# Patient Record
Sex: MALE | Race: WHITE | NOT HISPANIC OR LATINO | Employment: FULL TIME | URBAN - METROPOLITAN AREA
[De-identification: names, ages, dates, MRNs, and addresses within clinical notes are randomized per-mention and may not be internally consistent; named-entity substitution may affect disease eponyms.]

---

## 2017-01-24 ENCOUNTER — COMMUNICATION - HEALTHEAST (OUTPATIENT)
Dept: FAMILY MEDICINE | Facility: CLINIC | Age: 62
End: 2017-01-24

## 2017-01-24 DIAGNOSIS — E11.9 DIABETES MELLITUS, TYPE 2 (H): ICD-10-CM

## 2017-02-22 ENCOUNTER — COMMUNICATION - HEALTHEAST (OUTPATIENT)
Dept: FAMILY MEDICINE | Facility: CLINIC | Age: 62
End: 2017-02-22

## 2017-02-22 DIAGNOSIS — E11.9 TYPE 2 DIABETES MELLITUS (H): ICD-10-CM

## 2017-03-03 ENCOUNTER — OFFICE VISIT - HEALTHEAST (OUTPATIENT)
Dept: NURSING | Facility: CLINIC | Age: 62
End: 2017-03-03

## 2017-03-03 DIAGNOSIS — E66.9 OBESITY, UNSPECIFIED: ICD-10-CM

## 2017-03-03 DIAGNOSIS — E11.9 TYPE II OR UNSPECIFIED TYPE DIABETES MELLITUS WITHOUT MENTION OF COMPLICATION, NOT STATED AS UNCONTROLLED: ICD-10-CM

## 2017-03-03 DIAGNOSIS — E11.9 CONTROLLED TYPE 2 DIABETES MELLITUS WITHOUT COMPLICATION, WITH LONG-TERM CURRENT USE OF INSULIN (H): ICD-10-CM

## 2017-03-03 DIAGNOSIS — Z79.4 CONTROLLED TYPE 2 DIABETES MELLITUS WITHOUT COMPLICATION, WITH LONG-TERM CURRENT USE OF INSULIN (H): ICD-10-CM

## 2017-03-03 DIAGNOSIS — G40.309 GENERALIZED CONVULSIVE EPILEPSY (H): ICD-10-CM

## 2017-03-03 DIAGNOSIS — F32.9 MAJOR DEPRESSION, CHRONIC: ICD-10-CM

## 2017-03-03 LAB
CHOLEST SERPL-MCNC: 155 MG/DL
FASTING STATUS PATIENT QL REPORTED: NO
HBA1C MFR BLD: 8.5 % (ref 3.5–6)
HDLC SERPL-MCNC: 30 MG/DL
LDLC SERPL CALC-MCNC: 99 MG/DL
TRIGL SERPL-MCNC: 130 MG/DL

## 2017-03-05 ENCOUNTER — COMMUNICATION - HEALTHEAST (OUTPATIENT)
Dept: FAMILY MEDICINE | Facility: CLINIC | Age: 62
End: 2017-03-05

## 2017-03-06 ENCOUNTER — AMBULATORY - HEALTHEAST (OUTPATIENT)
Dept: PODIATRY | Facility: CLINIC | Age: 62
End: 2017-03-06

## 2017-03-06 DIAGNOSIS — L60.2 ONYCHAUXIS: ICD-10-CM

## 2017-03-06 DIAGNOSIS — M79.673 PAIN OF FOOT, UNSPECIFIED LATERALITY: ICD-10-CM

## 2017-03-06 DIAGNOSIS — E11.49 TYPE 2 DIABETES MELLITUS WITH NEUROLOGICAL MANIFESTATIONS (H): ICD-10-CM

## 2017-03-06 ASSESSMENT — MIFFLIN-ST. JEOR: SCORE: 2186.06

## 2017-03-19 ENCOUNTER — COMMUNICATION - HEALTHEAST (OUTPATIENT)
Dept: FAMILY MEDICINE | Facility: CLINIC | Age: 62
End: 2017-03-19

## 2017-03-19 DIAGNOSIS — E78.5 HYPERLIPIDEMIA: ICD-10-CM

## 2017-03-21 ENCOUNTER — COMMUNICATION - HEALTHEAST (OUTPATIENT)
Dept: FAMILY MEDICINE | Facility: CLINIC | Age: 62
End: 2017-03-21

## 2017-04-14 ENCOUNTER — OFFICE VISIT - HEALTHEAST (OUTPATIENT)
Dept: NURSING | Facility: CLINIC | Age: 62
End: 2017-04-14

## 2017-04-14 DIAGNOSIS — Z79.4 TYPE 2 DIABETES MELLITUS WITHOUT COMPLICATION, WITH LONG-TERM CURRENT USE OF INSULIN (H): ICD-10-CM

## 2017-04-14 DIAGNOSIS — G40.309 GENERALIZED CONVULSIVE EPILEPSY (H): ICD-10-CM

## 2017-04-14 DIAGNOSIS — E11.9 TYPE 2 DIABETES MELLITUS WITHOUT COMPLICATION, WITH LONG-TERM CURRENT USE OF INSULIN (H): ICD-10-CM

## 2017-05-02 ENCOUNTER — OFFICE VISIT - HEALTHEAST (OUTPATIENT)
Dept: EDUCATION SERVICES | Facility: CLINIC | Age: 62
End: 2017-05-02

## 2017-05-02 DIAGNOSIS — E11.9 CONTROLLED TYPE 2 DIABETES MELLITUS WITHOUT COMPLICATION, WITH LONG-TERM CURRENT USE OF INSULIN (H): ICD-10-CM

## 2017-05-02 DIAGNOSIS — E11.9 DIABETES (H): ICD-10-CM

## 2017-05-02 DIAGNOSIS — Z79.4 CONTROLLED TYPE 2 DIABETES MELLITUS WITHOUT COMPLICATION, WITH LONG-TERM CURRENT USE OF INSULIN (H): ICD-10-CM

## 2017-05-08 ENCOUNTER — COMMUNICATION - HEALTHEAST (OUTPATIENT)
Dept: FAMILY MEDICINE | Facility: CLINIC | Age: 62
End: 2017-05-08

## 2017-05-08 DIAGNOSIS — E11.9 DIABETES MELLITUS (H): ICD-10-CM

## 2017-05-14 ENCOUNTER — RECORDS - HEALTHEAST (OUTPATIENT)
Dept: ADMINISTRATIVE | Facility: OTHER | Age: 62
End: 2017-05-14

## 2017-06-14 ENCOUNTER — COMMUNICATION - HEALTHEAST (OUTPATIENT)
Dept: FAMILY MEDICINE | Facility: CLINIC | Age: 62
End: 2017-06-14

## 2017-06-14 DIAGNOSIS — E11.9 TYPE 2 DIABETES MELLITUS (H): ICD-10-CM

## 2017-06-23 ENCOUNTER — COMMUNICATION - HEALTHEAST (OUTPATIENT)
Dept: NURSING | Facility: CLINIC | Age: 62
End: 2017-06-23

## 2017-06-23 DIAGNOSIS — R56.9 SEIZURE (H): ICD-10-CM

## 2017-07-05 ENCOUNTER — COMMUNICATION - HEALTHEAST (OUTPATIENT)
Dept: FAMILY MEDICINE | Facility: CLINIC | Age: 62
End: 2017-07-05

## 2017-07-05 ENCOUNTER — AMBULATORY - HEALTHEAST (OUTPATIENT)
Dept: FAMILY MEDICINE | Facility: CLINIC | Age: 62
End: 2017-07-05

## 2017-07-05 DIAGNOSIS — E78.00 HYPERCHOLESTEREMIA: ICD-10-CM

## 2017-07-05 DIAGNOSIS — G40.309 GENERALIZED CONVULSIVE EPILEPSY (H): ICD-10-CM

## 2017-07-05 DIAGNOSIS — Z12.5 PROSTATE CANCER SCREENING: ICD-10-CM

## 2017-07-05 DIAGNOSIS — Z79.4 CONTROLLED TYPE 2 DIABETES MELLITUS WITHOUT COMPLICATION, WITH LONG-TERM CURRENT USE OF INSULIN (H): ICD-10-CM

## 2017-07-05 DIAGNOSIS — E11.9 CONTROLLED TYPE 2 DIABETES MELLITUS WITHOUT COMPLICATION, WITH LONG-TERM CURRENT USE OF INSULIN (H): ICD-10-CM

## 2017-07-13 ENCOUNTER — AMBULATORY - HEALTHEAST (OUTPATIENT)
Dept: LAB | Facility: CLINIC | Age: 62
End: 2017-07-13

## 2017-07-13 ENCOUNTER — COMMUNICATION - HEALTHEAST (OUTPATIENT)
Dept: FAMILY MEDICINE | Facility: CLINIC | Age: 62
End: 2017-07-13

## 2017-07-13 DIAGNOSIS — Z12.5 PROSTATE CANCER SCREENING: ICD-10-CM

## 2017-07-13 DIAGNOSIS — Z79.4 CONTROLLED TYPE 2 DIABETES MELLITUS WITHOUT COMPLICATION, WITH LONG-TERM CURRENT USE OF INSULIN (H): ICD-10-CM

## 2017-07-13 DIAGNOSIS — E78.00 HYPERCHOLESTEREMIA: ICD-10-CM

## 2017-07-13 DIAGNOSIS — G40.309 GENERALIZED CONVULSIVE EPILEPSY (H): ICD-10-CM

## 2017-07-13 DIAGNOSIS — E11.9 CONTROLLED TYPE 2 DIABETES MELLITUS WITHOUT COMPLICATION, WITH LONG-TERM CURRENT USE OF INSULIN (H): ICD-10-CM

## 2017-07-13 LAB
ALT SERPL W P-5'-P-CCNC: 28 U/L (ref 0–45)
HBA1C MFR BLD: 8 % (ref 3.5–6)
PSA SERPL-MCNC: 0.2 NG/ML (ref 0–4.5)

## 2017-08-28 ENCOUNTER — COMMUNICATION - HEALTHEAST (OUTPATIENT)
Dept: NURSING | Facility: CLINIC | Age: 62
End: 2017-08-28

## 2017-08-28 DIAGNOSIS — R56.9 SEIZURE (H): ICD-10-CM

## 2017-08-28 DIAGNOSIS — E11.9 DIABETES MELLITUS (H): ICD-10-CM

## 2017-10-03 ENCOUNTER — COMMUNICATION - HEALTHEAST (OUTPATIENT)
Dept: FAMILY MEDICINE | Facility: CLINIC | Age: 62
End: 2017-10-03

## 2017-10-03 DIAGNOSIS — E11.9 DIABETES (H): ICD-10-CM

## 2017-12-14 ENCOUNTER — RECORDS - HEALTHEAST (OUTPATIENT)
Dept: ADMINISTRATIVE | Facility: OTHER | Age: 62
End: 2017-12-14

## 2017-12-15 ENCOUNTER — COMMUNICATION - HEALTHEAST (OUTPATIENT)
Dept: FAMILY MEDICINE | Facility: CLINIC | Age: 62
End: 2017-12-15

## 2017-12-19 ENCOUNTER — COMMUNICATION - HEALTHEAST (OUTPATIENT)
Dept: NURSING | Facility: CLINIC | Age: 62
End: 2017-12-19

## 2017-12-19 DIAGNOSIS — R56.9 SEIZURE (H): ICD-10-CM

## 2017-12-31 ENCOUNTER — COMMUNICATION - HEALTHEAST (OUTPATIENT)
Dept: FAMILY MEDICINE | Facility: CLINIC | Age: 62
End: 2017-12-31

## 2017-12-31 DIAGNOSIS — F32.9 MAJOR DEPRESSION, CHRONIC: ICD-10-CM

## 2018-01-03 ENCOUNTER — RECORDS - HEALTHEAST (OUTPATIENT)
Dept: ADMINISTRATIVE | Facility: OTHER | Age: 63
End: 2018-01-03

## 2018-01-03 LAB
LAB AP CHARGES (HE HISTORICAL CONVERSION): NORMAL
PATH REPORT.COMMENTS IMP SPEC: NORMAL
PATH REPORT.FINAL DX SPEC: NORMAL
PATH REPORT.GROSS SPEC: NORMAL
PATH REPORT.MICROSCOPIC SPEC OTHER STN: NORMAL
PATH REPORT.RELEVANT HX SPEC: NORMAL
RESULT FLAG (HE HISTORICAL CONVERSION): NORMAL

## 2018-01-04 ENCOUNTER — OFFICE VISIT - HEALTHEAST (OUTPATIENT)
Dept: FAMILY MEDICINE | Facility: CLINIC | Age: 63
End: 2018-01-04

## 2018-01-04 DIAGNOSIS — E11.9 DIABETES (H): ICD-10-CM

## 2018-01-04 DIAGNOSIS — G62.9 PERIPHERAL NEUROPATHY: ICD-10-CM

## 2018-01-04 DIAGNOSIS — F32.9 MAJOR DEPRESSION, CHRONIC: ICD-10-CM

## 2018-01-04 DIAGNOSIS — E11.9 CONTROLLED TYPE 2 DIABETES MELLITUS WITHOUT COMPLICATION, WITH LONG-TERM CURRENT USE OF INSULIN (H): ICD-10-CM

## 2018-01-04 DIAGNOSIS — E78.00 HYPERCHOLESTEREMIA: ICD-10-CM

## 2018-01-04 DIAGNOSIS — Z23 NEED FOR VACCINATION: ICD-10-CM

## 2018-01-04 DIAGNOSIS — Z00.00 HEALTH CARE MAINTENANCE: ICD-10-CM

## 2018-01-04 DIAGNOSIS — Z79.4 CONTROLLED TYPE 2 DIABETES MELLITUS WITHOUT COMPLICATION, WITH LONG-TERM CURRENT USE OF INSULIN (H): ICD-10-CM

## 2018-01-04 DIAGNOSIS — Z12.11 COLON CANCER SCREENING: ICD-10-CM

## 2018-01-04 DIAGNOSIS — E66.9 OBESITY: ICD-10-CM

## 2018-01-04 LAB
ALBUMIN SERPL-MCNC: 3.7 G/DL (ref 3.5–5)
ALP SERPL-CCNC: 129 U/L (ref 45–120)
ALT SERPL W P-5'-P-CCNC: 31 U/L (ref 0–45)
ANION GAP SERPL CALCULATED.3IONS-SCNC: 11 MMOL/L (ref 5–18)
AST SERPL W P-5'-P-CCNC: 15 U/L (ref 0–40)
BILIRUB SERPL-MCNC: 0.3 MG/DL (ref 0–1)
BUN SERPL-MCNC: 23 MG/DL (ref 8–22)
CALCIUM SERPL-MCNC: 9 MG/DL (ref 8.5–10.5)
CHLORIDE BLD-SCNC: 108 MMOL/L (ref 98–107)
CHOLEST SERPL-MCNC: 166 MG/DL
CO2 SERPL-SCNC: 22 MMOL/L (ref 22–31)
CREAT SERPL-MCNC: 0.82 MG/DL (ref 0.7–1.3)
FASTING STATUS PATIENT QL REPORTED: NO
GFR SERPL CREATININE-BSD FRML MDRD: >60 ML/MIN/1.73M2
GLUCOSE BLD-MCNC: 267 MG/DL (ref 70–125)
HBA1C MFR BLD: 7.7 % (ref 3.5–6)
HDLC SERPL-MCNC: 42 MG/DL
LDLC SERPL CALC-MCNC: 96 MG/DL
POTASSIUM BLD-SCNC: 4.4 MMOL/L (ref 3.5–5)
PROT SERPL-MCNC: 6.9 G/DL (ref 6–8)
SODIUM SERPL-SCNC: 141 MMOL/L (ref 136–145)
TRIGL SERPL-MCNC: 142 MG/DL

## 2018-01-05 ENCOUNTER — COMMUNICATION - HEALTHEAST (OUTPATIENT)
Dept: FAMILY MEDICINE | Facility: CLINIC | Age: 63
End: 2018-01-05

## 2018-01-05 ENCOUNTER — COMMUNICATION - HEALTHEAST (OUTPATIENT)
Dept: SURGERY | Facility: CLINIC | Age: 63
End: 2018-01-05

## 2018-01-08 ENCOUNTER — AMBULATORY - HEALTHEAST (OUTPATIENT)
Dept: PODIATRY | Facility: CLINIC | Age: 63
End: 2018-01-08

## 2018-01-08 DIAGNOSIS — L60.2 ONYCHAUXIS: ICD-10-CM

## 2018-01-08 DIAGNOSIS — E11.49 TYPE 2 DIABETES MELLITUS WITH NEUROLOGICAL MANIFESTATIONS (H): ICD-10-CM

## 2018-01-08 DIAGNOSIS — M79.673 PAIN OF FOOT, UNSPECIFIED LATERALITY: ICD-10-CM

## 2018-01-08 ASSESSMENT — MIFFLIN-ST. JEOR: SCORE: 2226.89

## 2018-02-15 ENCOUNTER — OFFICE VISIT - HEALTHEAST (OUTPATIENT)
Dept: SURGERY | Facility: CLINIC | Age: 63
End: 2018-02-15

## 2018-02-15 DIAGNOSIS — F32.A DEPRESSION: ICD-10-CM

## 2018-02-15 DIAGNOSIS — E66.01 OBESITY, CLASS III, BMI 40-49.9 (MORBID OBESITY) (H): ICD-10-CM

## 2018-02-15 DIAGNOSIS — R03.0 ELEVATED BLOOD PRESSURE READING: ICD-10-CM

## 2018-02-15 DIAGNOSIS — E11.8 TYPE II DIABETES MELLITUS WITH COMPLICATION (H): ICD-10-CM

## 2018-02-15 DIAGNOSIS — M19.079 ANKLE ARTHRITIS: ICD-10-CM

## 2018-02-15 DIAGNOSIS — G47.33 OSA ON CPAP: ICD-10-CM

## 2018-02-15 ASSESSMENT — MIFFLIN-ST. JEOR: SCORE: 2204.28

## 2018-02-26 ENCOUNTER — COMMUNICATION - HEALTHEAST (OUTPATIENT)
Dept: FAMILY MEDICINE | Facility: CLINIC | Age: 63
End: 2018-02-26

## 2018-02-26 DIAGNOSIS — E11.9 TYPE 2 DIABETES MELLITUS (H): ICD-10-CM

## 2018-03-08 ENCOUNTER — COMMUNICATION - HEALTHEAST (OUTPATIENT)
Dept: FAMILY MEDICINE | Facility: CLINIC | Age: 63
End: 2018-03-08

## 2018-03-08 DIAGNOSIS — Z79.4 TYPE 2 DIABETES MELLITUS WITHOUT COMPLICATION, WITH LONG-TERM CURRENT USE OF INSULIN (H): ICD-10-CM

## 2018-03-08 DIAGNOSIS — E11.9 TYPE 2 DIABETES MELLITUS WITHOUT COMPLICATION, WITH LONG-TERM CURRENT USE OF INSULIN (H): ICD-10-CM

## 2018-03-15 ENCOUNTER — COMMUNICATION - HEALTHEAST (OUTPATIENT)
Dept: SURGERY | Facility: CLINIC | Age: 63
End: 2018-03-15

## 2018-03-15 ENCOUNTER — OFFICE VISIT - HEALTHEAST (OUTPATIENT)
Dept: SURGERY | Facility: CLINIC | Age: 63
End: 2018-03-15

## 2018-03-15 DIAGNOSIS — Z71.3 DIETARY COUNSELING: ICD-10-CM

## 2018-03-15 DIAGNOSIS — E66.01 OBESITY, MORBID, BMI 40.0-49.9 (H): ICD-10-CM

## 2018-03-15 ASSESSMENT — MIFFLIN-ST. JEOR: SCORE: 2195.2

## 2018-03-26 ENCOUNTER — OFFICE VISIT - HEALTHEAST (OUTPATIENT)
Dept: SURGERY | Facility: CLINIC | Age: 63
End: 2018-03-26

## 2018-03-26 ENCOUNTER — AMBULATORY - HEALTHEAST (OUTPATIENT)
Dept: PODIATRY | Facility: CLINIC | Age: 63
End: 2018-03-26

## 2018-03-26 DIAGNOSIS — M79.673 PAIN OF FOOT, UNSPECIFIED LATERALITY: ICD-10-CM

## 2018-03-26 DIAGNOSIS — E11.49 TYPE 2 DIABETES MELLITUS WITH NEUROLOGICAL MANIFESTATIONS (H): ICD-10-CM

## 2018-03-26 DIAGNOSIS — L60.2 ONYCHAUXIS: ICD-10-CM

## 2018-03-26 DIAGNOSIS — E66.01 MORBID OBESITY (H): ICD-10-CM

## 2018-04-04 ENCOUNTER — AMBULATORY - HEALTHEAST (OUTPATIENT)
Dept: SURGERY | Facility: CLINIC | Age: 63
End: 2018-04-04

## 2018-04-16 ENCOUNTER — OFFICE VISIT - HEALTHEAST (OUTPATIENT)
Dept: SURGERY | Facility: CLINIC | Age: 63
End: 2018-04-16

## 2018-04-16 DIAGNOSIS — E66.01 MORBID OBESITY (H): ICD-10-CM

## 2018-04-17 ENCOUNTER — OFFICE VISIT - HEALTHEAST (OUTPATIENT)
Dept: SURGERY | Facility: CLINIC | Age: 63
End: 2018-04-17

## 2018-04-17 DIAGNOSIS — Z71.3 DIETARY COUNSELING: ICD-10-CM

## 2018-04-17 DIAGNOSIS — E11.9 CONTROLLED TYPE 2 DIABETES MELLITUS WITHOUT COMPLICATION, WITH LONG-TERM CURRENT USE OF INSULIN (H): ICD-10-CM

## 2018-04-17 DIAGNOSIS — Z79.4 CONTROLLED TYPE 2 DIABETES MELLITUS WITHOUT COMPLICATION, WITH LONG-TERM CURRENT USE OF INSULIN (H): ICD-10-CM

## 2018-04-17 DIAGNOSIS — E66.01 OBESITY, MORBID, BMI 40.0-49.9 (H): ICD-10-CM

## 2018-04-17 ASSESSMENT — MIFFLIN-ST. JEOR: SCORE: 2190.67

## 2018-04-26 ENCOUNTER — COMMUNICATION - HEALTHEAST (OUTPATIENT)
Dept: FAMILY MEDICINE | Facility: CLINIC | Age: 63
End: 2018-04-26

## 2018-04-26 DIAGNOSIS — F32.9 MAJOR DEPRESSION, CHRONIC: ICD-10-CM

## 2018-05-02 ENCOUNTER — COMMUNICATION - HEALTHEAST (OUTPATIENT)
Dept: FAMILY MEDICINE | Facility: CLINIC | Age: 63
End: 2018-05-02

## 2018-05-02 DIAGNOSIS — R56.9 SEIZURE (H): ICD-10-CM

## 2018-05-22 ENCOUNTER — COMMUNICATION - HEALTHEAST (OUTPATIENT)
Dept: FAMILY MEDICINE | Facility: CLINIC | Age: 63
End: 2018-05-22

## 2018-05-24 ENCOUNTER — COMMUNICATION - HEALTHEAST (OUTPATIENT)
Dept: FAMILY MEDICINE | Facility: CLINIC | Age: 63
End: 2018-05-24

## 2018-05-24 DIAGNOSIS — E11.9 TYPE 2 DIABETES MELLITUS WITHOUT COMPLICATION, WITH LONG-TERM CURRENT USE OF INSULIN (H): ICD-10-CM

## 2018-05-24 DIAGNOSIS — Z79.4 TYPE 2 DIABETES MELLITUS WITHOUT COMPLICATION, WITH LONG-TERM CURRENT USE OF INSULIN (H): ICD-10-CM

## 2018-06-26 ENCOUNTER — COMMUNICATION - HEALTHEAST (OUTPATIENT)
Dept: FAMILY MEDICINE | Facility: CLINIC | Age: 63
End: 2018-06-26

## 2018-06-26 DIAGNOSIS — E78.5 HYPERLIPIDEMIA: ICD-10-CM

## 2018-07-03 ENCOUNTER — OFFICE VISIT - HEALTHEAST (OUTPATIENT)
Dept: FAMILY MEDICINE | Facility: CLINIC | Age: 63
End: 2018-07-03

## 2018-07-03 DIAGNOSIS — E66.9 OBESITY: ICD-10-CM

## 2018-07-03 DIAGNOSIS — G40.309 GENERALIZED CONVULSIVE EPILEPSY (H): ICD-10-CM

## 2018-07-03 DIAGNOSIS — Z79.4 CONTROLLED TYPE 2 DIABETES MELLITUS WITHOUT COMPLICATION, WITH LONG-TERM CURRENT USE OF INSULIN (H): ICD-10-CM

## 2018-07-03 DIAGNOSIS — E11.9 CONTROLLED TYPE 2 DIABETES MELLITUS WITHOUT COMPLICATION, WITH LONG-TERM CURRENT USE OF INSULIN (H): ICD-10-CM

## 2018-07-03 DIAGNOSIS — E78.00 HYPERCHOLESTEREMIA: ICD-10-CM

## 2018-07-03 DIAGNOSIS — Z12.11 COLON CANCER SCREENING: ICD-10-CM

## 2018-07-03 DIAGNOSIS — F32.9 MAJOR DEPRESSION, CHRONIC: ICD-10-CM

## 2018-07-03 DIAGNOSIS — Z12.5 PROSTATE CANCER SCREENING: ICD-10-CM

## 2018-07-03 DIAGNOSIS — E11.9 TYPE 2 DIABETES MELLITUS (H): ICD-10-CM

## 2018-07-03 LAB
CREAT UR-MCNC: 360.6 MG/DL
HBA1C MFR BLD: 11.7 % (ref 3.5–6)
MICROALBUMIN UR-MCNC: 6.77 MG/DL (ref 0–1.99)
MICROALBUMIN/CREAT UR: 18.8 MG/G
PHENYTOIN SERPL-MCNC: 9.4 UG/ML (ref 10–20)

## 2018-07-05 ENCOUNTER — COMMUNICATION - HEALTHEAST (OUTPATIENT)
Dept: FAMILY MEDICINE | Facility: CLINIC | Age: 63
End: 2018-07-05

## 2018-07-09 ENCOUNTER — RECORDS - HEALTHEAST (OUTPATIENT)
Dept: ADMINISTRATIVE | Facility: OTHER | Age: 63
End: 2018-07-09

## 2018-07-09 ENCOUNTER — COMMUNICATION - HEALTHEAST (OUTPATIENT)
Dept: FAMILY MEDICINE | Facility: CLINIC | Age: 63
End: 2018-07-09

## 2018-07-10 ENCOUNTER — AMBULATORY - HEALTHEAST (OUTPATIENT)
Dept: NURSING | Facility: CLINIC | Age: 63
End: 2018-07-10

## 2018-07-10 DIAGNOSIS — I10 HYPERTENSION: ICD-10-CM

## 2018-07-30 ENCOUNTER — COMMUNICATION - HEALTHEAST (OUTPATIENT)
Dept: FAMILY MEDICINE | Facility: CLINIC | Age: 63
End: 2018-07-30

## 2018-07-30 DIAGNOSIS — R56.9 SEIZURE (H): ICD-10-CM

## 2018-08-09 ENCOUNTER — OFFICE VISIT - HEALTHEAST (OUTPATIENT)
Dept: FAMILY MEDICINE | Facility: CLINIC | Age: 63
End: 2018-08-09

## 2018-08-09 DIAGNOSIS — R05.9 COUGH: ICD-10-CM

## 2018-08-09 DIAGNOSIS — L08.9 INFECTED SEBACEOUS CYST: ICD-10-CM

## 2018-08-09 DIAGNOSIS — L72.3 INFECTED SEBACEOUS CYST: ICD-10-CM

## 2018-08-20 ENCOUNTER — COMMUNICATION - HEALTHEAST (OUTPATIENT)
Dept: FAMILY MEDICINE | Facility: CLINIC | Age: 63
End: 2018-08-20

## 2018-08-20 DIAGNOSIS — E11.9 TYPE 2 DIABETES MELLITUS (H): ICD-10-CM

## 2018-08-28 ENCOUNTER — COMMUNICATION - HEALTHEAST (OUTPATIENT)
Dept: TELEHEALTH | Facility: CLINIC | Age: 63
End: 2018-08-28

## 2018-09-27 ENCOUNTER — COMMUNICATION - HEALTHEAST (OUTPATIENT)
Dept: FAMILY MEDICINE | Facility: CLINIC | Age: 63
End: 2018-09-27

## 2018-09-27 ENCOUNTER — OFFICE VISIT - HEALTHEAST (OUTPATIENT)
Dept: FAMILY MEDICINE | Facility: CLINIC | Age: 63
End: 2018-09-27

## 2018-09-27 DIAGNOSIS — E11.9 DIABETES MELLITUS, TYPE 2 (H): ICD-10-CM

## 2018-09-27 DIAGNOSIS — J02.9 SORE THROAT: ICD-10-CM

## 2018-09-27 DIAGNOSIS — Z79.4 TYPE 2 DIABETES MELLITUS WITHOUT COMPLICATION, WITH LONG-TERM CURRENT USE OF INSULIN (H): ICD-10-CM

## 2018-09-27 DIAGNOSIS — E11.9 TYPE 2 DIABETES MELLITUS WITHOUT COMPLICATION, WITH LONG-TERM CURRENT USE OF INSULIN (H): ICD-10-CM

## 2018-09-27 DIAGNOSIS — J02.0 STREP THROAT: ICD-10-CM

## 2018-09-27 DIAGNOSIS — Z00.00 HEALTH CARE MAINTENANCE: ICD-10-CM

## 2018-09-27 DIAGNOSIS — Z12.5 PROSTATE CANCER SCREENING: ICD-10-CM

## 2018-09-27 LAB
DEPRECATED S PYO AG THROAT QL EIA: ABNORMAL
HBA1C MFR BLD: 8.1 % (ref 3.5–6)
PSA SERPL-MCNC: 0.2 NG/ML (ref 0–4.5)

## 2018-10-01 ENCOUNTER — OFFICE VISIT - HEALTHEAST (OUTPATIENT)
Dept: FAMILY MEDICINE | Facility: CLINIC | Age: 63
End: 2018-10-01

## 2018-10-01 ENCOUNTER — RECORDS - HEALTHEAST (OUTPATIENT)
Dept: GENERAL RADIOLOGY | Facility: CLINIC | Age: 63
End: 2018-10-01

## 2018-10-01 ENCOUNTER — COMMUNICATION - HEALTHEAST (OUTPATIENT)
Dept: SCHEDULING | Facility: CLINIC | Age: 63
End: 2018-10-01

## 2018-10-01 DIAGNOSIS — R06.00 DYSPNEA: ICD-10-CM

## 2018-10-01 DIAGNOSIS — R05.9 COUGH: ICD-10-CM

## 2018-10-01 DIAGNOSIS — G47.33 OSA ON CPAP: ICD-10-CM

## 2018-10-01 DIAGNOSIS — R06.00 DYSPNEA, UNSPECIFIED: ICD-10-CM

## 2018-10-01 LAB
BASOPHILS # BLD AUTO: 0 THOU/UL (ref 0–0.2)
BASOPHILS NFR BLD AUTO: 0 % (ref 0–2)
BNP SERPL-MCNC: 23 PG/ML (ref 0–55)
EOSINOPHIL # BLD AUTO: 0 THOU/UL (ref 0–0.4)
EOSINOPHIL NFR BLD AUTO: 0 % (ref 0–6)
ERYTHROCYTE [DISTWIDTH] IN BLOOD BY AUTOMATED COUNT: 12.6 % (ref 11–14.5)
HCT VFR BLD AUTO: 40.3 % (ref 40–54)
HGB BLD-MCNC: 13.4 G/DL (ref 14–18)
LYMPHOCYTES # BLD AUTO: 2 THOU/UL (ref 0.8–4.4)
LYMPHOCYTES NFR BLD AUTO: 15 % (ref 20–40)
MCH RBC QN AUTO: 29 PG (ref 27–34)
MCHC RBC AUTO-ENTMCNC: 33.3 G/DL (ref 32–36)
MCV RBC AUTO: 87 FL (ref 80–100)
MONOCYTES # BLD AUTO: 1 THOU/UL (ref 0–0.9)
MONOCYTES NFR BLD AUTO: 7 % (ref 2–10)
NEUTROPHILS # BLD AUTO: 10.5 THOU/UL (ref 2–7.7)
NEUTROPHILS NFR BLD AUTO: 78 % (ref 50–70)
PLATELET # BLD AUTO: 274 THOU/UL (ref 140–440)
PMV BLD AUTO: 10.5 FL (ref 8.5–12.5)
RBC # BLD AUTO: 4.62 MILL/UL (ref 4.4–6.2)
WBC: 13.6 THOU/UL (ref 4–11)

## 2018-10-02 ENCOUNTER — COMMUNICATION - HEALTHEAST (OUTPATIENT)
Dept: FAMILY MEDICINE | Facility: CLINIC | Age: 63
End: 2018-10-02

## 2018-11-05 ENCOUNTER — COMMUNICATION - HEALTHEAST (OUTPATIENT)
Dept: FAMILY MEDICINE | Facility: CLINIC | Age: 63
End: 2018-11-05

## 2018-11-05 DIAGNOSIS — E11.9 DIABETES (H): ICD-10-CM

## 2018-11-05 DIAGNOSIS — R56.9 SEIZURE (H): ICD-10-CM

## 2019-01-08 ENCOUNTER — COMMUNICATION - HEALTHEAST (OUTPATIENT)
Dept: FAMILY MEDICINE | Facility: CLINIC | Age: 64
End: 2019-01-08

## 2019-01-11 ENCOUNTER — RECORDS - HEALTHEAST (OUTPATIENT)
Dept: ADMINISTRATIVE | Facility: OTHER | Age: 64
End: 2019-01-11

## 2019-02-07 ENCOUNTER — COMMUNICATION - HEALTHEAST (OUTPATIENT)
Dept: FAMILY MEDICINE | Facility: CLINIC | Age: 64
End: 2019-02-07

## 2019-02-07 DIAGNOSIS — R56.9 SEIZURE (H): ICD-10-CM

## 2019-02-11 ENCOUNTER — COMMUNICATION - HEALTHEAST (OUTPATIENT)
Dept: FAMILY MEDICINE | Facility: CLINIC | Age: 64
End: 2019-02-11

## 2019-02-11 DIAGNOSIS — E11.9 DIABETES (H): ICD-10-CM

## 2019-02-12 ENCOUNTER — COMMUNICATION - HEALTHEAST (OUTPATIENT)
Dept: FAMILY MEDICINE | Facility: CLINIC | Age: 64
End: 2019-02-12

## 2019-02-12 ENCOUNTER — AMBULATORY - HEALTHEAST (OUTPATIENT)
Dept: FAMILY MEDICINE | Facility: CLINIC | Age: 64
End: 2019-02-12

## 2019-02-14 ENCOUNTER — COMMUNICATION - HEALTHEAST (OUTPATIENT)
Dept: FAMILY MEDICINE | Facility: CLINIC | Age: 64
End: 2019-02-14

## 2019-02-14 ENCOUNTER — OFFICE VISIT - HEALTHEAST (OUTPATIENT)
Dept: FAMILY MEDICINE | Facility: CLINIC | Age: 64
End: 2019-02-14

## 2019-02-14 DIAGNOSIS — E11.9 DIABETES MELLITUS, TYPE 2 (H): ICD-10-CM

## 2019-02-14 DIAGNOSIS — E78.00 HYPERCHOLESTEREMIA: ICD-10-CM

## 2019-02-14 DIAGNOSIS — G63 POLYNEUROPATHY ASSOCIATED WITH UNDERLYING DISEASE (H): ICD-10-CM

## 2019-02-14 DIAGNOSIS — F32.9 MAJOR DEPRESSION, CHRONIC: ICD-10-CM

## 2019-02-14 LAB
ALBUMIN SERPL-MCNC: 3.9 G/DL (ref 3.5–5)
ALP SERPL-CCNC: 157 U/L (ref 45–120)
ALT SERPL W P-5'-P-CCNC: 20 U/L (ref 0–45)
ANION GAP SERPL CALCULATED.3IONS-SCNC: 14 MMOL/L (ref 5–18)
AST SERPL W P-5'-P-CCNC: 12 U/L (ref 0–40)
BILIRUB SERPL-MCNC: 0.7 MG/DL (ref 0–1)
BUN SERPL-MCNC: 21 MG/DL (ref 8–22)
CALCIUM SERPL-MCNC: 9.4 MG/DL (ref 8.5–10.5)
CHLORIDE BLD-SCNC: 103 MMOL/L (ref 98–107)
CHOLEST SERPL-MCNC: 201 MG/DL
CO2 SERPL-SCNC: 22 MMOL/L (ref 22–31)
CREAT SERPL-MCNC: 0.9 MG/DL (ref 0.7–1.3)
FASTING STATUS PATIENT QL REPORTED: ABNORMAL
GFR SERPL CREATININE-BSD FRML MDRD: >60 ML/MIN/1.73M2
GLUCOSE BLD-MCNC: 304 MG/DL (ref 70–125)
HBA1C MFR BLD: 10.9 % (ref 3.5–6)
HDLC SERPL-MCNC: 42 MG/DL
LDLC SERPL CALC-MCNC: 131 MG/DL
POTASSIUM BLD-SCNC: 4.5 MMOL/L (ref 3.5–5)
PROT SERPL-MCNC: 6.8 G/DL (ref 6–8)
SODIUM SERPL-SCNC: 139 MMOL/L (ref 136–145)
TRIGL SERPL-MCNC: 138 MG/DL

## 2019-02-16 ENCOUNTER — COMMUNICATION - HEALTHEAST (OUTPATIENT)
Dept: FAMILY MEDICINE | Facility: CLINIC | Age: 64
End: 2019-02-16

## 2019-03-22 ENCOUNTER — RECORDS - HEALTHEAST (OUTPATIENT)
Dept: HEALTH INFORMATION MANAGEMENT | Facility: CLINIC | Age: 64
End: 2019-03-22

## 2019-04-05 ENCOUNTER — COMMUNICATION - HEALTHEAST (OUTPATIENT)
Dept: FAMILY MEDICINE | Facility: CLINIC | Age: 64
End: 2019-04-05

## 2019-04-05 DIAGNOSIS — E11.9 TYPE 2 DIABETES MELLITUS (H): ICD-10-CM

## 2019-04-09 ENCOUNTER — COMMUNICATION - HEALTHEAST (OUTPATIENT)
Dept: FAMILY MEDICINE | Facility: CLINIC | Age: 64
End: 2019-04-09

## 2019-04-09 DIAGNOSIS — F32.9 MAJOR DEPRESSION, CHRONIC: ICD-10-CM

## 2019-04-23 ENCOUNTER — COMMUNICATION - HEALTHEAST (OUTPATIENT)
Dept: FAMILY MEDICINE | Facility: CLINIC | Age: 64
End: 2019-04-23

## 2019-04-26 ENCOUNTER — COMMUNICATION - HEALTHEAST (OUTPATIENT)
Dept: FAMILY MEDICINE | Facility: CLINIC | Age: 64
End: 2019-04-26

## 2019-05-01 ENCOUNTER — COMMUNICATION - HEALTHEAST (OUTPATIENT)
Dept: FAMILY MEDICINE | Facility: CLINIC | Age: 64
End: 2019-05-01

## 2019-05-01 DIAGNOSIS — R56.9 SEIZURE (H): ICD-10-CM

## 2019-05-23 ENCOUNTER — COMMUNICATION - HEALTHEAST (OUTPATIENT)
Dept: FAMILY MEDICINE | Facility: CLINIC | Age: 64
End: 2019-05-23

## 2019-05-23 ENCOUNTER — OFFICE VISIT - HEALTHEAST (OUTPATIENT)
Dept: FAMILY MEDICINE | Facility: CLINIC | Age: 64
End: 2019-05-23

## 2019-05-23 DIAGNOSIS — E78.5 HYPERLIPIDEMIA: ICD-10-CM

## 2019-05-23 DIAGNOSIS — Z79.4 TYPE 2 DIABETES MELLITUS WITHOUT COMPLICATION, WITH LONG-TERM CURRENT USE OF INSULIN (H): ICD-10-CM

## 2019-05-23 DIAGNOSIS — E11.9 TYPE 2 DIABETES MELLITUS WITHOUT COMPLICATION, WITH LONG-TERM CURRENT USE OF INSULIN (H): ICD-10-CM

## 2019-05-23 DIAGNOSIS — G40.309 GENERALIZED CONVULSIVE EPILEPSY (H): ICD-10-CM

## 2019-05-23 LAB
ALBUMIN SERPL-MCNC: 3.9 G/DL (ref 3.5–5)
ALP SERPL-CCNC: 121 U/L (ref 45–120)
ALT SERPL W P-5'-P-CCNC: 19 U/L (ref 0–45)
ANION GAP SERPL CALCULATED.3IONS-SCNC: 10 MMOL/L (ref 5–18)
AST SERPL W P-5'-P-CCNC: 13 U/L (ref 0–40)
BILIRUB SERPL-MCNC: 0.3 MG/DL (ref 0–1)
BUN SERPL-MCNC: 21 MG/DL (ref 8–22)
CALCIUM SERPL-MCNC: 9.4 MG/DL (ref 8.5–10.5)
CHLORIDE BLD-SCNC: 106 MMOL/L (ref 98–107)
CHOLEST SERPL-MCNC: 167 MG/DL
CO2 SERPL-SCNC: 24 MMOL/L (ref 22–31)
CREAT SERPL-MCNC: 0.77 MG/DL (ref 0.7–1.3)
FASTING STATUS PATIENT QL REPORTED: YES
GFR SERPL CREATININE-BSD FRML MDRD: >60 ML/MIN/1.73M2
GLUCOSE BLD-MCNC: 167 MG/DL (ref 70–125)
HBA1C MFR BLD: 8 % (ref 3.5–6)
HDLC SERPL-MCNC: 43 MG/DL
LDLC SERPL CALC-MCNC: 105 MG/DL
PHENYTOIN SERPL-MCNC: 9.6 UG/ML (ref 10–20)
POTASSIUM BLD-SCNC: 4.4 MMOL/L (ref 3.5–5)
PROT SERPL-MCNC: 6.7 G/DL (ref 6–8)
SODIUM SERPL-SCNC: 140 MMOL/L (ref 136–145)
TRIGL SERPL-MCNC: 97 MG/DL

## 2019-07-30 ENCOUNTER — COMMUNICATION - HEALTHEAST (OUTPATIENT)
Dept: FAMILY MEDICINE | Facility: CLINIC | Age: 64
End: 2019-07-30

## 2019-07-30 DIAGNOSIS — R56.9 SEIZURE (H): ICD-10-CM

## 2019-08-05 ENCOUNTER — COMMUNICATION - HEALTHEAST (OUTPATIENT)
Dept: FAMILY MEDICINE | Facility: CLINIC | Age: 64
End: 2019-08-05

## 2019-08-05 DIAGNOSIS — Z79.4 TYPE 2 DIABETES MELLITUS WITHOUT COMPLICATION, WITH LONG-TERM CURRENT USE OF INSULIN (H): ICD-10-CM

## 2019-08-05 DIAGNOSIS — E11.9 TYPE 2 DIABETES MELLITUS WITHOUT COMPLICATION, WITH LONG-TERM CURRENT USE OF INSULIN (H): ICD-10-CM

## 2019-08-12 ENCOUNTER — COMMUNICATION - HEALTHEAST (OUTPATIENT)
Dept: FAMILY MEDICINE | Facility: CLINIC | Age: 64
End: 2019-08-12

## 2019-10-28 ENCOUNTER — COMMUNICATION - HEALTHEAST (OUTPATIENT)
Dept: FAMILY MEDICINE | Facility: CLINIC | Age: 64
End: 2019-10-28

## 2019-10-28 DIAGNOSIS — E11.9 TYPE 2 DIABETES MELLITUS WITHOUT COMPLICATION, WITH LONG-TERM CURRENT USE OF INSULIN (H): ICD-10-CM

## 2019-10-28 DIAGNOSIS — R56.9 SEIZURE (H): ICD-10-CM

## 2019-10-28 DIAGNOSIS — Z79.4 TYPE 2 DIABETES MELLITUS WITHOUT COMPLICATION, WITH LONG-TERM CURRENT USE OF INSULIN (H): ICD-10-CM

## 2019-10-29 ENCOUNTER — COMMUNICATION - HEALTHEAST (OUTPATIENT)
Dept: FAMILY MEDICINE | Facility: CLINIC | Age: 64
End: 2019-10-29

## 2019-10-29 DIAGNOSIS — E11.9 TYPE 2 DIABETES MELLITUS WITHOUT COMPLICATION, WITH LONG-TERM CURRENT USE OF INSULIN (H): ICD-10-CM

## 2019-10-29 DIAGNOSIS — Z79.4 TYPE 2 DIABETES MELLITUS WITHOUT COMPLICATION, WITH LONG-TERM CURRENT USE OF INSULIN (H): ICD-10-CM

## 2019-10-29 DIAGNOSIS — R56.9 SEIZURE (H): ICD-10-CM

## 2019-11-21 ENCOUNTER — COMMUNICATION - HEALTHEAST (OUTPATIENT)
Dept: FAMILY MEDICINE | Facility: CLINIC | Age: 64
End: 2019-11-21

## 2019-11-21 DIAGNOSIS — Z79.4 TYPE 2 DIABETES MELLITUS WITHOUT COMPLICATION, WITH LONG-TERM CURRENT USE OF INSULIN (H): ICD-10-CM

## 2019-11-21 DIAGNOSIS — E11.9 TYPE 2 DIABETES MELLITUS WITHOUT COMPLICATION, WITH LONG-TERM CURRENT USE OF INSULIN (H): ICD-10-CM

## 2019-12-12 ENCOUNTER — OFFICE VISIT - HEALTHEAST (OUTPATIENT)
Dept: FAMILY MEDICINE | Facility: CLINIC | Age: 64
End: 2019-12-12

## 2019-12-12 DIAGNOSIS — Z79.4 TYPE 2 DIABETES MELLITUS WITHOUT COMPLICATION, WITH LONG-TERM CURRENT USE OF INSULIN (H): ICD-10-CM

## 2019-12-12 DIAGNOSIS — E11.9 TYPE 2 DIABETES MELLITUS WITHOUT COMPLICATION, WITH LONG-TERM CURRENT USE OF INSULIN (H): ICD-10-CM

## 2019-12-12 DIAGNOSIS — G63 POLYNEUROPATHY ASSOCIATED WITH UNDERLYING DISEASE (H): ICD-10-CM

## 2019-12-12 DIAGNOSIS — B37.2 YEAST DERMATITIS: ICD-10-CM

## 2019-12-12 DIAGNOSIS — E11.9 TYPE 2 DIABETES MELLITUS WITHOUT COMPLICATION, WITHOUT LONG-TERM CURRENT USE OF INSULIN (H): ICD-10-CM

## 2019-12-12 DIAGNOSIS — E78.00 HYPERCHOLESTEREMIA: ICD-10-CM

## 2019-12-12 DIAGNOSIS — Z12.5 SCREENING PSA (PROSTATE SPECIFIC ANTIGEN): ICD-10-CM

## 2019-12-12 DIAGNOSIS — G47.33 OSA ON CPAP: ICD-10-CM

## 2019-12-12 DIAGNOSIS — F32.9 MAJOR DEPRESSION, CHRONIC: ICD-10-CM

## 2019-12-12 LAB
CREAT UR-MCNC: 267.4 MG/DL
HBA1C MFR BLD: 9.7 % (ref 3.5–6)
MICROALBUMIN UR-MCNC: 21.79 MG/DL (ref 0–1.99)
MICROALBUMIN/CREAT UR: 81.5 MG/G
PSA SERPL-MCNC: 0.2 NG/ML (ref 0–4.5)

## 2019-12-12 ASSESSMENT — MIFFLIN-ST. JEOR: SCORE: 2138.21

## 2019-12-12 ASSESSMENT — PATIENT HEALTH QUESTIONNAIRE - PHQ9: SUM OF ALL RESPONSES TO PHQ QUESTIONS 1-9: 19

## 2019-12-13 ENCOUNTER — COMMUNICATION - HEALTHEAST (OUTPATIENT)
Dept: FAMILY MEDICINE | Facility: CLINIC | Age: 64
End: 2019-12-13

## 2020-01-02 ENCOUNTER — COMMUNICATION - HEALTHEAST (OUTPATIENT)
Dept: FAMILY MEDICINE | Facility: CLINIC | Age: 65
End: 2020-01-02

## 2020-01-02 DIAGNOSIS — E11.9 DIABETES (H): ICD-10-CM

## 2020-01-10 ENCOUNTER — RECORDS - HEALTHEAST (OUTPATIENT)
Dept: ADMINISTRATIVE | Facility: OTHER | Age: 65
End: 2020-01-10

## 2020-01-13 ENCOUNTER — COMMUNICATION - HEALTHEAST (OUTPATIENT)
Dept: FAMILY MEDICINE | Facility: CLINIC | Age: 65
End: 2020-01-13

## 2020-01-27 ENCOUNTER — COMMUNICATION - HEALTHEAST (OUTPATIENT)
Dept: FAMILY MEDICINE | Facility: CLINIC | Age: 65
End: 2020-01-27

## 2020-01-27 DIAGNOSIS — R56.9 SEIZURE (H): ICD-10-CM

## 2020-01-28 ENCOUNTER — OFFICE VISIT - HEALTHEAST (OUTPATIENT)
Dept: FAMILY MEDICINE | Facility: CLINIC | Age: 65
End: 2020-01-28

## 2020-01-28 DIAGNOSIS — E11.9 DIABETES MELLITUS (H): ICD-10-CM

## 2020-01-28 DIAGNOSIS — G47.33 OSA ON CPAP: ICD-10-CM

## 2020-01-28 DIAGNOSIS — F32.9 MAJOR DEPRESSION, CHRONIC: ICD-10-CM

## 2020-01-28 DIAGNOSIS — G63 POLYNEUROPATHY ASSOCIATED WITH UNDERLYING DISEASE (H): ICD-10-CM

## 2020-01-28 RX ORDER — GLUCOSAMINE HCL/CHONDROITIN SU 500-400 MG
3 CAPSULE ORAL PRN
Status: SHIPPED
Start: 2020-01-28 | End: 2021-12-22

## 2020-01-28 ASSESSMENT — PATIENT HEALTH QUESTIONNAIRE - PHQ9: SUM OF ALL RESPONSES TO PHQ QUESTIONS 1-9: 1

## 2020-02-21 ENCOUNTER — COMMUNICATION - HEALTHEAST (OUTPATIENT)
Dept: FAMILY MEDICINE | Facility: CLINIC | Age: 65
End: 2020-02-21

## 2020-02-21 DIAGNOSIS — F32.9 MAJOR DEPRESSION, CHRONIC: ICD-10-CM

## 2020-02-24 ENCOUNTER — COMMUNICATION - HEALTHEAST (OUTPATIENT)
Dept: FAMILY MEDICINE | Facility: CLINIC | Age: 65
End: 2020-02-24

## 2020-02-24 DIAGNOSIS — E78.5 HYPERLIPIDEMIA: ICD-10-CM

## 2020-04-22 ENCOUNTER — COMMUNICATION - HEALTHEAST (OUTPATIENT)
Dept: FAMILY MEDICINE | Facility: CLINIC | Age: 65
End: 2020-04-22

## 2020-04-22 DIAGNOSIS — R56.9 SEIZURE (H): ICD-10-CM

## 2020-04-24 ENCOUNTER — OFFICE VISIT - HEALTHEAST (OUTPATIENT)
Dept: FAMILY MEDICINE | Facility: CLINIC | Age: 65
End: 2020-04-24

## 2020-04-24 ENCOUNTER — COMMUNICATION - HEALTHEAST (OUTPATIENT)
Dept: FAMILY MEDICINE | Facility: CLINIC | Age: 65
End: 2020-04-24

## 2020-04-24 DIAGNOSIS — E78.00 HYPERCHOLESTEREMIA: ICD-10-CM

## 2020-04-24 DIAGNOSIS — G40.309 GENERALIZED CONVULSIVE EPILEPSY (H): ICD-10-CM

## 2020-04-24 DIAGNOSIS — E11.9 TYPE 2 DIABETES MELLITUS WITHOUT COMPLICATION, WITHOUT LONG-TERM CURRENT USE OF INSULIN (H): ICD-10-CM

## 2020-04-24 DIAGNOSIS — Z00.00 HEALTH CARE MAINTENANCE: ICD-10-CM

## 2020-05-10 ENCOUNTER — COMMUNICATION - HEALTHEAST (OUTPATIENT)
Dept: FAMILY MEDICINE | Facility: CLINIC | Age: 65
End: 2020-05-10

## 2020-05-10 DIAGNOSIS — E11.9 TYPE 2 DIABETES MELLITUS (H): ICD-10-CM

## 2020-06-04 ENCOUNTER — COMMUNICATION - HEALTHEAST (OUTPATIENT)
Dept: FAMILY MEDICINE | Facility: CLINIC | Age: 65
End: 2020-06-04

## 2020-06-04 DIAGNOSIS — E11.9 TYPE 2 DIABETES MELLITUS WITHOUT COMPLICATION, WITH LONG-TERM CURRENT USE OF INSULIN (H): ICD-10-CM

## 2020-06-04 DIAGNOSIS — Z79.4 TYPE 2 DIABETES MELLITUS WITHOUT COMPLICATION, WITH LONG-TERM CURRENT USE OF INSULIN (H): ICD-10-CM

## 2020-06-10 ENCOUNTER — COMMUNICATION - HEALTHEAST (OUTPATIENT)
Dept: FAMILY MEDICINE | Facility: CLINIC | Age: 65
End: 2020-06-10

## 2020-06-10 DIAGNOSIS — Z23 NEED FOR TD VACCINE: ICD-10-CM

## 2020-06-10 DIAGNOSIS — Z23 NEED FOR SHINGLES VACCINE: ICD-10-CM

## 2020-06-11 ENCOUNTER — RECORDS - HEALTHEAST (OUTPATIENT)
Dept: ADMINISTRATIVE | Facility: OTHER | Age: 65
End: 2020-06-11

## 2020-06-11 LAB — RETINOPATHY: NEGATIVE

## 2020-06-15 ENCOUNTER — RECORDS - HEALTHEAST (OUTPATIENT)
Dept: HEALTH INFORMATION MANAGEMENT | Facility: CLINIC | Age: 65
End: 2020-06-15

## 2020-06-17 ENCOUNTER — AMBULATORY - HEALTHEAST (OUTPATIENT)
Dept: NURSING | Facility: CLINIC | Age: 65
End: 2020-06-17

## 2020-06-17 ENCOUNTER — COMMUNICATION - HEALTHEAST (OUTPATIENT)
Dept: SCHEDULING | Facility: CLINIC | Age: 65
End: 2020-06-17

## 2020-06-17 ENCOUNTER — AMBULATORY - HEALTHEAST (OUTPATIENT)
Dept: LAB | Facility: CLINIC | Age: 65
End: 2020-06-17

## 2020-06-17 DIAGNOSIS — G40.309 GENERALIZED CONVULSIVE EPILEPSY (H): ICD-10-CM

## 2020-06-17 DIAGNOSIS — E11.9 TYPE 2 DIABETES MELLITUS WITHOUT COMPLICATION, WITHOUT LONG-TERM CURRENT USE OF INSULIN (H): ICD-10-CM

## 2020-06-17 DIAGNOSIS — Z23 NEED FOR TD VACCINE: ICD-10-CM

## 2020-06-17 DIAGNOSIS — E78.00 HYPERCHOLESTEREMIA: ICD-10-CM

## 2020-06-17 LAB
ALBUMIN SERPL-MCNC: 3.8 G/DL (ref 3.5–5)
ALP SERPL-CCNC: 140 U/L (ref 45–120)
ALT SERPL W P-5'-P-CCNC: 22 U/L (ref 0–45)
ANION GAP SERPL CALCULATED.3IONS-SCNC: 13 MMOL/L (ref 5–18)
AST SERPL W P-5'-P-CCNC: 13 U/L (ref 0–40)
BASOPHILS # BLD AUTO: 0 THOU/UL (ref 0–0.2)
BASOPHILS NFR BLD AUTO: 1 % (ref 0–2)
BILIRUB SERPL-MCNC: 0.3 MG/DL (ref 0–1)
BUN SERPL-MCNC: 16 MG/DL (ref 8–22)
CALCIUM SERPL-MCNC: 8.8 MG/DL (ref 8.5–10.5)
CHLORIDE BLD-SCNC: 104 MMOL/L (ref 98–107)
CHOLEST SERPL-MCNC: 189 MG/DL
CO2 SERPL-SCNC: 22 MMOL/L (ref 22–31)
CREAT SERPL-MCNC: 1.11 MG/DL (ref 0.7–1.3)
EOSINOPHIL # BLD AUTO: 0.1 THOU/UL (ref 0–0.4)
EOSINOPHIL NFR BLD AUTO: 1 % (ref 0–6)
ERYTHROCYTE [DISTWIDTH] IN BLOOD BY AUTOMATED COUNT: 12.8 % (ref 11–14.5)
FASTING STATUS PATIENT QL REPORTED: NO
GFR SERPL CREATININE-BSD FRML MDRD: >60 ML/MIN/1.73M2
GLUCOSE BLD-MCNC: 454 MG/DL (ref 70–125)
HBA1C MFR BLD: 11.2 % (ref 3.5–6)
HCT VFR BLD AUTO: 43.3 % (ref 40–54)
HDLC SERPL-MCNC: 45 MG/DL
HGB BLD-MCNC: 14.9 G/DL (ref 14–18)
LDLC SERPL CALC-MCNC: 97 MG/DL
LYMPHOCYTES # BLD AUTO: 2.1 THOU/UL (ref 0.8–4.4)
LYMPHOCYTES NFR BLD AUTO: 24 % (ref 20–40)
MCH RBC QN AUTO: 30.3 PG (ref 27–34)
MCHC RBC AUTO-ENTMCNC: 34.5 G/DL (ref 32–36)
MCV RBC AUTO: 88 FL (ref 80–100)
MONOCYTES # BLD AUTO: 0.4 THOU/UL (ref 0–0.9)
MONOCYTES NFR BLD AUTO: 4 % (ref 2–10)
NEUTROPHILS # BLD AUTO: 6.3 THOU/UL (ref 2–7.7)
NEUTROPHILS NFR BLD AUTO: 71 % (ref 50–70)
PHENYTOIN SERPL-MCNC: 7.5 UG/ML (ref 10–20)
PLATELET # BLD AUTO: 225 THOU/UL (ref 140–440)
PMV BLD AUTO: 8.1 FL (ref 7–10)
POTASSIUM BLD-SCNC: 4.9 MMOL/L (ref 3.5–5)
PROT SERPL-MCNC: 6.7 G/DL (ref 6–8)
RBC # BLD AUTO: 4.93 MILL/UL (ref 4.4–6.2)
SODIUM SERPL-SCNC: 139 MMOL/L (ref 136–145)
TRIGL SERPL-MCNC: 233 MG/DL
WBC: 8.9 THOU/UL (ref 4–11)

## 2020-06-18 ENCOUNTER — COMMUNICATION - HEALTHEAST (OUTPATIENT)
Dept: FAMILY MEDICINE | Facility: CLINIC | Age: 65
End: 2020-06-18

## 2020-06-25 ENCOUNTER — COMMUNICATION - HEALTHEAST (OUTPATIENT)
Dept: NURSING | Facility: CLINIC | Age: 65
End: 2020-06-25

## 2020-07-03 ENCOUNTER — COMMUNICATION - HEALTHEAST (OUTPATIENT)
Dept: FAMILY MEDICINE | Facility: CLINIC | Age: 65
End: 2020-07-03

## 2020-07-03 DIAGNOSIS — E11.9 TYPE 2 DIABETES MELLITUS WITHOUT COMPLICATION, WITH LONG-TERM CURRENT USE OF INSULIN (H): ICD-10-CM

## 2020-07-03 DIAGNOSIS — Z79.4 TYPE 2 DIABETES MELLITUS WITHOUT COMPLICATION, WITH LONG-TERM CURRENT USE OF INSULIN (H): ICD-10-CM

## 2020-07-05 ENCOUNTER — COMMUNICATION - HEALTHEAST (OUTPATIENT)
Dept: FAMILY MEDICINE | Facility: CLINIC | Age: 65
End: 2020-07-05

## 2020-07-19 ENCOUNTER — COMMUNICATION - HEALTHEAST (OUTPATIENT)
Dept: FAMILY MEDICINE | Facility: CLINIC | Age: 65
End: 2020-07-19

## 2020-07-19 DIAGNOSIS — R56.9 SEIZURE (H): ICD-10-CM

## 2020-08-07 ENCOUNTER — COMMUNICATION - HEALTHEAST (OUTPATIENT)
Dept: FAMILY MEDICINE | Facility: CLINIC | Age: 65
End: 2020-08-07

## 2020-08-07 DIAGNOSIS — E11.9 TYPE 2 DIABETES MELLITUS (H): ICD-10-CM

## 2020-08-14 ENCOUNTER — COMMUNICATION - HEALTHEAST (OUTPATIENT)
Dept: FAMILY MEDICINE | Facility: CLINIC | Age: 65
End: 2020-08-14

## 2020-08-14 DIAGNOSIS — E11.9 TYPE 2 DIABETES MELLITUS (H): ICD-10-CM

## 2020-08-18 ENCOUNTER — COMMUNICATION - HEALTHEAST (OUTPATIENT)
Dept: FAMILY MEDICINE | Facility: CLINIC | Age: 65
End: 2020-08-18

## 2020-08-18 DIAGNOSIS — F32.9 MAJOR DEPRESSION, CHRONIC: ICD-10-CM

## 2020-08-26 ENCOUNTER — OFFICE VISIT - HEALTHEAST (OUTPATIENT)
Dept: FAMILY MEDICINE | Facility: CLINIC | Age: 65
End: 2020-08-26

## 2020-08-26 DIAGNOSIS — E11.9 TYPE 2 DIABETES MELLITUS WITHOUT COMPLICATION, WITHOUT LONG-TERM CURRENT USE OF INSULIN (H): ICD-10-CM

## 2020-08-26 DIAGNOSIS — Z23 NEED FOR SHINGLES VACCINE: ICD-10-CM

## 2020-08-26 DIAGNOSIS — G47.33 OSA ON CPAP: ICD-10-CM

## 2020-08-26 DIAGNOSIS — F32.9 MAJOR DEPRESSION, CHRONIC: ICD-10-CM

## 2020-08-26 DIAGNOSIS — E11.9 DIABETES (H): ICD-10-CM

## 2020-08-26 DIAGNOSIS — E78.00 HYPERCHOLESTEREMIA: ICD-10-CM

## 2020-08-26 DIAGNOSIS — B35.1 ONYCHOMYCOSIS: ICD-10-CM

## 2020-08-26 ASSESSMENT — PATIENT HEALTH QUESTIONNAIRE - PHQ9: SUM OF ALL RESPONSES TO PHQ QUESTIONS 1-9: 10

## 2020-08-29 ENCOUNTER — COMMUNICATION - HEALTHEAST (OUTPATIENT)
Dept: FAMILY MEDICINE | Facility: CLINIC | Age: 65
End: 2020-08-29

## 2020-08-29 DIAGNOSIS — E11.9 DIABETES (H): ICD-10-CM

## 2020-09-18 ENCOUNTER — COMMUNICATION - HEALTHEAST (OUTPATIENT)
Dept: FAMILY MEDICINE | Facility: CLINIC | Age: 65
End: 2020-09-18

## 2020-10-26 ENCOUNTER — AMBULATORY - HEALTHEAST (OUTPATIENT)
Dept: NURSING | Facility: CLINIC | Age: 65
End: 2020-10-26

## 2020-10-26 DIAGNOSIS — Z23 NEED FOR SHINGLES VACCINE: ICD-10-CM

## 2020-11-13 ENCOUNTER — COMMUNICATION - HEALTHEAST (OUTPATIENT)
Dept: FAMILY MEDICINE | Facility: CLINIC | Age: 65
End: 2020-11-13

## 2020-11-15 ENCOUNTER — COMMUNICATION - HEALTHEAST (OUTPATIENT)
Dept: FAMILY MEDICINE | Facility: CLINIC | Age: 65
End: 2020-11-15

## 2020-11-15 DIAGNOSIS — E78.5 HYPERLIPIDEMIA: ICD-10-CM

## 2020-11-17 ENCOUNTER — COMMUNICATION - HEALTHEAST (OUTPATIENT)
Dept: FAMILY MEDICINE | Facility: CLINIC | Age: 65
End: 2020-11-17

## 2020-11-17 DIAGNOSIS — E11.9 TYPE 2 DIABETES MELLITUS (H): ICD-10-CM

## 2020-11-19 ENCOUNTER — OFFICE VISIT - HEALTHEAST (OUTPATIENT)
Dept: FAMILY MEDICINE | Facility: CLINIC | Age: 65
End: 2020-11-19

## 2020-11-19 DIAGNOSIS — E11.9 TYPE 2 DIABETES MELLITUS WITHOUT COMPLICATION, WITHOUT LONG-TERM CURRENT USE OF INSULIN (H): ICD-10-CM

## 2020-11-19 DIAGNOSIS — Z12.5 SCREENING PSA (PROSTATE SPECIFIC ANTIGEN): ICD-10-CM

## 2020-11-19 DIAGNOSIS — Z23 NEED FOR INFLUENZA VACCINATION: ICD-10-CM

## 2020-11-19 DIAGNOSIS — E11.9 DIABETES (H): ICD-10-CM

## 2020-11-19 DIAGNOSIS — F32.9 MAJOR DEPRESSION, CHRONIC: ICD-10-CM

## 2020-11-19 DIAGNOSIS — E11.9 TYPE 2 DIABETES MELLITUS (H): ICD-10-CM

## 2020-11-19 DIAGNOSIS — F10.21 ALCOHOL DEPENDENCE IN REMISSION (H): ICD-10-CM

## 2020-11-19 DIAGNOSIS — E66.01 MORBID OBESITY (H): ICD-10-CM

## 2020-11-19 DIAGNOSIS — E78.5 HYPERLIPIDEMIA: ICD-10-CM

## 2020-11-19 LAB
CREAT UR-MCNC: 88 MG/DL
HBA1C MFR BLD: 12.1 %
MICROALBUMIN UR-MCNC: 2.48 MG/DL (ref 0–1.99)
MICROALBUMIN/CREAT UR: 28.2 MG/G
PSA SERPL-MCNC: 0.5 NG/ML (ref 0–4.5)

## 2020-11-19 RX ORDER — METFORMIN HCL 500 MG
TABLET, EXTENDED RELEASE 24 HR ORAL
Qty: 360 TABLET | Refills: 3 | Status: SHIPPED | OUTPATIENT
Start: 2020-11-19 | End: 2021-10-14

## 2020-11-19 RX ORDER — VENLAFAXINE HYDROCHLORIDE 150 MG/1
300 CAPSULE, EXTENDED RELEASE ORAL DAILY
Qty: 180 CAPSULE | Refills: 3 | Status: SHIPPED | OUTPATIENT
Start: 2020-11-19 | End: 2021-12-17

## 2020-11-19 RX ORDER — ATORVASTATIN CALCIUM 20 MG/1
20 TABLET, FILM COATED ORAL AT BEDTIME
Qty: 90 TABLET | Refills: 3 | Status: SHIPPED | OUTPATIENT
Start: 2020-11-19 | End: 2022-01-05

## 2020-11-19 ASSESSMENT — MIFFLIN-ST. JEOR: SCORE: 2141.61

## 2020-11-19 ASSESSMENT — PATIENT HEALTH QUESTIONNAIRE - PHQ9: SUM OF ALL RESPONSES TO PHQ QUESTIONS 1-9: 19

## 2020-11-20 ENCOUNTER — COMMUNICATION - HEALTHEAST (OUTPATIENT)
Dept: FAMILY MEDICINE | Facility: CLINIC | Age: 65
End: 2020-11-20

## 2020-11-29 ENCOUNTER — COMMUNICATION - HEALTHEAST (OUTPATIENT)
Dept: FAMILY MEDICINE | Facility: CLINIC | Age: 65
End: 2020-11-29

## 2020-11-29 DIAGNOSIS — E11.9 TYPE 2 DIABETES MELLITUS WITHOUT COMPLICATION, WITH LONG-TERM CURRENT USE OF INSULIN (H): ICD-10-CM

## 2020-11-29 DIAGNOSIS — Z79.4 TYPE 2 DIABETES MELLITUS WITHOUT COMPLICATION, WITH LONG-TERM CURRENT USE OF INSULIN (H): ICD-10-CM

## 2020-12-16 ENCOUNTER — RECORDS - HEALTHEAST (OUTPATIENT)
Dept: ADMINISTRATIVE | Facility: OTHER | Age: 65
End: 2020-12-16

## 2020-12-30 ENCOUNTER — COMMUNICATION - HEALTHEAST (OUTPATIENT)
Dept: FAMILY MEDICINE | Facility: CLINIC | Age: 65
End: 2020-12-30

## 2020-12-30 DIAGNOSIS — E11.9 TYPE 2 DIABETES MELLITUS WITHOUT COMPLICATION, WITH LONG-TERM CURRENT USE OF INSULIN (H): ICD-10-CM

## 2020-12-30 DIAGNOSIS — Z79.4 TYPE 2 DIABETES MELLITUS WITHOUT COMPLICATION, WITH LONG-TERM CURRENT USE OF INSULIN (H): ICD-10-CM

## 2021-01-04 ENCOUNTER — COMMUNICATION - HEALTHEAST (OUTPATIENT)
Dept: FAMILY MEDICINE | Facility: CLINIC | Age: 66
End: 2021-01-04

## 2021-01-11 ENCOUNTER — COMMUNICATION - HEALTHEAST (OUTPATIENT)
Dept: FAMILY MEDICINE | Facility: CLINIC | Age: 66
End: 2021-01-11

## 2021-01-11 DIAGNOSIS — E11.9 TYPE 2 DIABETES MELLITUS WITHOUT COMPLICATION, WITH LONG-TERM CURRENT USE OF INSULIN (H): ICD-10-CM

## 2021-01-11 DIAGNOSIS — Z79.4 TYPE 2 DIABETES MELLITUS WITHOUT COMPLICATION, WITH LONG-TERM CURRENT USE OF INSULIN (H): ICD-10-CM

## 2021-02-16 ENCOUNTER — COMMUNICATION - HEALTHEAST (OUTPATIENT)
Dept: FAMILY MEDICINE | Facility: CLINIC | Age: 66
End: 2021-02-16

## 2021-02-16 DIAGNOSIS — E11.9 TYPE 2 DIABETES MELLITUS (H): ICD-10-CM

## 2021-02-16 RX ORDER — LISINOPRIL 5 MG/1
5 TABLET ORAL DAILY
Qty: 90 TABLET | Refills: 2 | Status: SHIPPED | OUTPATIENT
Start: 2021-02-16 | End: 2021-12-08

## 2021-02-22 ENCOUNTER — OFFICE VISIT - HEALTHEAST (OUTPATIENT)
Dept: FAMILY MEDICINE | Facility: CLINIC | Age: 66
End: 2021-02-22

## 2021-02-22 DIAGNOSIS — E11.9 TYPE 2 DIABETES MELLITUS WITHOUT COMPLICATION, WITH LONG-TERM CURRENT USE OF INSULIN (H): ICD-10-CM

## 2021-02-22 DIAGNOSIS — Z79.4 TYPE 2 DIABETES MELLITUS WITHOUT COMPLICATION, WITH LONG-TERM CURRENT USE OF INSULIN (H): ICD-10-CM

## 2021-02-22 LAB
CREAT UR-MCNC: 130.5 MG/DL
HBA1C MFR BLD: 9.1 %
MICROALBUMIN UR-MCNC: 3.11 MG/DL (ref 0–1.99)
MICROALBUMIN/CREAT UR: 23.8 MG/G

## 2021-02-23 ENCOUNTER — COMMUNICATION - HEALTHEAST (OUTPATIENT)
Dept: FAMILY MEDICINE | Facility: CLINIC | Age: 66
End: 2021-02-23

## 2021-05-05 ENCOUNTER — COMMUNICATION - HEALTHEAST (OUTPATIENT)
Dept: FAMILY MEDICINE | Facility: CLINIC | Age: 66
End: 2021-05-05

## 2021-05-05 DIAGNOSIS — E11.9 TYPE 2 DIABETES MELLITUS WITHOUT COMPLICATION, WITH LONG-TERM CURRENT USE OF INSULIN (H): ICD-10-CM

## 2021-05-05 DIAGNOSIS — Z79.4 TYPE 2 DIABETES MELLITUS WITHOUT COMPLICATION, WITH LONG-TERM CURRENT USE OF INSULIN (H): ICD-10-CM

## 2021-05-06 RX ORDER — INSULIN GLARGINE 100 [IU]/ML
INJECTION, SOLUTION SUBCUTANEOUS
Qty: 54 ML | Refills: 1 | Status: SHIPPED | OUTPATIENT
Start: 2021-05-06 | End: 2022-02-24

## 2021-05-13 ENCOUNTER — RECORDS - HEALTHEAST (OUTPATIENT)
Dept: FAMILY MEDICINE | Facility: CLINIC | Age: 66
End: 2021-05-13

## 2021-05-13 DIAGNOSIS — R56.9 SEIZURE (H): ICD-10-CM

## 2021-05-13 RX ORDER — PHENYTOIN SODIUM 100 MG/1
CAPSULE, EXTENDED RELEASE ORAL
Qty: 450 CAPSULE | Refills: 0 | Status: SHIPPED | OUTPATIENT
Start: 2021-05-13 | End: 2021-08-03

## 2021-05-26 ASSESSMENT — PATIENT HEALTH QUESTIONNAIRE - PHQ9
SUM OF ALL RESPONSES TO PHQ QUESTIONS 1-9: 10
SUM OF ALL RESPONSES TO PHQ QUESTIONS 1-9: 19

## 2021-05-27 ASSESSMENT — PATIENT HEALTH QUESTIONNAIRE - PHQ9
SUM OF ALL RESPONSES TO PHQ QUESTIONS 1-9: 1
SUM OF ALL RESPONSES TO PHQ QUESTIONS 1-9: 19

## 2021-05-27 NOTE — TELEPHONE ENCOUNTER
Refill Approved    Rx renewed per Medication Renewal Policy. Medication was last renewed on 8/20/18 .    Sangita Quarles, [facility] Triage/Med Refill 4/6/2019     Requested Prescriptions   Pending Prescriptions Disp Refills     lisinopril (PRINIVIL,ZESTRIL) 5 MG tablet [Pharmacy Med Name: LISINOPRIL 5MG TABS] 90 tablet 1     Sig: TAKE ONE TABLET BY MOUTH EVERY DAY    [employer] Refill Protocol Passed - 4/5/2019 11:41 AM       Passed - PCP or prescribing provider visit in past 12 months      Last office visit with prescriber/PCP: 2/14/2019 Marquise Russ MD OR same dept: 2/14/2019 Marquise Russ MD OR same specialty: 2/14/2019 Marquise Russ MD  Last physical: Visit date not found Last MTM visit: Visit date not found   Next visit within 3 mo: Visit date not found  Next physical within 3 mo: Visit date not found  Prescriber OR PCP: Marquise Russ MD  Last diagnosis associated with med order: 1. Type 2 diabetes mellitus (H)  - lisinopril (PRINIVIL,ZESTRIL) 5 MG tablet [Pharmacy Med Name: LISINOPRIL 5MG TABS]; TAKE ONE TABLET BY MOUTH EVERY DAY  Dispense: 90 tablet; Refill: 1    If protocol passes may refill for 12 months if within 3 months of last provider visit (or a total of 15 months).            Passed - Serum Potassium in past 12 months    Lab Results   Component Value Date    Potassium 4.5 02/14/2019            Passed - Blood pressure filed in past 12 months    BP Readings from Last 1 Encounters:   02/14/19 138/82            Passed - Serum Creatinine in past 12 months    Creatinine   Date Value Ref Range Status   02/14/2019 0.90 0.70 - 1.30 mg/dL Final

## 2021-05-27 NOTE — TELEPHONE ENCOUNTER
Refill Approved    Rx renewed per Medication Renewal Policy. Medication was last renewed on 4/26/18.    Kalie Myers, [facility] Triage/Med Refill 4/10/2019     Requested Prescriptions   Pending Prescriptions Disp Refills     venlafaxine 225 mg TR24 90 each 0     Sig: Take 225 mg by mouth daily.       Venlafaxine/Desvenlafaxine Refill Protocol Passed - 4/9/2019  3:12 PM        Passed - LFT or AST or ALT in last year     Albumin   Date Value Ref Range Status   02/14/2019 3.9 3.5 - 5.0 g/dL Final     Bilirubin, Total   Date Value Ref Range Status   02/14/2019 0.7 0.0 - 1.0 mg/dL Final     Bilirubin, Direct   Date Value Ref Range Status   10/08/2010 0.1 <0.6 mg/dL Final     Alkaline Phosphatase   Date Value Ref Range Status   02/14/2019 157 (H) 45 - 120 U/L Final     AST   Date Value Ref Range Status   02/14/2019 12 0 - 40 U/L Final     ALT   Date Value Ref Range Status   02/14/2019 20 0 - 45 U/L Final     Protein, Total   Date Value Ref Range Status   02/14/2019 6.8 6.0 - 8.0 g/dL Final                Passed - Fasting lipid cascade in last year     Cholesterol   Date Value Ref Range Status   02/14/2019 201 (H) <=199 mg/dL Final     Triglycerides   Date Value Ref Range Status   02/14/2019 138 <=149 mg/dL Final     HDL Cholesterol   Date Value Ref Range Status   02/14/2019 42 >=40 mg/dL Final     LDL Calculated   Date Value Ref Range Status   02/14/2019 131 (H) <=129 mg/dL Final     Patient Fasting > 8hrs?   Date Value Ref Range Status   02/14/2019 Unknown  Final             Passed - PCP or prescribing provider visit in last year     Last office visit with prescriber/PCP: 2/14/2019 Marquise Russ MD OR same dept: 2/14/2019 Marquise Russ MD OR same specialty: 2/14/2019 Marquise Russ MD  Last physical: Visit date not found Last MTM visit: Visit date not found   Next visit within 3 mo: Visit date not found  Next physical within 3 mo: Visit date not found  Prescriber OR PCP: Marquise Hanson  MD [place]  Last diagnosis associated with med order: 1. Major depression, chronic  - venlafaxine 225 mg TR24; Take 225 mg by mouth daily.  Dispense: 90 each; Refill: 0    If protocol passes may refill for 12 months if within 3 months of last provider visit (or a total of 15 months).             Passed - Blood Pressure in last year     BP Readings from Last 1 Encounters:   02/14/19 138/82

## 2021-05-28 ENCOUNTER — RECORDS - HEALTHEAST (OUTPATIENT)
Dept: ADMINISTRATIVE | Facility: CLINIC | Age: 66
End: 2021-05-28

## 2021-05-28 NOTE — TELEPHONE ENCOUNTER
RN cannot approve Refill Request    RN can NOT refill this medication overdue for office visits and/or labs.    Daron Chambers, [facility] Triage/Med Refill 5/1/2019    Requested Prescriptions   Pending Prescriptions Disp Refills     phenytoin (DILANTIN) 100 MG ER capsule [Pharmacy Med Name: PHENYTOIN-EX 100MG CAP] 450 capsule 0     Sig: TAKE 5 CAPSULES BY MOUTH AT BEDTIME       Phenytoin Refill Protocol Failed - 5/1/2019  8:50 AM        Failed - Free phenytoin level in last 12 months     Phenytoin, Free   Date Value Ref Range Status   03/03/2017 <0.5 (L) 1.0 - 2.0 ug/mL Final             Failed - Folate level in last 12 months     No results found for: FOLATE          Passed - LFT or AST or ALT in last 12 months     Albumin   Date Value Ref Range Status   02/14/2019 3.9 3.5 - 5.0 g/dL Final     Bilirubin, Total   Date Value Ref Range Status   02/14/2019 0.7 0.0 - 1.0 mg/dL Final     Bilirubin, Direct   Date Value Ref Range Status   10/08/2010 0.1 <0.6 mg/dL Final     Alkaline Phosphatase   Date Value Ref Range Status   02/14/2019 157 (H) 45 - 120 U/L Final     AST   Date Value Ref Range Status   02/14/2019 12 0 - 40 U/L Final     ALT   Date Value Ref Range Status   02/14/2019 20 0 - 45 U/L Final     Protein, Total   Date Value Ref Range Status   02/14/2019 6.8 6.0 - 8.0 g/dL Final                Passed - CBC w/o diff (Hemogram 2) in last year      WBC   Date Value Ref Range Status   10/01/2018 13.6 (H) 4.0 - 11.0 thou/uL Final     RBC   Date Value Ref Range Status   10/01/2018 4.62 4.40 - 6.20 mill/uL Final     Hemoglobin   Date Value Ref Range Status   10/01/2018 13.4 (L) 14.0 - 18.0 g/dL Final     Hematocrit   Date Value Ref Range Status   10/01/2018 40.3 40.0 - 54.0 % Final     MCV   Date Value Ref Range Status   10/01/2018 87 80 - 100 fL Final     MCH   Date Value Ref Range Status   10/01/2018 29.0 27.0 - 34.0 pg Final     MCHC   Date Value Ref Range Status   10/01/2018 33.3 32.0 - 36.0 g/dL Final      RDW   Date Value Ref Range Status   10/01/2018 12.6 11.0 - 14.5 % Final     Platelets   Date Value Ref Range Status   10/01/2018 274 140 - 440 thou/uL Final     MPV   Date Value Ref Range Status   10/01/2018 10.5 8.5 - 12.5 fL Final                Passed - PCP or prescribing provider visit in past 12 months       Last office visit with prescriber/PCP: Visit date not found OR same dept: 2/14/2019 Marquise Russ MD OR same specialty: 2/14/2019 Marquise Russ MD  Last physical: Visit date not found Last MTM visit: Visit date not found   Next visit within 3 mo: Visit date not found  Next physical within 3 mo: Visit date not found  Prescriber OR PCP: Becki Zamora MD  Last diagnosis associated with med order: 1. Seizure (H)  - phenytoin (DILANTIN) 100 MG ER capsule [Pharmacy Med Name: PHENYTOIN-EX 100MG CAP]; TAKE 5 CAPSULES BY MOUTH AT BEDTIME  Dispense: 450 capsule; Refill: 0    If protocol passes may refill for 12 months if within 3 months of last provider visit (or a total of 15 months).

## 2021-05-29 NOTE — PATIENT INSTRUCTIONS - HE
Resume aspirin 81 mg daily    Tighten up diet and recheck A1C in 3 months.    Diabetic ed refresher course offered (in my result letter).

## 2021-05-29 NOTE — PROGRESS NOTES
HPI:  Diabetic recheck.  Still looking for work.  Last week a lot of bad days but typically doing ok.  Took a walk around [employer] last month.  Eye check a couple months ago.   Taking meds consistently.  Depression doing better.  Getting out more.    Blood sugar numbers running the highest of 210 a couple weeks ago.     MEDS: REVIEWED  ALLERGIES: REVIEWED  PMH: REVIEWED  PSH: REVIEWED    SMOKING: none      EXAM:  /85   Pulse 75   Temp 97.7  F (36.5  C) (Oral)   Resp 24   Wt (!) 312 lb 3.2 oz (141.6 kg)   BMI 45.72 kg/m     Wt Readings from Last 3 Encounters:   05/23/19 (!) 312 lb 3.2 oz (141.6 kg)   02/14/19 (!) 306 lb (138.8 kg)   10/01/18 (!) 309 lb 12.8 oz (140.5 kg)      GEN: ALERT, ORIENTED TIMES THREE, NAD  LUNGS: CTA  COR: RRR WITHOUT MURMUR  FEET: exam done 2-14-19    LABS:    Lab Results   Component Value Date    HGBA1C 8.0 (H) 05/23/2019     Lab Results   Component Value Date    CHOL 167 05/23/2019    CHOL 201 (H) 02/14/2019    CHOL 166 01/04/2018     Lab Results   Component Value Date    HDL 43 05/23/2019    HDL 42 02/14/2019    HDL 42 01/04/2018     Lab Results   Component Value Date    LDLCALC 105 05/23/2019    LDLCALC 131 (H) 02/14/2019    LDLCALC 96 01/04/2018     Lab Results   Component Value Date    TRIG 97 05/23/2019    TRIG 138 02/14/2019    TRIG 142 01/04/2018     Lab Results   Component Value Date    MICROALBUR 6.77 (H) 07/03/2018       No components found for: CHOLHDL    Chemistry        Component Value Date/Time     05/23/2019 1052    K 4.4 05/23/2019 1052     05/23/2019 1052    CO2 24 05/23/2019 1052    BUN 21 05/23/2019 1052    CREATININE 0.77 05/23/2019 1052     (H) 05/23/2019 1052        Component Value Date/Time    CALCIUM 9.4 05/23/2019 1052    ALKPHOS 121 (H) 05/23/2019 1052    AST 13 05/23/2019 1052    ALT 19 05/23/2019 1052    BILITOT 0.3 05/23/2019 1052            IMP:    1. Generalized Convulsive Seizure                          The diagnosis was  confirmed.  The condition is stable/controlled on DILANTIN and no side effects  have been noted.  The appropriate follow up labs  ( DILANTIN LEVEL AND LIVER FXNS )have been ordered  (OR ARE UP TO DATE) and medication refills ordered if requested.     Phenytoin (Dilantin )   2. Hyperlipidemia                          The diagnosis was confirmed.  The condition is stable/controlled on ATORVASTATIN and no side effects  have been noted.  The appropriate follow up labs  ( FLP, ALT )have been ordered  (OR ARE UP TO DATE) and medication refills ordered if requested.   atorvastatin (LIPITOR) 20 MG tablet    Lipid Profile    Comprehensive Metabolic Panel   3. Type 2 diabetes mellitus without complication, with long-term current use of insulin (H)  insulin aspart U-100 (NOVOLOG FLEXPEN U-100 INSULIN) 100 unit/mL (3 mL) injection pen    Glycosylated Hemoglobin A1c    Comprehensive Metabolic Panel    Microalbumin, Random Urine                          The diagnosis was confirmed.  The condition is stable/controlled on METFORMIN, INSULINand no side effects  have been noted.  The appropriate follow up labs  ( AIC )have been ordered  (OR ARE UP TO DATE) and medication refills ordered if requested.    4.  HTN, controlled  PLAN:   Patient Instructions   Resume aspirin 81 mg daily    Tighten up diet and recheck A1C in 3 months.    Diabetic ed refresher course offered (in my result letter).      DIABETES TIPS  Weight control and exercise are important.  Try to check your blood glucose every day.  Fasting blood sugar should be in the range of .  In case blood sugar is too low you should have quick access to a source of sugar such as orange juice or a candy bar.  If low blood sugars are occurring frequently let us know as a  medication adjustment  Is neessary.

## 2021-05-30 VITALS — HEIGHT: 71 IN | BODY MASS INDEX: 42.56 KG/M2 | WEIGHT: 304 LBS

## 2021-05-30 NOTE — TELEPHONE ENCOUNTER
RN cannot approve Refill Request    RN can NOT refill this medication overdue for office visits and/or labs.    Daron Chambers, [facility] Triage/Med Refill 7/30/2019    Requested Prescriptions   Pending Prescriptions Disp Refills     phenytoin (DILANTIN) 100 MG ER capsule [Pharmacy Med Name: PHENYTOIN-EX 100MG CAP] 450 capsule 0     Sig: TAKE 5 CAPSULES BY MOUTH AT BEDTIME       Phenytoin Refill Protocol Failed - 7/30/2019 10:11 AM        Failed - Free phenytoin level in last 12 months     Phenytoin, Free   Date Value Ref Range Status   03/03/2017 <0.5 (L) 1.0 - 2.0 ug/mL Final             Failed - Folate level in last 12 months     No results found for: FOLATE          Passed - LFT or AST or ALT in last 12 months     Albumin   Date Value Ref Range Status   05/23/2019 3.9 3.5 - 5.0 g/dL Final     Bilirubin, Total   Date Value Ref Range Status   05/23/2019 0.3 0.0 - 1.0 mg/dL Final     Bilirubin, Direct   Date Value Ref Range Status   10/08/2010 0.1 <0.6 mg/dL Final     Alkaline Phosphatase   Date Value Ref Range Status   05/23/2019 121 (H) 45 - 120 U/L Final     AST   Date Value Ref Range Status   05/23/2019 13 0 - 40 U/L Final     ALT   Date Value Ref Range Status   05/23/2019 19 0 - 45 U/L Final     Protein, Total   Date Value Ref Range Status   05/23/2019 6.7 6.0 - 8.0 g/dL Final                Passed - CBC w/o diff (Hemogram 2) in last year      WBC   Date Value Ref Range Status   10/01/2018 13.6 (H) 4.0 - 11.0 thou/uL Final     RBC   Date Value Ref Range Status   10/01/2018 4.62 4.40 - 6.20 mill/uL Final     Hemoglobin   Date Value Ref Range Status   10/01/2018 13.4 (L) 14.0 - 18.0 g/dL Final     Hematocrit   Date Value Ref Range Status   10/01/2018 40.3 40.0 - 54.0 % Final     MCV   Date Value Ref Range Status   10/01/2018 87 80 - 100 fL Final     MCH   Date Value Ref Range Status   10/01/2018 29.0 27.0 - 34.0 pg Final     MCHC   Date Value Ref Range Status   10/01/2018 33.3 32.0 - 36.0 g/dL Final      RDW   Date Value Ref Range Status   10/01/2018 12.6 11.0 - 14.5 % Final     Platelets   Date Value Ref Range Status   10/01/2018 274 140 - 440 thou/uL Final     MPV   Date Value Ref Range Status   10/01/2018 10.5 8.5 - 12.5 fL Final                Passed - PCP or prescribing provider visit in past 12 months       Last office visit with prescriber/PCP: 5/23/2019 Marquise Russ MD OR same dept: 5/23/2019 Marquise Russ MD OR same specialty: 5/23/2019 Marquise Russ MD  Last physical: Visit date not found Last MTM visit: Visit date not found   Next visit within 3 mo: Visit date not found  Next physical within 3 mo: Visit date not found  Prescriber OR PCP: Marquise Russ MD  Last diagnosis associated with med order: 1. Seizure (H)  - phenytoin (DILANTIN) 100 MG ER capsule [Pharmacy Med Name: PHENYTOIN-EX 100MG CAP]; TAKE 5 CAPSULES BY MOUTH AT BEDTIME  Dispense: 450 capsule; Refill: 0    If protocol passes may refill for 12 months if within 3 months of last provider visit (or a total of 15 months).

## 2021-05-31 VITALS — HEIGHT: 71 IN | BODY MASS INDEX: 43.82 KG/M2 | WEIGHT: 313 LBS

## 2021-05-31 VITALS — BODY MASS INDEX: 43.65 KG/M2 | WEIGHT: 313 LBS

## 2021-05-31 NOTE — TELEPHONE ENCOUNTER
RN cannot approve Refill Request    RN can NOT refill this medication PCP messaged that patient is overdue for Labs. Last office visit: 5/23/2019 Marquise Russ MD Last Physical: Visit date not found Last MTM visit: Visit date not found Last visit same specialty: 5/23/2019 Marquise Russ MD.  Next visit within 3 mo: Visit date not found  Next physical within 3 mo: Visit date not found      Carolin CRUZ [place], [facility] Triage/Med Refill 8/5/2019    Requested Prescriptions   Pending Prescriptions Disp Refills     BASAGLAR KWIKPEN U-100 INSULIN 100 unit/mL (3 mL) pen [Pharmacy Med Name: BASAGLAR KWIKPEN 100UNIT/ML SOPN] 15 mL 11     Sig: INJECT 60 UNITS SUBCUTANEOUSLY DAILY AT BEDTIME       Insulin/GLP-1 Refill Protocol Failed - 8/5/2019 10:29 AM        Failed - Microalbumin in last year     Microalbumin, Random Urine   Date Value Ref Range Status   07/03/2018 6.77 (H) 0.00 - 1.99 mg/dL Final                  Passed - Visit with PCP or prescribing provider visit in last 6 months     Last office visit with prescriber/PCP: 5/23/2019 OR same dept: 5/23/2019 Marquise Russ MD OR same specialty: 5/23/2019 Marquise Russ MD Last physical: Visit date not found Last MTM visit: Visit date not found     Next appt within 3 mo: Visit date not found  Next physical within 3 mo: Visit date not found  Prescriber OR PCP: Marquise Russ MD  Last diagnosis associated with med order: 1. Type 2 diabetes mellitus without complication, with long-term current use of insulin (H)  - BASAGLAR KWIKPEN U-100 INSULIN 100 unit/mL (3 mL) pen [Pharmacy Med Name: BASAGLAR KWIKPEN 100UNIT/ML SOPN]; INJECT 60 UNITS SUBCUTANEOUSLY DAILY AT BEDTIME  Dispense: 15 mL; Refill: 11    If protocol passes may refill for 6 months if within 3 months of last provider visit (or a total of 9 months).              Passed - A1C in last 6 months     Hemoglobin A1c   Date Value Ref Range Status   05/23/2019 8.0 (H) 3.5 - 6.0  % Final               Passed - Blood pressure in last year     BP Readings from Last 1 Encounters:   05/23/19 138/85             Passed - Creatinine done in last year     Creatinine   Date Value Ref Range Status   05/23/2019 0.77 0.70 - 1.30 mg/dL Final

## 2021-06-01 VITALS — BODY MASS INDEX: 46.21 KG/M2 | HEIGHT: 69 IN | WEIGHT: 312 LBS

## 2021-06-01 VITALS — WEIGHT: 315 LBS | BODY MASS INDEX: 46.28 KG/M2

## 2021-06-01 VITALS — HEIGHT: 69 IN | WEIGHT: 314 LBS | BODY MASS INDEX: 46.51 KG/M2

## 2021-06-01 VITALS — WEIGHT: 311.3 LBS | BODY MASS INDEX: 45.59 KG/M2

## 2021-06-01 VITALS — WEIGHT: 311 LBS | HEIGHT: 69 IN | BODY MASS INDEX: 46.06 KG/M2

## 2021-06-02 VITALS — BODY MASS INDEX: 44.81 KG/M2 | WEIGHT: 306 LBS

## 2021-06-02 VITALS — BODY MASS INDEX: 45.37 KG/M2 | WEIGHT: 309.8 LBS

## 2021-06-02 VITALS — WEIGHT: 309.8 LBS | BODY MASS INDEX: 45.37 KG/M2

## 2021-06-02 NOTE — TELEPHONE ENCOUNTER
Incoming call from Myra at [facility].  Pt is completely out of medications below.  Please advise.    Requested Prescriptions     Pending Prescriptions Disp Refills     insulin glargine (BASAGLAR KWIKPEN) 100 unit/mL (3 mL) pen [Pharmacy Med Name: BASAGLAR KWIKPEN 100UNIT/ML SOPN] 15 mL 2     Sig: INJECT 60 UNITS SUBCUTANEOUSLY DAILY AT BEDTIME     phenytoin (DILANTIN) 100 MG ER capsule [Pharmacy Med Name: PHENYTOIN-EX 100MG CAP] 450 capsule 0     Sig: TAKE 5 CAPSULES BY MOUTH AT BEDTIME

## 2021-06-02 NOTE — TELEPHONE ENCOUNTER
RN cannot approve Refill Request    RN can NOT refill these medications Protocol failed and NO refill given.       Helga Matson, [facility] Triage/Med Refill 10/28/2019    Requested Prescriptions   Pending Prescriptions Disp Refills     insulin glargine (BASAGLAR KWIKPEN) 100 unit/mL (3 mL) pen [Pharmacy Med Name: BASAGLAR KWIKPEN 100UNIT/ML SOPN] 15 mL 2     Sig: INJECT 60 UNITS SUBCUTANEOUSLY DAILY AT BEDTIME       Insulin/GLP-1 Refill Protocol Failed - 10/28/2019 12:44 PM        Failed - Microalbumin in last year     Microalbumin, Random Urine   Date Value Ref Range Status   07/03/2018 6.77 (H) 0.00 - 1.99 mg/dL Final                  Passed - Visit with PCP or prescribing provider visit in last 6 months     Last office visit with prescriber/PCP: 5/23/2019 OR same dept: 5/23/2019 Marquise Russ MD OR same specialty: 5/23/2019 Marquise Russ MD Last physical: Visit date not found Last MTM visit: Visit date not found     Next appt within 3 mo: Visit date not found  Next physical within 3 mo: Visit date not found  Prescriber OR PCP: Marquise Russ MD  Last diagnosis associated with med order: 1. Type 2 diabetes mellitus without complication, with long-term current use of insulin (H)  - insulin glargine (BASAGLAR KWIKPEN) 100 unit/mL (3 mL) pen [Pharmacy Med Name: BASAGLAR KWIKPEN 100UNIT/ML SOPN]; INJECT 60 UNITS SUBCUTANEOUSLY DAILY AT BEDTIME  Dispense: 15 mL; Refill: 2    2. Seizure (H)  - phenytoin (DILANTIN) 100 MG ER capsule [Pharmacy Med Name: PHENYTOIN-EX 100MG CAP]; TAKE 5 CAPSULES BY MOUTH AT BEDTIME  Dispense: 450 capsule; Refill: 0    If protocol passes may refill for 6 months if within 3 months of last provider visit (or a total of 9 months).              Passed - A1C in last 6 months     Hemoglobin A1c   Date Value Ref Range Status   05/23/2019 8.0 (H) 3.5 - 6.0 % Final               Passed - Blood pressure in last year     BP Readings from Last 1 Encounters:   05/23/19  138/85             Passed - Creatinine done in last year     Creatinine   Date Value Ref Range Status   05/23/2019 0.77 0.70 - 1.30 mg/dL Final             phenytoin (DILANTIN) 100 MG ER capsule [Pharmacy Med Name: PHENYTOIN-EX 100MG CAP] 450 capsule 0     Sig: TAKE 5 CAPSULES BY MOUTH AT BEDTIME       Phenytoin Refill Protocol Failed - 10/28/2019 12:44 PM        Failed - CBC w/o diff (Hemogram 2) in last year      WBC   Date Value Ref Range Status   10/01/2018 13.6 (H) 4.0 - 11.0 thou/uL Final     RBC   Date Value Ref Range Status   10/01/2018 4.62 4.40 - 6.20 mill/uL Final     Hemoglobin   Date Value Ref Range Status   10/01/2018 13.4 (L) 14.0 - 18.0 g/dL Final     Hematocrit   Date Value Ref Range Status   10/01/2018 40.3 40.0 - 54.0 % Final     MCV   Date Value Ref Range Status   10/01/2018 87 80 - 100 fL Final     MCH   Date Value Ref Range Status   10/01/2018 29.0 27.0 - 34.0 pg Final     MCHC   Date Value Ref Range Status   10/01/2018 33.3 32.0 - 36.0 g/dL Final     RDW   Date Value Ref Range Status   10/01/2018 12.6 11.0 - 14.5 % Final     Platelets   Date Value Ref Range Status   10/01/2018 274 140 - 440 thou/uL Final     MPV   Date Value Ref Range Status   10/01/2018 10.5 8.5 - 12.5 fL Final                Failed - Free phenytoin level in last 12 months     Phenytoin, Free   Date Value Ref Range Status   03/03/2017 <0.5 (L) 1.0 - 2.0 ug/mL Final             Failed - Folate level in last 12 months     No results found for: FOLATE          Passed - LFT or AST or ALT in last 12 months     Albumin   Date Value Ref Range Status   05/23/2019 3.9 3.5 - 5.0 g/dL Final     Bilirubin, Total   Date Value Ref Range Status   05/23/2019 0.3 0.0 - 1.0 mg/dL Final     Bilirubin, Direct   Date Value Ref Range Status   10/08/2010 0.1 <0.6 mg/dL Final     Alkaline Phosphatase   Date Value Ref Range Status   05/23/2019 121 (H) 45 - 120 U/L Final     AST   Date Value Ref Range Status   05/23/2019 13 0 - 40 U/L Final     ALT    Date Value Ref Range Status   05/23/2019 19 0 - 45 U/L Final     Protein, Total   Date Value Ref Range Status   05/23/2019 6.7 6.0 - 8.0 g/dL Final                Passed - PCP or prescribing provider visit in past 12 months       Last office visit with prescriber/PCP: 5/23/2019 Marquise Russ MD OR same dept: 5/23/2019 Marquise Russ MD OR same specialty: 5/23/2019 Marquise Russ MD  Last physical: Visit date not found Last MTM visit: Visit date not found   Next visit within 3 mo: Visit date not found  Next physical within 3 mo: Visit date not found  Prescriber OR PCP: Marquise Russ MD  Last diagnosis associated with med order: 1. Type 2 diabetes mellitus without complication, with long-term current use of insulin (H)  - insulin glargine (BASAGLAR KWIKPEN) 100 unit/mL (3 mL) pen [Pharmacy Med Name: BASAGLAR KWIKPEN 100UNIT/ML SOPN]; INJECT 60 UNITS SUBCUTANEOUSLY DAILY AT BEDTIME  Dispense: 15 mL; Refill: 2    2. Seizure (H)  - phenytoin (DILANTIN) 100 MG ER capsule [Pharmacy Med Name: PHENYTOIN-EX 100MG CAP]; TAKE 5 CAPSULES BY MOUTH AT BEDTIME  Dispense: 450 capsule; Refill: 0    If protocol passes may refill for 12 months if within 3 months of last provider visit (or a total of 15 months).

## 2021-06-02 NOTE — TELEPHONE ENCOUNTER
First Attempt: Spoke to pt and let him know that Dr. Dahl approved 1 month supply of refills, but pt will need to be seen by Dr. Russ for a diabetic check for further refills. Pt stated he's been on this medication since the 80's and swore, then hung up the phone.

## 2021-06-03 VITALS — BODY MASS INDEX: 45.72 KG/M2 | WEIGHT: 312.2 LBS

## 2021-06-03 NOTE — TELEPHONE ENCOUNTER
Patient notified that the refill request is being addressed by the refill nurses.      Verified that he got the atorvastatin and metformin handled also.

## 2021-06-03 NOTE — TELEPHONE ENCOUNTER
Medication Question or Clarification  Who is calling: Patient  What medication are you calling about? (include dose and sig) Dede Langford  Who prescribed the medication?:Dr Everett  What is your question/concern?: Patient has a an appt scheduled for 12/12/19. He only has a few days left of Basaglar.  Pharmacy: [facility]  Okay to leave a detailed message?: Yes  Site CMT - Please call the pharmacy to obtain any additional needed information.  [facility] also request atorvastin and is out of metformin.  The order should have refills.  The patient is going to call his pharmacy.

## 2021-06-03 NOTE — TELEPHONE ENCOUNTER
RN cannot approve Refill Request    RN can NOT refill this medication Protocol failed and NO refill given.       Kalie Myers, [facility] Triage/Med Refill 11/22/2019    Requested Prescriptions   Pending Prescriptions Disp Refills     insulin glargine (BASAGLAR KWIKPEN) 100 unit/mL (3 mL) pen [Pharmacy Med Name: BASAGLAR KWIKPEN 100UNIT/ML SOPN] 15 mL 6     Sig: INJECT 60 UNITS SUBCUTANEOUSLY DAILY AT BEDTIME       Insulin/GLP-1 Refill Protocol Failed - 11/21/2019 10:22 AM        Failed - Visit with PCP or prescribing provider visit in last 6 months     Last office visit with prescriber/PCP: Visit date not found OR same dept: 5/23/2019 Marquise Russ MD OR same specialty: 5/23/2019 Marquise Russ MD Last physical: Visit date not found Last MTM visit: Visit date not found     Next appt within 3 mo: Visit date not found  Next physical within 3 mo: Visit date not found  Prescriber OR PCP: Charlie Dahl MD  Last diagnosis associated with med order: 1. Type 2 diabetes mellitus without complication, with long-term current use of insulin (H)  - insulin glargine (BASAGLAR KWIKPEN) 100 unit/mL (3 mL) pen [Pharmacy Med Name: BASAGLAR KWIKPEN 100UNIT/ML SOPN]; INJECT 60 UNITS SUBCUTANEOUSLY DAILY AT BEDTIME  Dispense: 15 mL; Refill: 0    If protocol passes may refill for 6 months if within 3 months of last provider visit (or a total of 9 months).              Failed - A1C in last 6 months     Hemoglobin A1c   Date Value Ref Range Status   05/23/2019 8.0 (H) 3.5 - 6.0 % Final               Failed - Microalbumin in last year     Microalbumin, Random Urine   Date Value Ref Range Status   07/03/2018 6.77 (H) 0.00 - 1.99 mg/dL Final                  Passed - Blood pressure in last year     BP Readings from Last 1 Encounters:   05/23/19 138/85             Passed - Creatinine done in last year     Creatinine   Date Value Ref Range Status   05/23/2019 0.77 0.70 - 1.30 mg/dL Final

## 2021-06-04 VITALS
RESPIRATION RATE: 22 BRPM | TEMPERATURE: 96.9 F | HEART RATE: 82 BPM | WEIGHT: 296.2 LBS | OXYGEN SATURATION: 97 % | BODY MASS INDEX: 41.9 KG/M2 | DIASTOLIC BLOOD PRESSURE: 80 MMHG | SYSTOLIC BLOOD PRESSURE: 129 MMHG

## 2021-06-04 VITALS
WEIGHT: 302.25 LBS | RESPIRATION RATE: 16 BRPM | HEART RATE: 89 BPM | BODY MASS INDEX: 42.76 KG/M2 | SYSTOLIC BLOOD PRESSURE: 134 MMHG | DIASTOLIC BLOOD PRESSURE: 81 MMHG

## 2021-06-04 VITALS
HEIGHT: 71 IN | RESPIRATION RATE: 24 BRPM | WEIGHT: 295.2 LBS | TEMPERATURE: 99.6 F | SYSTOLIC BLOOD PRESSURE: 130 MMHG | OXYGEN SATURATION: 96 % | BODY MASS INDEX: 41.33 KG/M2 | HEART RATE: 95 BPM | DIASTOLIC BLOOD PRESSURE: 75 MMHG

## 2021-06-04 NOTE — PATIENT INSTRUCTIONS - HE
Trial increase of venlafaxine to 300 mg daily (two 150 mg capsules) and eval after 6 weeks.    Combination of remeron or trazodone with venlafaxine can result in the seratonin syndrome.    Schedule a follow up with your psychologist in the near future.    If the med change isn't helpful then we may need to consider enlisting the help of a psychiatrist.     Trial of lotrimin cream twice daily to left groin area.   Keep area clean and dry.    Schedule for nail care    Set up your follow up eye exam with  [facility] in Jan 2020    Tighten up the diabetic diet  Check blood sugars twice day for a couple weeks.  Let me know if these numbers are > 180.  Otherwise the plan will be an A1C in 3 months.

## 2021-06-04 NOTE — TELEPHONE ENCOUNTER
patient states his pharmacy faxed refill request several times to clinic the first one was requested on 12/23/19 . Patient is out of medication ,he is really up set . Please expedite as soon as possible . Patient is scheduled appointment tomorrow with provider.  Refill Request  Did you contact pharmacy: No  Medication name:   Requested Prescriptions     Pending Prescriptions Disp Refills     metFORMIN (GLUCOPHAGE-XR) 500 MG 24 hr tablet 360 tablet 3     Sig: TAKE 4 TABLETS BY MOUTH WITH EVENING MEAL   Who prescribed the medication: Marquise Russ MD  Pharmacy Name and Location: [facility]   Is patient out of medication: Yes patient states his pharmacy faxed refill request several times to clinic the first one was requested on 12/23/19 .  Patient notified refills processed in 72 hours:  yes  Okay to leave a detailed message: no

## 2021-06-04 NOTE — PROGRESS NOTES
DIAGNOSIS:  1. Type 2 diabetes mellitus without complication, without long-term current use of insulin (H) , POORLY CONTROLLED.  RESUME DIABETIC DIET. Glycosylated Hemoglobin A1c    Microalbumin, Random Urine   2. Screening PSA (prostate specific antigen)  PSA (Prostatic-Specific Antigen), Annual Screen   3. ROSSI on CPAP     4. Major depression, chronic, POORLY CONTROLLED venlafaxine (EFFEXOR-XR) 150 MG 24 hr capsule   5. Hypercholesteremia, CONTROLLED ON SATATIN     6. Yeast dermatitis, LEFT GROIN,  START LOTRIMIN CREAM two times a day OTC.  APPLY TO CLEAN AND DRY SKIN    7. Polyneuropathy associated with underlying disease (H)     8. Type 2 diabetes mellitus without complication, with long-term current use of insulin (H)  insulin aspart U-100 (NOVOLOG FLEXPEN U-100 INSULIN) 100 unit/mL (3 mL) injection pen       PLAN:     Trial increase of venlafaxine to 300 mg daily (two 150 mg capsules) and eval after 6 weeks.    Combination of remeron or trazodone with venlafaxine can result in the seratonin syndrome.    Schedule a follow up with your psychologist in the near future.    If the med change isn't helpful then we may need to consider enlisting the help of a psychiatrist.     Trial of lotrimin cream twice daily to left groin area.   Keep area clean and dry.    Schedule for nail care    Set up your follow up eye exam with [facility] in Jan 2020    Tighten up the diabetic diet  Check blood sugars twice day for a couple weeks.  Let me know if these numbers are > 180.  Otherwise the plan will be an A1C in 3 months.      I spent 45 MIN with patient face-to-face, of which 50% or greater was spent in counseling and coordination of care in regards to patient's: DEPRESSION AND MED CHANGE, DIABETES, YEAST INFECTION LEFT GROIN.   Please see plan above                   HPI:  [id number]  Onset of a cough 3 days ago.  Non-productive.  No shaking chills or fever.  No shortness of breath.     Has lost some weight. Not really  "getting exercise.  Not really adhering to the diabetic diet.    Sleeping a lot.  CPAP machine broke in the spring and he hasn't been able to get it repaired.  Machine is 9-10 years old    Cyst in the left groin area.  It popped last weekend it drained.  Felt tight for awhile and then better afterwards.  There was a lot of drainage.    Main reason for visit is depression.  Does dishes and laundry.  Not even looking for a job, \"not even on the radar\".  Dreads when people come over.   Still takes venlafaxine 225 mg daily in the evening.   Job loss in Feb 2019 was the start of things getting worse.  Tried celexa 10-11 years ago but it wasn't very effective.  Hasn't seen his psychologist for about a month.  Had been seeing the psychologist about every 2 weeks.    Awakes about every 2 hours at night.     PHQ-9:  19    No muscle aches on the atorvastatin  No dry cough on the lisinopril.    Last eye exam Jan 2020      Current Outpatient Medications on File Prior to Visit   Medication Sig Dispense Refill     aspirin 81 MG EC tablet Take 81 mg by mouth daily.       atorvastatin (LIPITOR) 20 MG tablet Take 1 tablet (20 mg total) by mouth at bedtime. 90 tablet 2     blood glucose test (ACCU-CHEK DEVON PLUS TEST STRP) strips Test 4 x/day as directed. Dispense brand per patient's insurance at pharmacy discretion. 360 strip 4     blood glucose test strips Dispense brand per patient's insurance at pharmacy discretion.  One Touch Ultra -test 4 times daily 200 strip 3     blood-glucose meter Misc Use 1 Device As Directed 4 (four) times a day. 1 each 0     insulin glargine (BASAGLAR KWIKPEN) 100 unit/mL (3 mL) pen INJECT 60 UNITS SUBCUTANEOUSLY DAILY AT BEDTIME 15 mL 6     lancets Misc Use As Directed. Use as directed.       lisinopril (PRINIVIL,ZESTRIL) 5 MG tablet Take 1 tablet (5 mg total) by mouth daily. 90 tablet 3     metFORMIN (GLUCOPHAGE-XR) 500 MG 24 hr tablet TAKE 4 TABLETS BY MOUTH WITH EVENING MEAL 360 tablet 3     " "NEEDLES, INSULIN DISPOSABLE (BD ULTRA-FINE YAHAIRA PEN NEEDLES MISC) Use As Directed daily. Use daily with Levemir and Novolog pen as directed.       phenytoin (DILANTIN) 100 MG ER capsule TAKE 5 CAPSULES BY MOUTH AT BEDTIME 450 capsule 0     [DISCONTINUED] insulin aspart U-100 (NOVOLOG FLEXPEN U-100 INSULIN) 100 unit/mL (3 mL) injection pen Inject 14-24 units with each meal per sliding scale. 5 Pre-filled Pen Syringe 6     [DISCONTINUED] venlafaxine 225 mg TR24 Take 225 mg by mouth daily. 90 each 2     albuterol (PROAIR HFA;PROVENTIL HFA;VENTOLIN HFA) 90 mcg/actuation inhaler Inhale 1-2 puffs po q 4-6h prn cough, wheeze, sob 1 Inhaler 0     CALCIUM CARBONATE (CALCIUM 500 ORAL) Take 1 tablet by mouth daily.       cholecalciferol, vitamin D3, 1,000 unit tablet Take 1,000 Units by mouth daily.       MULTIVITAMIN ORAL Take 1 tablet by mouth daily.       pen needle, diabetic (BD ULTRA-FINE YAHAIRA PEN NEEDLE) 32 gauge x 5/32\" Ndle USE DAILY WITH NOVOLOG AND LEVEMIR PENS AS DIRECTED 200 each 1     No current facility-administered medications on file prior to visit.        Pmh: reviewed  Psh: reviewed  Allergy:  reviewed      EXAM:    /75 (Patient Site: Left Arm, Patient Position: Sitting, Cuff Size: Adult Large)   Pulse 95   Temp 99.6  F (37.6  C) (Oral)   Resp 24   Ht 5' 10.5\" (1.791 m)   Wt (!) 295 lb 3.2 oz (133.9 kg)   SpO2 96%   BMI 41.76 kg/m    GEN:   ALERT, NAD, ORIENTED TIMES THREE  HEENT: TMS NL, PERRL, THR CLEAR  NECK: SUPPLE WITHOUT ADENOPATHY OR THYROMEGALY  LUNGS: CTA  COR: RRR WITHOUT MURMUR  FEET:  MF TESTING:  No sensation to the monofilament.              SKIN EXAM:  NL              VASCULAR:   R DP  PULSE: +                                      L DP  PULSE: +                                      R PT  PULSE: +                                      L PT  PULSE: +          EXT: WITHOUT EDEMA/SWELLING    Lab Results   Component Value Date    HGBA1C 9.7 (H) 12/12/2019     Lab Results   Component " Value Date    MICROALBUR 6.77 (H) 07/03/2018     Lab Results   Component Value Date    CHOL 167 05/23/2019    CHOL 201 (H) 02/14/2019    CHOL 166 01/04/2018     Lab Results   Component Value Date    HDL 43 05/23/2019    HDL 42 02/14/2019    HDL 42 01/04/2018     Lab Results   Component Value Date    LDLCALC 105 05/23/2019    LDLCALC 131 (H) 02/14/2019    LDLCALC 96 01/04/2018     Lab Results   Component Value Date    TRIG 97 05/23/2019    TRIG 138 02/14/2019    TRIG 142 01/04/2018     Lab Results   Component Value Date    PSA 0.2 09/27/2018    PSA 0.2 07/13/2017    PSA 0.2 03/15/2010       No components found for: CHOLHDL              Recent Results (from the past 168 hour(s))   Glycosylated Hemoglobin A1c    Collection Time: 12/12/19  1:41 PM   Result Value Ref Range    Hemoglobin A1c 9.7 (H) 3.5 - 6.0 %

## 2021-06-05 VITALS
BODY MASS INDEX: 42.62 KG/M2 | SYSTOLIC BLOOD PRESSURE: 134 MMHG | RESPIRATION RATE: 20 BRPM | TEMPERATURE: 98 F | HEART RATE: 82 BPM | DIASTOLIC BLOOD PRESSURE: 73 MMHG | WEIGHT: 305.6 LBS

## 2021-06-05 VITALS
TEMPERATURE: 97.7 F | RESPIRATION RATE: 20 BRPM | DIASTOLIC BLOOD PRESSURE: 81 MMHG | HEIGHT: 71 IN | BODY MASS INDEX: 41.19 KG/M2 | HEART RATE: 103 BPM | SYSTOLIC BLOOD PRESSURE: 137 MMHG | WEIGHT: 294.2 LBS

## 2021-06-05 NOTE — TELEPHONE ENCOUNTER
Sister Kaelyn calling. Requesting a prior authorization be done as quickly as possible because patient is out of medication.    Prior Authorization Request  Who s requesting:  Kaelyn marc  Pharmacy Name and Location: [facility] [place]  Medication Name: venlafaxine (EFFEXOR-XR) 150 MG 24 hr capsule  Insurance Plan: [employer]  Insurance Member ID Number:  [id number]  CoverMyMeds Key: N/A  Informed patient that prior authorizations can take up to 10 business days for response:   Yes  Okay to leave a detailed message: Yes

## 2021-06-05 NOTE — TELEPHONE ENCOUNTER
RN cannot approve Refill Request    RN can NOT refill this medication PCP messaged that patient is overdue for Labs. Last office visit: 12/12/2019 Marquise Russ MD Last Physical: Visit date not found Last MTM visit: Visit date not found Last visit same specialty: 12/12/2019 Marquise Russ MD.  Next visit within 3 mo: Visit date not found  Next physical within 3 mo: Visit date not found      Ileana Clay, [facility] Triage/Med Refill 1/27/2020    Requested Prescriptions   Pending Prescriptions Disp Refills     phenytoin (DILANTIN) 100 MG ER capsule 450 capsule 0       Phenytoin Refill Protocol Failed - 1/27/2020 10:09 AM        Failed - CBC w/o diff (Hemogram 2) in last year      WBC   Date Value Ref Range Status   10/01/2018 13.6 (H) 4.0 - 11.0 thou/uL Final     RBC   Date Value Ref Range Status   10/01/2018 4.62 4.40 - 6.20 mill/uL Final     Hemoglobin   Date Value Ref Range Status   10/01/2018 13.4 (L) 14.0 - 18.0 g/dL Final     Hematocrit   Date Value Ref Range Status   10/01/2018 40.3 40.0 - 54.0 % Final     MCV   Date Value Ref Range Status   10/01/2018 87 80 - 100 fL Final     MCH   Date Value Ref Range Status   10/01/2018 29.0 27.0 - 34.0 pg Final     MCHC   Date Value Ref Range Status   10/01/2018 33.3 32.0 - 36.0 g/dL Final     RDW   Date Value Ref Range Status   10/01/2018 12.6 11.0 - 14.5 % Final     Platelets   Date Value Ref Range Status   10/01/2018 274 140 - 440 thou/uL Final     MPV   Date Value Ref Range Status   10/01/2018 10.5 8.5 - 12.5 fL Final                Failed - Free phenytoin level in last 12 months     Phenytoin, Free   Date Value Ref Range Status   03/03/2017 <0.5 (L) 1.0 - 2.0 ug/mL Final             Failed - Folate level in last 12 months     No results found for: FOLATE          Passed - LFT or AST or ALT in last 12 months     Albumin   Date Value Ref Range Status   05/23/2019 3.9 3.5 - 5.0 g/dL Final     Bilirubin, Total   Date Value Ref Range Status    05/23/2019 0.3 0.0 - 1.0 mg/dL Final     Bilirubin, Direct   Date Value Ref Range Status   10/08/2010 0.1 <0.6 mg/dL Final     Alkaline Phosphatase   Date Value Ref Range Status   05/23/2019 121 (H) 45 - 120 U/L Final     AST   Date Value Ref Range Status   05/23/2019 13 0 - 40 U/L Final     ALT   Date Value Ref Range Status   05/23/2019 19 0 - 45 U/L Final     Protein, Total   Date Value Ref Range Status   05/23/2019 6.7 6.0 - 8.0 g/dL Final                Passed - PCP or prescribing provider visit in past 12 months       Last office visit with prescriber/PCP: 12/12/2019 Marquise Russ MD OR same dept: 12/12/2019 Marquise Russ MD OR same specialty: 12/12/2019 Marquise Russ MD  Last physical: Visit date not found Last MTM visit: Visit date not found   Next visit within 3 mo: Visit date not found  Next physical within 3 mo: Visit date not found  Prescriber OR PCP: Marquise Russ MD  Last diagnosis associated with med order: 1. Seizure (H)  - phenytoin (DILANTIN) 100 MG ER capsule  Dispense: 450 capsule; Refill: 0    If protocol passes may refill for 12 months if within 3 months of last provider visit (or a total of 15 months).

## 2021-06-05 NOTE — TELEPHONE ENCOUNTER
Central PA team  [phone]  Pool: HE PA MED ([id number])          PA has been initiated.       PA form completed and faxed insurance via Cover My Meds     Key:  [id number] - PA Case ID: [id number]     Medication:  Venlafaxine HCl ER 150MG er capsules    Insurance:  [facility]        Response will be received via fax and may take up to 5-10 business days depending on plan

## 2021-06-05 NOTE — PROGRESS NOTES
"DIAGNOSIS:  1. Major depression, chronic, Improved    2. Diabetes mellitus (H), not ideally controlled blood-glucose meter Misc    blood glucose test strips   3. ROSSI on CPAP. Not using CPAP for financial reasons    4. Polyneuropathy associated with underlying disease (H). bilat feet        PLAN:     Next A1C 3-12 20 or after.    The following high BMI interventions were performed this visit: encouragement to exercise    Follow up with the sleep clinic when he wishes.    Continue present diabetic and depression meds at current doses.              HPI:  Feeling better after a recent trip to [facility].  On 12-12-19 we increased the venlafaxine to 300 mg daily.  Needs to set up a psychology appt  Groin area is much better.   Now the blood sugars running  Has stopped drinking coke.  Most of the blood sugars running < 150 and only rarely > 200.      PHQ-9:  1        Current Outpatient Medications on File Prior to Visit   Medication Sig Dispense Refill     aspirin 81 MG EC tablet Take 81 mg by mouth daily.       atorvastatin (LIPITOR) 20 MG tablet Take 1 tablet (20 mg total) by mouth at bedtime. 90 tablet 2     insulin aspart U-100 (NOVOLOG FLEXPEN U-100 INSULIN) 100 unit/mL (3 mL) injection pen Inject 18-26 units with each meal per sliding scale. 5 Pre-filled Pen Syringe 6     insulin glargine (BASAGLAR KWIKPEN) 100 unit/mL (3 mL) pen INJECT 60 UNITS SUBCUTANEOUSLY DAILY AT BEDTIME 15 mL 6     lancets Misc Use As Directed. Use as directed.       lisinopril (PRINIVIL,ZESTRIL) 5 MG tablet Take 1 tablet (5 mg total) by mouth daily. 90 tablet 3     metFORMIN (GLUCOPHAGE-XR) 500 MG 24 hr tablet TAKE 4 TABLETS BY MOUTH WITH EVENING MEAL 360 tablet 1     pen needle, diabetic (BD ULTRA-FINE YAHAIRA PEN NEEDLE) 32 gauge x 5/32\" Ndle USE DAILY WITH NOVOLOG AND LEVEMIR PENS AS DIRECTED 200 each 1     phenytoin (DILANTIN) 100 MG ER capsule TAKE 5 CAPSULES BY MOUTH AT BEDTIME 450 capsule 0     venlafaxine (EFFEXOR-XR) 150 MG 24 hr " capsule Take 2 capsules (300 mg total) by mouth daily. 60 capsule 1     [DISCONTINUED] blood glucose test strips Dispense brand per patient's insurance at pharmacy discretion.  One Touch Ultra -test 4 times daily (Patient taking differently: Use 3 each As Directed. Dispense brand per patient's insurance at pharmacy discretion.  One Touch Ultra -test 3 times daily) 200 strip 3     [DISCONTINUED] blood-glucose meter Misc Use 1 Device As Directed 4 (four) times a day. (Patient taking differently: Use 1 Device As Directed 3 (three) times a day. ) 1 each 0     [DISCONTINUED] albuterol (PROAIR HFA;PROVENTIL HFA;VENTOLIN HFA) 90 mcg/actuation inhaler Inhale 1-2 puffs po q 4-6h prn cough, wheeze, sob 1 Inhaler 0     [DISCONTINUED] blood glucose test (ACCU-CHEK DEVON PLUS TEST STRP) strips Test 4 x/day as directed. Dispense brand per patient's insurance at pharmacy discretion. 360 strip 4     [DISCONTINUED] CALCIUM CARBONATE (CALCIUM 500 ORAL) Take 1 tablet by mouth daily.       [DISCONTINUED] cholecalciferol, vitamin D3, 1,000 unit tablet Take 1,000 Units by mouth daily.       [DISCONTINUED] MULTIVITAMIN ORAL Take 1 tablet by mouth daily.       [DISCONTINUED] NEEDLES, INSULIN DISPOSABLE (BD ULTRA-FINE YAHAIRA PEN NEEDLES MISC) Use As Directed daily. Use daily with Levemir and Novolog pen as directed.       No current facility-administered medications on file prior to visit.        Pmh: reviewed  Psh: reviewed  Allergy:  reviewed      EXAM:    /80   Pulse 82   Temp 96.9  F (36.1  C) (Oral)   Resp 22   Wt (!) 296 lb 3.2 oz (134.4 kg)   SpO2 97%   BMI 41.90 kg/m    GEN:   ALERT, NAD, ORIENTED TIMES THREE  NECK: SUPPLE WITHOUT ADENOPATHY OR THYROMEGALY  LUNGS: CTA  COR: RRR WITHOUT MURMUR  SKIN: NO RASH , ULCERS OR LESIONS NOTED  EXT: WITHOUT EDEMA/SWELLING  FEET:  MF TESTING: poor sensation              SKIN EXAM:  NL              VASCULAR:   R DP  PULSE: +                                      L DP  PULSE: +                                       R PT  PULSE: +                                      L PT  PULSE: +      No results found for this or any previous visit (from the past 168 hour(s)).  Lab Results   Component Value Date    HGBA1C 9.7 (H) 12/12/2019

## 2021-06-05 NOTE — PATIENT INSTRUCTIONS - HE
Next A1C 3-12 20 or after.    The following high BMI interventions were performed this visit: encouragement to exercise

## 2021-06-05 NOTE — TELEPHONE ENCOUNTER
Refill Request  Did you contact pharmacy: Yes  Medication name:   Requested Prescriptions     Pending Prescriptions Disp Refills     phenytoin (DILANTIN) 100 MG ER capsule 450 capsule 0     Who prescribed the medication: HEMANTH URRUTIA  Requested Pharmacy: [facility] #[id number]  Is patient out of medication: Yes  Patient notified refills processed in 3 business days:  no  Okay to leave a detailed message: no    FYI: Patient stated that he is out of his medication. Patient is questioning if he can get prescription sent enough to bridge him through his appointment on 1/28/2020.

## 2021-06-07 NOTE — TELEPHONE ENCOUNTER
Who is calling:  Patient  Reason for Call:  Wants to apologize for missing phone call this morning - Patient overslept and didn't hear the phone as it was in the living room. Patient rescheduled for 05/04.  Date of last appointment with primary care: NA   Okay to leave a detailed message: Yes

## 2021-06-07 NOTE — TELEPHONE ENCOUNTER
RN cannot approve Refill Request    RN can NOT refill this medication PCP messaged that patient is overdue for Labs. Last office visit: 1/28/2020 Marquise Russ MD Last Physical: Visit date not found Last MTM visit: Visit date not found Last visit same specialty: 1/28/2020 Marquise Russ MD.  Next visit within 3 mo: Visit date not found  Next physical within 3 mo: Visit date not found      Ileana Clay, [facility] Triage/Med Refill 4/22/2020    Requested Prescriptions   Pending Prescriptions Disp Refills     phenytoin (DILANTIN) 100 MG ER capsule [Pharmacy Med Name: PHENYTOIN SODIUM 100MG EXT CAPSULES] 450 capsule 0     Sig: TAKE FIVE CAPSULES BY MOUTH EVERY NIGHT AT BEDTIME       Phenytoin Refill Protocol Failed - 4/22/2020  8:50 AM        Failed - CBC w/o diff (Hemogram 2) in last year      WBC   Date Value Ref Range Status   10/01/2018 13.6 (H) 4.0 - 11.0 thou/uL Final     RBC   Date Value Ref Range Status   10/01/2018 4.62 4.40 - 6.20 mill/uL Final     Hemoglobin   Date Value Ref Range Status   10/01/2018 13.4 (L) 14.0 - 18.0 g/dL Final     Hematocrit   Date Value Ref Range Status   10/01/2018 40.3 40.0 - 54.0 % Final     MCV   Date Value Ref Range Status   10/01/2018 87 80 - 100 fL Final     MCH   Date Value Ref Range Status   10/01/2018 29.0 27.0 - 34.0 pg Final     MCHC   Date Value Ref Range Status   10/01/2018 33.3 32.0 - 36.0 g/dL Final     RDW   Date Value Ref Range Status   10/01/2018 12.6 11.0 - 14.5 % Final     Platelets   Date Value Ref Range Status   10/01/2018 274 140 - 440 thou/uL Final     MPV   Date Value Ref Range Status   10/01/2018 10.5 8.5 - 12.5 fL Final                Failed - Free phenytoin level in last 12 months     Phenytoin, Free   Date Value Ref Range Status   03/03/2017 <0.5 (L) 1.0 - 2.0 ug/mL Final             Failed - Folate level in last 12 months     No results found for: FOLATE          Passed - LFT or AST or ALT in last 12 months     Albumin    Date Value Ref Range Status   05/23/2019 3.9 3.5 - 5.0 g/dL Final     Bilirubin, Total   Date Value Ref Range Status   05/23/2019 0.3 0.0 - 1.0 mg/dL Final     Bilirubin, Direct   Date Value Ref Range Status   10/08/2010 0.1 <0.6 mg/dL Final     Alkaline Phosphatase   Date Value Ref Range Status   05/23/2019 121 (H) 45 - 120 U/L Final     AST   Date Value Ref Range Status   05/23/2019 13 0 - 40 U/L Final     ALT   Date Value Ref Range Status   05/23/2019 19 0 - 45 U/L Final     Protein, Total   Date Value Ref Range Status   05/23/2019 6.7 6.0 - 8.0 g/dL Final                Passed - PCP or prescribing provider visit in past 12 months       Last office visit with prescriber/PCP: 1/28/2020 Marquise Russ MD OR same dept: 1/28/2020 Marquise Russ MD OR same specialty: 1/28/2020 Marquise Russ MD  Last physical: Visit date not found Last MTM visit: Visit date not found   Next visit within 3 mo: Visit date not found  Next physical within 3 mo: Visit date not found  Prescriber OR PCP: Marquise Rsus MD  Last diagnosis associated with med order: 1. Seizure (H)  - phenytoin (DILANTIN) 100 MG ER capsule [Pharmacy Med Name: PHENYTOIN SODIUM 100MG EXT CAPSULES]; TAKE FIVE CAPSULES BY MOUTH EVERY NIGHT AT BEDTIME  Dispense: 450 capsule; Refill: 0    If protocol passes may refill for 12 months if within 3 months of last provider visit (or a total of 15 months).

## 2021-06-07 NOTE — PATIENT INSTRUCTIONS - HE
Follow up for fasting labs    Continue same meds.    Colorectal screening discussed.    Update Td  when you come in for labs.    Call insurance to see if the Shingrix vaccine is covered.

## 2021-06-08 NOTE — TELEPHONE ENCOUNTER
Left message to schedule appt for this morning.  See message from Dr. Russ below.  Please help patient schedule.

## 2021-06-08 NOTE — TELEPHONE ENCOUNTER
At 1941 writer received a phone call from Sarmad  at [facility] regarding critical glucose lab, 454 mg/dL. writer called pt and asked to recheck his blood sugar and was 339 mg/dL. Pt said that the last time he had his insulin novolog was 1330 at lunch, and since then had pop and other snacks. Pt said that he will eat dinner at 2100, because he doesn't feel hungry right now. Writer paged on call and spoke with Dr Charlie Adames, and informed about the blood sugar check and the glucose lab. Doctor suggested writer to send a message to dr Russ regarding adjustment about his medications.Moe called back patient, and told him what the on call provider suggested. Pt was also told to cut back the pop intake sice it rises the blood sugar, pt said lately he drinks a lot of pop.    Graham Alejandro RN  COVID 19 Nurse Triage Plan/Patient Instructions    Please be aware that novel coronavirus (COVID-19) may be circulating in the community. If you develop symptoms such as fever, cough, or SOB or if you have concerns about the presence of another infection including coronavirus (COVID-19), please contact your health care provider or visit [email or web address].     Disposition/Instructions    Patient to stay at home and follow home care protocol based instructions.    Thank you for taking steps to prevent the spread of this virus.  o Limit your contact with others.  o Wear a simple mask to cover your cough.  o Wash your hands well and often.    Resources    [facility]: About COVID-19: [email or web address]    CDC: What to Do If You're Sick: www.cdc.gov/coronavirus/2019-ncov/about/steps-when-sick.html    CDC: Ending Home Isolation: www.cdc.gov/coronavirus/2019-ncov/hcp/disposition-in-home-patients.html     CDC: Caring for Someone: www.cdc.gov/coronavirus/2019-ncov/if-you-are-sick/care-for-someone.html     [facility]: Interim Guidance for Hospital Discharge to Home:  www.[email or web address].[email or web address]/diseases/coronavirus/hcp/hospdischarge.pdf    [facility] clinical trials (COVID-19 research studies): [email or web address]     Below are the COVID-19 hotlines at the [facility] of Health ([facility]). Interpreters are available.   o For health questions: Call 186-590-8326 or 1-110.936.9815 (7 a.m. to 7 p.m.)  o For questions about schools and childcare: Call 850-039-5824 or 1-230.127.3690 (7 a.m. to 7 p.m.)

## 2021-06-08 NOTE — TELEPHONE ENCOUNTER
Left message to call back for: Appt needed  Information to relay to patient: LMTCB, Please assist patient with scheduling a fasting lab only appt.

## 2021-06-08 NOTE — TELEPHONE ENCOUNTER
PLEASE SET UP A VIDEO APPT FOR THE PT WITH ME ON 6-18-20 TO DISCUSS HIS DIABETES AND RECENT ELEVATED BLOOD SUGARS.

## 2021-06-08 NOTE — TELEPHONE ENCOUNTER
Patient scheduled a nurse only visit on 06/12/20 at 2:45PM for his tetanus shot and Shingrix vaccine. Please place the order for the Shingrix vaccine if appropriate.    Thank you!

## 2021-06-08 NOTE — TELEPHONE ENCOUNTER
RN cannot approve Refill Request    RN can NOT refill this medication Protocol failed and NO refill given. .  Kalie Myers, [facility] Connection Triage/Med Refill 6/9/2020    Requested Prescriptions   Pending Prescriptions Disp Refills     insulin glargine (BASAGLAR KWIKPEN) 100 unit/mL (3 mL) pen [Pharmacy Med Name: BASAGLAR 100 U/ML KWIKPEN INJ 3ML] 15 mL 6     Sig: INJECT 60 UNITS SUBCUTANEOUSLY DAILY AT BEDTIME       Insulin/GLP-1 Refill Protocol Failed - 6/4/2020  5:05 PM        Failed - Creatinine done in last year     Creatinine   Date Value Ref Range Status   05/23/2019 0.77 0.70 - 1.30 mg/dL Final             Passed - Visit with PCP or prescribing provider visit in last 6 months     Last office visit with prescriber/PCP: 1/28/2020 OR same dept: 1/28/2020 Marquise Russ MD OR same specialty: 1/28/2020 Marquise Russ MD Last physical: Visit date not found Last MTM visit: Visit date not found     Next appt within 3 mo: Visit date not found  Next physical within 3 mo: Visit date not found  Prescriber OR PCP: Marquise Russ MD  Last diagnosis associated with med order: 1. Type 2 diabetes mellitus without complication, with long-term current use of insulin (H)  - insulin glargine (BASAGLAR KWIKPEN) 100 unit/mL (3 mL) pen [Pharmacy Med Name: BASAGLAR 100 U/ML KWIKPEN INJ 3ML]; INJECT 60 UNITS SUBCUTANEOUSLY DAILY AT BEDTIME  Dispense: 15 mL; Refill: 6    If protocol passes may refill for 6 months if within 3 months of last provider visit (or a total of 9 months).              Passed - A1C in last 6 months     Hemoglobin A1c   Date Value Ref Range Status   12/12/2019 9.7 (H) 3.5 - 6.0 % Final               Passed - Microalbumin in last year     Microalbumin, Random Urine   Date Value Ref Range Status   12/12/2019 21.79 (H) 0.00 - 1.99 mg/dL Final                  Passed - Blood pressure in last year     BP Readings from Last 1 Encounters:   01/28/20 129/80

## 2021-06-08 NOTE — TELEPHONE ENCOUNTER
Refill Approved    Rx renewed per Medication Renewal Policy. Medication was last renewed on 4/6/19.    Kalie Myers, [facility] Triage/Med Refill 5/12/2020     Requested Prescriptions   Pending Prescriptions Disp Refills     lisinopriL (PRINIVIL,ZESTRIL) 5 MG tablet [Pharmacy Med Name: LISINOPRIL 5MG TABLETS] 90 tablet 3     Sig: TAKE 1 TABLET BY MOUTH DAILY       Ace Inhibitors Refill Protocol Passed - 5/10/2020  2:27 PM        Passed - PCP or prescribing provider visit in past 12 months       Last office visit with prescriber/PCP: 1/28/2020 Marquise Russ MD OR same dept: 1/28/2020 Marquise Russ MD OR same specialty: 1/28/2020 Marquise Russ MD  Last physical: Visit date not found Last MTM visit: Visit date not found   Next visit within 3 mo: Visit date not found  Next physical within 3 mo: Visit date not found  Prescriber OR PCP: Marquise Russ MD  Last diagnosis associated with med order: 1. Type 2 diabetes mellitus (H)  - lisinopriL (PRINIVIL,ZESTRIL) 5 MG tablet [Pharmacy Med Name: LISINOPRIL 5MG TABLETS]; TAKE 1 TABLET BY MOUTH DAILY  Dispense: 90 tablet; Refill: 3    If protocol passes may refill for 12 months if within 3 months of last provider visit (or a total of 15 months).             Passed - Serum Potassium in past 12 months     Lab Results   Component Value Date    Potassium 4.4 05/23/2019             Passed - Blood pressure filed in past 12 months     BP Readings from Last 1 Encounters:   01/28/20 129/80             Passed - Serum Creatinine in past 12 months     Creatinine   Date Value Ref Range Status   05/23/2019 0.77 0.70 - 1.30 mg/dL Final

## 2021-06-09 NOTE — PROGRESS NOTES
ASSESSMENT: Onychauxis, diabetes with neurologic manifestations, foot pain.    PLAN: Toenails were debrided manually and mechanically x10. Return to clinic in nine weeks.         SUBJECTIVE: Toenails are long, thick and painful. Last nail debridement in the clinic was October 20, 2015.  He denies any changes in the numbness in his feet.    OBJECTIVE:  Vascular: DP pulses are diminished. PT pulses are diminished. Pedal hair is absent. Feet are cool to the touch.  Neuro: Sensation in the feet is diminished per monofilament.  Derm:  Toenails are elongated, thickened and dystrophic with discoloration and subungual debris. Skin is thin and shiny but intact.   Musculoskeletal: Hallux valgus.

## 2021-06-09 NOTE — TELEPHONE ENCOUNTER
RN cannot approve Refill Request    RN can NOT refill this medication Protocol failed and NO refill given. Last office visit: 1/28/2020 Marquise Russ MD Last Physical: Visit date not found Last MTM visit: Visit date not found Last visit same specialty: 1/28/2020 Marquise Russ MD.  Next visit within 3 mo: Visit date not found  Next physical within 3 mo: Visit date not found      Marquise Russ, [facility] Connection Triage/Med Refill 7/20/2020    Requested Prescriptions   Signed Prescriptions Disp Refills    phenytoin (DILANTIN) 100 MG ER capsule 450 capsule 3     Sig: TAKE 5 CAPSULES BY MOUTH EVERY NIGHT AT BEDTIME       Phenytoin Refill Protocol Failed - 7/20/2020 12:07 PM        Failed - Free phenytoin level in last 12 months     Phenytoin, Free   Date Value Ref Range Status   03/03/2017 <0.5 (L) 1.0 - 2.0 ug/mL Final             Failed - Folate level in last 12 months     No results found for: FOLATE          Passed - LFT or AST or ALT in last 12 months     Albumin   Date Value Ref Range Status   06/17/2020 3.8 3.5 - 5.0 g/dL Final     Bilirubin, Total   Date Value Ref Range Status   06/17/2020 0.3 0.0 - 1.0 mg/dL Final     Bilirubin, Direct   Date Value Ref Range Status   10/08/2010 0.1 <0.6 mg/dL Final     Alkaline Phosphatase   Date Value Ref Range Status   06/17/2020 140 (H) 45 - 120 U/L Final     AST   Date Value Ref Range Status   06/17/2020 13 0 - 40 U/L Final     ALT   Date Value Ref Range Status   06/17/2020 22 0 - 45 U/L Final     Protein, Total   Date Value Ref Range Status   06/17/2020 6.7 6.0 - 8.0 g/dL Final                Passed - CBC w/o diff (Hemogram 2) in last year      WBC   Date Value Ref Range Status   06/17/2020 8.9 4.0 - 11.0 thou/uL Final     RBC   Date Value Ref Range Status   06/17/2020 4.93 4.40 - 6.20 mill/uL Final     Hemoglobin   Date Value Ref Range Status   06/17/2020 14.9 14.0 - 18.0 g/dL Final     Hematocrit   Date Value Ref Range Status   06/17/2020 43.3  40.0 - 54.0 % Final     MCV   Date Value Ref Range Status   06/17/2020 88 80 - 100 fL Final     MCH   Date Value Ref Range Status   06/17/2020 30.3 27.0 - 34.0 pg Final     MCHC   Date Value Ref Range Status   06/17/2020 34.5 32.0 - 36.0 g/dL Final     RDW   Date Value Ref Range Status   06/17/2020 12.8 11.0 - 14.5 % Final     Platelets   Date Value Ref Range Status   06/17/2020 225 140 - 440 thou/uL Final     MPV   Date Value Ref Range Status   06/17/2020 8.1 7.0 - 10.0 fL Final                Passed - PCP or prescribing provider visit in past 12 months       Last office visit with prescriber/PCP: 1/28/2020 Marquise Russ MD OR same dept: 1/28/2020 Marquise Russ MD OR same specialty: 1/28/2020 Marquise Russ MD  Last physical: Visit date not found Last MTM visit: Visit date not found   Next visit within 3 mo: Visit date not found  Next physical within 3 mo: Visit date not found  Prescriber OR PCP: Marquise Russ MD  Last diagnosis associated with med order: 1. Seizure (H)  - phenytoin (DILANTIN) 100 MG ER capsule; TAKE 5 CAPSULES BY MOUTH EVERY NIGHT AT BEDTIME  Dispense: 450 capsule; Refill: 3    If protocol passes may refill for 12 months if within 3 months of last provider visit (or a total of 15 months).

## 2021-06-09 NOTE — TELEPHONE ENCOUNTER
Requested Prescriptions     Pending Prescriptions Disp Refills     insulin aspart U-100 (NOVOLOG FLEXPEN U-100 INSULIN) 100 unit/mL (3 mL) injection pen       Sig: Inject 18-26 units with each meal per sliding scale.

## 2021-06-09 NOTE — PROGRESS NOTES
MTM Encounter    Assessment/Plan  Diabetes: Controlled.  Very well controlled with sliding scale and up to date on diabetes maintenance items.  Discussed possibility of a GLP-1 and decided not to due to keeping things simple.  We also discussed the possibility of increasing his statin which would reduce his 10 year risk from about 11% to 8%, and agreed not to do this either for simplicity's sake.  He is up to date on all diabetic maintenance items and is on appropriate aspirin and statin therapy.  Lisinopril was recently started, presumably to help prevent microalbuminuira, so we will get a BMP today.  Previous orders for lipid cascade and A1c also present.  EDIT: A1c came back elevated at 8.5%.  Isac to start checking blood sugar 2-3 times per day and follow up with MTM in one month.  - BMP  - A1c and lipid cascade    Epilepsy: Despite having subtherapuetic levels chronically, Isac has not had a seizure in several decades.  His history suggests that it is appropriate for him to continue on antiepileptics.  We discussed today that if his PTN levels are still low today, that we could increase his dose from 460mg QHS to 500mg QHS which would reduce his pill count by one and eliminate one prescription each month.  It would also cause his Dilantin level to trend closer to normal levels.  I will touch base with his neurologist once his Dilantin levels come back to discuss our plan of a modest increase before getting back to Isac.  EDIT: Dilantin levels came back undetectable.  Per my discussion with Dr. Pastrana, he would be OK with stopping, but recommends continuing.  Isac would also like to continue despite undetectable levels.  We will increase to 500mg to reduce pill count, but no need to increase further as this amount has been therapeutic.  - Free dilantin level    Depression: Although Isac feels that his depression is well controlled, he describes a 1-2 month bout of crying spells and feeling hopeless.  He is  currently stressed because his father is dying and he is trying to help care for his brother with parkinsons, but feels that overall he is doing well.  We agreed to continue current therapy, but will monitor for subsequent episodes of crying spells/hopelessness without cause.  - Continue current therapy     Supplements: We discussed the pros and cons of his current supplements.  Literature shows little benefit of multivitamin use in those without specific deficiencies.  Calcium supplementation does not meaningfully improve bone density or prevent fractures in those without osteoporosis.  PTN can lower calcium and vitamin D levels in some cases, so we will continue to monitor his serum calcium in the future.  We also discussed the limited benefit of vitamin C in general immune support.  Since Isac's preference is to take as few medications as possible, we will stop these today.  We agreed that he would continue vitamin D as the potential upside outweighs the risks of polypharmacy.  - stop multivitamin  - stop calcium  - stop vitamin C    Follow Up  As needed.    Subjective/Objective  Isac Li is a 62 y.o. male here for a medication therapy management (MTM) appointment; his chief concern today is initial medication review.    Diabetes: Isac is taking Levemir 50 units at bedtime and Novolog sliding scale 12-20 units in addition to metformin XR 2000mg QD.  Seeing diabetes education with Taniya.  Last A1c: 6.5% on 10/27/16  Glucose readings: Isac checks his glucose 1-3 time(s) per day.  AM fasting range: 130-160  Last microalbumin/creatinine: wnl June '16  Last diabetic eye exam: Dec '16 at [facility]  Last diabetic foot exam: Sept '16; some numbness  On aspirin: Yes  On statin: moderate intensity  Current complications: none   Lifestyle interventions:encouragement to exercise, weight loss from baseline weight and lifestyle education regarding diet     Epilepsy:  Hasnt had a seizure since 1980.  Isac fell as a boy  "in the 60s and had a seizure.  Several years later he started having grand mal and petite mal seizures and was started on medication.  He was somewhat well controlled until he went do college and was drinking more.  He had an EEG four years ago with some base activity which suggested he should continue on medication.  Last saw neurology in 2016 (Doc Pastrana at [facility]).  Currently taking 460mg Dilantin ER QHS.  Last Dilantin level was 6.3 Sept '16.    Depression: Taking venlafaxine 225mg daily.  He has been on venlafaxine for about 4-5 years.  His dose was last increased about two years ago.  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  Last \"hopeless spell\" was about six months ago and lasted 1-2.    ----------------    Isac's medication list was reviewed with them, discussing reason for use, directions for use, and potential side effects of each medication as needed. Indication, safety, efficacy, and convenience was assessed for all medications addressed above.  No environmental factors were noted currently affecting patient.  This care plan was communicated via EMR with his primary care provider, Marquise Russ MD, who is the authorizing prescriber for this visit.  Direct supervision was available by either the patient's PCP or other available provider.  Time and complexity billing metrics are included in the docflowsheet linked to this visit.  Time spent: 70 minutes      Brody Gomez, PharmD  [facility] Pharmacist  [facility]  [phone]    "

## 2021-06-09 NOTE — TELEPHONE ENCOUNTER
Disregard last message from PA team.  [facility] was called for further inquiry on what they were trying to process.  They were trying to process the generic Insulin Lispro instead of brand Novolog Flexpen.     They now have a paid claim for Novolog Flexpen brand, no further action needed.

## 2021-06-09 NOTE — TELEPHONE ENCOUNTER
Central PA team  [phone]  Pool: HE PA MED ([id number])          PA has been initiated.       PA form completed and faxed insurance via Cover My Meds     Key:  [id number]     Medication:  Novolog Flexpen    Insurance:  [employer]         Response will be received via fax and may take up to 5-10 business days depending on plan

## 2021-06-09 NOTE — TELEPHONE ENCOUNTER
Patient Returning Call  Reason for call:  Returning call to have appointment today, still a possibility? Less than 30mins from appt time   Information relayed to patient:  Per message below   Patient has additional questions:  Yes  If YES, what are your questions/concerns:  Today?  Okay to leave a detailed message?: Yes

## 2021-06-09 NOTE — TELEPHONE ENCOUNTER
Prior Authorization Request  Who s requesting:  Pharmacy  Pharmacy Name and Location: [id number]  Medication Name: insulin aspart U-100 (NOVOLOG FLEXPEN U-100 INSULIN) 100 unit/mL (3 mL) injection pen   Insurance Plan: [employer]   Insurance Member ID Number:  [id number]  CoverMyMeds Key: N/A  Informed patient that prior authorizations can take up to 10 business days for response:   NA  Okay to leave a detailed message: No

## 2021-06-10 ENCOUNTER — OFFICE VISIT - HEALTHEAST (OUTPATIENT)
Dept: FAMILY MEDICINE | Facility: CLINIC | Age: 66
End: 2021-06-10

## 2021-06-10 DIAGNOSIS — F32.9 MAJOR DEPRESSION, CHRONIC: ICD-10-CM

## 2021-06-10 DIAGNOSIS — E78.00 HYPERCHOLESTEREMIA: ICD-10-CM

## 2021-06-10 DIAGNOSIS — E66.01 MORBID OBESITY (H): ICD-10-CM

## 2021-06-10 DIAGNOSIS — E11.9 TYPE 2 DIABETES MELLITUS WITHOUT COMPLICATION, WITHOUT LONG-TERM CURRENT USE OF INSULIN (H): ICD-10-CM

## 2021-06-10 LAB
ALBUMIN SERPL-MCNC: 3.5 G/DL (ref 3.5–5)
ALP SERPL-CCNC: 157 U/L (ref 45–120)
ALT SERPL W P-5'-P-CCNC: 22 U/L (ref 0–45)
ANION GAP SERPL CALCULATED.3IONS-SCNC: 16 MMOL/L (ref 5–18)
AST SERPL W P-5'-P-CCNC: 19 U/L (ref 0–40)
BILIRUB SERPL-MCNC: 0.2 MG/DL (ref 0–1)
BUN SERPL-MCNC: 19 MG/DL (ref 8–22)
CALCIUM SERPL-MCNC: 8.8 MG/DL (ref 8.5–10.5)
CHLORIDE BLD-SCNC: 104 MMOL/L (ref 98–107)
CO2 SERPL-SCNC: 19 MMOL/L (ref 22–31)
CREAT SERPL-MCNC: 1.09 MG/DL (ref 0.7–1.3)
GFR SERPL CREATININE-BSD FRML MDRD: >60 ML/MIN/1.73M2
GLUCOSE BLD-MCNC: 397 MG/DL (ref 70–125)
HBA1C MFR BLD: 10.5 %
POTASSIUM BLD-SCNC: 4.6 MMOL/L (ref 3.5–5)
PROT SERPL-MCNC: 6.3 G/DL (ref 6–8)
SODIUM SERPL-SCNC: 139 MMOL/L (ref 136–145)

## 2021-06-10 ASSESSMENT — PATIENT HEALTH QUESTIONNAIRE - PHQ9: SUM OF ALL RESPONSES TO PHQ QUESTIONS 1-9: 9

## 2021-06-10 ASSESSMENT — MIFFLIN-ST. JEOR: SCORE: 2140.71
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
## 2021-06-10 NOTE — PROGRESS NOTES
DIAGNOSIS:  1. Need for shingles vaccine  Varicella Zoster, Recombinant Vaccine IM   2. Diabetes (H) , poorly contolled metFORMIN (GLUCOPHAGE-XR) 500 MG 24 hr tablet   3. Type 2 diabetes mellitus without complication, without long-term current use of insulin (H)  Glycosylated Hemoglobin A1c   4. Hypercholesteremia , on statin    5. Major depression, chronic, stable, doing better lately    6. ROSSI on CPAP, CPAP broken Ambulatory referral to Sleep Medicine   7. Onychomycosis, nails needing care Ambulatory referral to Podiatry   8.      Peripheral neuropathy    PLAN:     Check with insurance on Cologuard coverage    Shingrix #1 and then repeat in 2 months    Next A1C 9-17-20.  If A1Cs not improving then will consult with Nighat Matson, Morgan D for more intense monitoring of blood sugars and augmentation of med regimen.    Referral to the Sleep Cllinic    Referral to the Podiatry Clinic    Take your Novolog before meals, watch diet,  Get out for those walks.          HPI:  NOT CHECKING BLOOD SUGARS.  Eats one large meal in the evening.  Beginning to get out for walks.  No seizures since 1980.  Eye exam 6--11-20      Current Outpatient Medications on File Prior to Visit   Medication Sig Dispense Refill     aspirin 81 MG EC tablet Take 81 mg by mouth daily.       atorvastatin (LIPITOR) 20 MG tablet TAKE ONE TABLET BY MOUTH AT BEDTIME 90 tablet 2     blood glucose test strips Use 3 each As Directed as needed. Dispense brand per patient's insurance at pharmacy discretion.  One Touch Ultra -test 3 times daily       blood-glucose meter Misc Use 1 Device As Directed 3 (three) times a day.       insulin aspart U-100 (NOVOLOG FLEXPEN U-100 INSULIN) 100 unit/mL (3 mL) injection pen Inject 18-26 units with each meal per sliding scale. 15 mL 1     insulin glargine (BASAGLAR KWIKPEN) 100 unit/mL (3 mL) pen INJECT 60 UNITS SUBCUTANEOUSLY DAILY AT BEDTIME 15 mL 6     lancets Misc Use As Directed. Use as directed.       lisinopriL  "(PRINIVIL,ZESTRIL) 5 MG tablet TAKE 1 TABLET BY MOUTH DAILY 90 tablet 0     pen needle, diabetic (BD ULTRA-FINE YAHAIRA PEN NEEDLE) 32 gauge x 5/32\" Ndle USE DAILY WITH NOVOLOG AND LEVEMIR PENS AS DIRECTED 200 each 1     phenytoin (DILANTIN) 100 MG ER capsule TAKE 5 CAPSULES BY MOUTH EVERY NIGHT AT BEDTIME 450 capsule 3     venlafaxine (EFFEXOR-XR) 150 MG 24 hr capsule TAKE 2 CAPSULES BY MOUTH DAILY 180 capsule 2     [DISCONTINUED] metFORMIN (GLUCOPHAGE-XR) 500 MG 24 hr tablet TAKE 4 TABLETS BY MOUTH WITH EVENING MEAL 360 tablet 1     No current facility-administered medications on file prior to visit.        Pmh: reviewed  Psh: reviewed  Allergy:  reviewed      EXAM:    /81   Pulse 89   Resp 16   Wt (!) 302 lb 4 oz (137.1 kg)   BMI 42.76 kg/m    GEN:   ALERT, NAD, ORIENTED TIMES THREE  NECK: SUPPLE WITHOUT ADENOPATHY OR THYROMEGALY  LUNGS: CTA  COR: RRR WITHOUT MURMUR  SKIN: NO RASH , ULCERS OR LESIONS NOTED  EXT: WITHOUT EDEMA/SWELLING  FEET:  MF TESTING: monofilament sensation absent              SKIN EXAM:  NL              VASCULAR:   R DP  PULSE: -                                      L DP  PULSE: -                                      R PT  PULSE: -                                      L PT  PULSE: -        No results found for this or any previous visit (from the past 168 hour(s)).       Lab Results   Component Value Date    HGBA1C 11.2 (H) 06/17/2020     Lab Results   Component Value Date    MICROALBUR 21.79 (H) 12/12/2019     Lab Results   Component Value Date    CHOL 189 06/17/2020    CHOL 167 05/23/2019    CHOL 201 (H) 02/14/2019     Lab Results   Component Value Date    HDL 45 06/17/2020    HDL 43 05/23/2019    HDL 42 02/14/2019     Lab Results   Component Value Date    LDLCALC 97 06/17/2020    LDLCALC 105 05/23/2019    LDLCALC 131 (H) 02/14/2019     Lab Results   Component Value Date    TRIG 233 (H) 06/17/2020    TRIG 97 05/23/2019    TRIG 138 02/14/2019   .  Results for orders placed or " performed in visit on 06/17/20   Comprehensive Metabolic Panel   Result Value Ref Range    Sodium 139 136 - 145 mmol/L    Potassium 4.9 3.5 - 5.0 mmol/L    Chloride 104 98 - 107 mmol/L    CO2 22 22 - 31 mmol/L    Anion Gap, Calculation 13 5 - 18 mmol/L    Glucose 454 (HH) 70 - 125 mg/dL    BUN 16 8 - 22 mg/dL    Creatinine 1.11 0.70 - 1.30 mg/dL    GFR MDRD Af Amer >60 >60 mL/min/1.73m2    GFR MDRD Non Af Amer >60 >60 mL/min/1.73m2    Bilirubin, Total 0.3 0.0 - 1.0 mg/dL    Calcium 8.8 8.5 - 10.5 mg/dL    Protein, Total 6.7 6.0 - 8.0 g/dL    Albumin 3.8 3.5 - 5.0 g/dL    Alkaline Phosphatase 140 (H) 45 - 120 U/L    AST 13 0 - 40 U/L    ALT 22 0 - 45 U/L     Lab Results   Component Value Date    PHENYTOIN 7.5 (L) 06/17/2020       No components found for: CHOLHDL

## 2021-06-10 NOTE — TELEPHONE ENCOUNTER
Refill Approved    Rx renewed per Medication Renewal Policy. Medication was last renewed on 5/12/20.    Darlyn Zuluaga, [facility] Triage/Med Refill 8/7/2020     Requested Prescriptions   Pending Prescriptions Disp Refills     lisinopriL (PRINIVIL,ZESTRIL) 5 MG tablet [Pharmacy Med Name: LISINOPRIL 5MG TABLETS] 90 tablet 0     Sig: TAKE 1 TABLET BY MOUTH DAILY       Ace Inhibitors Refill Protocol Passed - 8/7/2020  5:05 PM        Passed - PCP or prescribing provider visit in past 12 months       Last office visit with prescriber/PCP: 1/28/2020 Marquise Russ MD OR same dept: 1/28/2020 Marquise Russ MD OR same specialty: 1/28/2020 Marquise Russ MD  Last physical: Visit date not found Last MTM visit: Visit date not found   Next visit within 3 mo: Visit date not found  Next physical within 3 mo: Visit date not found  Prescriber OR PCP: Marquise Russ MD  Last diagnosis associated with med order: 1. Type 2 diabetes mellitus (H)  - lisinopriL (PRINIVIL,ZESTRIL) 5 MG tablet [Pharmacy Med Name: LISINOPRIL 5MG TABLETS]; TAKE 1 TABLET BY MOUTH DAILY  Dispense: 90 tablet; Refill: 0    If protocol passes may refill for 12 months if within 3 months of last provider visit (or a total of 15 months).             Passed - Serum Potassium in past 12 months     Lab Results   Component Value Date    Potassium 4.9 06/17/2020             Passed - Blood pressure filed in past 12 months     BP Readings from Last 1 Encounters:   01/28/20 129/80             Passed - Serum Creatinine in past 12 months     Creatinine   Date Value Ref Range Status   06/17/2020 1.11 0.70 - 1.30 mg/dL Final                            
no

## 2021-06-10 NOTE — TELEPHONE ENCOUNTER
Refill Approved    Rx renewed per Medication Renewal Policy. Medication was last renewed on 8/7/20.    Last OV: 4/24/20 virtual    Shira Sepulveda [facility] Triage/Med Refill 8/16/2020     Requested Prescriptions   Signed Prescriptions Disp Refills    lisinopriL (PRINIVIL,ZESTRIL) 5 MG tablet 90 tablet 0     Sig: TAKE 1 TABLET BY MOUTH DAILY       Ace Inhibitors Refill Protocol Passed - 8/14/2020  4:09 PM        Passed - PCP or prescribing provider visit in past 12 months       Last office visit with prescriber/PCP: 1/28/2020 Marquise Russ MD OR same dept: 1/28/2020 Marquise Russ MD OR same specialty: 1/28/2020 Marquise Russ MD  Last physical: Visit date not found Last MTM visit: Visit date not found   Next visit within 3 mo: Visit date not found  Next physical within 3 mo: Visit date not found  Prescriber OR PCP: Marquise Russ MD  Last diagnosis associated with med order: 1. Type 2 diabetes mellitus (H)  - lisinopriL (PRINIVIL,ZESTRIL) 5 MG tablet; TAKE 1 TABLET BY MOUTH DAILY  Dispense: 90 tablet; Refill: 0    If protocol passes may refill for 12 months if within 3 months of last provider visit (or a total of 15 months).             Passed - Serum Potassium in past 12 months     Lab Results   Component Value Date    Potassium 4.9 06/17/2020             Passed - Blood pressure filed in past 12 months     BP Readings from Last 1 Encounters:   01/28/20 129/80             Passed - Serum Creatinine in past 12 months     Creatinine   Date Value Ref Range Status   06/17/2020 1.11 0.70 - 1.30 mg/dL Final

## 2021-06-10 NOTE — TELEPHONE ENCOUNTER
Refill Approved    Rx renewed per Medication Renewal Policy. Medication was last renewed on 2/21/20.    Kalie Myers, [facility] Triage/Med Refill 8/19/2020     Requested Prescriptions   Pending Prescriptions Disp Refills     venlafaxine (EFFEXOR-XR) 150 MG 24 hr capsule [Pharmacy Med Name: VENLAFAXINE 150MG ER CAPSULES] 60 capsule 5     Sig: TAKE 2 CAPSULES BY MOUTH DAILY       Venlafaxine/Desvenlafaxine Refill Protocol Passed - 8/18/2020 10:07 AM        Passed - LFT or AST or ALT in last year     Albumin   Date Value Ref Range Status   06/17/2020 3.8 3.5 - 5.0 g/dL Final     Bilirubin, Total   Date Value Ref Range Status   06/17/2020 0.3 0.0 - 1.0 mg/dL Final     Bilirubin, Direct   Date Value Ref Range Status   10/08/2010 0.1 <0.6 mg/dL Final     Alkaline Phosphatase   Date Value Ref Range Status   06/17/2020 140 (H) 45 - 120 U/L Final     AST   Date Value Ref Range Status   06/17/2020 13 0 - 40 U/L Final     ALT   Date Value Ref Range Status   06/17/2020 22 0 - 45 U/L Final     Protein, Total   Date Value Ref Range Status   06/17/2020 6.7 6.0 - 8.0 g/dL Final                Passed - Fasting lipid cascade in last year     Cholesterol   Date Value Ref Range Status   06/17/2020 189 <=199 mg/dL Final     Triglycerides   Date Value Ref Range Status   06/17/2020 233 (H) <=149 mg/dL Final     HDL Cholesterol   Date Value Ref Range Status   06/17/2020 45 >=40 mg/dL Final     LDL Calculated   Date Value Ref Range Status   06/17/2020 97 <=129 mg/dL Final     Patient Fasting > 8hrs?   Date Value Ref Range Status   06/17/2020 No  Final             Passed - PCP or prescribing provider visit in last year     Last office visit with prescriber/PCP: 1/28/2020 Marquise Russ MD OR same dept: 1/28/2020 Marquise Russ MD OR same specialty: 1/28/2020 Marquise Russ MD  Last physical: Visit date not found Last MTM visit: Visit date not found   Next visit within 3 mo: Visit date not found  Next  physical within 3 mo: Visit date not found  Prescriber OR PCP: Marquise Russ MD  Last diagnosis associated with med order: 1. Major depression, chronic  - venlafaxine (EFFEXOR-XR) 150 MG 24 hr capsule [Pharmacy Med Name: VENLAFAXINE 150MG ER CAPSULES]; TAKE 2 CAPSULES BY MOUTH DAILY  Dispense: 60 capsule; Refill: 5    If protocol passes may refill for 12 months if within 3 months of last provider visit (or a total of 15 months).             Passed - Blood Pressure in last year     BP Readings from Last 1 Encounters:   01/28/20 129/80

## 2021-06-10 NOTE — PATIENT INSTRUCTIONS - HE
Check with insurance on Cologuard coverage    Shingrix #1 and then repeat in 2 months    Next A1C 9-17-20    Referral to the Sleep Cllinic    Referral to the Podiatry Clinic    Take your Novolog before meals, watch diet,  Get out for those walks.

## 2021-06-10 NOTE — PROGRESS NOTES
MTM Encounter    Assessment/Plan  Diabetes: Uncontrolled to goal of 7%.  We will switch Isac from Levemir to Basaglar as this will likely provide more even glucose lowering and is approximately 15% cheaper than Levemir.  We will increase his basal unit by a moderate amount today and bump up the base on his mealtime insulin also.  The should improve the frequent hyperglycemia Isac is experiencing.  His sliding scale should help prevent episodes of hypoglycemia from this modestly aggressive increase today.  Isac is going to follow-up with diabetes education in the next month, and with me in June.  -Stop Levemir   -Start Basaglar 55 units  -Increase NovoLog to 14-24 units per meal    Epilepsy: Per Isac's wishes we will continue with Dilantin.  He will continues to take 5 100 mg capsules daily.    We reviewed all of his lab results today.  Exercise was encouraged to improve glucose readings and raise HDL.    Follow Up  Two months    Subjective/Objective  Isac Li is a 62 y.o. male here for a medication therapy management (MTM) appointment; his chief concern today is diabetes follow-up.    Diabetes: Isac is taking Levemir 50 units and NovoLog sliding scale 12-20 units per meal.  Isac admits that with the stress he has had going on in his life recently he has not been very consistently taking his insulin.  He noticed that his blood sugars continued to climb and are often in the 300s now.  Last A1c: 8.5% on 3/3/17  Glucose readings: Isac checks his glucose 0-2 time(s) per day.  AM fasting range: 200-350  Last microalbumin/creatinine: wnl June '16  Last diabetic eye exam: Dec '16 at [facility]  Last diabetic foot exam: Sept '16; some numbness  On aspirin: Yes  On statin: moderate intensity  Current complications: none   Lifestyle interventions:encouragement to exercise, weight loss from baseline weight and lifestyle education regarding diet     Epilepsy: I learned from Isac today that Dr. Pastrana has only seen him once  and that his previous neurologist has left. I do not feel a compelling reason to continue his Dilantin, which is chronically undetectable, however, Isac would like to continue with Dilantin even though his drug levels are nondetectable because he does not want to have a seizure again.  ----------------    Isac's medication list was reviewed with them, discussing reason for use, directions for use, and potential side effects of each medication as needed. Indication, safety, efficacy, and convenience was assessed for all medications addressed above.  No environmental factors were noted currently affecting patient.  This care plan was communicated via EMR with his primary care provider, Marquise Russ MD, who is the authorizing prescriber for this visit.  Direct supervision was available by either the patient's PCP or other available provider.  Time and complexity billing metrics are included in the docflowsheet linked to this visit.  Time spent: 25 minutes      Brody Gomez PharmD  [facility] Pharmacist  [facility]  [phone]

## 2021-06-11 ENCOUNTER — COMMUNICATION - HEALTHEAST (OUTPATIENT)
Dept: FAMILY MEDICINE | Facility: CLINIC | Age: 66
End: 2021-06-11

## 2021-06-13 NOTE — PATIENT INSTRUCTIONS - HE
Flu vaccine    Fasting labs    Recommend diabetic shoes    The following high BMI interventions were performed this visit: encouragement to exercise and dietary management education, guidance, and counseling     Work on getting weight below 290 lbs with a goal of 280 lbs.    Make sure you take a dose of Novolog before every meal.    Drink zero josé beverages.  wiork on reducing intake of potatoes, bread, pasta and rice    Next A1C in 3 months.  Follow up in 3 months.    Check with insurance on COLOGUARD coverage.   If it is a covered benefit then I will order it.

## 2021-06-13 NOTE — TELEPHONE ENCOUNTER
Refill Approved    Rx renewed per Medication Renewal Policy. Medication was last renewed on 6/9/20.    Kalie Myers, [facility] Triage/Med Refill 11/30/2020     Requested Prescriptions   Pending Prescriptions Disp Refills     insulin glargine (BASAGLAR KWIKPEN) 100 unit/mL (3 mL) pen [Pharmacy Med Name: BASAGLAR 100 U/ML KWIKPEN INJ 3ML] 15 mL 6     Sig: ADMINISTER 60 UNITS UNDER THE SKIN DAILY AT BEDTIME       Insulin/GLP-1 Refill Protocol Passed - 11/29/2020 11:05 AM        Passed - Visit with PCP or prescribing provider visit in last 6 months     Last office visit with prescriber/PCP: 11/19/2020 OR same dept: 11/19/2020 Marquise Russ MD OR same specialty: 11/19/2020 Marquise Russ MD Last physical: Visit date not found Last MTM visit: Visit date not found     Next appt within 3 mo: Visit date not found  Next physical within 3 mo: Visit date not found  Prescriber OR PCP: Marquise Russ MD  Last diagnosis associated with med order: 1. Type 2 diabetes mellitus without complication, with long-term current use of insulin (H)  - insulin glargine (BASAGLAR KWIKPEN) 100 unit/mL (3 mL) pen [Pharmacy Med Name: BASAGLAR 100 U/ML KWIKPEN INJ 3ML]; ADMINISTER 60 UNITS UNDER THE SKIN DAILY AT BEDTIME  Dispense: 15 mL; Refill: 6    If protocol passes may refill for 6 months if within 3 months of last provider visit (or a total of 9 months).              Passed - A1C in last 6 months     Hemoglobin A1c   Date Value Ref Range Status   11/19/2020 12.1 (H) <=5.6 % Final     Comment:     Normal <5.7% Prediabete 5.7-6.4% Diabletes 6.5% or higher - adopted from ADA consensus guidelines               Passed - Microalbumin in last year     Microalbumin, Random Urine   Date Value Ref Range Status   11/19/2020 2.48 (H) 0.00 - 1.99 mg/dL Final                  Passed - Blood pressure in last year     BP Readings from Last 1 Encounters:   11/19/20 137/81             Passed - Creatinine done in last year      Creatinine   Date Value Ref Range Status   06/17/2020 1.11 0.70 - 1.30 mg/dL Final

## 2021-06-13 NOTE — TELEPHONE ENCOUNTER
Refill Approved    Rx renewed per Medication Renewal Policy. Medication was last renewed on 02/24/2020.  Last office visit was 08/26/2020 with PCP.    Trish Hull, [facility] Triage/Med Refill 11/16/2020     Requested Prescriptions   Pending Prescriptions Disp Refills     atorvastatin (LIPITOR) 20 MG tablet [Pharmacy Med Name: ATORVASTATIN 20MG TABLETS] 90 tablet 2     Sig: TAKE 1 TABLET BY MOUTH AT BEDTIME       Statins Refill Protocol (Hmg CoA Reductase Inhibitors) Passed - 11/15/2020  3:12 AM        Passed - PCP or prescribing provider visit in past 12 months      Last office visit with prescriber/PCP: 8/26/2020 Marquise Russ MD OR same dept: 8/26/2020 Marquise Russ MD OR same specialty: 8/26/2020 Marquise Russ MD  Last physical: Visit date not found Last MTM visit: Visit date not found   Next visit within 3 mo: Visit date not found  Next physical within 3 mo: Visit date not found  Prescriber OR PCP: Marquise Russ MD  Last diagnosis associated with med order: 1. Hyperlipidemia  - atorvastatin (LIPITOR) 20 MG tablet [Pharmacy Med Name: ATORVASTATIN 20MG TABLETS]; TAKE 1 TABLET BY MOUTH AT BEDTIME  Dispense: 90 tablet; Refill: 2    If protocol passes may refill for 12 months if within 3 months of last provider visit (or a total of 15 months).

## 2021-06-13 NOTE — TELEPHONE ENCOUNTER
Requested Prescriptions     Pending Prescriptions Disp Refills     lisinopriL (PRINIVIL,ZESTRIL) 5 MG tablet 90 tablet 3     Sig: Take 1 tablet (5 mg total) by mouth daily.

## 2021-06-13 NOTE — TELEPHONE ENCOUNTER
Refill Approved    Rx renewed per Medication Renewal Policy. Medication was last renewed on 8/16/20.    Kalie Myers, [facility] Triage/Med Refill 11/19/2020     Requested Prescriptions   Pending Prescriptions Disp Refills     lisinopriL (PRINIVIL,ZESTRIL) 5 MG tablet 90 tablet 3     Sig: Take 1 tablet (5 mg total) by mouth daily.       Ace Inhibitors Refill Protocol Passed - 11/17/2020  2:46 PM        Passed - PCP or prescribing provider visit in past 12 months       Last office visit with prescriber/PCP: 8/26/2020 Marquise Russ MD OR same dept: 8/26/2020 Marquise Russ MD OR same specialty: 8/26/2020 Marquise Russ MD  Last physical: Visit date not found Last MTM visit: Visit date not found   Next visit within 3 mo: Visit date not found  Next physical within 3 mo: Visit date not found  Prescriber OR PCP: Marquise Russ MD  Last diagnosis associated with med order: 1. Type 2 diabetes mellitus (H)  - lisinopriL (PRINIVIL,ZESTRIL) 5 MG tablet; Take 1 tablet (5 mg total) by mouth daily.  Dispense: 90 tablet; Refill: 3    If protocol passes may refill for 12 months if within 3 months of last provider visit (or a total of 15 months).             Passed - Serum Potassium in past 12 months     Lab Results   Component Value Date    Potassium 4.9 06/17/2020             Passed - Blood pressure filed in past 12 months     BP Readings from Last 1 Encounters:   08/26/20 134/81             Passed - Serum Creatinine in past 12 months     Creatinine   Date Value Ref Range Status   06/17/2020 1.11 0.70 - 1.30 mg/dL Final

## 2021-06-13 NOTE — PROGRESS NOTES
HPI:  1130  Just got a job today.   Taking novolog with one meal out of three meals.    No seizure since Jan 1980  Eye exam 6-11-20    MEDS: REVIEWED  ALLERGIES: REVIEWED  PMH: REVIEWED  PSH: REVIEWED    PHQ-9:  19    SMOKING:non-smoker      EXAM:  /81 (Patient Site: Left Arm, Patient Position: Sitting, Cuff Size: Adult Large)   Pulse (!) 103   Temp 97.7  F (36.5  C) (Oral)   Resp 20   Wt (!) 294 lb 3.2 oz (133.4 kg)   BMI 41.62 kg/m       Wt Readings from Last 3 Encounters:   11/19/20 (!) 294 lb 3.2 oz (133.4 kg)   08/26/20 (!) 302 lb 4 oz (137.1 kg)   01/28/20 (!) 296 lb 3.2 oz (134.4 kg)      GEN: ALERT, ORIENTED TIMES THREE, NAD  LUNGS: CTA  COR: RRR WITHOUT MURMUR  FEET:  MF TESTING:  Absent sensation              SKIN EXAM:  NL              VASCULAR:   R DP  PULSE: +                                      L DP  PULSE: +                                      R PT  PULSE: +                                      L PT  PULSE: +    LABS:    Lab Results   Component Value Date    HGBA1C 12.1 (H) 11/19/2020     Lab Results   Component Value Date    CHOL 189 06/17/2020    CHOL 167 05/23/2019    CHOL 201 (H) 02/14/2019     Lab Results   Component Value Date    HDL 45 06/17/2020    HDL 43 05/23/2019    HDL 42 02/14/2019     Lab Results   Component Value Date    LDLCALC 97 06/17/2020    LDLCALC 105 05/23/2019    LDLCALC 131 (H) 02/14/2019     Lab Results   Component Value Date    TRIG 233 (H) 06/17/2020    TRIG 97 05/23/2019    TRIG 138 02/14/2019     No components found for: CHOLHDL    Chemistry        Component Value Date/Time     06/17/2020 1540    K 4.9 06/17/2020 1540     06/17/2020 1540    CO2 22 06/17/2020 1540    BUN 16 06/17/2020 1540    CREATININE 1.11 06/17/2020 1540     (HH) 06/17/2020 1540        Component Value Date/Time    CALCIUM 8.8 06/17/2020 1540    ALKPHOS 140 (H) 06/17/2020 1540    AST 13 06/17/2020 1540    ALT 22 06/17/2020 1540    BILITOT 0.3 06/17/2020 1540            IMP:  Pt is having headaches & chest is tight & stomach    1. Need for influenza vaccination  Influenza, Recombinant, Inj, Quadrivalent, PF, 18+YRS   2. Major depression, chronic  venlafaxine (EFFEXOR-XR) 150 MG 24 hr capsule   3. Type 2 diabetes mellitus (H), poorly controlled  lisinopriL (PRINIVIL,ZESTRIL) 5 MG tablet    metFORMIN (GLUCOPHAGE-XR) 500 MG 24 hr tablet    Microalbumin, Random Urine    CANCELED: Glycosylated Hemoglobin A1c   4. Diabetes (H)     5. Hyperlipidemia , stable on statin atorvastatin (LIPITOR) 20 MG tablet   6. Type 2 diabetes mellitus without complication, without long-term current use of insulin (H)  Glycosylated Hemoglobin A1c   7. Screening PSA (prostate specific antigen)  PSA, Annual Screen (Prostatic-Specific Antigen)   8. Alcohol dependence in remission (H)     9. Morbid obesity (H)         PLAN:      Flu vaccine    Fasting labs    Recommend diabetic shoes    The following high BMI interventions were performed this visit: encouragement to exercise and dietary management education, guidance, and counseling     Work on getting weight below 290 lbs with a goal of 280 lbs.    Make sure you take a dose of Novolog before every meal.    Drink zero josé beverages.  wiork on reducing intake of potatoes, bread, pasta and rice    Next A1C in 3 months.    Check with insurance on COLOGUARD coverage.   If it is a covered benefit then I will order it.       I spent 45 min  with patient face-to-face, of which 50% or greater was spent in counseling and coordination of care in regards to patient's:  Diabetes, weight, health main updates, vaccine updates. .   Please see plan above       DIABETES TIPS  Weight control and exercise are important.  Try to check your blood glucose every day.  Fasting blood sugar should be in the range of .  In case blood sugar is too low you should have quick access to a source of sugar such as orange juice or a candy bar.  If low blood sugars are occurring frequently let us know as a  medication adjustment  Is neessary.

## 2021-06-13 NOTE — TELEPHONE ENCOUNTER
Upcoming Appointment Question  When is the appointment: 11/17/20  What is your appointment for?: office visit - diabetic check  Who is your appointment scheduled with?: PCP  What is your question/concern?: Patient was returning calls from clinic about needing to reschedule this appointment due to the provider changing his hours.  Patient states he is looking for the latest appointment in the day with his PCP for this appointment.    Patient would like the appointment on 11/19/20 at 11:30am with PCP.    Central scheduling is not able to schedule in blocked slot.    Okay to leave a detailed message?: Yes

## 2021-06-14 NOTE — TELEPHONE ENCOUNTER
Please resend insulin aspart U-100 (NOVOLOG FLEXPEN U-100 INSULIN) 100 unit/mL (3 mL) injection pen. Last Rx printed.  LMTCB. Patient to call back or respond to WOW[employer] message with updated BG readings.

## 2021-06-14 NOTE — TELEPHONE ENCOUNTER
Central PA team  [phone]  Pool: HE PA MED ([id number])          PA has been initiated.       PA form completed and faxed insurance via Cover My Meds     Key:  [id number]     Medication:  NOVOLOG FLEXPEN    Insurance:  [employer]        Response will be received via fax and may take up to 5-10 business days depending on plan

## 2021-06-14 NOTE — TELEPHONE ENCOUNTER
Last Rx was not sent as KEVIN. Please resend to pharmacy.    Requested Prescriptions     Pending Prescriptions Disp Refills     NOVOLOG FLEXPEN U-100 INSULIN 100 unit/mL (3 mL) injection pen 15 mL 3     Sig: Inject 18-26 units with each meal per sliding scale 11.9 Type 2 without complications     Signed Prescriptions Disp Refills     insulin aspart U-100 (NOVOLOG FLEXPEN U-100 INSULIN) 100 unit/mL (3 mL) injection pen 15 mL 3     Sig: Inject 18-26 units with each meal per sliding scale.     Authorizing Provider: RAJAN CASTRO

## 2021-06-14 NOTE — TELEPHONE ENCOUNTER
Has some nova log left but he needs a refill but he stated that when he goes to  the prescription it is stating that it is $300 out of pocket. He needs a prior auth sent in so he can pick them up at a reasonable price.

## 2021-06-14 NOTE — TELEPHONE ENCOUNTER
RN cannot approve Refill Request    RN can NOT refill this medication diagnosis code needed. Last office visit: 11/19/2020 Marquise Russ MD Last Physical: Visit date not found Last MTM visit: Visit date not found Last visit same specialty: 11/19/2020 Marquise Russ MD.  Next visit within 3 mo: Visit date not found  Next physical within 3 mo: Visit date not found      Seema Orozco, [facility] Triage/Med Refill 1/2/2021

## 2021-06-15 NOTE — TELEPHONE ENCOUNTER
Refill Approved    Rx renewed per Medication Renewal Policy. Medication was last renewed on 11/19/20, last OV 11/19/20.    Seema Orozco, [facility] Triage/Med Refill 2/16/2021     Requested Prescriptions   Pending Prescriptions Disp Refills     lisinopriL (PRINIVIL,ZESTRIL) 5 MG tablet 90 tablet 3     Sig: Take 1 tablet (5 mg total) by mouth daily.       Ace Inhibitors Refill Protocol Passed - 2/16/2021 11:25 AM        Passed - PCP or prescribing provider visit in past 12 months       Last office visit with prescriber/PCP: 11/19/2020 Marquise Russ MD OR same dept: 11/19/2020 Marquise Russ MD OR same specialty: 11/19/2020 Marquise Russ MD  Last physical: Visit date not found Last MTM visit: Visit date not found   Next visit within 3 mo: Visit date not found  Next physical within 3 mo: Visit date not found  Prescriber OR PCP: Marquise Russ MD  Last diagnosis associated with med order: 1. Type 2 diabetes mellitus (H)  - lisinopriL (PRINIVIL,ZESTRIL) 5 MG tablet; Take 1 tablet (5 mg total) by mouth daily.  Dispense: 90 tablet; Refill: 3    If protocol passes may refill for 12 months if within 3 months of last provider visit (or a total of 15 months).             Passed - Serum Potassium in past 12 months     Lab Results   Component Value Date    Potassium 4.9 06/17/2020             Passed - Blood pressure filed in past 12 months     BP Readings from Last 1 Encounters:   11/19/20 137/81             Passed - Serum Creatinine in past 12 months     Creatinine   Date Value Ref Range Status   06/17/2020 1.11 0.70 - 1.30 mg/dL Final

## 2021-06-15 NOTE — PROGRESS NOTES
ASSESSMENT: Onychauxis, diabetes with neurologic manifestations, foot pain.    PLAN: Toenails were debrided manually and mechanically x10. Return to clinic in nine weeks.         SUBJECTIVE: Toenails are long, thick and painful. Last nail debridement in the clinic was March 6, 2017.  He denies any changes in the numbness in his feet.    OBJECTIVE:  Vascular: DP pulses are diminished. PT pulses are diminished. Pedal hair is absent. Feet are cool to the touch.  Neuro: Sensation in the feet is diminished per monofilament.  Derm:  Toenails are elongated, thickened and dystrophic with discoloration and subungual debris. Skin is thin and shiny but intact.   Musculoskeletal: Hallux valgus.

## 2021-06-15 NOTE — PROGRESS NOTES
HPI:  Diabetic check    Took a fall 12-23-17.  No head injury.  Some left sided neck pain but no arm pain.  Hasn't been walking or swimming.  Gets home at night and then doesn't do much.  States weight has been going up and he wants to work on weight loss.      PHQ-9:  5  MARTHA-7:  2    MEDS: REVIEWED  ALLERGIES: REVIEWED  PMH: REVIEWED  PSH: REVIEWED    SMOKING:  no      EXAM:  /72  Pulse 88  Temp 98.2  F (36.8  C) (Oral)   Resp 16  Wt (!) 313 lb (142 kg)  BMI 43.65 kg/m2  GEN: ALERT, ORIENTED TIMES THREE, NAD  MS:  NO PARACERVICAL TX  LUNGS: CTA  COR: RRR WITHOUT MURMUR  SKIN:   BACK WITHOUT CONCERNING LESIONS  FEET:  MF TESTING: N ABSENT SENSATION              SKIN EXAM:  NL              VASCULAR:   R DP  PULSE: +                                      L DP  PULSE: +                                      R PT  PULSE: -                                      L PT  PULSE: -    LABS:    Lab Results   Component Value Date    HGBA1C 7.7 (H) 01/04/2018     Lab Results   Component Value Date    CHOL 155 03/03/2017    CHOL 152 10/27/2016    CHOL 168 06/27/2016     Lab Results   Component Value Date    HDL 30 (L) 03/03/2017    HDL 37 (L) 10/27/2016    HDL 45 06/27/2016     Lab Results   Component Value Date    LDLCALC 99 03/03/2017    LDLCALC 98 10/27/2016    LDLCALC 105 06/27/2016     Lab Results   Component Value Date    TRIG 130 03/03/2017    TRIG 83 10/27/2016    TRIG 91 06/27/2016     No components found for: CHOLHDL    Chemistry        Component Value Date/Time     03/03/2017 1131    K 4.0 03/03/2017 1131     (H) 03/03/2017 1131    CO2 24 03/03/2017 1131    BUN 21 03/03/2017 1131    CREATININE 0.73 03/03/2017 1131     (H) 03/03/2017 1131        Component Value Date/Time    CALCIUM 9.0 03/03/2017 1131    ALKPHOS 122 (H) 06/27/2016 0821    AST 14 06/27/2016 0821    ALT 28 07/13/2017 0738    BILITOT 0.4 06/27/2016 0821        Lab Results   Component Value Date    PSA 0.2 07/13/2017    PSA 0.2  "03/15/2010     Lab Results   Component Value Date    PHENYTOIN 10.1 07/13/2017     Lab Results   Component Value Date    HGBA1C 7.7 (H) 01/04/2018           IMP:    1. Diabetes  pen needle, diabetic (BD ULTRA-FINE YAHAIRA PEN NEEDLES) 32 gauge x 5/32\" Ndle   2. Need for vaccination  Influenza, Seasonal,Quad Inj, 36+ MOS   3. Controlled type 2 diabetes mellitus without complication, with long-term current use of insulin  Glycosylated Hemoglobin A1c   4. Hypercholesteremia  Lipid Profile    Comprehensive Metabolic Panel   5. Major depression, chronic     6. Obesity  Ambulatory referral to Bariatric Care: Surgical and Non-Surgical   7. Peripheral neuropathy     8. Health care maintenance  Influenza, Seasonal,Quad Inj, 36+ MOS    Pneumococcal polysaccharide vaccine 23-valent 1 yo or older, subq/IM   9. Colon cancer screening  Ambulatory referral for Colonoscopy       PLAN:   Patient Instructions   NON- FASTING LABS  (SOME SWEETENER AND 1/2 & 1/2 IN COFFEE)    SCHEDULE EYE EXAM    FLU VACCINE AND PNEUMOVAX    REFERRAL TO THE BARIATRIC CLINIC.    COLONOSCOPY DUE    The following high BMI interventions were performed this visit: encouragement to exercise and dietary management education, guidance, and counseling        DIABETES TIPS  Weight control and exercise are important.  Try to check your blood glucose every day.  Fasting blood sugar should be in the range of .  In case blood sugar is too low you should have quick access to a source of sugar such as orange juice or a candy bar.  If low blood sugars are occurring frequently let us know as a  medication adjustment  Is neessary.    "

## 2021-06-15 NOTE — PATIENT INSTRUCTIONS - HE
You must use the novolog before every meal.    Swimming for exercise at [employer].      Colorectal cancer screening discussed and declined.    Cut out any sweetened drinks other than  for artificial sweeteners.    Follow up in 3 months.

## 2021-06-15 NOTE — PROGRESS NOTES
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  Not using CPAP as it is broken.   Eats twice daily. Uses the novolog with at least one of these meals.      MEDS: REVIEWED  ALLERGIES: REVIEWED  PMH: REVIEWED  PSH: REVIEWED    SMOKING:  Non-smoker.       EXAM:  /73 (Patient Site: Left Arm, Patient Position: Sitting, Cuff Size: Adult Large)   Pulse 82   Temp 98  F (36.7  C) (Oral)   Resp 20   Wt (!) 305 lb 9.6 oz (138.6 kg)   BMI 42.62 kg/m    GEN: ALERT, ORIENTED TIMES THREE, NAD  LUNGS: CTA  COR: RRR WITHOUT MURMUR  EXT:   MILD EDEMA OF THE DISTAL LE BILAT  FEET:  MF TESTING: NL              SKIN EXAM:  NL              VASCULAR:   R DP  PULSE: +                                      L DP  PULSE: +                                      R PT  PULSE: -                                      L PT  PULSE: -    LABS:    Lab Results   Component Value Date    HGBA1C 9.1 (H) 02/22/2021     Lab Results   Component Value Date    CHOL 189 06/17/2020    CHOL 167 05/23/2019    CHOL 201 (H) 02/14/2019     Lab Results   Component Value Date    HDL 45 06/17/2020    HDL 43 05/23/2019    HDL 42 02/14/2019     Lab Results   Component Value Date    LDLCALC 97 06/17/2020    LDLCALC 105 05/23/2019    LDLCALC 131 (H) 02/14/2019     Lab Results   Component Value Date    TRIG 233 (H) 06/17/2020    TRIG 97 05/23/2019    TRIG 138 02/14/2019     No components found for: CHOLHDL    Chemistry        Component Value Date/Time     06/17/2020 1540    K 4.9 06/17/2020 1540     06/17/2020 1540    CO2 22 06/17/2020 1540    BUN 16 06/17/2020 1540    CREATININE 1.11 06/17/2020 1540     (HH) 06/17/2020 1540        Component Value Date/Time    CALCIUM 8.8 06/17/2020 1540    ALKPHOS 140 (H) 06/17/2020 1540    AST 13 06/17/2020 1540    ALT 22 06/17/2020 1540    BILITOT 0.3 06/17/2020 1540        Lab Results   Component Value Date    PSA 0.5 11/19/2020    PSA 0.2 12/12/2019    PSA 0.2 09/27/2018           IMP:    1. Type 2 diabetes  mellitus without complication, with long-term current use of insulin (H)  Glycosylated Hemoglobin A1c    Microalbumin, Random Urine       PLAN:      You must use the novolog before every meal.    Swimming for exercise at [employer].      Colorectal cancer screening discussed and declined.    Cut out any sweetened drinks other than  for artificial sweeteners.    Follow up in 3 months.       DIABETES TIPS  Weight control and exercise are important.  Try to check your blood glucose every day.  Fasting blood sugar should be in the range of .  In case blood sugar is too low you should have quick access to a source of sugar such as orange juice or a candy bar.  If low blood sugars are occurring frequently let us know as a  medication adjustment  Is neessary.

## 2021-06-16 PROBLEM — E66.01 MORBID OBESITY (H): Status: ACTIVE | Noted: 2020-11-19

## 2021-06-16 PROBLEM — G47.33 OSA ON CPAP: Status: ACTIVE | Noted: 2018-10-03

## 2021-06-16 PROBLEM — E11.9 DIABETES MELLITUS, TYPE 2 (H): Status: ACTIVE | Noted: 2018-09-27

## 2021-06-16 NOTE — PROGRESS NOTES
Initial Structured Weight Loss Supervised Diet Evaluation     Assessment:  Pt. is a 63 y.o. male is being seen today for initial RD nutritional evaluation. Pt. has been unsuccessful with non-surgical weight loss methods and is interested in bariatric surgery. Today we reviewed current eating habits and level of physical activity, and instructed on the changes that are required for successful bariatric outcomes.    Workflow review: Pt still needs to attend initial seminar, has attended support group, has not completed labs and will start psych on 3/26  Weight goal: At or below initial weight    Pt's Initial Weight: 314 lbs  Weight: 312 lb (141.5 kg)  Weight loss from initial: 2  % Weight loss: 0.64 %  BMI: Body mass index is 45.69 kg/(m^2).    Estimated RMR ([place]-St Jeor equation): 2215 calories    -pt would like tools to help him lose weight but not 100% sure he will have surgery  +Pt shared with me his history of alcohol and sex addiction; his history being interested in men and a relationship he had with a women who he fell in love with and lost; pt dealing with depression and anxiety - pt is working with a therpist  +recently started to manage his diabetes (test and take meds)  Food allergies, intolerances, and [employer] customs: none    Diabetes  Testing Blood Sugars: 200-300s  Last A1C, (per pt. report): 7.7%  DM Meds currently taking: in chart    Vitamins   Educated on post-op vitamin regimen: Multi Vit + iron 2x/day, calcium citrate 500-600 mg 2x/day, 7411-0758 mcg of Sublingual B-12 daily, and 5000 IU Vitamin D3 daily.    Biggest struggle with weight loss: boredom eating, poor choices and large portion    Who does the grocery shopping for your household? Self  Who prepares your meals at home? Self  - lives alone    Diet Recall/Time: wakes at 7/730am  Breakfast: coffee and OJ occasionally OR fruit OR mcDonalds OR yogurt (most days skipping)   Am Snack: snacks all day on fruit, fiber one bar OR muffin -  previously eating candy bars   Lunch: sandwich OR salad w/ protein OR mcdonalds/arbys    Pm snack: occasional but mostly iced tea, soda and water  Dinner: lunds dinner   HS Snack: snacks all evening nuts, cookies     Typical Snacks: above     Meals per week away from home: 7-8X/week     Fried Foods: 3-4 times per week    Recommended limiting eating out to no more than 2x/week.  Patient and I reviewed the importance of eating three consistent meals per day; as well as meal timing to be spaced 4-5 hours apart.  Snack choices: 100-150 calories (1-2x/day if physically hungry), incorporating a fruit/vegetable w/ protein source.    Portion Sizes problematic? Yes per patient/diet recall  Encouraged slowing meal times down, 20-30 minutes, chewing to applesauce consistency.   To aid in proper portion control and slow meal time down discussed consuming meals off smaller plates, use toddler/children utensils and set utensils down after each bite.    Protein, vegetables/fruits, carbohydrates:   Reviewed lean protein sources today. Recommended consuming 15-20gm protein at 3 meals daily    Beverages (Type/Oz. per day)  Water: 32-60oz?   Coffee: 20oz w/ cream and slenda; occasional 2-3  Tea: sweetened iced tea 40oz daily   Milk: 3-4X/week   Regular soda: 1 can/day  Diet soda: none  Juice: 12oz every other day   Clemente-Aid/lemonade/etc: none  Alcohol: none    Discussed the importance of adequate hydration after surgery and the goal of 64+ oz. of fluid daily.   The patient understands the importance of avoiding all carbonated, caffeinated and sweetened drinks; instead choosing 64 oz. plain water.    Fluid-meal separation:  The patient and I reviewed the anatomy of the bypass and why  fluids from a meal is so important.    Exercise  ADL  -likes to swim but hasn't over the last     Pt. s understands that 30-60 minutes of daily activity is an important part of bariatric surgery success.   Encouraged pt. to incorporate upper  body strength training exercise, even if its lifting soup cans while watching TV at night, doing pushups/sit-ups, and abdominal work.    PES statement:   1. (NI-1.3)Excessive energy intake related to Food and nutrition related knowledge deficit concerning excessive energy/oral intake as evidenced by Intake of high caloric density foods/beverages (juice, soda) at meals and/or snacks; large portions; frequent grazing; Estimated intake that exceeds estimated daily energy intake; Frequent excessive fast food or restaurant intake; and BMI 45.69    2. (NC-3.3.5) Obese, class III, BMI ?40 related to physical inactivity as evidenced by Infrequent, low-duration and or low intensity physical activity; and Large amounts of sedentary activities, no structured exercise regimen.     Intervention  Discussion:    1. Educated pt on the [place] [facility] Bariatric Success handout.  2. Reviewed lean protein sources today. Recommended consuming 15-20gm protein at 3 meals daily    Instructions/Goals:     1. Include 15-20gm lean protein at each meal.  2. Increase vegetable/fruit intake, by having a vegetable or fruit with each meal daily. Recommended pt to increase vegetable/fruit intake to 4-5 servings daily.  3. Increase fluid intake to 64oz daily: choose plain or calorie/carbonation-free beverages.  4. Incorporate daily structured activity, 30-60 minutes most days of the week  5. Practice plate method: 1/2 plate lean/low fat protein source, vegetable/fruit, <25% of plate complex carbohydrates.  6. Read food labels more consistently: keeping total fat grams <10, total sugar grams <10, fiber >3gm per serving.  7. Separate fluids 30 minutes before/after meal times.  8. Practice eating off of smaller plates/bowls, chewing to applesauce consistency, taking 20-30 minutes to eat in a calm/relaxed environment without distractions of tv/email/cell phone.    Handouts Provided:  Pt. [facility] Patient Handbook  Protein supplement  list    Monitor/Evaluation:  Pt. s target weight: no gain from initial visit, pt. verbalized understanding.     Has realistic expectations for weight loss: Yes  Verbalizes understanding of dietary changes post procedure: Yes  Verbalizes understanding of supplement needs: No  Verbalizes willingness to participate in physical activity: Yes  Motivation for change: average  RD s prediction for client success and compliance on a scale of 1 (low) to 10 (high):  5    Plan for next visit:   Bariatric plate. Give food journal homework.    Time In: 9:00a  Time Out: 10:00a     ABN signed: Yes
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
## 2021-06-16 NOTE — PROGRESS NOTES
ASSESSMENT: Onychauxis, diabetes with neurologic manifestations, foot pain.    PLAN: Toenails were debrided manually x10. Return to clinic in nine weeks as needed.         SUBJECTIVE: Toenails are long, thick and painful. Last nail debridement in the clinic was January 8, 2018.  He denies any changes in the numbness in his feet.    OBJECTIVE:  Vascular: DP pulses are diminished. PT pulses are diminished. Pedal hair is absent. Feet are cool to the touch.  Neuro: Sensation in the feet is diminished per monofilament.  Derm:  Toenails are elongated, thickened and dystrophic with discoloration and subungual debris. Skin is thin and shiny but intact.   Musculoskeletal: Hallux valgus.

## 2021-06-16 NOTE — TELEPHONE ENCOUNTER
Telephone Encounter by Jayla Nicole at 1/15/2020  8:39 AM     Author: Jayla Nicole Service: -- Author Type: --    Filed: 1/15/2020  8:39 AM Encounter Date: 1/13/2020 Status: Signed    : Jayla Nicole APPROVED:    Approval start date: 01/01/2020   Approval end date:  01/30/2023    Pharmacy has been notified of approval and will contact patient when medication is ready for pickup.

## 2021-06-17 NOTE — TELEPHONE ENCOUNTER
Telephone Encounter by Jenna Prado at 1/12/2021  2:51 PM     Author: Jenna Prado Service: -- Author Type: --    Filed: 1/12/2021  2:53 PM Encounter Date: 1/11/2021 Status: Signed    : Jenna Prado       PRIOR AUTHORIZATION DENIED    Denial Rational: Insurance plan covers Novolog brand.  Formulary does not have any brand products on a lower tier to be eligible for lowering your copayment tier per medicare law.  Novolog will stay on Tier 3

## 2021-06-17 NOTE — PROGRESS NOTES
Attended support group. Workflow updated.    Tor Cárdenas [facility]  P: [phone]  F: [phone]

## 2021-06-17 NOTE — TELEPHONE ENCOUNTER
Requested Prescriptions     Pending Prescriptions Disp Refills     insulin glargine (BASAGLAR KWIKPEN) 100 unit/mL (3 mL) pen 45 mL 1     Sig: ADMINISTER 60 UNITS UNDER THE SKIN DAILY AT BEDTIME

## 2021-06-17 NOTE — TELEPHONE ENCOUNTER
Refill Approved    Rx renewed per Medication Renewal Policy. Medication was last renewed on 11/30/20.    Marquise Jose, [facility] Triage/Med Refill 5/6/2021     Requested Prescriptions   Pending Prescriptions Disp Refills     insulin glargine (BASAGLAR KWIKPEN) 100 unit/mL (3 mL) pen 45 mL 1     Sig: ADMINISTER 60 UNITS UNDER THE SKIN DAILY AT BEDTIME       Insulin/GLP-1 Refill Protocol Passed - 5/5/2021  9:33 AM        Passed - Visit with PCP or prescribing provider visit in last 6 months     Last office visit with prescriber/PCP: 2/22/2021 OR same dept: 2/22/2021 Marquise Russ MD OR same specialty: 2/22/2021 Marquise Russ MD Last physical: Visit date not found Last MTM visit: Visit date not found     Next appt within 3 mo: Visit date not found  Next physical within 3 mo: Visit date not found  Prescriber OR PCP: Marquise Russ MD  Last diagnosis associated with med order: 1. Type 2 diabetes mellitus without complication, with long-term current use of insulin (H)  - insulin glargine (BASAGLAR KWIKPEN) 100 unit/mL (3 mL) pen; ADMINISTER 60 UNITS UNDER THE SKIN DAILY AT BEDTIME  Dispense: 45 mL; Refill: 1    If protocol passes may refill for 6 months if within 3 months of last provider visit (or a total of 9 months).              Passed - A1C in last 6 months     Hemoglobin A1c   Date Value Ref Range Status   02/22/2021 9.1 (H) <=5.6 % Final               Passed - Microalbumin in last year     Microalbumin, Random Urine   Date Value Ref Range Status   02/22/2021 3.11 (H) 0.00 - 1.99 mg/dL Final                  Passed - Blood pressure in last year     BP Readings from Last 1 Encounters:   02/22/21 134/73             Passed - Creatinine done in last year     Creatinine   Date Value Ref Range Status   06/17/2020 1.11 0.70 - 1.30 mg/dL Final

## 2021-06-17 NOTE — PROGRESS NOTES
+Pt presented today to discuss he does not want to move forward with surgery currently. Pt stated he is not in a place where he is even ready to make changes to his current habits. Pt was not cleared by psych and stated he is planning to work with his own therapist for awhile. Pt stated he will reach out in the future when he thinks he is more ready to focus on his health. Let patient know nutrition services are always here to help him with his diabetes management or any nutrition concern that may arise. Encouraged pt even small changes he can make with his health are improvements. Pt declined further follow-up at this point.  Time In: 8:55a  Time Out: 9:05a

## 2021-06-17 NOTE — TELEPHONE ENCOUNTER
Telephone Encounter by Jenna Prado at 7/6/2020  1:15 PM     Author: Jenna Prado Service: -- Author Type: --    Filed: 7/6/2020  1:21 PM Encounter Date: 7/5/2020 Status: Addendum    : Jenna Prado    Related Notes: Original Note by Jenna Prado filed at 7/6/2020  1:18 PM       PRIOR AUTHORIZATION DENIED    Denial Rational: must try/fail Fiasp.          Appeal Information: This medication was denied. If physician would like to appeal because patient has contraindication or allergy to covered medication please write letter of medical necessity and route back to PA team to initiate.  If no further action is needed please close encounter thank you.
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
## 2021-06-17 NOTE — TELEPHONE ENCOUNTER
Telephone Encounter by Jenna Prado at 1/12/2021  9:33 AM     Author: Jenna Prado Service: -- Author Type: --    Filed: 1/12/2021  2:51 PM Encounter Date: 1/11/2021 Status: Addendum    : Jenna Prado    Related Notes: Original Note by Jenna Prado filed at 1/12/2021  9:34 AM       Rep from [employer] called back regarding PA.     Novolog is on formulary, no PA needed.  Requested tier exception over the phone.  Rep initiated Tier exception request and will fax determination.     Central PA team  [phone]  Pool:  PA MED ([id number])          Tier exception PA has been initiated.

## 2021-06-17 NOTE — TELEPHONE ENCOUNTER
RN cannot approve Refill Request    RN can NOT refill this medication PCP messaged that patient is overdue for Labs. Last office visit: 2/22/2021 Marquise Russ MD Last Physical: Visit date not found Last MTM visit: Visit date not found Last visit same specialty: 2/22/2021 Marquise Russ MD.  Next visit within 3 mo: Visit date not found  Next physical within 3 mo: Visit date not found      Seema Orozco, [facility] Triage/Med Refill 5/13/2021    Requested Prescriptions   Pending Prescriptions Disp Refills     phenytoin (DILANTIN) 100 MG ER capsule [Pharmacy Med Name: PHENYTOIN SODIUM 100MG EXT CAPSULES] 450 capsule 3     Sig: TAKE 5 CAPSULES BY MOUTH EVERY NIGHT AT BEDTIME       Phenytoin Refill Protocol Failed - 5/13/2021  5:27 AM        Failed - Free phenytoin level in last 12 months     Phenytoin, Free   Date Value Ref Range Status   03/03/2017 <0.5 (L) 1.0 - 2.0 ug/mL Final             Failed - Folate level in last 12 months     No results found for: FOLATE          Passed - LFT or AST or ALT in last 12 months     Albumin   Date Value Ref Range Status   06/17/2020 3.8 3.5 - 5.0 g/dL Final     Bilirubin, Total   Date Value Ref Range Status   06/17/2020 0.3 0.0 - 1.0 mg/dL Final     Bilirubin, Direct   Date Value Ref Range Status   10/08/2010 0.1 <0.6 mg/dL Final     Alkaline Phosphatase   Date Value Ref Range Status   06/17/2020 140 (H) 45 - 120 U/L Final     AST   Date Value Ref Range Status   06/17/2020 13 0 - 40 U/L Final     ALT   Date Value Ref Range Status   06/17/2020 22 0 - 45 U/L Final     Protein, Total   Date Value Ref Range Status   06/17/2020 6.7 6.0 - 8.0 g/dL Final                Passed - CBC w/o diff (Hemogram 2) in last year      WBC   Date Value Ref Range Status   06/17/2020 8.9 4.0 - 11.0 thou/uL Final     RBC   Date Value Ref Range Status   06/17/2020 4.93 4.40 - 6.20 mill/uL Final     Hemoglobin   Date Value Ref Range Status   06/17/2020 14.9 14.0 - 18.0 g/dL Final      Hematocrit   Date Value Ref Range Status   06/17/2020 43.3 40.0 - 54.0 % Final     MCV   Date Value Ref Range Status   06/17/2020 88 80 - 100 fL Final     MCH   Date Value Ref Range Status   06/17/2020 30.3 27.0 - 34.0 pg Final     MCHC   Date Value Ref Range Status   06/17/2020 34.5 32.0 - 36.0 g/dL Final     RDW   Date Value Ref Range Status   06/17/2020 12.8 11.0 - 14.5 % Final     Platelets   Date Value Ref Range Status   06/17/2020 225 140 - 440 thou/uL Final     MPV   Date Value Ref Range Status   06/17/2020 8.1 7.0 - 10.0 fL Final                Passed - PCP or prescribing provider visit in past 12 months       Last office visit with prescriber/PCP: 2/22/2021 Marquise Russ MD OR same dept: 2/22/2021 Marquise Russ MD OR same specialty: 2/22/2021 Marquise Russ MD  Last physical: Visit date not found Last MTM visit: Visit date not found   Next visit within 3 mo: Visit date not found  Next physical within 3 mo: Visit date not found  Prescriber OR PCP: Marquise Russ MD  Last diagnosis associated with med order: 1. Seizure (H)  - phenytoin (DILANTIN) 100 MG ER capsule [Pharmacy Med Name: PHENYTOIN SODIUM 100MG EXT CAPSULES]; TAKE 5 CAPSULES BY MOUTH EVERY NIGHT AT BEDTIME  Dispense: 450 capsule; Refill: 3    If protocol passes may refill for 12 months if within 3 months of last provider visit (or a total of 15 months).

## 2021-06-18 NOTE — LETTER
Letter by Marquise Russ MD at      Author: Marquise Russ MD Service: -- Author Type: --    Filed:  Encounter Date: 2/16/2019 Status: (Other)       Isac CRUZ Guillermo  [address] [place] [id number]             February 16, 2019         Dear Mr. Li,    Below are the results from your recent visit:    Resulted Orders   Glycosylated Hemoglobin A1c   Result Value Ref Range    Hemoglobin A1c 10.9 (H) 3.5 - 6.0 %   Lipid Profile   Result Value Ref Range    Triglycerides 138 <=149 mg/dL    Cholesterol 201 (H) <=199 mg/dL    LDL Calculated 131 (H) <=129 mg/dL    HDL Cholesterol 42 >=40 mg/dL    Patient Fasting > 8hrs? Unknown    Comprehensive Metabolic Panel   Result Value Ref Range    Sodium 139 136 - 145 mmol/L    Potassium 4.5 3.5 - 5.0 mmol/L    Chloride 103 98 - 107 mmol/L    CO2 22 22 - 31 mmol/L    Anion Gap, Calculation 14 5 - 18 mmol/L    Glucose 304 (H) 70 - 125 mg/dL    BUN 21 8 - 22 mg/dL    Creatinine 0.90 0.70 - 1.30 mg/dL    GFR MDRD Af Amer >60 >60 mL/min/1.73m2    GFR MDRD Non Af Amer >60 >60 mL/min/1.73m2    Bilirubin, Total 0.7 0.0 - 1.0 mg/dL    Calcium 9.4 8.5 - 10.5 mg/dL    Protein, Total 6.8 6.0 - 8.0 g/dL    Albumin 3.9 3.5 - 5.0 g/dL    Alkaline Phosphatase 157 (H) 45 - 120 U/L    AST 12 0 - 40 U/L    ALT 20 0 - 45 U/L    Narrative    Fasting Glucose reference range is 70-99 mg/dL per  American Diabetes Association (ADA) guidelines.       Emanuel Chau:  As we discussed the A1C is elevated.  Resume diabetic diet, get a daily walk, work on weight loss and take your meds without missing.  Then we will recheck the A1C in 3 months (make an appt with me for then).  The potassium, liver and kidney functions were NORMAL.  Keep in touch and let me know of any hep we can provide.    The LDL was also elevated as it was much lower in the past when you were taking your statin regularly.  So get back on your daily statin as well.    Please call with questions or contact us using  [employer].    Sincerely,        Electronically signed by Marquise Russ MD

## 2021-06-19 NOTE — PROGRESS NOTES
"ASSESSMENT & PLAN:  1. Cough     2. Infected sebaceous cyst         Patient Instructions   Use Albuterol inhaler with spacer. Take 2 puffs up to 4 times a day as needed for cough.     Seeing Dr Russ on Thursday, August 16 th at 9:30 am. Sooner is worsening symptoms.     Dr. Russ can assess if surgery consult is needed.     Start Keflex 500 mg 3 times a day for 10 days.     Hot pack infected cyst 1-2 times a day for 1 week.      Orders Placed This Encounter   Procedures     Nursing communication     Give spacer/aerochamber     There are no discontinued medications.    No Follow-up on file.    CHIEF COMPLAINT:  Chief Complaint   Patient presents with     Cough     cough x 3-4 days      Mass     lump in groin x \"few years\", discharge x 1 day        HISTORY OF PRESENT ILLNESS:  Isac is a 63 y.o. male presenting to the clinic today for cough and groin mass.    Cough: On Monday, he felt cold onset with a sore throat that has since resolved. Dry and nonproductive cough has remained and is a little bit better since Monday. He denies albuterol use.    Groin Mass: He has a cyst in his groin for years that drained 2 years ago. Mass is draining today and he is inquiring about getting it checked. Mass is bothersome and he does not know if it is caused by his weight.     REVIEW OF SYSTEMS:   He denies ear pain and sinus pressure. He uses a CPAP machine. All other systems are negative.    PFSH:    TOBACCO USE:  History   Smoking Status     Former Smoker     Quit date: 2/15/1990   Smokeless Tobacco     Never Used       VITALS:  Vitals:    08/09/18 1048   BP: 138/73   Patient Site: Left Arm   Patient Position: Sitting   Cuff Size: Adult Large   Pulse: 76   Resp: 16   Temp: 97.8  F (36.6  C)   TempSrc: Oral   SpO2: 96%   Weight: (!) 316 lb (143.3 kg)     Wt Readings from Last 3 Encounters:   08/09/18 (!) 316 lb (143.3 kg)   07/03/18 (!) 311 lb 4.8 oz (141.2 kg)   04/17/18 (!) 311 lb (141.1 kg)     Body mass index is 46.28 " kg/(m^2).    PHYSICAL EXAM:  /73 (Patient Site: Left Arm, Patient Position: Sitting, Cuff Size: Adult Large)  Pulse 76  Temp 97.8  F (36.6  C) (Oral)   Resp 16  Wt (!) 316 lb (143.3 kg)  SpO2 96%  BMI 46.28 kg/m2  General appearance: alert, appears stated age and cooperative  Nose: Nares normal. Septum midline. Mucosa normal. No drainage or sinus tenderness.  Throat: lips, mucosa, and tongue normal; teeth and gums normal  Lungs: clear to auscultation bilaterally course breath sound anteriorlly no rhonic wheezes or crackles.  Heart: regular rate and rhythm, S1, S2 normal, no murmur, click, rub or gallop  Male genitalia: 2 cm fullness in left pubic area that shows weeping and redness and tender to palpation  Neurologic: Grossly normal    QUALITY MEASURES:      ADDITIONAL HISTORY SUMMARIZED (2): None.  DECISION TO OBTAIN EXTRA INFORMATION (1): None.   RADIOLOGY TESTS (1): None.  LABS (1): None.  MEDICINE TESTS (1): None.  INDEPENDENT REVIEW (2 each): None.     The visit lasted a total of 20 minutes face to face with the patient. Over 50% of the time was spent counseling and educating the patient about cough and groin mass.     I, Dorota Potts, am scribing for and in the presence of, Dr. Sara Monahan.    I, Dr. Sara Monahan MD, personally performed the services described in this documentation, as scribed by Dorota Potts in my presence, and it is both accurate and complete.    MEDICATIONS:  Current Outpatient Prescriptions   Medication Sig Dispense Refill     aspirin 81 MG EC tablet Take 81 mg by mouth daily.       atorvastatin (LIPITOR) 20 MG tablet Take 1 tablet (20 mg total) by mouth at bedtime. 90 tablet 2     blood glucose test (ACCU-CHEK DEVON PLUS TEST STRP) strips Test 4 x/day as directed. Dispense brand per patient's insurance at pharmacy discretion. 360 strip 4     blood glucose test strips Use 1 each As Directed as needed. Dispense brand per patient's insurance at pharmacy  "discretion.  One Touch Ultra       blood-glucose meter Misc Use 1 Device As Directed 4 (four) times a day. 1 each 0     CALCIUM CARBONATE (CALCIUM 500 ORAL) Take 1 tablet by mouth daily.       cholecalciferol, vitamin D3, 1,000 unit tablet Take 1,000 Units by mouth daily.       insulin aspart U-100 (NOVOLOG FLEXPEN U-100 INSULIN) 100 unit/mL injection pen Inject 14-24 units with each meal per sliding scale. 5 Pre-filled Pen Syringe 6     insulin glargine (BASAGLAR KWIKPEN U-100 INSULIN) 100 unit/mL (3 mL) pen Inject 55 Units under the skin at bedtime. 5 adj dose pen 11     lancets Misc Use As Directed. Use as directed.       lisinopril (PRINIVIL,ZESTRIL) 2.5 MG tablet Take 2 tablets (5 mg total) by mouth daily. 90 tablet 3     metFORMIN (GLUCOPHAGE-XR) 500 MG 24 hr tablet TAKE 4 TABLETS BY MOUTH WITH EVENING MEAL 360 tablet 3     MULTIVITAMIN ORAL Take 1 tablet by mouth daily.       NEEDLES, INSULIN DISPOSABLE (BD ULTRA-FINE YAHAIRA PEN NEEDLES MISC) Use As Directed daily. Use daily with Levemir and Novolog pen as directed.       pen needle, diabetic (BD ULTRA-FINE YAHAIRA PEN NEEDLES) 32 gauge x 5/32\" Ndle USE DAILY WITH LEMEVIR AND NOVOLOG PENS AS DIRECTED 200 each 1     phenytoin (DILANTIN) 100 MG ER capsule TAKE 5 CAPSULES BY MOUTH AT BEDTIME 150 capsule 2     venlafaxine 225 mg TR24 Take 225 mg by mouth daily. 90 each 2     albuterol (PROAIR HFA;PROVENTIL HFA;VENTOLIN HFA) 90 mcg/actuation inhaler Inhale 2 puffs 4 (four) times a day as needed for wheezing. Or cough 1 each 0     cephalexin (KEFLEX) 500 MG capsule Take 1 capsule (500 mg total) by mouth 3 (three) times a day for 10 days. 30 capsule 0     No current facility-administered medications for this visit.        Total data points: 0    "

## 2021-06-19 NOTE — PROGRESS NOTES
I met with Isac Li at the request of Dr. Russ to recheck his blood pressure.  Blood pressure medications on the MAR were reviewed with patient.    Patient has taken all medications as per usual regimen: Yes  Patient reports tolerating them without any issues or concerns: No     Patient's lisinopril was increased from 2.5 mg to 5 mg daily a week ago.  Isac came in for a blood pressure check.      Vitals:    07/10/18 0742 07/10/18 0758   BP: 140/82 140/70   Patient Site: Right Arm    Patient Position: Sitting    Cuff Size: Adult Large    Pulse: 78        After 5 minutes, the patient's blood pressure remained greater than or equal to 140/90.    Is the patient currently having any chest pain?  No  Does the patient currently have a headache?   No  Does the patient currently have any vision changes? No  Does the patient currently have any nausea? No  Does the patient currently have any abdominal pain? No    The previous encounter was reviewed.  The patient was discharged and the note will be sent to the provider for final review.  Advised to continue what he is doing.  We will call him with any recommended changes.  If he does not hear from us, he should come back in a week for another bp check.

## 2021-06-19 NOTE — LETTER
Letter by Marquise Russ MD at      Author: Marquise Russ MD Service: -- Author Type: --    Filed:  Encounter Date: 4/26/2019 Status: (Other)         Isac Li  [address] [place] [id number]      April 26, 2019      Dear Mr. Li,    In reviewing your chart it looks like you are going to be due for a diabetic check appointment with Dr Russ in mid May. If you would like help getting this appointment scheduled please let us know! Otherwise you can schedule through here or by calling [phone].       Sincerely,    [facility] Support Staff

## 2021-06-19 NOTE — PROGRESS NOTES
DIAGNOSIS:  1. Controlled type 2 diabetes mellitus without complication, with long-term current use of insulin  Glycosylated Hemoglobin A1c    Microalbumin, Random Urine   2. Generalized Convulsive Seizure  Phenytoin (Dilantin )   3. Hypercholesteremia     4. Major depression, chronic     5. Obesity     6. Prostate cancer screening  PSA, Annual Screen (Prostatic-Specific Antigen)   7. Colon cancer screening     8. Type 2 diabetes mellitus (H)  lisinopril (PRINIVIL,ZESTRIL) 2.5 MG tablet       PLAN:  Patient Instructions   Please get colonoscopy done this year  ([facility])    The following high BMI interventions were performed this visit: encouragement to exercise and dietary management education, guidance, and counseling     Increase lisinopril to 5 mg daily  Nurse BP check in a week  Follow up BP check in a month.             The following high BMI interventions were performed this visit: encouragement to exercise and dietary management education, guidance, and counseling        HPI: in for a medcheck today.  Does miss some novalog.  Not getting in a lot of walking.  Eye exam within the past year.        Current Outpatient Prescriptions on File Prior to Visit   Medication Sig Dispense Refill     aspirin 81 MG EC tablet Take 81 mg by mouth daily.       atorvastatin (LIPITOR) 20 MG tablet Take 1 tablet (20 mg total) by mouth at bedtime. 90 tablet 2     blood glucose test (ACCU-CHEK DEVON PLUS TEST STRP) strips Test 4 x/day as directed. Dispense brand per patient's insurance at pharmacy discretion. 360 strip 4     blood glucose test strips Use 1 each As Directed as needed. Dispense brand per patient's insurance at pharmacy discretion.  One Touch Ultra       blood-glucose meter Misc Use 1 Device As Directed 4 (four) times a day. 1 each 0     CALCIUM CARBONATE (CALCIUM 500 ORAL) Take 1 tablet by mouth daily.       cholecalciferol, vitamin D3, 1,000 unit tablet Take 1,000 Units by mouth daily.       insulin aspart U-100  "(NOVOLOG FLEXPEN U-100 INSULIN) 100 unit/mL injection pen Inject 14-24 units with each meal per sliding scale. 5 Pre-filled Pen Syringe 6     insulin glargine (BASAGLAR KWIKPEN U-100 INSULIN) 100 unit/mL (3 mL) pen Inject 55 Units under the skin at bedtime. 5 adj dose pen 11     lancets Misc Use As Directed. Use as directed.       metFORMIN (GLUCOPHAGE-XR) 500 MG 24 hr tablet TAKE 4 TABLETS BY MOUTH WITH EVENING MEAL 360 tablet 3     MULTIVITAMIN ORAL Take 1 tablet by mouth daily.       NEEDLES, INSULIN DISPOSABLE (BD ULTRA-FINE YAHAIRA PEN NEEDLES MISC) Use As Directed daily. Use daily with Levemir and Novolog pen as directed.       pen needle, diabetic (BD ULTRA-FINE YAHAIRA PEN NEEDLES) 32 gauge x 5/32\" Ndle USE DAILY WITH LEMEVIR AND NOVOLOG PENS AS DIRECTED 200 each 1     phenytoin (DILANTIN) 100 MG ER capsule TAKE 5 CAPSULES BY MOUTH AT BEDTIME 150 capsule 2     venlafaxine 225 mg TR24 Take 225 mg by mouth daily. 90 each 2     [DISCONTINUED] lisinopril (PRINIVIL,ZESTRIL) 2.5 MG tablet Take 1 tablet (2.5 mg total) by mouth daily. 90 tablet 3     No current facility-administered medications on file prior to visit.        Pmh: reviewed  Psh: reviewed  Allergy:  reviewed      EXAM:    /70  Pulse 78  Temp 98.4  F (36.9  C) (Oral)   Resp 16  Wt (!) 311 lb 4.8 oz (141.2 kg)  BMI 45.59 kg/m2   BP Readings from Last 3 Encounters:   07/03/18 150/70   02/15/18 161/70   01/08/18 128/74      GEN:   ALERT, NAD, ORIENTED TIMES THREE  NECK:  SUPPLE WITHOUT ADENOPATHY OR THYROMEGALY.  LUNGS: CTA  COR: RRR WITHOUT MURMUR  SKIN: NO RASH , ULCERS OR LESIONS NOTED  FEET:  MF TESTING: NL              SKIN EXAM:  NL              VASCULAR:   R DP  PULSE: +                                      L DP  PULSE: +                                      R PT  PULSE: -                                      L PT  PULSE: -      EXT: WITHOUT EDEMA OR SWELLING      Results for orders placed or performed in visit on 01/04/18   Comprehensive " Metabolic Panel   Result Value Ref Range    Sodium 141 136 - 145 mmol/L    Potassium 4.4 3.5 - 5.0 mmol/L    Chloride 108 (H) 98 - 107 mmol/L    CO2 22 22 - 31 mmol/L    Anion Gap, Calculation 11 5 - 18 mmol/L    Glucose 267 (H) 70 - 125 mg/dL    BUN 23 (H) 8 - 22 mg/dL    Creatinine 0.82 0.70 - 1.30 mg/dL    GFR MDRD Af Amer >60 >60 mL/min/1.73m2    GFR MDRD Non Af Amer >60 >60 mL/min/1.73m2    Bilirubin, Total 0.3 0.0 - 1.0 mg/dL    Calcium 9.0 8.5 - 10.5 mg/dL    Protein, Total 6.9 6.0 - 8.0 g/dL    Albumin 3.7 3.5 - 5.0 g/dL    Alkaline Phosphatase 129 (H) 45 - 120 U/L    AST 15 0 - 40 U/L    ALT 31 0 - 45 U/L     Lab Results   Component Value Date    CHOL 166 01/04/2018    CHOL 155 03/03/2017    CHOL 152 10/27/2016     Lab Results   Component Value Date    HDL 42 01/04/2018    HDL 30 (L) 03/03/2017    HDL 37 (L) 10/27/2016     Lab Results   Component Value Date    LDLCALC 96 01/04/2018    LDLCALC 99 03/03/2017    LDLCALC 98 10/27/2016     Lab Results   Component Value Date    TRIG 142 01/04/2018    TRIG 130 03/03/2017    TRIG 83 10/27/2016     Lab Results   Component Value Date    HGBA1C 7.7 (H) 01/04/2018     Lab Results   Component Value Date    MICROALBUR 3.69 (H) 06/27/2016     Lab Results   Component Value Date    PSA 0.2 07/13/2017    PSA 0.2 03/15/2010     Lab Results   Component Value Date    PHENYTOIN 10.1 07/13/2017     PHQ-9:  12    No components found for: CHOLHDL      No results found for this or any previous visit (from the past 168 hour(s)).

## 2021-06-19 NOTE — LETTER
Letter by Marquise Russ MD at      Author: Marquise Russ MD Service: -- Author Type: --    Filed:  Encounter Date: 5/23/2019 Status: (Other)         Isac CRUZ Guillermo  [address] [place] [id number]             May 23, 2019         Dear Mr. Li,    Below are the results from your recent visit:    Resulted Orders   Glycosylated Hemoglobin A1c   Result Value Ref Range    Hemoglobin A1c 8.0 (H) 3.5 - 6.0 %   Lipid Profile   Result Value Ref Range    Triglycerides 97 <=149 mg/dL    Cholesterol 167 <=199 mg/dL    LDL Calculated 105 <=129 mg/dL    HDL Cholesterol 43 >=40 mg/dL    Patient Fasting > 8hrs? Yes    Phenytoin (Dilantin )   Result Value Ref Range    Phenytoin 9.6 (L) 10.0 - 20.0 ug/mL   Comprehensive Metabolic Panel   Result Value Ref Range    Sodium 140 136 - 145 mmol/L    Potassium 4.4 3.5 - 5.0 mmol/L    Chloride 106 98 - 107 mmol/L    CO2 24 22 - 31 mmol/L    Anion Gap, Calculation 10 5 - 18 mmol/L    Glucose 167 (H) 70 - 125 mg/dL    BUN 21 8 - 22 mg/dL    Creatinine 0.77 0.70 - 1.30 mg/dL    GFR MDRD Af Amer >60 >60 mL/min/1.73m2    GFR MDRD Non Af Amer >60 >60 mL/min/1.73m2    Bilirubin, Total 0.3 0.0 - 1.0 mg/dL    Calcium 9.4 8.5 - 10.5 mg/dL    Protein, Total 6.7 6.0 - 8.0 g/dL    Albumin 3.9 3.5 - 5.0 g/dL    Alkaline Phosphatase 121 (H) 45 - 120 U/L    AST 13 0 - 40 U/L    ALT 19 0 - 45 U/L    Narrative    Fasting Glucose reference range is 70-99 mg/dL per  American Diabetes Association (ADA) guidelines.       NURIS Chua:  I was going to talk to you again this morning but they inadvertently let you go.  Anyway the A1C is way better!  Still a ways to go as we would like to see ig close to 7.0.  I now want you to tighten up the diabetic diet, which you are familiar with.  If you need a refreshed course let me know and I could have you see one of the diabetic educators again.   Then we will recheck the A1C in 3 months again.    The LDL cholesterol has improved.    However I would also like to see your LDL lower (closer to 70).  So if you have been taking your atorvastatin 20 mg every day let me know and then I would suggest increasing it to 40 mg daily.    The liver and kidney function are normal.  The phenytoin level is where it has been, near the bottom of the therapeutic range.   As long as you have been seizure free then we would not make a change in the dilantin.   Also you should see the Neurologist yearly per their recommendation.    Please call with questions or contact us using [employer].    Sincerely,        Electronically signed by Marquise Russ MD

## 2021-06-20 NOTE — LETTER
Letter by Marquise Russ MD at      Author: Marquise Russ MD Service: -- Author Type: --    Filed:  Encounter Date: 12/13/2019 Status: Signed         Alf Guillermo  [address] [place] [id number]             December 13, 2019         Dear Mr. Li,    Below are the results from your recent visit:    Resulted Orders   Glycosylated Hemoglobin A1c   Result Value Ref Range    Hemoglobin A1c 9.7 (H) 3.5 - 6.0 %   Microalbumin, Random Urine   Result Value Ref Range    Microalbumin, Random Urine 21.79 (H) 0.00 - 1.99 mg/dL    Creatinine, Urine 267.4 mg/dL    Microalbumin/Creatinine Ratio Random Urine 81.5 (H) <=19.9 mg/g    Narrative    Microalbumin, Random Urine  <2.0 mg/dL . . . . . . . . Normal  3.0-30.0 mg/dL . . . . . . Microalbuminuria  >30.0 mg/dL . . . . . .  . Clinical Proteinuria    Microalbumin/Creatinine Ratio, Random Urine  <20 mg/g . . . . .. . . . Normal   mg/g . . . . . . . Microalbuminuria  >300 mg/g . . . . . . . . Clinical Proteinuria       PSA (Prostatic-Specific Antigen), Annual Screen   Result Value Ref Range    PSA 0.2 0.0 - 4.5 ng/mL    Narrative    Method is Abbott Prostate-Specific Antigen (PSA)  Standard-WHO 1st International (90:10)       Emanuel Chau:  Your PSA is normal and should be rechecked in one year.    The diabetes is very poorly controlled. Per our visit on 12-12-19 please get back to eating a diabetic diet, taking your meds as prescribed and also tryng to get in a daily walk.  If your home blood sugars are consistently >180 then I want to see you back in Jan 2020.    There is also some protein in your urine and at your next visit we will talk about a possible increase in your lisinopril.    Please call with questions or contact us using [employer].    Sincerely,        Electronically signed by Marquise Russ MD

## 2021-06-20 NOTE — LETTER
Letter by Marquise Russ MD at      Author: Marquise Russ MD Service: -- Author Type: --    Filed:  Encounter Date: 2020 Status: (Other)         Isac Li  [address]  [facility] [id number]      2020      Dear Isac Li,   : 1955      This letter is in regards to the appointment that you had scheduled on 20 at the [facility] with Dr. Russ.     The [facility] strives to see all patients in a timely manner and we need your help to achieve this.  The above-mentioned appointment was missed and we do not have record of a cancellation by you.  Whenever possible, we request appointment cancellations at least 72 hours in advance.  This time allows us to offer the appointment to another patient in need.      If you feel you have received this letter in error, or if you need to reschedule this appointment, please call our office so that we may update our records.      Sincerely,    [facility]

## 2021-06-20 NOTE — PROGRESS NOTES
DIAGNOSIS:  1. Health care maintenance  Influenza, Recombinant, Inj, Quadrivalent, PF, 18+YRS   2. Diabetes mellitus, type 2 (H)  Glycosylated Hemoglobin A1c   3. Type 2 diabetes mellitus without complication, with long-term current use of insulin (H)  insulin glargine (BASAGLAR KWIKPEN U-100 INSULIN) 100 unit/mL (3 mL) pen   4. Sore throat  Rapid Strep A Screen-Throat   5. Strep throat  penicillin G benzathine (BICILLIN L-A) 1,200,000 unit/2 mL Syrg       PLAN:  Patient Instructions   Schedule eye exam    Flu vaccine    Bicillin LA 1.2 million units IM    Will await A1C and refer if the diabetes remains poorly controlled      HPI:  Throat is raw.  Cough for 2 days.  No fever or shortness of breath.  July A1C was not on meds regularly.  Since then has been better   Blood sugars have been better.  No recent eye exam.            Current Outpatient Prescriptions on File Prior to Visit   Medication Sig Dispense Refill     aspirin 81 MG EC tablet Take 81 mg by mouth daily.       atorvastatin (LIPITOR) 20 MG tablet Take 1 tablet (20 mg total) by mouth at bedtime. 90 tablet 2     blood glucose test (ACCU-CHEK DEVON PLUS TEST STRP) strips Test 4 x/day as directed. Dispense brand per patient's insurance at pharmacy discretion. 360 strip 4     blood glucose test strips Use 1 each As Directed as needed. Dispense brand per patient's insurance at pharmacy discretion.  One Touch Ultra       blood-glucose meter Misc Use 1 Device As Directed 4 (four) times a day. 1 each 0     CALCIUM CARBONATE (CALCIUM 500 ORAL) Take 1 tablet by mouth daily.       cholecalciferol, vitamin D3, 1,000 unit tablet Take 1,000 Units by mouth daily.       insulin aspart U-100 (NOVOLOG FLEXPEN U-100 INSULIN) 100 unit/mL injection pen Inject 14-24 units with each meal per sliding scale. 5 Pre-filled Pen Syringe 6     lancets Misc Use As Directed. Use as directed.       lisinopril (PRINIVIL,ZESTRIL) 5 MG tablet Take 1 tablet (5 mg total) by mouth daily. 90  "tablet 1     metFORMIN (GLUCOPHAGE-XR) 500 MG 24 hr tablet TAKE 4 TABLETS BY MOUTH WITH EVENING MEAL 360 tablet 3     MULTIVITAMIN ORAL Take 1 tablet by mouth daily.       NEEDLES, INSULIN DISPOSABLE (BD ULTRA-FINE YAHAIRA PEN NEEDLES MISC) Use As Directed daily. Use daily with Levemir and Novolog pen as directed.       pen needle, diabetic (BD ULTRA-FINE YAHAIRA PEN NEEDLES) 32 gauge x 5/32\" Ndle USE DAILY WITH LEMEVIR AND NOVOLOG PENS AS DIRECTED 200 each 1     phenytoin (DILANTIN) 100 MG ER capsule TAKE 5 CAPSULES BY MOUTH AT BEDTIME 150 capsule 2     venlafaxine 225 mg TR24 Take 225 mg by mouth daily. 90 each 2     [DISCONTINUED] insulin glargine (BASAGLAR KWIKPEN U-100 INSULIN) 100 unit/mL (3 mL) pen Inject 55 Units under the skin at bedtime. 5 adj dose pen 11     albuterol (PROAIR HFA;PROVENTIL HFA;VENTOLIN HFA) 90 mcg/actuation inhaler Inhale 2 puffs 4 (four) times a day as needed for wheezing. Or cough 1 each 0     No current facility-administered medications on file prior to visit.        Pmh: reviewed  Psh: reviewed  Allergy:  reviewed      EXAM:    /74  Pulse 81  Temp 97.4  F (36.3  C) (Oral)   Resp 16  Wt (!) 309 lb 12.8 oz (140.5 kg)  SpO2 96%  BMI 45.37 kg/m2   BP Readings from Last 3 Encounters:   09/27/18 137/74   08/09/18 138/73   07/10/18 140/70      Wt Readings from Last 3 Encounters:   09/27/18 (!) 309 lb 12.8 oz (140.5 kg)   08/09/18 (!) 316 lb (143.3 kg)   07/03/18 (!) 311 lb 4.8 oz (141.2 kg)       GEN:   ALERT, NAD, ORIENTED TIMES THREE  NECK: SUPPLE WITHOUT ADENOPATHY OR THYROMEGALY  LUNGS: CTA  COR: RRR WITHOUT MURMUR  SKIN: NO RASH , ULCERS OR LESIONS NOTED  EXT: WITHOUT EDEMA OR SWELLING    Recent Results (from the past 168 hour(s))   Glycosylated Hemoglobin A1c    Collection Time: 09/27/18  8:59 AM   Result Value Ref Range    Hemoglobin A1c 8.1 (H) 3.5 - 6.0 %   Rapid Strep A Screen-Throat    Collection Time: 09/27/18  9:02 AM   Result Value Ref Range    Rapid Strep A Antigen Group " A Strep detected (!) No Group A Strep detected, presumptive negative          Lab Results   Component Value Date    HGBA1C 8.1 (H) 09/27/2018     Results for orders placed or performed in visit on 01/04/18   Comprehensive Metabolic Panel   Result Value Ref Range    Sodium 141 136 - 145 mmol/L    Potassium 4.4 3.5 - 5.0 mmol/L    Chloride 108 (H) 98 - 107 mmol/L    CO2 22 22 - 31 mmol/L    Anion Gap, Calculation 11 5 - 18 mmol/L    Glucose 267 (H) 70 - 125 mg/dL    BUN 23 (H) 8 - 22 mg/dL    Creatinine 0.82 0.70 - 1.30 mg/dL    GFR MDRD Af Amer >60 >60 mL/min/1.73m2    GFR MDRD Non Af Amer >60 >60 mL/min/1.73m2    Bilirubin, Total 0.3 0.0 - 1.0 mg/dL    Calcium 9.0 8.5 - 10.5 mg/dL    Protein, Total 6.9 6.0 - 8.0 g/dL    Albumin 3.7 3.5 - 5.0 g/dL    Alkaline Phosphatase 129 (H) 45 - 120 U/L    AST 15 0 - 40 U/L    ALT 31 0 - 45 U/L     Lab Results   Component Value Date    MICROALBUR 6.77 (H) 07/03/2018     Lab Results   Component Value Date    PSA 0.2 07/13/2017    PSA 0.2 03/15/2010

## 2021-06-20 NOTE — LETTER
Letter by Marquise Russ MD at      Author: Marquise Russ MD Service: -- Author Type: --    Filed:  Encounter Date: 6/18/2020 Status: (Other)         Isac CRUZ Guillermo  [address] [place] [id number]             June 18, 2020         Dear Mr. Li,    Below are the results from your recent visit:    Resulted Orders   Comprehensive Metabolic Panel   Result Value Ref Range    Sodium 139 136 - 145 mmol/L    Potassium 4.9 3.5 - 5.0 mmol/L    Chloride 104 98 - 107 mmol/L    CO2 22 22 - 31 mmol/L    Anion Gap, Calculation 13 5 - 18 mmol/L    Glucose 454 (HH) 70 - 125 mg/dL    BUN 16 8 - 22 mg/dL    Creatinine 1.11 0.70 - 1.30 mg/dL    GFR MDRD Af Amer >60 >60 mL/min/1.73m2    GFR MDRD Non Af Amer >60 >60 mL/min/1.73m2    Bilirubin, Total 0.3 0.0 - 1.0 mg/dL    Calcium 8.8 8.5 - 10.5 mg/dL    Protein, Total 6.7 6.0 - 8.0 g/dL    Albumin 3.8 3.5 - 5.0 g/dL    Alkaline Phosphatase 140 (H) 45 - 120 U/L    AST 13 0 - 40 U/L    ALT 22 0 - 45 U/L    Narrative    Fasting Glucose reference range is 70-99 mg/dL per  American Diabetes Association (ADA) guidelines.   Phenytoin (Dilantin )   Result Value Ref Range    Phenytoin 7.5 (L) 10.0 - 20.0 ug/mL   Lipid Profile   Result Value Ref Range    Triglycerides 233 (H) <=149 mg/dL    Cholesterol 189 <=199 mg/dL    LDL Calculated 97 <=129 mg/dL    HDL Cholesterol 45 >=40 mg/dL    Patient Fasting > 8hrs? No    Glycosylated Hemoglobin A1c   Result Value Ref Range    Hemoglobin A1c 11.2 (H) 3.5 - 6.0 %   HM1 (CBC with Diff)   Result Value Ref Range    WBC 8.9 4.0 - 11.0 thou/uL    RBC 4.93 4.40 - 6.20 mill/uL    Hemoglobin 14.9 14.0 - 18.0 g/dL    Hematocrit 43.3 40.0 - 54.0 %    MCV 88 80 - 100 fL    MCH 30.3 27.0 - 34.0 pg    MCHC 34.5 32.0 - 36.0 g/dL    RDW 12.8 11.0 - 14.5 %    Platelets 225 140 - 440 thou/uL    MPV 8.1 7.0 - 10.0 fL    Neutrophils % 71 (H) 50 - 70 %    Lymphocytes % 24 20 - 40 %    Monocytes % 4 2 - 10 %    Eosinophils % 1 0 - 6 %     Basophils % 1 0 - 2 %    Neutrophils Absolute 6.3 2.0 - 7.7 thou/uL    Lymphocytes Absolute 2.1 0.8 - 4.4 thou/uL    Monocytes Absolute 0.4 0.0 - 0.9 thou/uL    Eosinophils Absolute 0.1 0.0 - 0.4 thou/uL    Basophils Absolute 0.0 0.0 - 0.2 thou/uL     Hi Isac:  Your diabetes is under very poor control.  I would like you to make an appt with me for today or tomorrow and I will have our staff contact you.    The phenytoin level is slightly low and we can discuss that too.  The remaining labs are normal.    Please call with questions or contact us using [employer].    Sincerely,        Electronically signed by Marquise Russ MD

## 2021-06-20 NOTE — PROGRESS NOTES
ASSESSMENT and PLAN:  Isac was seen today for cough.    Diagnoses and all orders for this visit:    Cough  -     XR Chest 2 Views; Future  -     BNP(B-type Natriuretic Peptide)  -     HM1(CBC and Differential)  -     HM1 (CBC with Diff)    Dyspnea  -     XR Chest 2 Views; Future  -     BNP(B-type Natriuretic Peptide)  -     HM1(CBC and Differential)  -     HM1 (CBC with Diff)    ROSSI on CPAP    Other orders  -     albuterol (PROAIR HFA;PROVENTIL HFA;VENTOLIN HFA) 90 mcg/actuation inhaler; Inhale 1-2 puffs po q 4-6h prn cough, wheeze, sob      Patient was seen today for evaluation of ongoing cough and episode of dyspnea last night.  Recent strep infection treated with penicillin injection.  I did not think it was necessary to repeat strep test today and it is interesting that he acquired strep throat  with associated cough.  We discussed that typically this strep symptoms should be better at this point however given his comorbidities I am concerned about heart failure versus an underlying pneumonia given the degree of coughing and shortness of breath.  Lungs were clear on exam but we did opt to obtain chest x-ray today.  Explained to the patient that x-ray was clear did not show any signs of effusion and pneumonia and he had also had labs drawn for CBC to look at white count and BNP.  We discussed trialing albuterol inhaler to see if bronchodilator would help and he can use before bedtime.  I did give him a letter for work which you can see in the letter tab that clears him out of work until Wednesday or Thursday unless he is not feeling any better.  He will let me know if we need to extend his work note.  Follow-up if no improvement of symptoms.  May need to consider prednisone if cough unrelieved  There are no Patient Instructions on file for this visit.    Orders Placed This Encounter   Procedures     XR Chest 2 Views     Standing Status:   Future     Number of Occurrences:   1     Standing Expiration Date:    10/2/2019     Order Specific Question:   Reason for Exam (Describe Symptoms):     Answer:   Cough, sob, eval for pneumonia or signs heart failure, orthopnea.     Order Specific Question:   Can the procedure be changed per Radiologist protocol?     Answer:   Yes     BNP(B-type Natriuretic Peptide)     HM1 (CBC with Diff)     There are no discontinued medications.    No Follow-up on file.    DATA REVIEWED:  Additional History from Old Records Summarized (2): Reviewed office note with Dr. Russ regarding strep throat  Decision to Obtain Records (1):    Radiology Tests Summarized or Ordered (1): X-ray ordered to rule out pneumonia  Labs Reviewed or Ordered (1): -Labs ordered and reviewed  Medicine Test Summarized or Ordered (1):   Independent Review of EKG, X-RAY, or RAPID STREP (2 each): Personally reviewed chest x-ray which did not show any pneumonia or pulmonary effusion    The visit lasted a total of 23 minutes face to face with the patient. Over 50% of the time was spent counseling and educating the patient     CHIEF COMPLAINT:  Chief Complaint   Patient presents with     Cough     Cough and SOB.  Started tuesday.  Starting to feel better today.        HISTORY OF PRESENT ILLNESS:  Isac Li is a 63 y.o. male who presents for ongoing feelings of being unwell with cough and dyspnea last night.   Was seen by Dr. Russ for medication management and dx strep 9/27 - had pcn injection. Had cough with strep sx.  Cough getting better today but last night couldn't use cpap which has never happened before.  He had to take it off and sleep in the recliner because he had difficulty with breathing.  When he coughs- getting up clear stuff.  No increased swelling lower legs. Throat better, was more raw. Pushing hydration.  Does not endorse any body aches.  No fevers or chills but may be was feeling warm before diagnosed with strep.  He does have diabetes that is not at great goal.  Single nickolas lives alone, stresses in  life. 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 Been out of work since wed last week. Live in apartment and takes elevator, fairly sedentary .  Does not do enough exertion to really now as he has had increased dyspnea with exertion.  No previous cardiac history.  He has never been a smoker.  -No previous history of orthopnea or paroxysmal nocturnal dyspnea    REVIEW OF SYSTEMS:    Comprehensive review of systems is negative except as noted in the HPI.     PFSH:  Tobacco use and medications reviewed below.     TOBACCO USE:  History   Smoking Status     Former Smoker     Quit date: 2/15/1990   Smokeless Tobacco     Never Used       VITALS:  Vitals:    10/01/18 1057 10/01/18 1139   BP: 143/74 145/73   Pulse: 88    Resp: 16    Temp: 99.6  F (37.6  C)    TempSrc: Oral    SpO2: 98%    Weight: (!) 309 lb 12.8 oz (140.5 kg)      Wt Readings from Last 3 Encounters:   10/01/18 (!) 309 lb 12.8 oz (140.5 kg)   09/27/18 (!) 309 lb 12.8 oz (140.5 kg)   08/09/18 (!) 316 lb (143.3 kg)       PHYSICAL EXAM:  Constitutional:  Reveals a 63 y.o. male in mild distress-frequent coughing. vitals:  Per nursing notes.  Neck:  Supple, thyroid not palpable.  Cardiac:  Regular rate and rhythm without murmurs, rubs, or gallops. Carotids without bruits. Legs without edema. Palpation of the radial pulse regular.  Lungs: Clear.  Respiratory effort normal.  No wheezes rhonchi or rales  Skin:   Without rash, bruise, or palpable lesions.  Rheumatologic: Normal joints and nails of the hands.  Neurologic:  Cranial nerves II-XII intact.     Psychiatric:  Mood appropriate, memory intact.         MEDICATIONS:  Current Outpatient Prescriptions   Medication Sig Dispense Refill     aspirin 81 MG EC tablet Take 81 mg by mouth daily.       atorvastatin (LIPITOR) 20 MG tablet Take 1 tablet (20 mg total) by mouth at bedtime. 90 tablet 2     blood glucose test (ACCU-CHEK DEVON PLUS TEST STRP) strips  "Test 4 x/day as directed. Dispense brand per patient's insurance at pharmacy discretion. 360 strip 4     blood glucose test strips Use 1 each As Directed as needed. Dispense brand per patient's insurance at pharmacy discretion.  One Touch Ultra       blood-glucose meter Misc Use 1 Device As Directed 4 (four) times a day. 1 each 0     CALCIUM CARBONATE (CALCIUM 500 ORAL) Take 1 tablet by mouth daily.       cholecalciferol, vitamin D3, 1,000 unit tablet Take 1,000 Units by mouth daily.       insulin aspart U-100 (NOVOLOG FLEXPEN U-100 INSULIN) 100 unit/mL injection pen Inject 14-24 units with each meal per sliding scale. 5 Pre-filled Pen Syringe 6     insulin glargine (BASAGLAR KWIKPEN U-100 INSULIN) 100 unit/mL (3 mL) pen Inject 60 Units under the skin at bedtime. 5 adj dose pen 11     lancets Misc Use As Directed. Use as directed.       lisinopril (PRINIVIL,ZESTRIL) 5 MG tablet Take 1 tablet (5 mg total) by mouth daily. 90 tablet 1     metFORMIN (GLUCOPHAGE-XR) 500 MG 24 hr tablet TAKE 4 TABLETS BY MOUTH WITH EVENING MEAL 360 tablet 3     MULTIVITAMIN ORAL Take 1 tablet by mouth daily.       NEEDLES, INSULIN DISPOSABLE (BD ULTRA-FINE YAHAIRA PEN NEEDLES MISC) Use As Directed daily. Use daily with Levemir and Novolog pen as directed.       pen needle, diabetic (BD ULTRA-FINE YAHAIRA PEN NEEDLES) 32 gauge x 5/32\" Ndle USE DAILY WITH LEMEVIR AND NOVOLOG PENS AS DIRECTED 200 each 1     phenytoin (DILANTIN) 100 MG ER capsule TAKE 5 CAPSULES BY MOUTH AT BEDTIME 150 capsule 2     venlafaxine 225 mg TR24 Take 225 mg by mouth daily. 90 each 2     albuterol (PROAIR HFA;PROVENTIL HFA;VENTOLIN HFA) 90 mcg/actuation inhaler Inhale 1-2 puffs po q 4-6h prn cough, wheeze, sob 1 Inhaler 0     No current facility-administered medications for this visit.              "

## 2021-06-21 NOTE — LETTER
Letter by Marquise Russ MD at      Author: Marquise Russ MD Service: -- Author Type: --    Filed:  Encounter Date: 2/23/2021 Status: (Other)         Alf Guillermo  [address]  [facility] [address]             February 23, 2021         Dear Mr. Li,    Below are the results from your recent visit:    Resulted Orders   Glycosylated Hemoglobin A1c   Result Value Ref Range    Hemoglobin A1c 9.1 (H) <=5.6 %   Microalbumin, Random Urine   Result Value Ref Range    Microalbumin, Random Urine 3.11 (H) 0.00 - 1.99 mg/dL    Creatinine, Urine 130.5 mg/dL    Microalbumin/Creatinine Ratio Random Urine 23.8 (H) <=19.9 mg/g    Narrative    Microalbumin, Random Urine  <2.0 mg/dL . . . . . . . . Normal  3.0-30.0 mg/dL . . . . . . Microalbuminuria  >30.0 mg/dL . . . . . .  . Clinical Proteinuria    Microalbumin/Creatinine Ratio, Random Urine  <20 mg/g . . . . .. . . . Normal   mg/g . . . . . . . Microalbuminuria  >300 mg/g . . . . . . . . Clinical Proteinuria            Hi Isac:  As we previously discussed at your appt the A1C has markedly improved.  Good job!  Now work on eliminating the sweetened drinks (except for artificial sweetener) and also work on taking your short acting insulin before each meal.   We will then get the A1C in 3 months.    If you are able to find safe access to swimming that would be good as well..  There is a minimal trace of protein in your urine. With improved diabetic control that can hopefully be eliminated.  Dr Russ    Please call with questions or contact us using [employer].    Sincerely,        Electronically signed by Marquise Russ MD

## 2021-06-21 NOTE — LETTER
Letter by Marquise Russ MD at      Author: Marquise Russ MD Service: -- Author Type: --    Filed:  Encounter Date: 11/20/2020 Status: (Other)         Isac Watkinsr  [address]  [facility] [address]             November 20, 2020         Dear Mr. Li,    Below are the results from your recent visit:    Resulted Orders   Glycosylated Hemoglobin A1c   Result Value Ref Range    Hemoglobin A1c 12.1 (H) <=5.6 %      Comment:      Normal <5.7% Prediabete 5.7-6.4% Diabletes 6.5% or higher - adopted from ADA consensus guidelines   PSA, Annual Screen (Prostatic-Specific Antigen)   Result Value Ref Range    PSA 0.5 0.0 - 4.5 ng/mL    Narrative    Method is Abbott Prostate-Specific Antigen (PSA)  Standard-WHO 1st International (90:10)   Microalbumin, Random Urine   Result Value Ref Range    Microalbumin, Random Urine 2.48 (H) 0.00 - 1.99 mg/dL    Creatinine, Urine 88.0 mg/dL    Microalbumin/Creatinine Ratio Random Urine 28.2 (H) <=19.9 mg/g    Narrative    Microalbumin, Random Urine  <2.0 mg/dL . . . . . . . . Normal  3.0-30.0 mg/dL . . . . . . Microalbuminuria  >30.0 mg/dL . . . . . .  . Clinical Proteinuria    Microalbumin/Creatinine Ratio, Random Urine  <20 mg/g . . . . .. . . . Normal   mg/g . . . . . . . Microalbuminuria  >300 mg/g . . . . . . . . Clinical Proteinuria           Emanuel Chau:  Your PSA is low and normal (very slightly up from last year).  It should be repeated in one year.    As we discussed your diabetes is VERY poorly controlled.  It is extremely important that you take your Novolog with EVERY meal, that you follow the diabetic diet and that you get in at least one walk daily.    We will plan to recheck the A1C in 3 months.  In the meantime I would suggest occasionally checking your blood sugars.  If you would call or MyChart  Me your blood sugar results in 2 weeks that would be helpful.      Please call with questions or contact us using  [employer].    Sincerely,        Electronically signed by Marquise Russ MD

## 2021-06-23 NOTE — TELEPHONE ENCOUNTER
Pharmacy called.  Patient there requesting refill of phenytoin 100 mg.  They state they have faxed us a couple of times.  I cannot find any request.  Patient has been out of meds.  They have given him 3 days of med twice now and they cannot do it anymore.      Patient has appt 2-12-19.  Please refill meds.    Requested Prescriptions     Pending Prescriptions Disp Refills     phenytoin (DILANTIN) 100 MG ER capsule 450 capsule 0     Sig: TAKE 5 CAPSULES BY MOUTH AT BEDTIME   Last Office Visit:  10/01/2018  Last Refill:11/005/2018  CSA:  N/A  Date of Last Labs: 07/03/2018   Ref Range & Units 7/3/18 0804   Phenytoin 10.0 - 20.0 ug/mL 9.4 Abnormally low     Resulting Agency  [facility]

## 2021-06-24 NOTE — PATIENT INSTRUCTIONS - HE
Real important to get back on Novolog and take all diabetic meds without messing doses.    Check A1C in 3 months    When you get insurance then may want to consider one of the newer diabetic meds and/or consulting with Endocrinology.    Get back on diabetic diet    Try to get in a daily walk

## 2021-06-24 NOTE — TELEPHONE ENCOUNTER
"RN cannot approve Refill Request    RN can NOT refill this medication Protocol failed and NO refill given.       Kalie Myers, [facility] Triage/Med Refill 2/12/2019    Requested Prescriptions   Pending Prescriptions Disp Refills     pen needle, diabetic (BD ULTRA-FINE YAHAIRA PEN NEEDLE) 32 gauge x 5/32\" Ndle 200 each 1     Sig: USE DAILY WITH NOVOLOG AND LEVEMIR PENS AS DIRECTED    Diabetic Supplies Refill Protocol Passed - 2/12/2019 11:05 AM       Passed - Visit with PCP or prescribing provider visit in last 6 months    Last office visit with prescriber/PCP: 9/27/2018 Marquise Russ MD OR same dept: 10/1/2018 Zahira Dickens DO OR same specialty: 10/1/2018 Zahira Dickens DO  Last physical: Visit date not found Last MTM visit: Visit date not found   Next visit within 3 mo: Visit date not found  Next physical within 3 mo: Visit date not found  Prescriber OR PCP: Marquise Russ MD  Last diagnosis associated with med order: 1. Diabetes (H)  - pen needle, diabetic (BD ULTRA-FINE YAHAIRA PEN NEEDLE) 32 gauge x 5/32\" Ndle; USE DAILY WITH NOVOLOG AND LEVEMIR PENS AS DIRECTED  Dispense: 200 each; Refill: 1  - metFORMIN (GLUCOPHAGE-XR) 500 MG 24 hr tablet; TAKE 4 TABLETS BY MOUTH WITH EVENING MEAL  Dispense: 360 tablet; Refill: 0    If protocol passes may refill for 12 months if within 3 months of last provider visit (or a total of 15 months).            Passed - A1C in last 6 months    Hemoglobin A1c   Date Value Ref Range Status   09/27/2018 8.1 (H) 3.5 - 6.0 % Final               metFORMIN (GLUCOPHAGE-XR) 500 MG 24 hr tablet 360 tablet 0     Sig: TAKE 4 TABLETS BY MOUTH WITH EVENING MEAL    Metformin Refill Protocol Failed - 2/12/2019 11:05 AM       Failed - LFT or AST or ALT in last 12 months    Albumin   Date Value Ref Range Status   01/04/2018 3.7 3.5 - 5.0 g/dL Final     Bilirubin, Total   Date Value Ref Range Status   01/04/2018 0.3 0.0 - 1.0 mg/dL Final     Bilirubin, Direct   Date " "Value Ref Range Status   10/08/2010 0.1 <0.6 mg/dL Final     Alkaline Phosphatase   Date Value Ref Range Status   01/04/2018 129 (H) 45 - 120 U/L Final     AST   Date Value Ref Range Status   01/04/2018 15 0 - 40 U/L Final     ALT   Date Value Ref Range Status   01/04/2018 31 0 - 45 U/L Final     Protein, Total   Date Value Ref Range Status   01/04/2018 6.9 6.0 - 8.0 g/dL Final               Failed - GFR or Serum Creatinine in last 6 months    GFR MDRD Non Af Amer   Date Value Ref Range Status   01/04/2018 >60 >60 mL/min/1.73m2 Final     GFR MDRD Af Amer   Date Value Ref Range Status   01/04/2018 >60 >60 mL/min/1.73m2 Final            Passed - Blood pressure in last 12 months    BP Readings from Last 1 Encounters:   10/01/18 145/73            Passed - Visit with PCP or prescribing provider visit in last 6 months or next 3 months    Last office visit with prescriber/PCP: 9/27/2018 OR same dept: 10/1/2018 Zahira Dickens DO OR same specialty: 10/1/2018 Zahira Dickens DO Last physical: Visit date not found Last MTM visit: Visit date not found         Next appt within 3 mo: Visit date not found  Next physical within 3 mo: Visit date not found  Prescriber OR PCP: Marquise Russ MD  Last diagnosis associated with med order: 1. Diabetes (H)  - pen needle, diabetic (BD ULTRA-FINE YAHAIRA PEN NEEDLE) 32 gauge x 5/32\" Ndle; USE DAILY WITH NOVOLOG AND LEVEMIR PENS AS DIRECTED  Dispense: 200 each; Refill: 1  - metFORMIN (GLUCOPHAGE-XR) 500 MG 24 hr tablet; TAKE 4 TABLETS BY MOUTH WITH EVENING MEAL  Dispense: 360 tablet; Refill: 0     If protocol passes may refill for 12 months if within 3 months of last provider visit (or a total of 15 months).          Passed - A1C in last 6 months    Hemoglobin A1c   Date Value Ref Range Status   09/27/2018 8.1 (H) 3.5 - 6.0 % Final              Passed - Microalbumin in last year     Microalbumin, Random Urine   Date Value Ref Range Status   07/03/2018 6.77 (H) 0.00 - 1.99 " mg/dL Final

## 2021-06-24 NOTE — TELEPHONE ENCOUNTER
Medication Question or Clarification  Who is calling: Pharmacy: [facility]  What medication are you calling about?: Test strips  What dose do you take?: Testing four times a day  How often are you taking the medication?: Testing for times a day  Who prescribed the medication?: Dr. Russ  What is your question/concern?: Insurance is requesting pt use One Touch Ultra Blue Test Strips.  Pt also needs lancets.  Please send new Rx to [facility].  Pharmacy: [facility]/[address] [place] [address] Building, [phone]  Okay to leave a detailed message?: Yes  Site CMT - Please call the pharmacy to obtain any additional needed information.

## 2021-06-24 NOTE — PROGRESS NOTES
DIAGNOSIS:  1. Diabetes mellitus, type 2 (H)  Glycosylated Hemoglobin A1c   2. Hypercholesteremia  Lipid Profile    Comprehensive Metabolic Panel   3. Polyneuropathy associated with underlying disease (H)     4. Major depression, chronic         PLAN:  Patient Instructions   Real important to get back on Novolog and take all diabetic meds without messing doses.    Check A1C in 3 months    When you get insurance then may want to consider one of the newer diabetic meds and/or consulting with Endocrinology.    Get back on diabetic diet    Try to get in a daily walk              HPI:  In for a diabetic check.  Sometime back was off venlafaxine for a week.  Takes novolog maybe once daily, but more regularly in the last few days. Blood sugars 240-260 but had one in the 400's about 4 days ago.   Had an eye exam about mid Jan 2019  Diet has not been very good.  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  Lives alone.  Has a sister and Mon and Fri support group.        Current Outpatient Medications on File Prior to Visit   Medication Sig Dispense Refill     albuterol (PROAIR HFA;PROVENTIL HFA;VENTOLIN HFA) 90 mcg/actuation inhaler Inhale 1-2 puffs po q 4-6h prn cough, wheeze, sob 1 Inhaler 0     aspirin 81 MG EC tablet Take 81 mg by mouth daily.       atorvastatin (LIPITOR) 20 MG tablet Take 1 tablet (20 mg total) by mouth at bedtime. 90 tablet 2     blood glucose test (ACCU-CHEK DEVON PLUS TEST STRP) strips Test 4 x/day as directed. Dispense brand per patient's insurance at pharmacy discretion. 360 strip 4     blood glucose test strips Use 1 each As Directed as needed. Dispense brand per patient's insurance at pharmacy discretion.  One Touch Ultra  0     blood-glucose meter Misc Use 1 Device As Directed 4 (four) times a day. 1 each 0     CALCIUM CARBONATE (CALCIUM 500 ORAL) Take 1 tablet by mouth daily.       cholecalciferol, vitamin D3, 1,000 unit tablet Take 1,000 Units by mouth daily.       insulin  "aspart U-100 (NOVOLOG FLEXPEN U-100 INSULIN) 100 unit/mL injection pen Inject 14-24 units with each meal per sliding scale. 5 Pre-filled Pen Syringe 6     insulin glargine (BASAGLAR KWIKPEN U-100 INSULIN) 100 unit/mL (3 mL) pen Inject 60 Units under the skin at bedtime. 5 adj dose pen 11     lancets Misc Use As Directed. Use as directed.       lisinopril (PRINIVIL,ZESTRIL) 5 MG tablet Take 1 tablet (5 mg total) by mouth daily. 90 tablet 1     metFORMIN (GLUCOPHAGE-XR) 500 MG 24 hr tablet TAKE 4 TABLETS BY MOUTH WITH EVENING MEAL 360 tablet 3     MULTIVITAMIN ORAL Take 1 tablet by mouth daily.       NEEDLES, INSULIN DISPOSABLE (BD ULTRA-FINE YAHAIRA PEN NEEDLES MISC) Use As Directed daily. Use daily with Levemir and Novolog pen as directed.       pen needle, diabetic (BD ULTRA-FINE YAHAIRA PEN NEEDLE) 32 gauge x 5/32\" Ndle USE DAILY WITH NOVOLOG AND LEVEMIR PENS AS DIRECTED 200 each 1     phenytoin (DILANTIN) 100 MG ER capsule TAKE 5 CAPSULES BY MOUTH AT BEDTIME 450 capsule 0     venlafaxine 225 mg TR24 Take 225 mg by mouth daily. 90 each 2     No current facility-administered medications on file prior to visit.        Pmh: reviewed  Psh: reviewed  Allergy:  reviewed      EXAM:    /82 (Patient Site: Left Arm, Patient Position: Sitting, Cuff Size: Adult Large)   Pulse 97   Temp 98  F (36.7  C) (Oral)   Resp 16   Wt (!) 306 lb (138.8 kg)   BMI 44.81 kg/m     Wt Readings from Last 3 Encounters:   02/14/19 (!) 306 lb (138.8 kg)   10/01/18 (!) 309 lb 12.8 oz (140.5 kg)   09/27/18 (!) 309 lb 12.8 oz (140.5 kg)      GEN:   ALERT, NAD, ORIENTED TIMES THREE  NECK: SUPPLE WITHOUT ADENOPATHY OR THYROMEGALY  LUNGS: CTA  COR: RRR WITHOUT MURMUR  SKIN: NO RASH , ULCERS OR LESIONS NOTED  FEET:  MF TESTING:  No sensation to monofilatment              SKIN EXAM:  NL              VASCULAR:   R DP  PULSE: +                                      L DP  PULSE: +                                      R PT  PULSE: +                       "                L PT  PULSE: +      EXT: WITHOUT EDEMA OR SWELLING    PHQ-9:  20    Recent Results (from the past 168 hour(s))   Glycosylated Hemoglobin A1c    Collection Time: 02/14/19 12:25 PM   Result Value Ref Range    Hemoglobin A1c 10.9 (H) 3.5 - 6.0 %          Lab Results   Component Value Date    MICROALBUR 6.77 (H) 07/03/2018     Lab Results   Component Value Date    HGBA1C 10.9 (H) 02/14/2019     Lab Results   Component Value Date    CHOL 166 01/04/2018    CHOL 155 03/03/2017    CHOL 152 10/27/2016     Lab Results   Component Value Date    HDL 42 01/04/2018    HDL 30 (L) 03/03/2017    HDL 37 (L) 10/27/2016     Lab Results   Component Value Date    LDLCALC 96 01/04/2018    LDLCALC 99 03/03/2017    LDLCALC 98 10/27/2016     Lab Results   Component Value Date    TRIG 142 01/04/2018    TRIG 130 03/03/2017    TRIG 83 10/27/2016     No components found for: CHOLHDL  Results for orders placed or performed in visit on 01/04/18   Comprehensive Metabolic Panel   Result Value Ref Range    Sodium 141 136 - 145 mmol/L    Potassium 4.4 3.5 - 5.0 mmol/L    Chloride 108 (H) 98 - 107 mmol/L    CO2 22 22 - 31 mmol/L    Anion Gap, Calculation 11 5 - 18 mmol/L    Glucose 267 (H) 70 - 125 mg/dL    BUN 23 (H) 8 - 22 mg/dL    Creatinine 0.82 0.70 - 1.30 mg/dL    GFR MDRD Af Amer >60 >60 mL/min/1.73m2    GFR MDRD Non Af Amer >60 >60 mL/min/1.73m2    Bilirubin, Total 0.3 0.0 - 1.0 mg/dL    Calcium 9.0 8.5 - 10.5 mg/dL    Protein, Total 6.9 6.0 - 8.0 g/dL    Albumin 3.7 3.5 - 5.0 g/dL    Alkaline Phosphatase 129 (H) 45 - 120 U/L    AST 15 0 - 40 U/L    ALT 31 0 - 45 U/L     Lab Results   Component Value Date    PSA 0.2 09/27/2018    PSA 0.2 07/13/2017    PSA 0.2 03/15/2010

## 2021-06-26 NOTE — PATIENT INSTRUCTIONS - HE
FOLLOW UP WITH THE EYE DOCTOR    The following high BMI interventions were performed this visit: weight monitoring and dietary management education, guidance, and counseling     Kayleear needs to be EVERY DAY.    Novolog sliding scale before meals.    \check blood sugars before breakfast dinner and bedtime over the next 2 weeks.    Update Dr Russ with blood sugars in 2 weeks.    Next A1C in 3 months.     Work on excercise and further weight loss of 10 lbs over the next 3 months.
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
## 2021-06-26 NOTE — PROGRESS NOTES
"HPI:  Diabetic follow up.  Using lantus 3-4 nights per weeks.  Not getting much excdercise.  Very inconsistent in both use of novolog and basaglar.      MEDS: REVIEWED  ALLERGIES: REVIEWED  PMH: REVIEWED  PSH: REVIEWED    SMOKING:  NON-SMOKER      EXAM:  /74   Pulse (!) 104   Ht 5' 11\" (1.803 m)   Wt (!) 294 lb (133.4 kg)   BMI 41.00 kg/m      Wt Readings from Last 3 Encounters:   06/10/21 (!) 294 lb (133.4 kg)   02/22/21 (!) 305 lb 9.6 oz (138.6 kg)   11/19/20 (!) 294 lb 3.2 oz (133.4 kg)      GEN: ALERT, ORIENTED TIMES THREE, NAD  LUNGS: CTA  COR: RRR WITHOUT MURMUR  FEET:  MF TESTING: DIMINISHED SENSATION IN BOTH FEET              SKIN EXAM:  NL              VASCULAR:   R DP  PULSE: +                                      L DP  PULSE: +                                      R PT  PULSE: -                                      L PT  PULSE: -    LABS:    Lab Results   Component Value Date    HGBA1C 10.5 (H) 06/10/2021     Lab Results   Component Value Date    CHOL 189 06/17/2020    CHOL 167 05/23/2019    CHOL 201 (H) 02/14/2019     Lab Results   Component Value Date    HDL 45 06/17/2020    HDL 43 05/23/2019    HDL 42 02/14/2019     Lab Results   Component Value Date    LDLCALC 97 06/17/2020    LDLCALC 105 05/23/2019    LDLCALC 131 (H) 02/14/2019     Lab Results   Component Value Date    TRIG 233 (H) 06/17/2020    TRIG 97 05/23/2019    TRIG 138 02/14/2019     No components found for: CHOLHDL    Chemistry        Component Value Date/Time     06/17/2020 1540    K 4.9 06/17/2020 1540     06/17/2020 1540    CO2 22 06/17/2020 1540    BUN 16 06/17/2020 1540    CREATININE 1.11 06/17/2020 1540     (HH) 06/17/2020 1540        Component Value Date/Time    CALCIUM 8.8 06/17/2020 1540    ALKPHOS 140 (H) 06/17/2020 1540    AST 13 06/17/2020 1540    ALT 22 06/17/2020 1540    BILITOT 0.3 06/17/2020 1540            IMP:    1. Type 2 diabetes mellitus without complication, without long-term current use of " insulin (H), poorly controlled Glycosylated Hemoglobin A1c   2. Hypercholesteremia, on  And tolerating statin med Comprehensive Metabolic Panel   3. Major depression, chronic, stable    4. Morbid obesity (H)         PLAN:      FOLLOW UP WITH THE EYE DOCTOR    The following high BMI interventions were performed this visit: weight monitoring and dietary management education, guidance, and counseling     Niranjanaglar needs to be EVERY DAY.    Novolog sliding scale before meals.    \check blood sugars before breakfast dinner and bedtime over the next 2 weeks.    Update Dr Russ with blood sugars in 2 weeks.    Next A1C in 3 months.     Work on excercise and further weight loss of 10 lbs over the next 3 months.            DIABETES TIPS  Weight control and exercise are important.  Try to check your blood glucose every day.  Fasting blood sugar should be in the range of .  In case blood sugar is too low you should have quick access to a source of sugar such as orange juice or a candy bar.  If low blood sugars are occurring frequently let us know as a  medication adjustment  Is neessary.

## 2021-07-03 ENCOUNTER — HEALTH MAINTENANCE LETTER (OUTPATIENT)
Age: 66
End: 2021-07-03

## 2021-07-03 NOTE — ADDENDUM NOTE
Addendum Note by Justin Hurtado CMA at 2/14/2019 12:30 PM     Author: Justin Hurtado CMA Service: -- Author Type: Certified Medical Assistant    Filed: 2/14/2019  2:36 PM Encounter Date: 2/14/2019 Status: Signed    : Justin Hurtado CMA (Certified Medical Assistant)    Addended by: JUSTIN HURTADO on: 2/14/2019 02:36 PM        Modules accepted: Orders

## 2021-07-03 NOTE — ADDENDUM NOTE
Addendum Note by Reva Cloud MA at 9/27/2018  9:30 AM     Author: Reva Cloud MA Service: -- Author Type: Certified Medical Assistant    Filed: 9/27/2018  9:30 AM Encounter Date: 9/27/2018 Status: Signed    : Reva Cloud MA (Certified Medical Assistant)    Addended by: REAV CLOUD on: 9/27/2018 09:30 AM        Modules accepted: Orders

## 2021-07-03 NOTE — ADDENDUM NOTE
Addendum Note by Rajan Russ MD at 9/27/2018  9:31 AM     Author: Rajan Russ MD Service: -- Author Type: Physician    Filed: 9/27/2018  9:31 AM Encounter Date: 9/27/2018 Status: Signed    : Rajan Russ MD (Physician)    Addended by: RAJAN RUSS on: 9/27/2018 09:31 AM        Modules accepted: Orders

## 2021-07-03 NOTE — ADDENDUM NOTE
Addendum Note by Sangeeta Zhong CMA at 2/14/2019 12:30 PM     Author: Sangeeta Zhong CMA Service: -- Author Type: Certified Medical Assistant    Filed: 2/14/2019  2:32 PM Encounter Date: 2/14/2019 Status: Signed    : Sangeeta Zhong CMA (Certified Medical Assistant)    Addended by: SANGEETA ZHONG on: 2/14/2019 02:32 PM        Modules accepted: Orders

## 2021-07-03 NOTE — ADDENDUM NOTE
Addendum Note by Brody Gomez PharmD at 3/6/2017  1:14 PM     Author: Brody Gomez PharmD Service: -- Author Type: Pharmacist    Filed: 3/6/2017  1:14 PM Encounter Date: 3/3/2017 Status: Signed    : Brody Gomez PharmD (Pharmacist)    Addended by: BRODY GOMEZ on: 3/6/2017 01:14 PM        Modules accepted: Orders

## 2021-07-04 NOTE — LETTER
Letter by Marquise Russ MD at      Author: Marquise Russ MD Service: -- Author Type: --    Filed:  Encounter Date: 6/11/2021 Status: (Other)         Isac Li  [address]  [facility] [address]             June 11, 2021         Dear Mr. Li,    Below are the results from your recent visit:    Resulted Orders   Glycosylated Hemoglobin A1c   Result Value Ref Range    Hemoglobin A1c 10.5 (H) <=5.6 %   Comprehensive Metabolic Panel   Result Value Ref Range    Sodium 139 136 - 145 mmol/L    Potassium 4.6 3.5 - 5.0 mmol/L    Chloride 104 98 - 107 mmol/L    CO2 19 (L) 22 - 31 mmol/L    Anion Gap, Calculation 16 5 - 18 mmol/L    Glucose 397 (H) 70 - 125 mg/dL    BUN 19 8 - 22 mg/dL    Creatinine 1.09 0.70 - 1.30 mg/dL    GFR MDRD Af Amer >60 >60 mL/min/1.73m2    GFR MDRD Non Af Amer >60 >60 mL/min/1.73m2    Bilirubin, Total 0.2 0.0 - 1.0 mg/dL    Calcium 8.8 8.5 - 10.5 mg/dL    Protein, Total 6.3 6.0 - 8.0 g/dL    Albumin 3.5 3.5 - 5.0 g/dL    Alkaline Phosphatase 157 (H) 45 - 120 U/L    AST 19 0 - 40 U/L    ALT 22 0 - 45 U/L    Narrative    Fasting Glucose reference range is 70-99 mg/dL per  American Diabetes Association (ADA) guidelines.       Emanuel Chau:  We discussed your elevated A1C which signifies poor diabetic control.  Please make the changes we discussed, particularly the consistent use of your two insulins.  Update me with your blood sugars in two weeks.  Try to get in a daily walk and work on weight loss.  The remaining labs are normal (the slight elevation of alk phos is insignificant).    Please call with questions or contact us using [employer].    Sincerely,        Electronically signed by Marquise Russ MD

## 2021-07-06 VITALS
DIASTOLIC BLOOD PRESSURE: 74 MMHG | HEART RATE: 104 BPM | SYSTOLIC BLOOD PRESSURE: 134 MMHG | WEIGHT: 294 LBS | BODY MASS INDEX: 41.16 KG/M2 | HEIGHT: 71 IN

## 2021-07-06 ASSESSMENT — PATIENT HEALTH QUESTIONNAIRE - PHQ9: SUM OF ALL RESPONSES TO PHQ QUESTIONS 1-9: 9

## 2021-09-18 DIAGNOSIS — Z79.4 TYPE 2 DIABETES MELLITUS WITHOUT COMPLICATION, WITH LONG-TERM CURRENT USE OF INSULIN (H): ICD-10-CM

## 2021-09-18 DIAGNOSIS — E11.9 TYPE 2 DIABETES MELLITUS WITHOUT COMPLICATION, WITH LONG-TERM CURRENT USE OF INSULIN (H): ICD-10-CM

## 2021-09-19 RX ORDER — INSULIN ASPART 100 [IU]/ML
INJECTION, SOLUTION INTRAVENOUS; SUBCUTANEOUS
Qty: 70 ML | Refills: 0 | Status: SHIPPED | OUTPATIENT
Start: 2021-09-19 | End: 2021-10-06

## 2021-09-19 NOTE — TELEPHONE ENCOUNTER
"Last Written Prescription Date:  1/29/21  Last Fill Quantity: 15 ml,  # refills: 3   Last office visit provider:  6/10/21     Requested Prescriptions   Pending Prescriptions Disp Refills     NOVOLOG FLEXPEN 100 UNIT/ML soln [Pharmacy Med Name: NOVOLOG FLEXPEN INJ 3ML (ORANGE)] 15 mL 3     Sig: ADMINISTER 18 TO 26 UNITS UNDER THE SKIN WITH EACH MEAL PER SLIDING SCALE       Short Acting Insulin Protocol Passed - 9/18/2021  4:38 PM        Passed - Serum creatinine on file in past 12 months     Recent Labs   Lab Test 06/10/21  1637   CR 1.09       Ok to refill medication if creatinine is low          Passed - HgbA1C in past 3 or 6 months     If HgbA1C is 8 or greater, it needs to be on file within the past 3 months.  If less than 8, must be on file within the past 6 months.     Recent Labs   Lab Test 06/10/21  1637   A1C 10.5*             Passed - Medication is active on med list        Passed - Patient is age 18 or older        Passed - Recent (6 mo) or future (30 days) visit within the authorizing provider's specialty     Patient had office visit in the last 6 months or has a visit in the next 30 days with authorizing provider or within the authorizing provider's specialty.  See \"Patient Info\" tab in inbasket, or \"Choose Columns\" in Meds & Orders section of the refill encounter.                 Marquise Jose RN 09/19/21 3:01 PM  "

## 2021-09-21 ENCOUNTER — OFFICE VISIT (OUTPATIENT)
Dept: FAMILY MEDICINE | Facility: CLINIC | Age: 66
End: 2021-09-21
Payer: COMMERCIAL

## 2021-09-21 VITALS
DIASTOLIC BLOOD PRESSURE: 71 MMHG | HEART RATE: 82 BPM | BODY MASS INDEX: 42.26 KG/M2 | SYSTOLIC BLOOD PRESSURE: 135 MMHG | RESPIRATION RATE: 20 BRPM | WEIGHT: 303 LBS

## 2021-09-21 DIAGNOSIS — Z51.81 ENCOUNTER FOR THERAPEUTIC DRUG MONITORING: ICD-10-CM

## 2021-09-21 DIAGNOSIS — Z79.4 CONTROLLED TYPE 2 DIABETES MELLITUS WITHOUT COMPLICATION, WITH LONG-TERM CURRENT USE OF INSULIN (H): Primary | ICD-10-CM

## 2021-09-21 DIAGNOSIS — E11.9 CONTROLLED TYPE 2 DIABETES MELLITUS WITHOUT COMPLICATION, WITH LONG-TERM CURRENT USE OF INSULIN (H): Primary | ICD-10-CM

## 2021-09-21 DIAGNOSIS — G40.309 GENERALIZED CONVULSIVE EPILEPSY (H): ICD-10-CM

## 2021-09-21 DIAGNOSIS — E66.01 MORBID OBESITY (H): ICD-10-CM

## 2021-09-21 LAB
ERYTHROCYTE [DISTWIDTH] IN BLOOD BY AUTOMATED COUNT: 12.9 % (ref 10–15)
HBA1C MFR BLD: 8.7 % (ref 0–5.6)
HCT VFR BLD AUTO: 39.5 % (ref 40–53)
HGB BLD-MCNC: 13.2 G/DL (ref 13.3–17.7)
MCH RBC QN AUTO: 28.8 PG (ref 26.5–33)
MCHC RBC AUTO-ENTMCNC: 33.4 G/DL (ref 31.5–36.5)
MCV RBC AUTO: 86 FL (ref 78–100)
PHENYTOIN SERPL-MCNC: 7.9 UG/ML
PLATELET # BLD AUTO: 276 10E3/UL (ref 150–450)
RBC # BLD AUTO: 4.58 10E6/UL (ref 4.4–5.9)
WBC # BLD AUTO: 8.6 10E3/UL (ref 4–11)

## 2021-09-21 PROCEDURE — 83036 HEMOGLOBIN GLYCOSYLATED A1C: CPT | Performed by: FAMILY MEDICINE

## 2021-09-21 PROCEDURE — 99214 OFFICE O/P EST MOD 30 MIN: CPT | Performed by: FAMILY MEDICINE

## 2021-09-21 PROCEDURE — 85027 COMPLETE CBC AUTOMATED: CPT | Performed by: FAMILY MEDICINE

## 2021-09-21 PROCEDURE — 80185 ASSAY OF PHENYTOIN TOTAL: CPT | Performed by: FAMILY MEDICINE

## 2021-09-21 PROCEDURE — 36415 COLL VENOUS BLD VENIPUNCTURE: CPT | Performed by: FAMILY MEDICINE

## 2021-09-21 NOTE — PROGRESS NOTES
HPI:  9:45am - 10:04am  Here for labs. Non-fasting.  Not taking his aspirin  In July checked blood sugars and then they were 130-190.  Not really watching diet.  No exercise.  Last eye appt June 2020  Usually misses his novolog with meals.  Last seizure 1-16-80    MEDS: REVIEWED  ALLERGIES: REVIEWED  PMH: REVIEWED  PSH: REVIEWED    SMOKING:  None.      EXAM:  /71 (BP Location: Left arm, Patient Position: Sitting, Cuff Size: Adult Large)   Pulse 82   Resp 20   Wt 137.4 kg (303 lb)   BMI 42.26 kg/m    GEN: ALERT, ORIENTED TIMES THREE, NAD  LUNGS: CTA  COR: RRR WITHOUT MURMUR  FEET:  MF TESTING: absent sensation in both feet.              SKIN EXAM:  NL              VASCULAR:   R DP  PULSE: +                                      L DP  PULSE: +                                      R PT  PULSE: -                                      L PT  PULSE: -    Last Comprehensive Metabolic Panel:  Sodium   Date Value Ref Range Status   06/10/2021 139 136 - 145 mmol/L Final     Potassium   Date Value Ref Range Status   06/10/2021 4.6 3.5 - 5.0 mmol/L Final     Chloride   Date Value Ref Range Status   06/10/2021 104 98 - 107 mmol/L Final     Carbon Dioxide (CO2)   Date Value Ref Range Status   06/10/2021 19 (L) 22 - 31 mmol/L Final     Anion Gap   Date Value Ref Range Status   06/10/2021 16 5 - 18 mmol/L Final     Glucose   Date Value Ref Range Status   06/10/2021 397 (H) 70 - 125 mg/dL Final     Urea Nitrogen   Date Value Ref Range Status   06/10/2021 19 8 - 22 mg/dL Final     Creatinine   Date Value Ref Range Status   06/10/2021 1.09 0.70 - 1.30 mg/dL Final     GFR Estimate   Date Value Ref Range Status   06/10/2021 >60 >60 mL/min/1.73m2 Final     Calcium   Date Value Ref Range Status   06/10/2021 8.8 8.5 - 10.5 mg/dL Final     Bilirubin Total   Date Value Ref Range Status   06/10/2021 0.2 0.0 - 1.0 mg/dL Final     Alkaline Phosphatase   Date Value Ref Range Status   06/10/2021 157 (H) 45 - 120 U/L Final     ALT   Date  Value Ref Range Status   06/10/2021 22 0 - 45 U/L Final     AST   Date Value Ref Range Status   06/10/2021 19 0 - 40 U/L Final         LABS:    Lab Results   Component Value Date    A1C 8.7 09/21/2021    A1C 10.5 06/10/2021    A1C 9.1 02/22/2021    A1C 12.1 11/19/2020    A1C 11.2 06/17/2020     Encounter Diagnoses   Name Primary?     Controlled type 2 diabetes mellitus without complication, with long-term current use of insulin (H), suboptimal control, non-compliant with novolog Yes     Morbid obesity (H)      Encounter for therapeutic drug monitoring, on dilantin for seizure prevention      Generalized Convulsive Seizure, stble without seizure                    PLAN:      Resume novolog sliding scale insulin before meals.     Check dilantin level and CBC.    Follow up with A1C in 3 months (get the PSA at the same time)    Follow up for flu vaccine      DIABETES TIPS  Weight control and exercise are important.  Try to check your blood glucose every day.  Fasting blood sugar should be in the range of .  In case blood sugar is too low you should have quick access to a source of sugar such as orange juice or a candy bar.  If low blood sugars are occurring frequently let us know as a  medication adjustment  Is neessary.

## 2021-09-21 NOTE — PATIENT INSTRUCTIONS
Resume novolog sliding scale insulin before meals.    Check dilantin level and CBC.    Follow up with A1C in 3 months (get the PSA at the same time).    Follow up for flu vaccine

## 2021-09-21 NOTE — LETTER
September 21, 2021      Isac Li  [address]  [facility] [address]        Dear ,  We are writing to inform you of your test results.      HI Isac:  Your A1C has improved a bit as we discussed at your appt recently.  Make sure to take your Novolog with meals per your sliding scale.  We will then check the A1C in 3 months and should see further improvement.    Your dilantin level is slightly low but is similar to last year.  The hemoglobin is very slighly low and I would recommend screening for colorectal cancer if this has not been done.  The gold standard would be a colonoscopy.  An alternative, non-invasive, but possible more expensive, test is available called the Cologuard Test.  If you would like me to order one of these for you please let me know.    Resulted Orders   Hemoglobin A1c   Result Value Ref Range    Hemoglobin A1C 8.7 (H) 0.0 - 5.6 %      Comment:      Normal <5.7%   Prediabetes 5.7-6.4%    Diabetes 6.5% or higher     Note: Adopted from ADA consensus guidelines.   CBC with platelets   Result Value Ref Range    WBC Count 8.6 4.0 - 11.0 10e3/uL    RBC Count 4.58 4.40 - 5.90 10e6/uL    Hemoglobin 13.2 (L) 13.3 - 17.7 g/dL    Hematocrit 39.5 (L) 40.0 - 53.0 %    MCV 86 78 - 100 fL    MCH 28.8 26.5 - 33.0 pg    MCHC 33.4 31.5 - 36.5 g/dL    RDW 12.9 10.0 - 15.0 %    Platelet Count 276 150 - 450 10e3/uL   Phenytoin level   Result Value Ref Range    Phenytoin 7.9   ug/mL      Comment:      Therapeutic Range: 10.0-20.0 ug/mL    Critical: Greater than 25.0 ug/mL       If you have any questions or concerns, please call the clinic at the number listed above.       Sincerely,      Marquise Russ MD

## 2021-09-22 ENCOUNTER — TELEPHONE (OUTPATIENT)
Dept: FAMILY MEDICINE | Facility: CLINIC | Age: 66
End: 2021-09-22

## 2021-09-22 ENCOUNTER — IMMUNIZATION (OUTPATIENT)
Dept: FAMILY MEDICINE | Facility: CLINIC | Age: 66
End: 2021-09-22
Payer: COMMERCIAL

## 2021-09-22 DIAGNOSIS — Z12.12 SCREENING FOR COLORECTAL CANCER: Primary | ICD-10-CM

## 2021-09-22 DIAGNOSIS — Z12.11 SCREENING FOR COLORECTAL CANCER: Primary | ICD-10-CM

## 2021-09-22 PROCEDURE — 90471 IMMUNIZATION ADMIN: CPT

## 2021-09-22 PROCEDURE — 90662 IIV NO PRSV INCREASED AG IM: CPT

## 2021-09-22 NOTE — TELEPHONE ENCOUNTER
Called and spoke to patient. He want to go with the cologuard test. He is going to check with his insurance to see if its covered and get back to us.

## 2021-09-22 NOTE — TELEPHONE ENCOUNTER
----- Message from Marquise Russ MD sent at 9/21/2021  8:40 PM CDT -----  Please review this with pt and see if he would like to do screening for colorecdtal cancer.    The hemoglobin is very slighly low and I would recommend screening for colorectal cancer if this has not been done.  The gold standard would be a COLONOSCOPY.   An alternative, non-invasive, but possible more expensive, test is available called the COLGUARD Test (you would have to check with your insurance on coverage)  If you would like me to order one of these for you please let me know.

## 2021-10-01 ENCOUNTER — TELEPHONE (OUTPATIENT)
Dept: FAMILY MEDICINE | Facility: CLINIC | Age: 66
End: 2021-10-01

## 2021-10-01 NOTE — TELEPHONE ENCOUNTER
Prior Authorization Request  Who s requesting:  Pharmacy  Pharmacy Name and Location: [id number]  Medication Name: NOVOLOG FLEXPEN 100 UNIT/ML soln  Insurance Plan: Medicare  Insurance Member ID Number:  [id number]  CoverMyMeds Key: [id number]  Informed patient that prior authorizations can take up to 10 business days for response:   Yes  Okay to leave a detailed message: Yes

## 2021-10-04 NOTE — TELEPHONE ENCOUNTER
Central Prior Authorization Team   Phone: [phone]    PA Initiation    Medication: NovoLOG FlexPen 100UNIT/ML pen-injectors    Insurance Company: [employer] - Phone [phone] Fax [phone]  Pharmacy Filling the Rx: [id number] [place], [place] - [address] AT [facility] [address]  [address]  Filling Pharmacy Phone: [phone]  Filling Pharmacy Fax:    Start Date: 10/4/2021

## 2021-10-06 DIAGNOSIS — E11.9 CONTROLLED TYPE 2 DIABETES MELLITUS WITHOUT COMPLICATION, WITH LONG-TERM CURRENT USE OF INSULIN (H): Primary | ICD-10-CM

## 2021-10-06 DIAGNOSIS — Z79.4 CONTROLLED TYPE 2 DIABETES MELLITUS WITHOUT COMPLICATION, WITH LONG-TERM CURRENT USE OF INSULIN (H): Primary | ICD-10-CM

## 2021-10-06 RX ORDER — INSULIN LISPRO 100 [IU]/ML
INJECTION, SOLUTION INTRAVENOUS; SUBCUTANEOUS
Qty: 70 ML | Refills: 1 | Status: SHIPPED | OUTPATIENT
Start: 2021-10-06 | End: 2021-12-22

## 2021-10-06 NOTE — TELEPHONE ENCOUNTER
Gave patient call, let him know his PA for Novolog is still pending.  Let him know I asked for a urgent request.  His [employer] D insurance covers this medication and let him know I asked the pharmacy to process under this insurance. Pharmacist told me the rejection said to process under [employer] first.    He has enough medication until Saturday. I will call [facility] this afternoonn if I don't hear from them.  In the meantime calling pharmacy to make sure this cannot be process under [facility].      Confirmed with pharmacist at [facility], [facility] is now telling them to bill [employer] first and this is the primary insurance.  Will call [facility] to check on status of request.      Per [employer] rep Novolog has been denied, they are faxing denial.

## 2021-10-06 NOTE — TELEPHONE ENCOUNTER
Novalog has been removed from the med list.  A new rx has been called for Humalog at the same dosage.

## 2021-10-06 NOTE — TELEPHONE ENCOUNTER
PRIOR AUTHORIZATION DENIED    Medication: NovoLOG FlexPen 100UNIT/ML pen-injectors    Denial Date: 10/6/2021    Denial Rational: Insurance requiring patient to try/fail Humalog and Lyumjev.  Humalog brand is preferred over generic.            Appeal Information: This medication was denied. If physician would like to appeal because patient has contraindication or allergy to covered medication please write letter of medical necessity and route back to PA team to initiate.  If no further action is needed please close encounter thank you.

## 2021-10-06 NOTE — TELEPHONE ENCOUNTER
Incoming call from pt following up on rx. Communicated to pt that the PA team initiated the PA on Monday. Pt is wondering if we can follow up. Pt has 3 vials left and would really like if this can be approved or what are other alternatives that he will need that would be covered by ins. Communicated to patient I will send the message to provider's team and the PA team.

## 2021-10-14 DIAGNOSIS — E11.9 CONTROLLED TYPE 2 DIABETES MELLITUS WITHOUT COMPLICATION, WITH LONG-TERM CURRENT USE OF INSULIN (H): Primary | ICD-10-CM

## 2021-10-14 DIAGNOSIS — Z79.4 CONTROLLED TYPE 2 DIABETES MELLITUS WITHOUT COMPLICATION, WITH LONG-TERM CURRENT USE OF INSULIN (H): Primary | ICD-10-CM

## 2021-10-14 DIAGNOSIS — E11.9 TYPE 2 DIABETES MELLITUS (H): ICD-10-CM

## 2021-10-14 RX ORDER — METFORMIN HCL 500 MG
TABLET, EXTENDED RELEASE 24 HR ORAL
Qty: 360 TABLET | Refills: 3 | Status: SHIPPED | OUTPATIENT
Start: 2021-10-14 | End: 2022-02-08

## 2021-10-14 NOTE — TELEPHONE ENCOUNTER
"Routing refill request to provider for review/approval because:  Early refill requested.    Last Written Prescription Date:  11/19/20  Last Fill Quantity: 360,  # refills: 3   Last office visit provider:  9/21/21     Requested Prescriptions   Pending Prescriptions Disp Refills     metFORMIN (GLUCOPHAGE-XR) 500 MG 24 hr tablet [Pharmacy Med Name: METFORMIN ER 500MG 24HR TABS] 360 tablet 3     Sig: TAKE 4 TABLETS BY MOUTH WITH THE EVENING MEAL       Biguanide Agents Passed - 10/14/2021  5:29 AM        Passed - Patient is age 10 or older        Passed - Patient has documented A1c within the specified period of time.     If HgbA1C is 8 or greater, it needs to be on file within the past 3 months.  If less than 8, must be on file within the past 6 months.     Recent Labs   Lab Test 09/21/21  0944   A1C 8.7*             Passed - Patient's CR is NOT>1.4 OR Patient's EGFR is NOT<45 within past 12 mos.     Recent Labs   Lab Test 06/10/21  1637   GFRESTIMATED >60   GFRESTBLACK >60       Recent Labs   Lab Test 06/10/21  1637   CR 1.09             Passed - Patient does NOT have a diagnosis of CHF.        Passed - Medication is active on med list        Passed - Recent (6 mo) or future (30 days) visit within the authorizing provider's specialty     Patient had office visit in the last 6 months or has a visit in the next 30 days with authorizing provider or within the authorizing provider's specialty.  See \"Patient Info\" tab in inbasket, or \"Choose Columns\" in Meds & Orders section of the refill encounter.                 Marquise Jose RN 10/14/21 2:52 PM  "

## 2021-11-15 ENCOUNTER — TELEPHONE (OUTPATIENT)
Dept: FAMILY MEDICINE | Facility: CLINIC | Age: 66
End: 2021-11-15
Payer: COMMERCIAL

## 2021-11-15 NOTE — TELEPHONE ENCOUNTER
Patient has been out of work due to illness and now needs a note to go back.      Please call patient with direction on how to do that.    Can he just get a note or does he have to be seen.    If he needs to come in to see doctor, he will see anybody who is available as soon as he can get in.  Phone [phone]

## 2021-11-15 NOTE — TELEPHONE ENCOUNTER
Reason for call:  Patient reporting a symptom    Symptom or request: Patient was not feeling well over the weekend, vomiting, dry throat, and extreme thirst. Pt requesting letter Ok to go back to work tonight.    Duration (how long have symptoms been present): x 3 days    Have you been treated for this before? No    Additional comments: Patient has not been checking his blood sugars.  Informed patient he should be checking his BG this may be why he is was not feeling well over the weekend.    Phone Number patient can be reached at:  Home number on file [phone] (home)    Best Time:  Anytime    Can we leave a detailed message on this number:  YES    Call taken on 11/15/2021 at 2:57 PM by Sara Peña

## 2021-11-15 NOTE — TELEPHONE ENCOUNTER
Patient calling back to get update on prior phone call.  if he has to be seen by dr gordillo or if the doctor can just write him a note to go back to work.    Patient was sick 11/12-11/14 and now needs note to go back to work.    Please call patient

## 2021-11-22 ENCOUNTER — OFFICE VISIT (OUTPATIENT)
Dept: FAMILY MEDICINE | Facility: CLINIC | Age: 66
End: 2021-11-22
Payer: COMMERCIAL

## 2021-11-22 VITALS
HEART RATE: 85 BPM | HEIGHT: 71 IN | WEIGHT: 294.8 LBS | DIASTOLIC BLOOD PRESSURE: 60 MMHG | BODY MASS INDEX: 41.27 KG/M2 | OXYGEN SATURATION: 96 % | SYSTOLIC BLOOD PRESSURE: 135 MMHG

## 2021-11-22 DIAGNOSIS — R56.9 SEIZURE (H): ICD-10-CM

## 2021-11-22 DIAGNOSIS — G63 POLYNEUROPATHY ASSOCIATED WITH UNDERLYING DISEASE (H): ICD-10-CM

## 2021-11-22 DIAGNOSIS — Z23 HIGH PRIORITY FOR 2019-NCOV VACCINE: ICD-10-CM

## 2021-11-22 DIAGNOSIS — E11.40 TYPE 2 DIABETES MELLITUS WITH DIABETIC NEUROPATHY, WITH LONG-TERM CURRENT USE OF INSULIN (H): Primary | ICD-10-CM

## 2021-11-22 DIAGNOSIS — Z79.4 TYPE 2 DIABETES MELLITUS WITH DIABETIC NEUROPATHY, WITH LONG-TERM CURRENT USE OF INSULIN (H): Primary | ICD-10-CM

## 2021-11-22 DIAGNOSIS — F10.21 ALCOHOL DEPENDENCE IN REMISSION (H): ICD-10-CM

## 2021-11-22 DIAGNOSIS — R35.0 URINARY FREQUENCY: ICD-10-CM

## 2021-11-22 DIAGNOSIS — R82.2 BILIRUBIN IN URINE: ICD-10-CM

## 2021-11-22 LAB
ALBUMIN UR-MCNC: ABNORMAL MG/DL
APPEARANCE UR: CLEAR
BACTERIA #/AREA URNS HPF: ABNORMAL /HPF
BILIRUB UR QL STRIP: ABNORMAL
COLOR UR AUTO: YELLOW
GLUCOSE UR STRIP-MCNC: 500 MG/DL
HGB UR QL STRIP: NEGATIVE
KETONES UR STRIP-MCNC: ABNORMAL MG/DL
LEUKOCYTE ESTERASE UR QL STRIP: NEGATIVE
NITRATE UR QL: NEGATIVE
PH UR STRIP: 5 [PH] (ref 5–8)
RBC #/AREA URNS AUTO: ABNORMAL /HPF
SP GR UR STRIP: >=1.03 (ref 1–1.03)
SQUAMOUS #/AREA URNS AUTO: ABNORMAL /LPF
UROBILINOGEN UR STRIP-ACNC: 0.2 E.U./DL
WBC #/AREA URNS AUTO: ABNORMAL /HPF

## 2021-11-22 PROCEDURE — 0003A COVID-19,PF,PFIZER (12+ YRS): CPT | Performed by: FAMILY MEDICINE

## 2021-11-22 PROCEDURE — 81001 URINALYSIS AUTO W/SCOPE: CPT | Performed by: FAMILY MEDICINE

## 2021-11-22 PROCEDURE — 99214 OFFICE O/P EST MOD 30 MIN: CPT | Mod: 25 | Performed by: FAMILY MEDICINE

## 2021-11-22 PROCEDURE — 91300 COVID-19,PF,PFIZER (12+ YRS): CPT | Performed by: FAMILY MEDICINE

## 2021-11-22 RX ORDER — PHENYTOIN SODIUM 100 MG/1
CAPSULE, EXTENDED RELEASE ORAL
Qty: 450 CAPSULE | Refills: 0 | Status: SHIPPED | OUTPATIENT
Start: 2021-11-22 | End: 2022-02-24

## 2021-11-22 RX ORDER — PHENYTOIN SODIUM 100 MG/1
CAPSULE, EXTENDED RELEASE ORAL
Qty: 450 CAPSULE | Refills: 0 | Status: SHIPPED | OUTPATIENT
Start: 2021-11-22 | End: 2021-12-22

## 2021-11-22 ASSESSMENT — MIFFLIN-ST. JEOR: SCORE: 2136.39

## 2021-11-22 NOTE — PATIENT INSTRUCTIONS
Patient Education     Long-Term Complications of Diabetes  Diabetes can cause health problems over time. These are called complications. They are more likely to happen if your blood sugar is often too high. Over time, high blood sugar can damage blood vessels in your body. It's important to keep your blood sugar in your target range. This can help prevent or delay complications from diabetes.    Possible complications  Diabetes can cause:    Eye problems, such as damage to the blood vessels in the eyes, pressure in the eye, and clouding of the eye s lens. In time, eye problems can lead to bleeding and blindness.     Tooth and gum problems. These can cause loss of teeth and bone.    Blood vessel disease, leading to circulation problems, heart attack, or stroke. Or you may need a limb removed because of poor blood flow to the skin.    Problems with sexual function. Men may have erectile dysfunction. Women may have sexual discomfort.     Kidney disease, leading to kidney failure. You may need dialysis or a kidney transplant.     Nerve problems, causing pain or loss of feeling in your feet and other parts of your body. This may lead to loss of a limb.     High blood pressure. This health problem puts strain on your heart and blood vessels.    Serious infections. These can possibly lead to loss of toes, feet, or limbs.  How to prevent complications  You can prevent these serious problems by managing your blood sugar, blood pressure, and cholesterol. This can help you feel better and stay healthy. You can control diabetes by tracking your blood sugar. You can also eat healthy and exercise to prevent weight gain. If you smoke, stop all tobacco products. It will greatly improve your overall health. And take medicine if directed by your healthcare provider.  [employer] last reviewed this educational content on 11/1/2019 2000-2021 [employer]. All rights reserved. This information is not intended as a  substitute for professional medical care. Always follow your healthcare professional's instructions.           Patient Education     Low Blood Sugar (Hypoglycemia)     Fast-acting sugar can be a glass of nonfat milk.        Having too little sugar (glucose) in your blood is called low blood sugar (hypoglycemia). Low blood sugar often means anything lower than 70 mg/dL. Talk with your healthcare provider about your target range. Ask what level is too low for you. Diabetes itself doesn t cause low blood sugar. But some treatments for diabetes may raise your risk for it. These include pills or insulin. Low blood sugar may make you pass out or have a seizure. So always treat low blood sugar right away. But don't overeat.  Special note: Always carry a source of fast-acting sugar and a snack in case of hypoglycemia.  What you may notice  If you have low blood sugar, you may have one or more of these symptoms:    Shakiness or dizziness    Cold, clammy skin or sweating    Feeling hungry    Headache    Nervousness    A hard, fast heartbeat    Weakness    Confusion or irritability    Blurred eyesight    Having nightmares or waking up confused or sweating    Numbness or tingling in the lips or tongue  What you should do  Here are tips to follow if you have hypoglycemia:     First check your blood sugar. If it's too low (out of your target range), eat or drink 15 to 20 grams of fast-acting sugar. This may be 3 to 4 glucose tablets, 4 ounces (half a cup) of fruit juice or regular (nondiet) soda, or 1 tablespoon of honey. Don t take more than this, or your blood sugar may go too high.    Don't eat foods high in protein such as milk or nuts to treat hypoglycemia. Protein may increase your insulin response. It may lower your blood sugar even more.    Wait 15 minutes. Then recheck your blood sugar if you can.    If your blood sugar is still too low, repeat the steps above and check your blood sugar again. If your blood sugar still  has not gone back to your target range, contact your healthcare provider. Or seek emergency care.    Once your blood sugar is back at target range, eat a snack or meal.  Preventing low blood sugar  Things you can do include the following:     If your condition needs a strict treatment plan, eat meals and snacks at the same times each day. Don t skip meals!    If your treatment plan lets you change when and what you eat, learn how to change the time and dose of your rapid-acting insulin to match this.     Ask your healthcare provider if it's safe to drink alcohol. Never drink on an empty stomach.    Take your medicine at the prescribed times.    Always carry a source of fast-acting sugar and a snack when you re away from home.    If you have had several hypoglycemic episodes, talk with your healthcare provider. See if you may be able to take less medicine. You also may have a condition where you no longer recognize the symptoms of low blood sugar until the value falls to dangerous levels.  Other things to do  Other tips include:    Carry a medical ID card or wear a medical alert bracelet or necklace. It should say that you have diabetes. It should also say what to do if you pass out or have a seizure.    Make sure your family, friends, and coworkers know the signs of low blood sugar. Tell them what to do if your blood sugar falls very low and you can t treat yourself.    Keep a glucagon emergency kit handy. Be sure your family, friends, and coworkers know how and when to use it. Check it often. Replace the glucagon before it expires.    Talk with your healthcare team about other things you can do to prevent low blood sugar.  If you have unexplained hypoglycemia or hypoglycemia several times, call your healthcare provider.  [employer] last reviewed this educational content on 11/1/2018 2000-2021 [employer]. All rights reserved. This information is not intended as a substitute for professional medical  care. Always follow your healthcare professional's instructions.

## 2021-11-22 NOTE — PROGRESS NOTES
"  Assessment & Plan       ICD-10-CM    1. Type 2 diabetes mellitus with diabetic neuropathy, with long-term current use of insulin (H)  E11.40     Z79.4    2. Seizure (H)  R56.9 phenytoin (DILANTIN) 100 MG capsule   3. High priority for 2019-nCoV vaccine  Z23    4. Polyneuropathy associated with underlying disease (H)  G63    5. Alcohol dependence in remission (H)  F10.21    6. Urinary frequency  R35.0 UA Macro with Reflex to Micro and Culture - lab collect     Medical decision making: Patient with type 2 diabetes not under good control, seizure disorder and polyneuropathy presents today for letter to return to work.  Noting urinary frequency, will check a UA today but likely from poorly controlled diabetes.  This is discussed with the patient.  Offered follow-up with diabetes educator that he declines.  Risk factors advisory mentions seizure disorder and he follows with neurologist and is on Dilantin with good control over the last 30 years that he has been seizure-free.  Respiratory advisory mentions alcohol dependence in remission.  This is remote for patient.  Polyneuropathy mention is likely from his diabetes.    He is currently using 60 units of long-acting insulin along with a sliding scale.  He can benefit from increasing his baseline long-acting to reduce the number of shots that he gives himself.  Noting his obesity, he is also a candidate for Trulicity or other GLP-1.  He should discuss this with his PCP.         BMI:   Estimated body mass index is 41.7 kg/m  as calculated from the following:    Height as of this encounter: 1.791 m (5' 10.5\").    Weight as of this encounter: 133.7 kg (294 lb 12.8 oz).       MEDICATIONS:  Continue current medications without change  Work on weight loss  See Patient Instructions    Return in about 4 weeks (around 12/20/2021) for Follow up, in person.    Alfonzo Shirley MD  [facility]    Subjective    Chief Complaint   Patient presents with "     SHAYNAECK     Needs a letter to return to work, also follow up on diabetes- Wants to talk about recent urine frequency.      Imm/Inj     COVID-19 VACCINE       Isac is a 66 year old who presents for the following health issues     HPI patient works at [employer].  He was not feeling back to himself for 2 to 3 days and took time off from work.  He needs a note to get back to work.  He informs me that he was feeling tired.  He is urinating a lot.  He had stopped his diabetes medication.  He lives by himself.  He has no children.  His support person is his sister who lives in [place].  They do meet once a week.  He denies any fevers, cough.  He does say that occasionally he gets some sweats.  He does have polyuria and polydipsia.  Patient uses 60 units of long-acting insulin Basaglar and uses sliding scale insulin before each meal.  He is wondering if he should use extra insulin if he is having a snack.    Patient does have childhood epilepsy and sees a [facility] neurologist about once a year.  He has been seizure-free for more than 30 years.  He needs his Dilantin refill usually done by his PCP Dr. Russ.    Note from neurologist from last year added as below for future reference.   Childhood epilepsy    Clinical examination: Normal with end gaze nystagmus commonly seen in patient on Dilantin    Recent Dilantin level in July 2017: 10    Recommend patient:    Continue Dilantin 500 mg daily    Check Dilantin level on an annual basis    It seems to be okay to tolerate low normal Dilantin level in light of his seizure absence for almost 30 years    I filled out his DMV form and requested that he is not monitoring anymore. He is seizure free for over 30 years and does not require DMV assessments for epilepsy    I will follow up with the patient on an annual basis or any time sooner if he is any questions or concerns    I spent today 15 minutes counseling on seizures and management    Patient has the  "Dilantin refilled by his primary care doctor    Patient Active Problem List   Diagnosis     Obesity     Diabetes mellitus type 2, controlled (H)     Generalized Convulsive Seizure     Peripheral neuropathy     Hypercholesteremia     Major depression, chronic     Diabetes mellitus, type 2 (H)     ROSSI on CPAP     Alcohol dependence in remission (H)     Morbid obesity (H)     Polyneuropathy associated with underlying disease (H)     Ankle fracture     Seizure disorder (H)     Current Outpatient Medications   Medication     aspirin 81 MG EC tablet     atorvastatin (LIPITOR) 20 MG tablet     blood glucose test strips     blood-glucose meter Misc     insulin glargine (BASAGLAR KWIKPEN) 100 unit/mL (3 mL) pen     insulin lispro (HUMALOG KWIKPEN) 100 UNIT/ML (1 unit dial) KWIKPEN     lancets Misc     lisinopriL (PRINIVIL,ZESTRIL) 5 MG tablet     metFORMIN (GLUCOPHAGE-XR) 500 MG 24 hr tablet     pen needle, diabetic (BD ULTRA-FINE YAHAIRA PEN NEEDLE) 32 gauge x 5/32\" Ndle     phenytoin (DILANTIN) 100 MG capsule     venlafaxine (EFFEXOR-XR) 150 MG 24 hr capsule     No current facility-administered medications for this visit.         Review of Systems   Constitutional, HEENT, cardiovascular, pulmonary, gi and gu systems are negative, except as otherwise noted.      Objective    /60 (BP Location: Left arm, Patient Position: Sitting, Cuff Size: Thigh)   Pulse 85   Ht 1.791 m (5' 10.5\")   Wt 133.7 kg (294 lb 12.8 oz)   SpO2 96%   BMI 41.70 kg/m    Body mass index is 41.7 kg/m .  Physical Exam   Patient is in no acute distress.    Results for orders placed or performed in visit on 11/22/21 (from the past 24 hour(s))   UA Macro with Reflex to Micro and Culture - lab collect    Specimen: Urine, Midstream   Result Value Ref Range    Color Urine Yellow Colorless, Straw, Light Yellow, Yellow    Appearance Urine Clear Clear    Glucose Urine 500  (A) Negative mg/dL    Bilirubin Urine Small (A) Negative    Ketones Urine Trace (A) " Negative mg/dL    Specific Gravity Urine >=1.030 1.005 - 1.030    Blood Urine Negative Negative    pH Urine 5.0 5.0 - 8.0    Protein Albumin Urine Trace (A) Negative mg/dL    Urobilinogen Urine 0.2 0.2, 1.0 E.U./dL    Nitrite Urine Negative Negative    Leukocyte Esterase Urine Negative Negative   Urine Microscopic   Result Value Ref Range    Bacteria Urine None Seen None Seen /HPF    RBC Urine None Seen 0-2 /HPF /HPF    WBC Urine None Seen 0-5 /HPF /HPF    Squamous Epithelials Urine Few (A) None Seen /LPF    Narrative    Urine Culture not indicated

## 2021-11-22 NOTE — LETTER
November 22, 2021      Isac CRUZ Guillermo  [address] [place] [address]        To Whom It May Concern:    Isac CRUZ Guillermo was seen in our clinic. He may return to work with no restrictions.    Sincerely,        Alfonzo Shirley MD

## 2021-12-08 DIAGNOSIS — E11.9 TYPE 2 DIABETES MELLITUS (H): ICD-10-CM

## 2021-12-08 RX ORDER — LISINOPRIL 5 MG/1
TABLET ORAL
Qty: 90 TABLET | Refills: 1 | Status: SHIPPED | OUTPATIENT
Start: 2021-12-08 | End: 2022-03-22

## 2021-12-21 ENCOUNTER — TELEPHONE (OUTPATIENT)
Dept: FAMILY MEDICINE | Facility: CLINIC | Age: 66
End: 2021-12-21
Payer: COMMERCIAL

## 2021-12-21 NOTE — TELEPHONE ENCOUNTER
Reason for Call:  Other appointment    Detailed comments: Isac saw Dr. Russ in September but in November saw a different so he is wondering if his appointment scheduled for tomorrow is still needed. He says he is happy to give up his time slot for others to use that need it. He says since he works nights the best way to contact him is either via [employer] message or via email.     Phone Number Patient can be reached at: Cell number on file:    Telephone Information:   Mobile [phone]       Best Time: any time     Can we leave a detailed message on this number? YES    Call taken on 12/21/2021 at 11:06 AM by Danielle Troy

## 2021-12-22 ENCOUNTER — OFFICE VISIT (OUTPATIENT)
Dept: FAMILY MEDICINE | Facility: CLINIC | Age: 66
End: 2021-12-22
Payer: COMMERCIAL

## 2021-12-22 VITALS
BODY MASS INDEX: 41.97 KG/M2 | OXYGEN SATURATION: 97 % | HEIGHT: 71 IN | DIASTOLIC BLOOD PRESSURE: 76 MMHG | HEART RATE: 75 BPM | WEIGHT: 299.8 LBS | SYSTOLIC BLOOD PRESSURE: 127 MMHG

## 2021-12-22 DIAGNOSIS — E11.9 CONTROLLED TYPE 2 DIABETES MELLITUS WITHOUT COMPLICATION, WITH LONG-TERM CURRENT USE OF INSULIN (H): ICD-10-CM

## 2021-12-22 DIAGNOSIS — Z12.5 SCREENING PSA (PROSTATE SPECIFIC ANTIGEN): ICD-10-CM

## 2021-12-22 DIAGNOSIS — E78.00 HYPERCHOLESTEREMIA: ICD-10-CM

## 2021-12-22 DIAGNOSIS — Z79.4 CONTROLLED TYPE 2 DIABETES MELLITUS WITHOUT COMPLICATION, WITH LONG-TERM CURRENT USE OF INSULIN (H): ICD-10-CM

## 2021-12-22 LAB
ALBUMIN SERPL-MCNC: 3.7 G/DL (ref 3.5–5)
ALP SERPL-CCNC: 144 U/L (ref 45–120)
ALT SERPL W P-5'-P-CCNC: 30 U/L (ref 0–45)
ANION GAP SERPL CALCULATED.3IONS-SCNC: 11 MMOL/L (ref 5–18)
AST SERPL W P-5'-P-CCNC: 17 U/L (ref 0–40)
BILIRUB SERPL-MCNC: 0.2 MG/DL (ref 0–1)
BUN SERPL-MCNC: 22 MG/DL (ref 8–22)
CALCIUM SERPL-MCNC: 9 MG/DL (ref 8.5–10.5)
CHLORIDE BLD-SCNC: 108 MMOL/L (ref 98–107)
CHOLEST SERPL-MCNC: 200 MG/DL
CO2 SERPL-SCNC: 23 MMOL/L (ref 22–31)
CREAT SERPL-MCNC: 0.78 MG/DL (ref 0.7–1.3)
FASTING STATUS PATIENT QL REPORTED: YES
GFR SERPL CREATININE-BSD FRML MDRD: >90 ML/MIN/1.73M2
GLUCOSE BLD-MCNC: 100 MG/DL (ref 70–125)
HBA1C MFR BLD: 11.5 % (ref 0–5.6)
HDLC SERPL-MCNC: 44 MG/DL
LDLC SERPL CALC-MCNC: 130 MG/DL
POTASSIUM BLD-SCNC: 4.3 MMOL/L (ref 3.5–5)
PROT SERPL-MCNC: 6.6 G/DL (ref 6–8)
PSA SERPL-MCNC: 0.25 UG/L (ref 0–4.5)
SODIUM SERPL-SCNC: 142 MMOL/L (ref 136–145)
TRIGL SERPL-MCNC: 132 MG/DL

## 2021-12-22 PROCEDURE — 36415 COLL VENOUS BLD VENIPUNCTURE: CPT | Performed by: FAMILY MEDICINE

## 2021-12-22 PROCEDURE — 80061 LIPID PANEL: CPT | Performed by: FAMILY MEDICINE

## 2021-12-22 PROCEDURE — 83036 HEMOGLOBIN GLYCOSYLATED A1C: CPT | Performed by: FAMILY MEDICINE

## 2021-12-22 PROCEDURE — 80053 COMPREHEN METABOLIC PANEL: CPT | Performed by: FAMILY MEDICINE

## 2021-12-22 PROCEDURE — 99214 OFFICE O/P EST MOD 30 MIN: CPT | Performed by: FAMILY MEDICINE

## 2021-12-22 PROCEDURE — G0103 PSA SCREENING: HCPCS | Performed by: FAMILY MEDICINE

## 2021-12-22 RX ORDER — GLUCOSAMINE HCL/CHONDROITIN SU 500-400 MG
3 CAPSULE ORAL 3 TIMES DAILY
Qty: 200 STRIP | Refills: 3 | Status: SHIPPED | OUTPATIENT
Start: 2021-12-22 | End: 2022-03-30

## 2021-12-22 RX ORDER — INSULIN LISPRO 100 [IU]/ML
INJECTION, SOLUTION INTRAVENOUS; SUBCUTANEOUS
Qty: 15 ML | Refills: 4 | Status: ON HOLD | OUTPATIENT
Start: 2021-12-22 | End: 2022-05-13

## 2021-12-22 ASSESSMENT — MIFFLIN-ST. JEOR: SCORE: 2159.07

## 2021-12-22 NOTE — PATIENT INSTRUCTIONS
Discussed colorectal cancer screening and will not ever do a colonoscopy.   Therefore there is no point in doing the Cologuard.    A1C in 3 months.    Try to watch the diet.    Fasting labs

## 2021-12-22 NOTE — PROGRESS NOTES
"HPI:    Here for a follow up on diabetes.  Needs to re schedule his eye exam.  Takes his novolag since 11-22-21. Has been still taking the Basaglar all along.   Running to the bathroom has stopped except for maybe once a night.  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        MEDS: REVIEWED  ALLERGIES: REVIEWED  PMH: REVIEWED  PSH: REVIEWED    SMOKING: none      EXAM:  /76   Pulse 75   Ht 1.791 m (5' 10.5\")   Wt 136 kg (299 lb 12.8 oz)   SpO2 97%   BMI 42.41 kg/m    GEN: ALERT, ORIENTED TIMES THREE, NAD  LUNGS: CTA  COR: RRR WITHOUT MURMUR  FEET:  On 6-10-21    LABS:    A1C:  11.5     Lab Results   Component Value Date    A1C 8.7 09/21/2021    A1C 10.5 06/10/2021    A1C 9.1 02/22/2021    A1C 12.1 11/19/2020    A1C 11.2 06/17/2020       Hemoglobin A1C   Date Value Ref Range Status   12/22/2021 11.5 (H) 0.0 - 5.6 % Final     Comment:     Normal <5.7%   Prediabetes 5.7-6.4%    Diabetes 6.5% or higher     Note: Adopted from ADA consensus guidelines.   09/21/2021 8.7 (H) 0.0 - 5.6 % Final     Comment:     Normal <5.7%   Prediabetes 5.7-6.4%    Diabetes 6.5% or higher     Note: Adopted from ADA consensus guidelines.   06/10/2021 10.5 (H) <=5.6 % Final       Encounter Diagnoses   Name Primary?     Controlled type 2 diabetes mellitus without complication, with long-term current use of insulin (H), RECENTLY NOT CONTROLLED BUT IMPROVING IN LAST MONTH           Hypercholesteremia, on statin      Screening PSA (prostate specific antigen)               PLAN:        Discussed colorectal cancer screening and will not ever do a colonoscopy.   Therefore there is no point in doing the Cologuard.    A1C in 3 months.    Try to watch the diet.     Fasting labs    DIABETES TIPS  Weight control and exercise are important.  Try to check your blood glucose every day.  Fasting blood sugar should be in the range of .  In case blood sugar is too low " you should have quick access to a source of sugar such as orange juice or a candy bar.  If low blood sugars are occurring frequently let us know as a  medication adjustment  Is neessary.

## 2022-01-05 DIAGNOSIS — E78.5 HYPERLIPIDEMIA: ICD-10-CM

## 2022-01-05 RX ORDER — ATORVASTATIN CALCIUM 20 MG/1
TABLET, FILM COATED ORAL
Qty: 90 TABLET | Refills: 3 | Status: SHIPPED | OUTPATIENT
Start: 2022-01-05 | End: 2022-01-06

## 2022-01-06 DIAGNOSIS — E78.5 HYPERLIPIDEMIA: ICD-10-CM

## 2022-01-06 RX ORDER — ATORVASTATIN CALCIUM 20 MG/1
TABLET, FILM COATED ORAL
Qty: 90 TABLET | Refills: 3 | Status: SHIPPED | OUTPATIENT
Start: 2022-01-06 | End: 2023-01-30

## 2022-02-08 DIAGNOSIS — E11.9 CONTROLLED TYPE 2 DIABETES MELLITUS WITHOUT COMPLICATION, WITH LONG-TERM CURRENT USE OF INSULIN (H): ICD-10-CM

## 2022-02-08 DIAGNOSIS — Z79.4 CONTROLLED TYPE 2 DIABETES MELLITUS WITHOUT COMPLICATION, WITH LONG-TERM CURRENT USE OF INSULIN (H): ICD-10-CM

## 2022-02-08 RX ORDER — METFORMIN HCL 500 MG
TABLET, EXTENDED RELEASE 24 HR ORAL
Qty: 360 TABLET | Refills: 3 | Status: ON HOLD | OUTPATIENT
Start: 2022-02-08 | End: 2022-05-13

## 2022-02-08 NOTE — TELEPHONE ENCOUNTER
Reason for Call:  Medication or medication refill:    Do you use a [facility]?  Name of the pharmacy and phone number for the current request:  [facility]/pharmacy #[id number] - [place], [place] - [address] [place] [address]  [phone]    Name of the medication requested: metFORMIN (GLUCOPHAGE-XR) 500 MG 24 hr tablet    Other request: Patient called stating his insurance changed and has a contract with [facility]. Pt's new pharmacy for his prescriptions need to go to [facility]. Pt is out of Metformin and need provider to send a 90 day supply to [facility]. Pt states that it needs to be 90 days in order his insurance to cover for it.     Can we leave a detailed message on this number? YES    Phone number patient can be reached at: Cell number on file:    Telephone Information:   Mobile [phone]       Best Time: Any time    Call taken on 2/8/2022 at 2:57 PM by Juana Mendoza

## 2022-02-24 ENCOUNTER — TELEPHONE (OUTPATIENT)
Dept: FAMILY MEDICINE | Facility: CLINIC | Age: 67
End: 2022-02-24
Payer: COMMERCIAL

## 2022-02-24 DIAGNOSIS — R56.9 SEIZURE (H): ICD-10-CM

## 2022-02-24 DIAGNOSIS — E11.9 TYPE 2 DIABETES MELLITUS WITHOUT COMPLICATION, WITH LONG-TERM CURRENT USE OF INSULIN (H): ICD-10-CM

## 2022-02-24 DIAGNOSIS — Z79.4 TYPE 2 DIABETES MELLITUS WITHOUT COMPLICATION, WITH LONG-TERM CURRENT USE OF INSULIN (H): ICD-10-CM

## 2022-02-24 RX ORDER — PHENYTOIN SODIUM 100 MG/1
CAPSULE, EXTENDED RELEASE ORAL
Qty: 450 CAPSULE | Refills: 0 | Status: ON HOLD | OUTPATIENT
Start: 2022-02-24 | End: 2022-05-13

## 2022-02-24 RX ORDER — INSULIN GLARGINE 100 [IU]/ML
INJECTION, SOLUTION SUBCUTANEOUS
Qty: 54 ML | Refills: 3 | Status: ON HOLD | OUTPATIENT
Start: 2022-02-24 | End: 2022-05-13

## 2022-02-24 NOTE — TELEPHONE ENCOUNTER
Pending Prescriptions:                       Disp   Refills    phenytoin (DILANTIN) 100 MG capsule       450 ca*3            Sig: TAKE 5 CAPSULES BY MOUTH EVERY NIGHT AT BEDTIME    insulin glargine (BASAGLAR KWIKPEN) 100 U*54 mL  3            Sig: [INSULIN GLARGINE (BASAGLAR KWIKPEN) 100 UNIT/ML           (3 ML) PEN] ADMINISTER 60 UNITS UNDER THE SKIN           DAILY AT BEDTIME

## 2022-02-24 NOTE — TELEPHONE ENCOUNTER
[facility] pharmacy needs verbal ok for high dose of venlafaxine.  Patient has had this before but has new insurance that will not approve it without doctors authorization  Please call [facility] with verbal ok  [phone]

## 2022-03-02 ENCOUNTER — TELEPHONE (OUTPATIENT)
Dept: FAMILY MEDICINE | Facility: CLINIC | Age: 67
End: 2022-03-02
Payer: COMMERCIAL

## 2022-03-02 NOTE — TELEPHONE ENCOUNTER
Received today from Isac paper work for the DMV    He needs it faxed to [phone] by Friday 2-4-2022.    Please mail a copy of the completed paper work to Isac in the envelope provided     Paper work given to Caitie

## 2022-03-08 NOTE — TELEPHONE ENCOUNTER
LMTCB, Please ask patient if he know when he had his first episode of seizures. We need it to put on the form for DMV. I am unable to find date in chart.

## 2022-03-08 NOTE — TELEPHONE ENCOUNTER
Incoming call from patient upset that he dropped off this form last week and it needed to go to the DMV by Friday last week but today we are calling him needing a date. I apologized and let him know I can take a message. Pt upset and wanted to talk to the nurse, I let him know [place] is not available but I can get him to their queue, but he may get transferred back to me. Pt transferred back to me - upset that he noted on a sticky note on the form when he dropped off. Pt states he is going to drive over and will talk to the . Pt hung up on me.

## 2022-03-15 ENCOUNTER — TELEPHONE (OUTPATIENT)
Dept: FAMILY MEDICINE | Facility: CLINIC | Age: 67
End: 2022-03-15
Payer: COMMERCIAL

## 2022-03-15 NOTE — TELEPHONE ENCOUNTER
[facility] [employer] Medical Request form received via fax today. I put in out guide and put on Selam's desk.

## 2022-03-16 NOTE — TELEPHONE ENCOUNTER
Need more info to complete form.  What is the reason for absence?  First date of absence?  Date of return to work?  Restrictions upon return:  What is pt unable to do that prevents him from working during period of absence?  Any additional helpful info?  Thanks.

## 2022-03-22 ENCOUNTER — VIRTUAL VISIT (OUTPATIENT)
Dept: FAMILY MEDICINE | Facility: CLINIC | Age: 67
End: 2022-03-22
Payer: COMMERCIAL

## 2022-03-22 DIAGNOSIS — E11.9 TYPE 2 DIABETES MELLITUS WITHOUT COMPLICATION, WITH LONG-TERM CURRENT USE OF INSULIN (H): ICD-10-CM

## 2022-03-22 DIAGNOSIS — F32.9 MAJOR DEPRESSION, CHRONIC: Primary | ICD-10-CM

## 2022-03-22 DIAGNOSIS — Z79.4 TYPE 2 DIABETES MELLITUS WITHOUT COMPLICATION, WITH LONG-TERM CURRENT USE OF INSULIN (H): ICD-10-CM

## 2022-03-22 PROBLEM — F10.21 ALCOHOL DEPENDENCE IN REMISSION (H): Status: ACTIVE | Noted: 2020-11-19

## 2022-03-22 PROCEDURE — 96127 BRIEF EMOTIONAL/BEHAV ASSMT: CPT | Performed by: FAMILY MEDICINE

## 2022-03-22 PROCEDURE — 99212 OFFICE O/P EST SF 10 MIN: CPT | Mod: TEL | Performed by: FAMILY MEDICINE

## 2022-03-22 RX ORDER — LISINOPRIL 5 MG/1
TABLET ORAL
Qty: 90 TABLET | Refills: 1 | Status: SHIPPED | OUTPATIENT
Start: 2022-03-22 | End: 2022-12-24

## 2022-03-22 ASSESSMENT — PATIENT HEALTH QUESTIONNAIRE - PHQ9: SUM OF ALL RESPONSES TO PHQ QUESTIONS 1-9: 16

## 2022-03-22 NOTE — LETTER
My Depression Action Plan  Name: Isac Li   Date of Birth 1955  Date: 3/22/2022    My doctor: Marquise Russ   [address] [facility] [id number] [phone]          GREEN    ZONE   Good Control    What it looks like:     Things are going generally well. You have normal ups and downs. You may even feel depressed from time to time, but bad moods usually last less than a day.   What you need to do:  1. Continue to care for yourself (see self care plan)  2. Check your depression survival kit and update it as needed  3. Follow your physician s recommendations including any medication.  4. Do not stop taking medication unless you consult with your physician first.           YELLOW         ZONE Getting Worse    What it looks like:     Depression is starting to interfere with your life.     It may be hard to get out of bed; you may be starting to isolate yourself from others.    Symptoms of depression are starting to last most all day and this has happened for several days.     You may have suicidal thoughts but they are not constant.   What you need to do:     1. Call your care team. Your response to treatment will improve if you keep your care team informed of your progress. Yellow periods are signs an adjustment may need to be made.     2. Continue your self-care.  Just get dressed and ready for the day.  Don't give yourself time to talk yourself out of it.    3. Talk to someone in your support network.    4. Open up your Depression Self-Care Plan/Wellness Kit.           RED    ZONE Medical Alert - Get Help    What it looks like:     Depression is seriously interfering with your life.     You may experience these or other symptoms: You can t get out of bed most days, can t work or engage in other necessary activities, you have trouble taking care of basic hygiene, or basic responsibilities, thoughts of suicide or death that will  not go away, self-injurious behavior.     What you need to do:  1. Call your care team and request a same-day appointment. If they are not available (weekends or after hours) call your local crisis line, emergency room or 911.          Depression Self-Care Plan / Wellness Kit    Many people find that medication and therapy are helpful treatments for managing depression. In addition, making small changes to your everyday life can help to boost your mood and improve your wellbeing. Below are some tips for you to consider. Be sure to talk with your medical provider and/or behavioral health consultant if your symptoms are worsening or not improving.     Sleep   Sleep hygiene  means all of the habits that support good, restful sleep. It includes maintaining a consistent bedtime and wake time, using your bedroom only for sleeping or sex, and keeping the bedroom dark and free of distractions like a computer, smartphone, or television.     Develop a Healthy Routine  Maintain good hygiene. Get out of bed in the morning, make your bed, brush your teeth, take a shower, and get dressed. Don t spend too much time viewing media that makes you feel stressed. Find time to relax each day.    Exercise  Get some form of exercise every day. This will help reduce pain and release endorphins, the  feel good  chemicals in your brain. It can be as simple as just going for a walk or doing some gardening, anything that will get you moving.      Diet  Strive to eat healthy foods, including fruits and vegetables. Drink plenty of water. Avoid excessive sugar, caffeine, alcohol, and other mood-altering substances.     Stay Connected with Others  Stay in touch with friends and family members.    Manage Your Mood  Try deep breathing, massage therapy, biofeedback, or meditation. Take part in fun activities when you can. Try to find something to smile about each day.     Psychotherapy  Be open to working with a therapist if your provider recommends  it.     Medication  Be sure to take your medication as prescribed. Most anti-depressants need to be taken every day. It usually takes several weeks for medications to work. Not all medicines work for all people. It is important to follow-up with your provider to make sure you have a treatment plan that is working for you. Do not stop your medication abruptly without first discussing it with your provider.    Crisis Resources   These hotlines are for both adults and children. They and are open 24 hours a day, 7 days a week unless noted otherwise.      National Suicide Prevention Lifeline   3-925-090-TALK (2794)      Crisis Text Line    www.crisistextline.org  Text HOME to 205879 from anywhere in the [place], anytime, about any type of crisis. A live, trained crisis counselor will receive the text and respond quickly.      [facility] Lifeline for LGBTQ Youth  A national crisis intervention and suicide lifeline for LGBTQ youth under 25. Provides a safe place to talk without judgement. Call 1-902.906.4284; text START to 352033 or visit www.thetrevorproject.org to talk to a trained counselor.      For [facility] crisis numbers, visit the [facility] website at:  https://[place].gov/dhs/people-we-serve/adults/health-care/mental-health/resources/crisis-contacts.jsp

## 2022-03-22 NOTE — PATIENT INSTRUCTIONS
A MEDICAL REQUEST FORM  for disability is completed from [facility] [employer]

## 2022-03-22 NOTE — PROGRESS NOTES
Isac is a 67 year old who is being evaluated via a billable telephone visit.      What phone number would you like to be contacted at? [phone]  How would you like to obtain your AVS? Sarbjit Carballo   Isac is a 67 year old who presents for the following health issues.  Had a good friend that committed suicide in Jan 2022 (his friend was [employer] like him)  Has suicidal thoughts but has no active plan to hurt himself.  Will see a counselor 3-22-22.  Eating about twice daily but not as well as in the past.  Sleeping is not good  Feels safe driving.  Takes his two venlafaxine tablets (total dose  Of 300 mg) every day.  Hospitalized for depression in  April 2003 for a couple weeks  Diabetes has been under poor control.    Lab Results   Component Value Date    A1C 11.5 12/22/2021    A1C 8.7 09/21/2021    A1C 10.5 06/10/2021    A1C 9.1 02/22/2021    A1C 12.1 11/19/2020       HPI       Feels like his life is a failure.  Has a psych appt 3-31-22    PHQ-9 score:    PHQ 3/22/2022   PHQ-9 Total Score 16   Q9: Thoughts of better off dead/self-harm past 2 weeks More than half the days         Objective       Vitals:  No vitals were obtained today due to virtual visit.    Physical Exam   healthy, alert and no distress  PSYCH: Alert and oriented times 3; coherent speech, normal   rate and volume, able to articulate logical thoughts, able   to abstract reason, no tangential thoughts, no hallucinations   or delusions  His affect is sad  RESP: No cough, no audible wheezing, able to talk in full sentences  Re    Encounter Diagnoses   Name Primary?     Screen for colon cancer      Need for hepatitis C screening test      Type 2 diabetes mellitus without complication, with long-term current use of insulin (H)      Major depression, chronic Yes    mainder of exam unable to be completed due to telephone visits        Phone call duration:  28  minutes

## 2022-03-30 ENCOUNTER — OFFICE VISIT (OUTPATIENT)
Dept: FAMILY MEDICINE | Facility: CLINIC | Age: 67
End: 2022-03-30
Payer: COMMERCIAL

## 2022-03-30 VITALS
BODY MASS INDEX: 41.44 KG/M2 | HEIGHT: 71 IN | DIASTOLIC BLOOD PRESSURE: 80 MMHG | WEIGHT: 296 LBS | OXYGEN SATURATION: 98 % | HEART RATE: 97 BPM | SYSTOLIC BLOOD PRESSURE: 141 MMHG

## 2022-03-30 DIAGNOSIS — Z79.4 CONTROLLED TYPE 2 DIABETES MELLITUS WITHOUT COMPLICATION, WITH LONG-TERM CURRENT USE OF INSULIN (H): ICD-10-CM

## 2022-03-30 DIAGNOSIS — Z11.59 NEED FOR HEPATITIS C SCREENING TEST: ICD-10-CM

## 2022-03-30 DIAGNOSIS — E78.5 HYPERLIPIDEMIA, UNSPECIFIED HYPERLIPIDEMIA TYPE: ICD-10-CM

## 2022-03-30 DIAGNOSIS — E66.813 CLASS 3 SEVERE OBESITY DUE TO EXCESS CALORIES WITH SERIOUS COMORBIDITY AND BODY MASS INDEX (BMI) OF 40.0 TO 44.9 IN ADULT (H): ICD-10-CM

## 2022-03-30 DIAGNOSIS — E66.01 CLASS 3 SEVERE OBESITY DUE TO EXCESS CALORIES WITH SERIOUS COMORBIDITY AND BODY MASS INDEX (BMI) OF 40.0 TO 44.9 IN ADULT (H): ICD-10-CM

## 2022-03-30 DIAGNOSIS — Z12.11 SCREEN FOR COLON CANCER: ICD-10-CM

## 2022-03-30 DIAGNOSIS — E11.9 CONTROLLED TYPE 2 DIABETES MELLITUS WITHOUT COMPLICATION, WITH LONG-TERM CURRENT USE OF INSULIN (H): ICD-10-CM

## 2022-03-30 DIAGNOSIS — F32.9 MAJOR DEPRESSION, CHRONIC: ICD-10-CM

## 2022-03-30 LAB
CHOLEST SERPL-MCNC: 203 MG/DL
CREAT UR-MCNC: 230 MG/DL
FASTING STATUS PATIENT QL REPORTED: YES
HBA1C MFR BLD: 10.3 % (ref 0–5.6)
HDLC SERPL-MCNC: 49 MG/DL
LDLC SERPL CALC-MCNC: 122 MG/DL
MICROALBUMIN UR-MCNC: 10.33 MG/DL (ref 0–1.99)
MICROALBUMIN/CREAT UR: 44.9 MG/G CR
TRIGL SERPL-MCNC: 158 MG/DL

## 2022-03-30 PROCEDURE — 99214 OFFICE O/P EST MOD 30 MIN: CPT | Performed by: FAMILY MEDICINE

## 2022-03-30 PROCEDURE — 36415 COLL VENOUS BLD VENIPUNCTURE: CPT | Performed by: FAMILY MEDICINE

## 2022-03-30 PROCEDURE — 82043 UR ALBUMIN QUANTITATIVE: CPT | Performed by: FAMILY MEDICINE

## 2022-03-30 PROCEDURE — 86803 HEPATITIS C AB TEST: CPT | Performed by: FAMILY MEDICINE

## 2022-03-30 PROCEDURE — 80061 LIPID PANEL: CPT | Performed by: FAMILY MEDICINE

## 2022-03-30 PROCEDURE — 83036 HEMOGLOBIN GLYCOSYLATED A1C: CPT | Performed by: FAMILY MEDICINE

## 2022-03-30 ASSESSMENT — PATIENT HEALTH QUESTIONNAIRE - PHQ9
SUM OF ALL RESPONSES TO PHQ QUESTIONS 1-9: 20
SUM OF ALL RESPONSES TO PHQ QUESTIONS 1-9: 20
10. IF YOU CHECKED OFF ANY PROBLEMS, HOW DIFFICULT HAVE THESE PROBLEMS MADE IT FOR YOU TO DO YOUR WORK, TAKE CARE OF THINGS AT HOME, OR GET ALONG WITH OTHER PEOPLE: SOMEWHAT DIFFICULT

## 2022-03-30 NOTE — LETTER
April 2, 2022      Isac Li  [address]  [place] [address]        Dear DottieGuillermo,  We are writing to inform you of your test results.    Emanuel Chau:  Your universal screen for Hepatitis C is NEGATIVE.    As we discussed the diabetic control is suboptimal.  It is important that you take each scheduled dose of insulin without missing any doses.  Also try to follow your diabetic diet (no donuts) and get in a daily walk.  We will recheck the A1C in 3 months.    Also your LDL is a bit higher than it should be.  If you are really taking your atorvastatin every day as scheduled then we should up the dose.   If you are missing doses then please be more compliant and we can also recheck this in 3 months.   Let me know which is the case.    There is a small trace of protein in your urine which I would anticipate will improve with better diabetic control.    As always it was a pleasure seeing you in clinic recently.      Resulted Orders   Hepatitis C Screen Reflex to HCV RNA Quant and Genotype   Result Value Ref Range    Hepatitis C Antibody Nonreactive Nonreactive    Narrative    Assay performance characteristics have not been established for newborns, infants, and children.   Albumin Random Urine Quantitative with Creat Ratio   Result Value Ref Range    Microalbumin Urine mg/dL 10.33 (H) 0.00 - 1.99 mg/dL    Creatinine Urine mg/dL 230 mg/dL    Microalbumin Urine mg/g Cr 44.9 (H) <=19.9 mg/g Cr    Narrative    Microalbumin, Random Urine   <2.0 mg/dL . . . . . . . . Normal   3.0-30.0 mg/dL . . . . . . Microalbuminuria   >30.0 mg/dL . . . . . .  . Clinical Proteinuria     Microalbumin/Creatinine Ratio, Random Urine   <20 mg/g . . . . .. . . . Normal    mg/g . . . . . . . Microalbuminuria   >300 mg/g . . . . . . . . Clinical Proteinuria   Hemoglobin A1c   Result Value Ref Range    Hemoglobin A1C 10.3 (H) 0.0 - 5.6 %      Comment:      Normal <5.7%   Prediabetes 5.7-6.4%    Diabetes 6.5% or higher      Note: Adopted from ADA consensus guidelines.   Lipid panel reflex to direct LDL Fasting   Result Value Ref Range    Cholesterol 203 (H) <=199 mg/dL    Triglycerides 158 (H) <=149 mg/dL    Direct Measure HDL 49 >=40 mg/dL      Comment:      HDL Cholesterol Reference Range:     0-2 years:   No reference ranges established for patients under 2 years old  at [facility] for lipid analytes.    2-8 years:  Greater than 45 mg/dL     18 years and older:   Female: Greater than or equal to 50 mg/dL   Male:   Greater than or equal to 40 mg/dL    LDL Cholesterol Calculated 122 <=129 mg/dL    Patient Fasting > 8hrs? Yes        If you have any questions or concerns, please call the clinic at the number listed above.       Sincerely,      Marquise Russ MD

## 2022-03-30 NOTE — PATIENT INSTRUCTIONS
Non-fasting labs    Take insulin without missing doses.  Then check A1C in 3 months    Work on weight reduction and losing 10 lbs (initial goal 285 lbs)

## 2022-03-30 NOTE — PROGRESS NOTES
"DIAGNOSIS:  Encounter Diagnoses   Name Primary?     Controlled type 2 diabetes mellitus without complication, with long-term current use of insulin (H), suboptimal control      Major depression, chronic, fair control      Hyperlipidemia, unspecified hyperlipidemia type, on statin      Screen for colon cancer      Need for hepatitis C screening test      Class 3 severe obesity due to excess calories with serious comorbidity and body mass index (BMI) of 40.0 to 44.9 in adult (H)           PLAN:     Non-fasting labs    Take insulin without missing doses.  Then check A1C in 3 months    Work on weight reduction and losing 10 lbs (initial goal 285 lbs)    approx 30 min spent in discussing above problems, discussing meds, diet and follow up.      HPI:  Diabetic check.  Miss about 10 doses of basaglar over the past month.             Current Outpatient Medications   Medication     aspirin (ASA) 81 MG EC tablet     atorvastatin (LIPITOR) 20 MG tablet     insulin glargine (BASAGLAR KWIKPEN) 100 UNIT/ML pen     insulin lispro (HUMALOG KWIKPEN) 100 UNIT/ML (1 unit dial) KWIKPEN     lisinopril (ZESTRIL) 5 MG tablet     metFORMIN (GLUCOPHAGE-XR) 500 MG 24 hr tablet     pen needle, diabetic (BD ULTRA-FINE YAHAIRA PEN NEEDLE) 32 gauge x 5/32\" Ndle     phenytoin (DILANTIN) 100 MG capsule     venlafaxine (EFFEXOR-XR) 150 MG 24 hr capsule     No current facility-administered medications for this visit.       Pmh: reviewed  Psh: reviewed  Allergy:  reviewed      EXAM:    BP (!) 141/80   Pulse 97   Ht 1.791 m (5' 10.5\")   Wt 134.3 kg (296 lb)   SpO2 98%   BMI 41.87 kg/m     Wt Readings from Last 4 Encounters:   03/30/22 134.3 kg (296 lb)   12/22/21 136 kg (299 lb 12.8 oz)   11/22/21 133.7 kg (294 lb 12.8 oz)   09/21/21 137.4 kg (303 lb)     GEN:   ALERT, NAD, ORIENTED TIMES THREE  LUNGS: CTA  COR: RRR WITHOUT MURMUR  FEET:  MF TESTING: minimal sensation              SKIN EXAM:  NL              VASCULAR:   R DP  PULSE: +                "                       L DP  PULSE: +                                      R PT  PULSE: -                                      L PT  PULSE: -      EXT: WITHOUT EDEMA/SWELLING    Hemoglobin A1C   Date Value Ref Range Status   03/30/2022 10.3 (H) 0.0 - 5.6 % Final     Comment:     Normal <5.7%   Prediabetes 5.7-6.4%    Diabetes 6.5% or higher     Note: Adopted from ADA consensus guidelines.       Lab Results   Component Value Date    A1C 11.5 12/22/2021    A1C 8.7 09/21/2021    A1C 10.5 06/10/2021    A1C 9.1 02/22/2021    A1C 12.1 11/19/2020     Lab Results   Component Value Date    CHOL 200 12/22/2021     Lab Results   Component Value Date    HDL 44 12/22/2021     Lab Results   Component Value Date     12/22/2021     Lab Results   Component Value Date    TRIG 132 12/22/2021     Last Comprehensive Metabolic Panel:  Sodium   Date Value Ref Range Status   12/22/2021 142 136 - 145 mmol/L Final     Potassium   Date Value Ref Range Status   12/22/2021 4.3 3.5 - 5.0 mmol/L Final     Chloride   Date Value Ref Range Status   12/22/2021 108 (H) 98 - 107 mmol/L Final     Carbon Dioxide (CO2)   Date Value Ref Range Status   12/22/2021 23 22 - 31 mmol/L Final     Anion Gap   Date Value Ref Range Status   12/22/2021 11 5 - 18 mmol/L Final     Glucose   Date Value Ref Range Status   12/22/2021 100 70 - 125 mg/dL Final     Urea Nitrogen   Date Value Ref Range Status   12/22/2021 22 8 - 22 mg/dL Final     Creatinine   Date Value Ref Range Status   12/22/2021 0.78 0.70 - 1.30 mg/dL Final     GFR Estimate   Date Value Ref Range Status   12/22/2021 >90 >60 mL/min/1.73m2 Final     Comment:     Effective December 21, 2021 eGFRcr in adults is calculated using the 2021 CKD-EPI creatinine equation which includes age and gender (Tricia fairchild al., NEJM, DOI: 10.1056/MFZZpk4539730)   06/10/2021 >60 >60 mL/min/1.73m2 Final     Calcium   Date Value Ref Range Status   12/22/2021 9.0 8.5 - 10.5 mg/dL Final     Bilirubin Total   Date Value Ref  Range Status   12/22/2021 0.2 0.0 - 1.0 mg/dL Final     Alkaline Phosphatase   Date Value Ref Range Status   12/22/2021 144 (H) 45 - 120 U/L Final     ALT   Date Value Ref Range Status   12/22/2021 30 0 - 45 U/L Final     AST   Date Value Ref Range Status   12/22/2021 17 0 - 40 U/L Final               Please feel free to contact us with any questions or if you would like more information.                                                                                                                                                                                                                                      Answers for HPI/ROS submitted by the patient on 3/30/2022  If you checked off any problems, how difficult have these problems made it for you to do your work, take care of things at home, or get along with other people?: Somewhat difficult  PHQ9 TOTAL SCORE: 20  Frequency of checking blood sugars:: a few times a month  What time of day are you checking your blood sugars : before meals  Have you had any blood sugars above 200?: Yes  Have you had any blood sugars below 70?: No  Hypoglycemia symptoms:: shakiness  Diabetic concerns:: none, blood sugar frequently over 200  Paraesthesia present:: none of these symptoms  Have you had a diabetic eye exam within the last year?: No  Depression/Anxiety: Depression  Status since last visit:: better  Other associated symptoms of depression:: Yes  Significant life event: : job concerns, financial concerns, grief or loss, health concerns  Anxious:: No  Current substance use:: No  How many servings of fruits and vegetables do you eat daily?: 0-1  On average, how many sweetened beverages do you drink each day (Examples: soda, juice, sweet tea, etc.  Do NOT count diet or artificially sweetened beverages)?: 2  How many minutes a day do you exercise enough to make your heart beat faster?: 9 or less  How many days a week do you exercise enough to make your heart beat faster?: 3  or less  How many days per week do you miss taking your medication?: 7

## 2022-03-31 LAB — HCV AB SERPL QL IA: NONREACTIVE

## 2022-03-31 ASSESSMENT — PATIENT HEALTH QUESTIONNAIRE - PHQ9: SUM OF ALL RESPONSES TO PHQ QUESTIONS 1-9: 20

## 2022-04-27 ENCOUNTER — TELEPHONE (OUTPATIENT)
Dept: BEHAVIORAL HEALTH | Facility: CLINIC | Age: 67
End: 2022-04-27

## 2022-04-27 ENCOUNTER — HOSPITAL ENCOUNTER (INPATIENT)
Facility: CLINIC | Age: 67
LOS: 16 days | Discharge: HOME | DRG: 885 | End: 2022-05-13
Attending: PSYCHIATRY & NEUROLOGY | Admitting: PSYCHIATRY & NEUROLOGY
Payer: COMMERCIAL

## 2022-04-27 DIAGNOSIS — F33.2 SEVERE EPISODE OF RECURRENT MAJOR DEPRESSIVE DISORDER, WITHOUT PSYCHOTIC FEATURES (H): ICD-10-CM

## 2022-04-27 DIAGNOSIS — F32.9 MAJOR DEPRESSION, CHRONIC: ICD-10-CM

## 2022-04-27 DIAGNOSIS — Z79.4 TYPE 2 DIABETES MELLITUS WITHOUT COMPLICATION, WITH LONG-TERM CURRENT USE OF INSULIN (H): ICD-10-CM

## 2022-04-27 DIAGNOSIS — E11.9 CONTROLLED TYPE 2 DIABETES MELLITUS WITHOUT COMPLICATION, WITH LONG-TERM CURRENT USE OF INSULIN (H): ICD-10-CM

## 2022-04-27 DIAGNOSIS — G40.909 SEIZURE DISORDER (H): ICD-10-CM

## 2022-04-27 DIAGNOSIS — R45.851 SUICIDAL IDEATION: ICD-10-CM

## 2022-04-27 DIAGNOSIS — Z79.4 CONTROLLED TYPE 2 DIABETES MELLITUS WITHOUT COMPLICATION, WITH LONG-TERM CURRENT USE OF INSULIN (H): ICD-10-CM

## 2022-04-27 DIAGNOSIS — E11.9 TYPE 2 DIABETES MELLITUS WITHOUT COMPLICATION, WITH LONG-TERM CURRENT USE OF INSULIN (H): ICD-10-CM

## 2022-04-27 DIAGNOSIS — S82.899A ANKLE FRACTURE: Primary | ICD-10-CM

## 2022-04-27 LAB
ALBUMIN SERPL-MCNC: 3.5 G/DL (ref 3.4–5)
ALP SERPL-CCNC: 149 U/L (ref 40–150)
ALT SERPL W P-5'-P-CCNC: 25 U/L (ref 0–70)
ANION GAP SERPL CALCULATED.3IONS-SCNC: 11 MMOL/L (ref 3–14)
AST SERPL W P-5'-P-CCNC: 9 U/L (ref 0–45)
BASOPHILS # BLD AUTO: 0 10E3/UL (ref 0–0.2)
BASOPHILS NFR BLD AUTO: 0 %
BILIRUB SERPL-MCNC: 0.3 MG/DL (ref 0.2–1.3)
BUN SERPL-MCNC: 19 MG/DL (ref 7–30)
CALCIUM SERPL-MCNC: 8.6 MG/DL (ref 8.5–10.1)
CHLORIDE BLD-SCNC: 106 MMOL/L (ref 94–109)
CO2 SERPL-SCNC: 21 MMOL/L (ref 20–32)
CREAT SERPL-MCNC: 0.82 MG/DL (ref 0.66–1.25)
EOSINOPHIL # BLD AUTO: 0 10E3/UL (ref 0–0.7)
EOSINOPHIL NFR BLD AUTO: 0 %
ERYTHROCYTE [DISTWIDTH] IN BLOOD BY AUTOMATED COUNT: 12.8 % (ref 10–15)
GFR SERPL CREATININE-BSD FRML MDRD: >90 ML/MIN/1.73M2
GLUCOSE BLD-MCNC: 241 MG/DL (ref 70–99)
GLUCOSE BLDC GLUCOMTR-MCNC: 184 MG/DL (ref 70–99)
GLUCOSE BLDC GLUCOMTR-MCNC: 238 MG/DL (ref 70–99)
HCT VFR BLD AUTO: 40.5 % (ref 40–53)
HGB BLD-MCNC: 13.8 G/DL (ref 13.3–17.7)
IMM GRANULOCYTES # BLD: 0.1 10E3/UL
IMM GRANULOCYTES NFR BLD: 1 %
LYMPHOCYTES # BLD AUTO: 2.4 10E3/UL (ref 0.8–5.3)
LYMPHOCYTES NFR BLD AUTO: 24 %
MCH RBC QN AUTO: 29.1 PG (ref 26.5–33)
MCHC RBC AUTO-ENTMCNC: 34.1 G/DL (ref 31.5–36.5)
MCV RBC AUTO: 85 FL (ref 78–100)
MONOCYTES # BLD AUTO: 0.6 10E3/UL (ref 0–1.3)
MONOCYTES NFR BLD AUTO: 6 %
NEUTROPHILS # BLD AUTO: 7 10E3/UL (ref 1.6–8.3)
NEUTROPHILS NFR BLD AUTO: 69 %
NRBC # BLD AUTO: 0 10E3/UL
NRBC BLD AUTO-RTO: 0 /100
PHENYTOIN SERPL-MCNC: 10 MG/L
PLATELET # BLD AUTO: 256 10E3/UL (ref 150–450)
POTASSIUM BLD-SCNC: 4 MMOL/L (ref 3.4–5.3)
PROT SERPL-MCNC: 7.1 G/DL (ref 6.8–8.8)
RBC # BLD AUTO: 4.74 10E6/UL (ref 4.4–5.9)
SARS-COV-2 RNA RESP QL NAA+PROBE: NEGATIVE
SODIUM SERPL-SCNC: 138 MMOL/L (ref 133–144)
TSH SERPL DL<=0.005 MIU/L-ACNC: 2.76 MU/L (ref 0.4–4)
WBC # BLD AUTO: 10 10E3/UL (ref 4–11)

## 2022-04-27 PROCEDURE — 36415 COLL VENOUS BLD VENIPUNCTURE: CPT | Performed by: PSYCHIATRY & NEUROLOGY

## 2022-04-27 PROCEDURE — 90791 PSYCH DIAGNOSTIC EVALUATION: CPT

## 2022-04-27 PROCEDURE — 250N000013 HC RX MED GY IP 250 OP 250 PS 637: Performed by: STUDENT IN AN ORGANIZED HEALTH CARE EDUCATION/TRAINING PROGRAM

## 2022-04-27 PROCEDURE — 99284 EMERGENCY DEPT VISIT MOD MDM: CPT | Performed by: PSYCHIATRY & NEUROLOGY

## 2022-04-27 PROCEDURE — U0005 INFEC AGEN DETEC AMPLI PROBE: HCPCS | Performed by: PSYCHIATRY & NEUROLOGY

## 2022-04-27 PROCEDURE — 80185 ASSAY OF PHENYTOIN TOTAL: CPT | Performed by: PSYCHIATRY & NEUROLOGY

## 2022-04-27 PROCEDURE — 99285 EMERGENCY DEPT VISIT HI MDM: CPT | Mod: 25 | Performed by: PSYCHIATRY & NEUROLOGY

## 2022-04-27 PROCEDURE — 85025 COMPLETE CBC W/AUTO DIFF WBC: CPT | Performed by: PSYCHIATRY & NEUROLOGY

## 2022-04-27 PROCEDURE — 250N000012 HC RX MED GY IP 250 OP 636 PS 637: Performed by: STUDENT IN AN ORGANIZED HEALTH CARE EDUCATION/TRAINING PROGRAM

## 2022-04-27 PROCEDURE — 80053 COMPREHEN METABOLIC PANEL: CPT | Performed by: PSYCHIATRY & NEUROLOGY

## 2022-04-27 PROCEDURE — 124N000002 HC R&B MH UMMC

## 2022-04-27 PROCEDURE — 82040 ASSAY OF SERUM ALBUMIN: CPT | Performed by: PSYCHIATRY & NEUROLOGY

## 2022-04-27 PROCEDURE — 84443 ASSAY THYROID STIM HORMONE: CPT | Performed by: PSYCHIATRY & NEUROLOGY

## 2022-04-27 PROCEDURE — C9803 HOPD COVID-19 SPEC COLLECT: HCPCS | Performed by: PSYCHIATRY & NEUROLOGY

## 2022-04-27 RX ORDER — LISINOPRIL 5 MG/1
5 TABLET ORAL DAILY
Status: DISCONTINUED | OUTPATIENT
Start: 2022-04-28 | End: 2022-05-13 | Stop reason: HOSPADM

## 2022-04-27 RX ORDER — PHENYTOIN SODIUM 100 MG/1
500 CAPSULE, EXTENDED RELEASE ORAL AT BEDTIME
Status: DISCONTINUED | OUTPATIENT
Start: 2022-04-27 | End: 2022-05-13 | Stop reason: HOSPADM

## 2022-04-27 RX ORDER — OLANZAPINE 5 MG/1
5 TABLET ORAL 3 TIMES DAILY PRN
Status: DISCONTINUED | OUTPATIENT
Start: 2022-04-27 | End: 2022-05-13 | Stop reason: HOSPADM

## 2022-04-27 RX ORDER — POLYETHYLENE GLYCOL 3350 17 G/17G
17 POWDER, FOR SOLUTION ORAL DAILY PRN
Status: DISCONTINUED | OUTPATIENT
Start: 2022-04-27 | End: 2022-05-13 | Stop reason: HOSPADM

## 2022-04-27 RX ORDER — DEXTROSE MONOHYDRATE 25 G/50ML
25-50 INJECTION, SOLUTION INTRAVENOUS
Status: DISCONTINUED | OUTPATIENT
Start: 2022-04-27 | End: 2022-05-13 | Stop reason: HOSPADM

## 2022-04-27 RX ORDER — INSULIN LISPRO 100 [IU]/ML
18-26 INJECTION, SOLUTION INTRAVENOUS; SUBCUTANEOUS
Status: DISCONTINUED | OUTPATIENT
Start: 2022-04-28 | End: 2022-04-28

## 2022-04-27 RX ORDER — ATORVASTATIN CALCIUM 20 MG/1
20 TABLET, FILM COATED ORAL AT BEDTIME
Status: DISCONTINUED | OUTPATIENT
Start: 2022-04-27 | End: 2022-05-13 | Stop reason: HOSPADM

## 2022-04-27 RX ORDER — HYDROXYZINE HYDROCHLORIDE 25 MG/1
25 TABLET, FILM COATED ORAL EVERY 4 HOURS PRN
Status: DISCONTINUED | OUTPATIENT
Start: 2022-04-27 | End: 2022-05-13 | Stop reason: HOSPADM

## 2022-04-27 RX ORDER — LANOLIN ALCOHOL/MO/W.PET/CERES
3 CREAM (GRAM) TOPICAL
Status: DISCONTINUED | OUTPATIENT
Start: 2022-04-27 | End: 2022-05-13 | Stop reason: HOSPADM

## 2022-04-27 RX ORDER — OLANZAPINE 10 MG/2ML
5 INJECTION, POWDER, FOR SOLUTION INTRAMUSCULAR 3 TIMES DAILY PRN
Status: DISCONTINUED | OUTPATIENT
Start: 2022-04-27 | End: 2022-05-13 | Stop reason: HOSPADM

## 2022-04-27 RX ORDER — MAGNESIUM HYDROXIDE/ALUMINUM HYDROXICE/SIMETHICONE 120; 1200; 1200 MG/30ML; MG/30ML; MG/30ML
30 SUSPENSION ORAL EVERY 4 HOURS PRN
Status: DISCONTINUED | OUTPATIENT
Start: 2022-04-27 | End: 2022-05-13 | Stop reason: HOSPADM

## 2022-04-27 RX ORDER — ACETAMINOPHEN 325 MG/1
650 TABLET ORAL EVERY 4 HOURS PRN
Status: DISCONTINUED | OUTPATIENT
Start: 2022-04-27 | End: 2022-05-13 | Stop reason: HOSPADM

## 2022-04-27 RX ORDER — VENLAFAXINE HYDROCHLORIDE 75 MG/1
300 CAPSULE, EXTENDED RELEASE ORAL AT BEDTIME
Status: DISCONTINUED | OUTPATIENT
Start: 2022-04-27 | End: 2022-05-05

## 2022-04-27 RX ORDER — NICOTINE POLACRILEX 4 MG
15-30 LOZENGE BUCCAL
Status: DISCONTINUED | OUTPATIENT
Start: 2022-04-27 | End: 2022-05-13 | Stop reason: HOSPADM

## 2022-04-27 RX ADMIN — INSULIN GLARGINE 60 UNITS: 100 INJECTION, SOLUTION SUBCUTANEOUS at 21:39

## 2022-04-27 RX ADMIN — PHENYTOIN SODIUM 500 MG: 100 CAPSULE ORAL at 21:39

## 2022-04-27 RX ADMIN — VENLAFAXINE HYDROCHLORIDE 300 MG: 75 CAPSULE, EXTENDED RELEASE ORAL at 21:39

## 2022-04-27 RX ADMIN — ATORVASTATIN CALCIUM 20 MG: 20 TABLET, FILM COATED ORAL at 21:39

## 2022-04-27 RX ADMIN — METFORMIN HYDROCHLORIDE 2000 MG: 750 TABLET, EXTENDED RELEASE ORAL at 21:03

## 2022-04-27 ASSESSMENT — ACTIVITIES OF DAILY LIVING (ADL)
ORAL_HYGIENE: INDEPENDENT
WALKING_OR_CLIMBING_STAIRS_DIFFICULTY: NO
CHANGE_IN_FUNCTIONAL_STATUS_SINCE_ONSET_OF_CURRENT_ILLNESS/INJURY: NO
DRESSING/BATHING_DIFFICULTY: NO
FALL_HISTORY_WITHIN_LAST_SIX_MONTHS: NO
WEAR_GLASSES_OR_BLIND: YES
DIFFICULTY_COMMUNICATING: NO
HYGIENE/GROOMING: INDEPENDENT
DOING_ERRANDS_INDEPENDENTLY_DIFFICULTY: NO
DIFFICULTY_EATING/SWALLOWING: NO
TOILETING_ISSUES: NO
CONCENTRATING,_REMEMBERING_OR_MAKING_DECISIONS_DIFFICULTY: NO
DRESS: INDEPENDENT

## 2022-04-27 ASSESSMENT — ENCOUNTER SYMPTOMS
CARDIOVASCULAR NEGATIVE: 1
HALLUCINATIONS: 0
EYES NEGATIVE: 1
NEUROLOGICAL NEGATIVE: 1
GASTROINTESTINAL NEGATIVE: 1
MUSCULOSKELETAL NEGATIVE: 1
NERVOUS/ANXIOUS: 1
SLEEP DISTURBANCE: 1
DECREASED CONCENTRATION: 1
RESPIRATORY NEGATIVE: 1
HYPERACTIVE: 0
CONSTITUTIONAL NEGATIVE: 1

## 2022-04-27 NOTE — ED PROVIDER NOTES
Signed out to me by Dr. Gary.  Labs checked and no acute concerns. He cannot contract for safety.  [facility]  also supports admission.  Please see their note.  He is medically appropriate for inpatient to mental health.  He cannot identify what could keep him safe.   Currently this is a voluntary admit.      Results for orders placed or performed during the hospital encounter of 04/27/22   Asymptomatic COVID-19 Virus (Coronavirus) by PCR Oropharynx     Status: Normal    Specimen: Oropharynx; Swab   Result Value Ref Range    SARS CoV2 PCR Negative Negative    Narrative    Testing was performed using the bennett  SARS-CoV-2 & Influenza A/B Assay on the bennett  Sohpie  System.  This test should be ordered for the detection of SARS-COV-2 in individuals who meet SARS-CoV-2 clinical and/or epidemiological criteria. Test performance is unknown in asymptomatic patients.  This test is for in vitro diagnostic use under the FDA EUA for laboratories certified under CLIA to perform moderate and/or high complexity testing. This test has not been FDA cleared or approved.  A negative test does not rule out the presence of PCR inhibitors in the specimen or target RNA in concentration below the limit of detection for the assay. The possibility of a false negative should be considered if the patient's recent exposure or clinical presentation suggests COVID-19.  [facility] are certified under the Clinical Laboratory Improvement Amendments of 1988 (CLIA-88) as qualified to perform moderate and/or high complexity laboratory testing.   Phenytoin level     Status: Normal   Result Value Ref Range    Phenytoin 10.0   mg/L   Comprehensive metabolic panel     Status: Abnormal   Result Value Ref Range    Sodium 138 133 - 144 mmol/L    Potassium 4.0 3.4 - 5.3 mmol/L    Chloride 106 94 - 109 mmol/L    Carbon Dioxide (CO2) 21 20 - 32 mmol/L    Anion Gap 11 3 - 14 mmol/L    Urea Nitrogen 19 7 - 30 mg/dL    Creatinine 0.82 0.66 -  1.25 mg/dL    Calcium 8.6 8.5 - 10.1 mg/dL    Glucose 241 (H) 70 - 99 mg/dL    Alkaline Phosphatase 149 40 - 150 U/L    AST 9 0 - 45 U/L    ALT 25 0 - 70 U/L    Protein Total 7.1 6.8 - 8.8 g/dL    Albumin 3.5 3.4 - 5.0 g/dL    Bilirubin Total 0.3 0.2 - 1.3 mg/dL    GFR Estimate >90 >60 mL/min/1.73m2   TSH with free T4 reflex     Status: Normal   Result Value Ref Range    TSH 2.76 0.40 - 4.00 mU/L   CBC with platelets and differential     Status: None   Result Value Ref Range    WBC Count 10.0 4.0 - 11.0 10e3/uL    RBC Count 4.74 4.40 - 5.90 10e6/uL    Hemoglobin 13.8 13.3 - 17.7 g/dL    Hematocrit 40.5 40.0 - 53.0 %    MCV 85 78 - 100 fL    MCH 29.1 26.5 - 33.0 pg    MCHC 34.1 31.5 - 36.5 g/dL    RDW 12.8 10.0 - 15.0 %    Platelet Count 256 150 - 450 10e3/uL    % Neutrophils 69 %    % Lymphocytes 24 %    % Monocytes 6 %    % Eosinophils 0 %    % Basophils 0 %    % Immature Granulocytes 1 %    NRBCs per 100 WBC 0 <1 /100    Absolute Neutrophils 7.0 1.6 - 8.3 10e3/uL    Absolute Lymphocytes 2.4 0.8 - 5.3 10e3/uL    Absolute Monocytes 0.6 0.0 - 1.3 10e3/uL    Absolute Eosinophils 0.0 0.0 - 0.7 10e3/uL    Absolute Basophils 0.0 0.0 - 0.2 10e3/uL    Absolute Immature Granulocytes 0.1 <=0.4 10e3/uL    Absolute NRBCs 0.0 10e3/uL   CBC with platelets differential     Status: None    Narrative    The following orders were created for panel order CBC with platelets differential.  Procedure                               Abnormality         Status                     ---------                               -----------         ------                     CBC with platelets and d...[[id number]]                      Final result                 Please view results for these tests on the individual orders.            Zoe Seymour MD  04/27/22 2040

## 2022-04-27 NOTE — ED TRIAGE NOTES
Patient reports increased depression. On a leave of absence from work since 2/25/22 for increased depression. Patient reports increased suicidal thoughts over the last 2 days. Patient reports 3 methods planned; shooting himself, box of old prescriptions, or crashing vehicle.   Patient reports in previous times of depression, he would never kill himself due to being [employer]. However, recent loss of a dear friend who was [employer] to suicide has given patient a different perspective.     Triage Assessment     Row Name 04/27/22 0416       Triage Assessment (Adult)    Airway WDL WDL       Respiratory WDL    Respiratory WDL WDL       Skin Circulation/Temperature WDL    Skin Circulation/Temperature WDL WDL       Cardiac WDL    Cardiac WDL WDL       Peripheral/Neurovascular WDL    Peripheral Neurovascular WDL WDL       Cognitive/Neuro/Behavioral WDL    Cognitive/Neuro/Behavioral WDL WDL
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
## 2022-04-27 NOTE — TELEPHONE ENCOUNTER
Bed avail on 20   638pm - Jovana, on call resident, paged   652pm - Jovana accepts     R: Goldie/Jesus     Pt placed in queue   654pm - unit charge notified, ready for report   703pm - ED charge notified via text page

## 2022-04-27 NOTE — ED PROVIDER NOTES
ED Provider Note  [facility]      History     Chief Complaint   Patient presents with     Suicidal     Patient reports increased depression. On a leave of absence from work since 22 for increased depression. Patient reports increased suicidal thoughts over the last 2 days. Patient reports 3 methods planned; shooting himself, box of old prescriptions, or crashing vehicle.      HPI  Isac Li is a 67 year old male who is here accompanied by his sister due to decompensated illness. Patient has history of recurrent MDD. He has had 2 prior psychiatric admission with last one 20 years ago. He reports last seeing a psychiatrist 15 years ago. His meds are managed by his primary care provider.     Patient reports feeling depressed last year. He felt the trigger to him getting worse was when a close acquaintance  of suicide. He reports the friend was [employer] like him and was devastated that he  by suicide. He started to be less productive with work and had to take a leave of absence. He has been taking his meds. He denies drug use.     Patient has diabetes and felt his blood sugars have been under control, but he had stopped checking it for couple months. He has a seizure disorder and reports last seizure was in . He denies acute medical concerns. He denies COVID symptoms and have been vaccinated.    Patient admits to worsen depression and it is now at the point of entertaining suicide. He has various methods and has access to a gun at home, stockpiled unused meds and he also thought of crashing his car. He feels he cannot be safe in the community as his depression has gotten progressive. He is open to being admitted.    Please see [facility] Crisis Assessment on 22 in Epic for further details.    PERSONAL MEDICAL HISTORY  Past Medical History:   Diagnosis Date     Alcoholism (H)     early 1970s.     Depression     hospitalized  suicidal ideation. Followed by Naseem Soto  "psychologist.     Diabetes mellitus (H)      Hx of type B viral hepatitis      Infectious viral hepatitis      Lower leg edema     dependent edema left leg more.     Seizures (H)     well controlled, last seizure , followed by Dr. Pastrana now.     Sleep apnea     uses CPAP     PAST SURGICAL HISTORY  Past Surgical History:   Procedure Laterality Date     ANKLE FRACTURE SURGERY Left      COLONOSCOPY       KNEE LIGAMENT RECONSTRUCTION Left      FAMILY HISTORY  History reviewed. No pertinent family history.  SOCIAL HISTORY  Social History     Tobacco Use     Smoking status: Former Smoker     Quit date: 2/15/1990     Years since quittin.2     Smokeless tobacco: Never Used   Substance Use Topics     Alcohol use: No     MEDICATIONS  No current facility-administered medications for this encounter.     Current Outpatient Medications   Medication     aspirin (ASA) 81 MG EC tablet     atorvastatin (LIPITOR) 20 MG tablet     insulin glargine (BASAGLAR KWIKPEN) 100 UNIT/ML pen     insulin lispro (HUMALOG KWIKPEN) 100 UNIT/ML (1 unit dial) KWIKPEN     lisinopril (ZESTRIL) 5 MG tablet     metFORMIN (GLUCOPHAGE-XR) 500 MG 24 hr tablet     pen needle, diabetic (BD ULTRA-FINE YAHAIRA PEN NEEDLE) 32 gauge x \" Ndle     phenytoin (DILANTIN) 100 MG capsule     venlafaxine (EFFEXOR-XR) 150 MG 24 hr capsule     ALLERGIES  Allergies   Allergen Reactions     Sulfa (Sulfonamide Antibiotics) [Sulfa Drugs] Unknown        Review of Systems   Constitutional: Negative.    HENT: Negative.    Eyes: Negative.    Respiratory: Negative.    Cardiovascular: Negative.    Gastrointestinal: Negative.    Genitourinary: Negative.    Musculoskeletal: Negative.    Skin: Negative.    Neurological: Negative.    Psychiatric/Behavioral: Positive for decreased concentration, sleep disturbance and suicidal ideas. Negative for hallucinations. The patient is nervous/anxious. The patient is not hyperactive.    All other systems reviewed and are " negative.        Physical Exam   BP: (!) 145/82  Pulse: 95  Temp: 98  F (36.7  C)  Resp: 18  Weight: 137 kg (302 lb)  SpO2: 95 %  Physical Exam  Vitals and nursing note reviewed.   HENT:      Head: Normocephalic.   Eyes:      Pupils: Pupils are equal, round, and reactive to light.   Pulmonary:      Effort: Pulmonary effort is normal.   Musculoskeletal:         General: Normal range of motion.      Cervical back: Normal range of motion.   Neurological:      General: No focal deficit present.      Mental Status: He is alert.   Psychiatric:         Attention and Perception: Attention and perception normal. He does not perceive auditory or visual hallucinations.         Mood and Affect: Affect normal. Mood is depressed.         Speech: Speech normal.         Behavior: Behavior is withdrawn. Behavior is not agitated, aggressive, hyperactive or combative. Behavior is cooperative.         Thought Content: Thought content is not paranoid or delusional. Thought content includes suicidal ideation. Thought content does not include homicidal ideation. Thought content includes suicidal plan.         Cognition and Memory: Cognition and memory normal.         Judgment: Judgment normal.         ED Course      Procedures       Mental Health Risk Assessment      PSS-3    Date and Time Over the past 2 weeks have you felt down, depressed, or hopeless? Over the past 2 weeks have you had thoughts of killing yourself? Have you ever attempted to kill yourself? When did this last happen? User   04/27/22 3866 yes yes yes more than 6 months ago TMW      C-SSRS ([place])    Date and Time Q1 Wished to be Dead (Past Month) Q2 Suicidal Thoughts (Past Month) Q3 Suicidal Thought Method Q4 Suicidal Intent without Specific Plan Q5 Suicide Intent with Specific Plan Q6 Suicide Behavior (Lifetime) Within the Past 3 Months? RETIRED: Level of Risk per Screen Screening Not Complete User   04/27/22 1445 yes yes yes no yes yes -- -- -- HF   04/27/22 4828  yes yes yes no yes yes -- -- -- TMW              Suicide assessment completed by mental health (D.E.C., LCSW, etc.)       No results found for any visits on 04/27/22.  Medications - No data to display     Assessments & Plan (with Medical Decision Making)   Patient is here accompanied by sister for a mental health evaluation and safety assessment. He has history of recurrent MDD and been hospitalized previously for feeling suicidal. He has been stable for years but notes that depression has gotten worse last year. He felt the turning point to his current decompensation was triggered by his friend's suicide. His Catholicism did not protect him. Patient has started to think that his own Catholicism is not protective of his own SI which has gotten progressively worse and intrusive. He has been entertaining various lethal methods. He is at high risk for suicide and would benefit from an admission for stabilization. He is voluntary. Patient is referred for admission. He is medically cleared.    I have reviewed the nursing notes. I have reviewed the findings, diagnosis, plan and need for follow up with the patient.    New Prescriptions    No medications on file       Final diagnoses:   Severe episode of recurrent major depressive disorder, without psychotic features (H)   Suicidal ideation       --  Richard Winn MD  [facility] EMERGENCY DEPARTMENT  4/27/2022     Richard Winn MD  04/27/22 3519
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
## 2022-04-28 LAB
CHOLEST SERPL-MCNC: 191 MG/DL
GLUCOSE BLDC GLUCOMTR-MCNC: 159 MG/DL (ref 70–99)
GLUCOSE BLDC GLUCOMTR-MCNC: 243 MG/DL (ref 70–99)
GLUCOSE BLDC GLUCOMTR-MCNC: 272 MG/DL (ref 70–99)
GLUCOSE BLDC GLUCOMTR-MCNC: 275 MG/DL (ref 70–99)
GLUCOSE BLDC GLUCOMTR-MCNC: 71 MG/DL (ref 70–99)
HBA1C MFR BLD: 9.8 % (ref 0–5.6)
HDLC SERPL-MCNC: 48 MG/DL
LDLC SERPL CALC-MCNC: 116 MG/DL
NONHDLC SERPL-MCNC: 143 MG/DL
TRIGL SERPL-MCNC: 135 MG/DL

## 2022-04-28 PROCEDURE — 80061 LIPID PANEL: CPT | Performed by: STUDENT IN AN ORGANIZED HEALTH CARE EDUCATION/TRAINING PROGRAM

## 2022-04-28 PROCEDURE — 124N000002 HC R&B MH UMMC

## 2022-04-28 PROCEDURE — 250N000013 HC RX MED GY IP 250 OP 250 PS 637: Performed by: PHYSICIAN ASSISTANT

## 2022-04-28 PROCEDURE — 250N000013 HC RX MED GY IP 250 OP 250 PS 637: Performed by: STUDENT IN AN ORGANIZED HEALTH CARE EDUCATION/TRAINING PROGRAM

## 2022-04-28 PROCEDURE — G0177 OPPS/PHP; TRAIN & EDUC SERV: HCPCS

## 2022-04-28 PROCEDURE — 83036 HEMOGLOBIN GLYCOSYLATED A1C: CPT | Performed by: STUDENT IN AN ORGANIZED HEALTH CARE EDUCATION/TRAINING PROGRAM

## 2022-04-28 PROCEDURE — 250N000013 HC RX MED GY IP 250 OP 250 PS 637

## 2022-04-28 PROCEDURE — 99222 1ST HOSP IP/OBS MODERATE 55: CPT | Performed by: PHYSICIAN ASSISTANT

## 2022-04-28 PROCEDURE — 90853 GROUP PSYCHOTHERAPY: CPT

## 2022-04-28 PROCEDURE — 250N000012 HC RX MED GY IP 250 OP 636 PS 637: Performed by: STUDENT IN AN ORGANIZED HEALTH CARE EDUCATION/TRAINING PROGRAM

## 2022-04-28 PROCEDURE — 99223 1ST HOSP IP/OBS HIGH 75: CPT | Mod: 25 | Performed by: PSYCHIATRY & NEUROLOGY

## 2022-04-28 PROCEDURE — 36415 COLL VENOUS BLD VENIPUNCTURE: CPT | Performed by: STUDENT IN AN ORGANIZED HEALTH CARE EDUCATION/TRAINING PROGRAM

## 2022-04-28 RX ORDER — QUETIAPINE FUMARATE 50 MG/1
50 TABLET, FILM COATED ORAL AT BEDTIME
Status: DISCONTINUED | OUTPATIENT
Start: 2022-04-28 | End: 2022-05-13 | Stop reason: HOSPADM

## 2022-04-28 RX ORDER — ASPIRIN 81 MG/1
81 TABLET, CHEWABLE ORAL DAILY
Status: DISCONTINUED | OUTPATIENT
Start: 2022-04-28 | End: 2022-05-13 | Stop reason: HOSPADM

## 2022-04-28 RX ADMIN — LISINOPRIL 5 MG: 5 TABLET ORAL at 08:17

## 2022-04-28 RX ADMIN — INSULIN GLARGINE 60 UNITS: 100 INJECTION, SOLUTION SUBCUTANEOUS at 21:11

## 2022-04-28 RX ADMIN — INSULIN ASPART 3 UNITS: 100 INJECTION, SOLUTION INTRAVENOUS; SUBCUTANEOUS at 12:43

## 2022-04-28 RX ADMIN — VENLAFAXINE HYDROCHLORIDE 300 MG: 75 CAPSULE, EXTENDED RELEASE ORAL at 21:10

## 2022-04-28 RX ADMIN — METFORMIN HYDROCHLORIDE 2000 MG: 750 TABLET, EXTENDED RELEASE ORAL at 19:05

## 2022-04-28 RX ADMIN — PHENYTOIN SODIUM 500 MG: 100 CAPSULE ORAL at 21:10

## 2022-04-28 RX ADMIN — ASPIRIN 81 MG CHEWABLE TABLET 81 MG: 81 TABLET CHEWABLE at 17:53

## 2022-04-28 RX ADMIN — ATORVASTATIN CALCIUM 20 MG: 20 TABLET, FILM COATED ORAL at 21:10

## 2022-04-28 RX ADMIN — INSULIN ASPART 3 UNITS: 100 INJECTION, SOLUTION INTRAVENOUS; SUBCUTANEOUS at 17:53

## 2022-04-28 RX ADMIN — QUETIAPINE FUMARATE 50 MG: 50 TABLET ORAL at 21:10

## 2022-04-28 ASSESSMENT — ACTIVITIES OF DAILY LIVING (ADL)
HYGIENE/GROOMING: INDEPENDENT
HYGIENE/GROOMING: INDEPENDENT
LAUNDRY: WITH SUPERVISION
DRESS: INDEPENDENT
DRESS: INDEPENDENT
LAUNDRY: WITH SUPERVISION
ORAL_HYGIENE: INDEPENDENT
ORAL_HYGIENE: INDEPENDENT
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
## 2022-04-28 NOTE — PLAN OF CARE
Problem: Sleep Disturbance  Goal: Adequate Sleep/Rest  Outcome: Ongoing, Progressing       Patient appeared to have slept for 7 hours. No complain of pain and no prn medications were administered. At 0200, BG check =71, and  4  oz of orange juice was  given per pt request. No signs of hypoglycemia noted or reported. 15 minutes safety checks was in place. No behavior or safety concerns noted. Staff will continue to offer support to pt.

## 2022-04-28 NOTE — PLAN OF CARE
"   04/28/22 1037   Individualization/Patient Specific Goals   Patient Personal Strengths community support;coping skills;expressive of emotions;expressive of needs;family/social support;independent living skills;insight into illness/situation;intellectual cognitive skills;medication/treatment adherence;motivated for recovery;motivated for treatment;positive educational history;positive vocational history;relationship stability;resilient;self-awareness;self-reliant;socioeconomic stability;spiritual/Jehovah's witness support;stable living environment;tolerant   Patient Vulnerabilities adverse childhood experience(s);traumatic event   Anxieties, Fears or Concerns \"Worried I'll never get better\"   Patient-Specific Goals (Include Timeframe) 1. Stabilization of mood disorder symptoms, 2. Absence of SI- safe with self  3. Medication mgmt 4.coordination of care with outpatient providers 5. Psych f/u care in place   Interprofessional Rounds   Participants CTC;OT;patient;psychiatrist;nursing   Team Discussion   Participants Dr. Lee, Dr. Fonseca, Dr. Buckner, Sangita Elkins RN, Sangeeta Raines mA.LP, Med students, Pharm student   Anticipated length of stay 5-7 days   Continued Stay Criteria/Rationale Severe depression, suicidal ideation, decline in functioning   Medical/Physical Diabetes, Seizure D/O   Plan Patient will be seen daily by Psychiatry team. Medications will be reviewed/adjusted per MD's, Outpatient providers will be contacted for care coordination.  Patient will need referral for outpatient Psychiatry.  CTC will continue to assess needs, ensure appropraite f/u care is in place.   Rationale for change in precautions or plan No change in plan/precautions   Safety Plan Patient will complete safety plan   Anticipated Discharge Disposition home or self-care                         "

## 2022-04-28 NOTE — PLAN OF CARE
"Pt has presented with calm but depressed mood during the shift today. His affect is flat at times, while other times he does brighten during one-on-one interactions. He has been polite and cooperative in his interactions with staff and other patients. BG at breakfast was 159, for which pt needed 1 unit of Novolog. However, insulin pen was not available at the time it was needed for administration. BG at lunch time was 272 for which pt received 3 units of Novolog. During community meeting this morning, he endorsed active SI but did not divulge his plan/method. When writer checked in with him afterward, he stated: \"I just can't stop thinking about putting the gun in my mouth.\" He stated he feels \"down and listless today.\" He then began apologizing and became quite tearful. He stated he was apologizing \"for everything.\" Pt was able to contract for safety at this time. He was reminded to let staff know if he felt he was unable to keep himself safe so staff could help him. Pt communicated understanding and gratitude.     Problem: Plan of Care - These are the overarching goals to be used throughout the patient stay.    Goal: Plan of Care Review/Shift Note  Description: The Plan of Care Review/Shift note should be completed every shift.  The Outcome Evaluation is a brief statement about your assessment that the patient is improving, declining, or no change.  This information will be displayed automatically on your shift note.  Flowsheets  Taken 4/28/2022 1341  Plan of Care Reviewed With: patient  Taken 4/28/2022 1011  Plan of Care Reviewed With: patient     "

## 2022-04-28 NOTE — H&P
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 Lee MD

## 2022-04-28 NOTE — PROGRESS NOTES
"SPIRITUAL HEALTH SERVICES  SPIRITUAL ASSESSMENT Progress Note  [facility] ([facility] [place]     REFERRAL SOURCE: I did visit this morning patient Isac per [facility] consult order referral. Pt was busy this morning but on my second visit attempts I get a chance to talk with the pt. Pt is so polite and willing to share his current ups and downs life struggles. Pt is Sabianist and if possible he prefer to speak with  and I promised him to arrange a  for him. Pt said, \"I have messed up my life since I was young until now. I don't think, God will forgive me for my past sins. I can forgive others who did something damage my life but the problem is, I can't forgive myself because I can't. All my past mistakes and wrong doing and plus my depression is killing with some non stop self accusation. I want to be forgiven by the power of God and will follow only the righteous path that leads me to God for the rest of my life. Can you pray with me please?.\" I did listen the pt reflectively to all his current anxiety and self concept disturbance. I shared some up lifting and hope giving words from Bible. I also spoke about God's love ad His forgiveness to all his past mistakes and sins. I also told him how to forgive himself like he forgive others. At the end of our conversation, based on his prayer request, I offered him a prayer.    PLAN: The  has informed about the pt request. So, during the pt stay in the unit, the  will visit the pt on his conveniens time.    Guy Peterson M.Div. (Alem), M.Th., D.Min., [facility]  Staff   Pager [phone]    "

## 2022-04-28 NOTE — CONSULTS
"[facility]  Consult Note - Hospitalist Service  Date of Admission:  4/27/2022  Consult Requested by: Psychiatry  Reason for Consult: Diabetes management    Assessment & Plan   Isac Li is a 67 year old male with PMH insuline dependent DM Type II, MDD, HLD, obesity and ROSSI (not on CPAP who is admitted to the [facility] on 4/27/2022 for worsening depression. Internal Medicine is consulted for diabetes management.     #Uncontrolled diabetes mellitus type II, insulin dependent.   Most recent A1C from toay is 9.8. Patient has not checked his blood sugars at home for 2-3 weeks. He does not count carbohydrates. He gives humalog insulin based on a sliding scale before meals but he usually doesn't check blood glucose prior to insulin and gives 20-26 units. He was started on his home dose of Lantus 60 units on admission. Overall he remains hyperglycemic with 0250am glucose low at 71.   -Cont PTA Lantus 60 units at bedtime  -Novolog medium resistance sliding scale QAC   -Hold on Novolog sliding scale  at bedtime due to low glucose at 0250am  -Blood glucose monitoring QAC and at bedtime  -Hypoglycemia protocol  -F/U primary care outpt. He does not follow with Endocrinology.   -Cont ACE inhibitor for renoprotection (patient denies history of HTN and not documented in chart)  -Restart PTA Aspirin     #MDD  -Cont PTA Effexor  -Further management per psychiatry    #Hyperlipidemia  -Cont PTA statin    #Seizure disorder  Last seizure in 1980  -Cont PTA dilantin    # Severe Obesity: Estimated body mass index is 42.18 kg/m  as calculated from the following:    Height as of this encounter: 1.791 m (5' 10.5\").    Weight as of this encounter: 135.3 kg (298 lb 3.2 oz).    #ROSSI  -Previously on CPAP but not since 2017 due to inability to afford a new machine  -Follow up patient    #Right shoulder pain  No significant findings on examination. No bony tenderness. Full ROM without " "pain  -Tylenol PRN    #Left planter fasciitis  Reported symptoms consistent with planter fascitis  -Educated patient and recommended stretching exercises       The patient's care was discussed with the Bedside Nurse and Patient.    Medicine will continue to peripherally follow blood glucose.  Recommendations relayed to primary team via this progress note.      Charity Goins PA-C  Internal Medicine [facility] Hospitalist  [facility] [place]  Pager [phone]              ______________________________________________________________________    Chief Complaint   Worsening depression    History obtained from patient and review of previous medical records    History of Present Illness   Isac Li is a 67 year old male with PMH insuline dependent DM Type II, MDD, HLD, obesity and ROSSI (not on CPAP who is admitted to the [facility] on 4/27/2022 for worsening depression. Internal Medicine is consulted for diabetes management.        Patient has not checked his blood sugars at home for 2-3 weeks. He does not count carbohydrates. He gives humalog insulin based on a sliding scale before meals but he usually doesn't check blood glucose and gives 20-26 units.     Currently his only complaints are right shoulder pain and left heel pain. His right shoulder pain started 2 days ago and he describes it as a \"throbbing\" sensation. He thinks he might have slept wrong. Pain is tolerable and no pain with movement. No recent trauma. His left heel pain is worse in the morning and described as a sharp pain. It gets better with ambulation. He can reproduce the pain with pressing on his foot. No recent injury. He has no other complaints at this time and denies HA, visual changes, SOB, or CP.     Review of Systems   The 10 point Review of Systems is negative other than noted in the HPI. Previously use to be on CPAP for ROSSI but cannot afford it.     Past Medical History    Reviewed with patient.   Past Medical History:   Diagnosis Date     " Alcoholism (H)     early .     Depression     hospitalized  suicidal ideation. Followed by Naseem Soto, psychologist.     Diabetes mellitus (H)      Hx of type B viral hepatitis      Infectious viral hepatitis      Lower leg edema     dependent edema left leg more.     Seizures (H)     well controlled, last seizure , followed by Dr. Pastrana now.     Sleep apnea     uses CPAP       Past Surgical History     Past Surgical History:   Procedure Laterality Date     ANKLE FRACTURE SURGERY Left      COLONOSCOPY       KNEE LIGAMENT RECONSTRUCTION Left        Social History   Lives alone. 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 He is a recovering alcoholic with no alcohol use in 4 years. Previous tobacco use but quit in . No drug use.       Family History   Family history of Parkinson disease (mom and brother). Mother  a cancer.     Medications   I have reviewed this patient's current medications    Allergies   Allergies   Allergen Reactions     Sulfa (Sulfonamide Antibiotics) [Sulfa Drugs] Unknown       Physical Exam   Vital Signs: Temp: 98.1  F (36.7  C) Temp src: Oral BP: (!) 145/76 (RN advised) Pulse: 78   Resp: 16 SpO2: 98 % O2 Device: None (Room air)    Weight: 298 lbs 3.2 oz    General: Alert and oriented x 3. Appears stated age. NAD. Appears comfortable   HEENT: Head normocephalic, atraumatic. Anicteric sclera. PERRL. EOMI. Oral mucosa moist. Tongue midline. Pharnyx without exudates. Neck supple. No JVD.  Resp: No signs of respiratory distress. Lungs clear to ausculation bilaterally without rales, rhonchi or wheezes.   Cardiac: RRR. S1 and S2 normal intensity. No murmurs, rubs or gallops.   GI: Abdomen soft, non-tender, non-distended. No scars. Umbilicus midline without hernia. Bowel sounds present. No masses, hepatomegaly or splenomegaly.   : No garcia  MSK: No joint tenderness or edema. Moves all extremities. Full ROM right shoulder without pain. No bony  tenderness.   Neuro: No focal deficits. Gait steady.   Psych: Appropriate mood and affect.  Derm: Skin warm and dry. No rashes or skin breakdown. No jaundice.   Extremities: No cyanosis, clubbing or edema      Data   Results for orders placed or performed during the hospital encounter of 04/27/22 (from the past 24 hour(s))   Asymptomatic COVID-19 Virus (Coronavirus) by PCR Oropharynx    Specimen: Oropharynx; Swab   Result Value Ref Range    SARS CoV2 PCR Negative Negative    Narrative    Testing was performed using the bennett  SARS-CoV-2 & Influenza A/B Assay on the bennett  Sophie  System.  This test should be ordered for the detection of SARS-COV-2 in individuals who meet SARS-CoV-2 clinical and/or epidemiological criteria. Test performance is unknown in asymptomatic patients.  This test is for in vitro diagnostic use under the FDA EUA for laboratories certified under CLIA to perform moderate and/or high complexity testing. This test has not been FDA cleared or approved.  A negative test does not rule out the presence of PCR inhibitors in the specimen or target RNA in concentration below the limit of detection for the assay. The possibility of a false negative should be considered if the patient's recent exposure or clinical presentation suggests COVID-19.  [facility] are certified under the Clinical Laboratory Improvement Amendments of 1988 (CLIA-88) as qualified to perform moderate and/or high complexity laboratory testing.   Phenytoin level   Result Value Ref Range    Phenytoin 10.0   mg/L   CBC with platelets differential    Narrative    The following orders were created for panel order CBC with platelets differential.  Procedure                               Abnormality         Status                     ---------                               -----------         ------                     CBC with platelets and d...[[id number]]                      Final result                 Please view  results for these tests on the individual orders.   Comprehensive metabolic panel   Result Value Ref Range    Sodium 138 133 - 144 mmol/L    Potassium 4.0 3.4 - 5.3 mmol/L    Chloride 106 94 - 109 mmol/L    Carbon Dioxide (CO2) 21 20 - 32 mmol/L    Anion Gap 11 3 - 14 mmol/L    Urea Nitrogen 19 7 - 30 mg/dL    Creatinine 0.82 0.66 - 1.25 mg/dL    Calcium 8.6 8.5 - 10.1 mg/dL    Glucose 241 (H) 70 - 99 mg/dL    Alkaline Phosphatase 149 40 - 150 U/L    AST 9 0 - 45 U/L    ALT 25 0 - 70 U/L    Protein Total 7.1 6.8 - 8.8 g/dL    Albumin 3.5 3.4 - 5.0 g/dL    Bilirubin Total 0.3 0.2 - 1.3 mg/dL    GFR Estimate >90 >60 mL/min/1.73m2   TSH with free T4 reflex   Result Value Ref Range    TSH 2.76 0.40 - 4.00 mU/L   CBC with platelets and differential   Result Value Ref Range    WBC Count 10.0 4.0 - 11.0 10e3/uL    RBC Count 4.74 4.40 - 5.90 10e6/uL    Hemoglobin 13.8 13.3 - 17.7 g/dL    Hematocrit 40.5 40.0 - 53.0 %    MCV 85 78 - 100 fL    MCH 29.1 26.5 - 33.0 pg    MCHC 34.1 31.5 - 36.5 g/dL    RDW 12.8 10.0 - 15.0 %    Platelet Count 256 150 - 450 10e3/uL    % Neutrophils 69 %    % Lymphocytes 24 %    % Monocytes 6 %    % Eosinophils 0 %    % Basophils 0 %    % Immature Granulocytes 1 %    NRBCs per 100 WBC 0 <1 /100    Absolute Neutrophils 7.0 1.6 - 8.3 10e3/uL    Absolute Lymphocytes 2.4 0.8 - 5.3 10e3/uL    Absolute Monocytes 0.6 0.0 - 1.3 10e3/uL    Absolute Eosinophils 0.0 0.0 - 0.7 10e3/uL    Absolute Basophils 0.0 0.0 - 0.2 10e3/uL    Absolute Immature Granulocytes 0.1 <=0.4 10e3/uL    Absolute NRBCs 0.0 10e3/uL   Glucose by meter   Result Value Ref Range    GLUCOSE BY METER POCT 238 (H) 70 - 99 mg/dL   Glucose by meter   Result Value Ref Range    GLUCOSE BY METER POCT 184 (H) 70 - 99 mg/dL   Glucose by meter   Result Value Ref Range    GLUCOSE BY METER POCT 71 70 - 99 mg/dL   Lipid panel reflex to direct LDL   Result Value Ref Range    Cholesterol 191 <200 mg/dL    Triglycerides 135 <150 mg/dL    Direct Measure  HDL 48 >=40 mg/dL    LDL Cholesterol Calculated 116 (H) <=100 mg/dL    Non HDL Cholesterol 143 (H) <130 mg/dL    Narrative    Cholesterol  Desirable:  <200 mg/dL    Triglycerides  Normal:  Less than 150 mg/dL  Borderline High:  150-199 mg/dL  High:  200-499 mg/dL  Very High:  Greater than or equal to 500 mg/dL    Direct Measure HDL  Female:  Greater than or equal to 50 mg/dL   Male:  Greater than or equal to 40 mg/dL    LDL Cholesterol  Desirable:  <100mg/dL  Above Desirable:  100-129 mg/dL   Borderline High:  130-159 mg/dL   High:  160-189 mg/dL   Very High:  >= 190 mg/dL    Non HDL Cholesterol  Desirable:  130 mg/dL  Above Desirable:  130-159 mg/dL  Borderline High:  160-189 mg/dL  High:  190-219 mg/dL  Very High:  Greater than or equal to 220 mg/dL   Hemoglobin A1c   Result Value Ref Range    Hemoglobin A1C 9.8 (H) 0.0 - 5.6 %   Glucose by meter   Result Value Ref Range    GLUCOSE BY METER POCT 159 (H) 70 - 99 mg/dL   Glucose by meter   Result Value Ref Range    GLUCOSE BY METER POCT 272 (H) 70 - 99 mg/dL

## 2022-04-28 NOTE — PROGRESS NOTES
"    ----------------------------------------------------------------------------------------------------------  [facility]  Psychiatric Progress Note  Hospital Day #1    Isac Li MRN# [id number]   Age: 67 year old YOB: 1955   Date of Admission: 4/27/2022     Subjective   The patient was discussed with the treatment team and chart notes were reviewed.      Identifier: Isac Li is a 67 year old male with a past psychiatric history of major depressive disorder admitted from the  ER on 04/27/2022 due to concern for SI in the context of loss of a recent close friend to suicide. Significant symptoms include worsening SI, depressed mood worthlessness, poor selfesteem, guiltiness, low energy  and sleep issues over past 2 weeks. His last psychiatric hospitalization was 20 years ago. Current psychosocial stressors include recent loss of a close friend to suicide who was also Taoism. Patient is on hospital day #1. Assessment is that the current presentation is consistent with diagnosis of MDD.     Sleep:  7 hours (04/28/22 0621)  Prescribed Medications: Taken as prescribed  PRN Psychiatric Medications:   No PRN medications given or requested.     Overnight Nursing Notes/Staff Report:  \"Patient appeared to have slept for 7 hours. No complain of pain and no prn medications were administered. At 0200, BG check =71, and  4  oz of orange juice was  given per pt request. No signs of hypoglycemia noted or reported. 15 minutes safety checks was in place. No behavior or safety concerns noted. Staff will continue to offer support to pt.\"    Patient interview:  Patient reports that although he had an okay night last night, this morning he felt pretty distressed and had increased suicidal ideation imagining a gun in his mouth. He says that he has been hospitalized in an inpatient psychiatry setting before and that he's always made it out, but he's worried this time that he won't last " "very long after he's discharged. He says he has had previous suicide attempts the last one in 1978 with slitting his wrists and also trying to drown himself.     Patient reports that he mainly follows with his primary care physician for his psychiatric medication venlafaxine, he said that he has been taking it for years now and does not know if it is working.He says that his primary care physician is not very comfortable with trying new psycho tropic medication so that he has just stayed with this one.     When discussing possible treatments for depression patient says that he would not like to pursue ECT As he has history of seizures and is worried that it will worsen his seizures or cause him to have one.      ROS   ROS was negative unless noted above.     Objective   Vitals:  Temp: 98.7  F (37.1  C) (Temp  Min: 98  F (36.7  C)  Max: 98.7  F (37.1  C))  Resp: 16 (Resp  Min: 16  Max: 18)  SpO2: 96 % (SpO2  Min: 95 %  Max: 98 %)  Pulse: 89 (90 standing) (Pulse  Min: 89  Max: 95)  BP: (!) 173/95 (161/90 standing)  Systolic (24hrs), Av , Min:140 , Max:173   Diastolic (24hrs), Av, Min:82, Max:95    Mental Status Examination:  Oriented to:  Grossly Oriented  General: Awake and Alert  Appearance:  appears stated age, Grooming is adequate and Dressed in dress shirt and hospital scrub pants  Behavior:  calm, cooperative and engaged  Eye Contact:  good  Psychomotor:  no abnormal motor symptoms appreciated; no catatonia present  Speech:  appropriate volume/tone  Language: Fluent in English with appropriate syntax and vocabulary.  Mood:  \"Not well\"  Affect:  appropriate, congruent with mood, sad and tearful  Thought Process:  coherent and thought blocking  Thought Content: suicidal ideation with plan and intent, No HI, No VH and No AH; No apparent delusions  Associations:  intact  Insight:  good due to reason for hospitalization and realistic expectations for the course  Judgment:  fair due to voluntary " admission in setting of SI, but depression is clouding judgment   Attention Span: grossly intact  Concentration:  Adequate for 20 minute conversation  Recent and Remote Memory:  grossly intact  Fund of Knowledge: average     Allergies     Allergies   Allergen Reactions     Sulfa (Sulfonamide Antibiotics) [Sulfa Drugs] Unknown        Labs & Imaging     Results for orders placed or performed during the hospital encounter of 04/27/22 (from the past 24 hour(s))   Asymptomatic COVID-19 Virus (Coronavirus) by PCR Oropharynx    Specimen: Oropharynx; Swab   Result Value Ref Range    SARS CoV2 PCR Negative Negative    Narrative    Testing was performed using the bennett  SARS-CoV-2 & Influenza A/B Assay on the bennett  Sophie  System.  This test should be ordered for the detection of SARS-COV-2 in individuals who meet SARS-CoV-2 clinical and/or epidemiological criteria. Test performance is unknown in asymptomatic patients.  This test is for in vitro diagnostic use under the FDA EUA for laboratories certified under CLIA to perform moderate and/or high complexity testing. This test has not been FDA cleared or approved.  A negative test does not rule out the presence of PCR inhibitors in the specimen or target RNA in concentration below the limit of detection for the assay. The possibility of a false negative should be considered if the patient's recent exposure or clinical presentation suggests COVID-19.  [facility] are certified under the Clinical Laboratory Improvement Amendments of 1988 (CLIA-88) as qualified to perform moderate and/or high complexity laboratory testing.   Phenytoin level   Result Value Ref Range    Phenytoin 10.0   mg/L   CBC with platelets differential    Narrative    The following orders were created for panel order CBC with platelets differential.  Procedure                               Abnormality         Status                     ---------                               -----------          ------                     CBC with platelets and d...[[id number]]                      Final result                 Please view results for these tests on the individual orders.   Comprehensive metabolic panel   Result Value Ref Range    Sodium 138 133 - 144 mmol/L    Potassium 4.0 3.4 - 5.3 mmol/L    Chloride 106 94 - 109 mmol/L    Carbon Dioxide (CO2) 21 20 - 32 mmol/L    Anion Gap 11 3 - 14 mmol/L    Urea Nitrogen 19 7 - 30 mg/dL    Creatinine 0.82 0.66 - 1.25 mg/dL    Calcium 8.6 8.5 - 10.1 mg/dL    Glucose 241 (H) 70 - 99 mg/dL    Alkaline Phosphatase 149 40 - 150 U/L    AST 9 0 - 45 U/L    ALT 25 0 - 70 U/L    Protein Total 7.1 6.8 - 8.8 g/dL    Albumin 3.5 3.4 - 5.0 g/dL    Bilirubin Total 0.3 0.2 - 1.3 mg/dL    GFR Estimate >90 >60 mL/min/1.73m2   TSH with free T4 reflex   Result Value Ref Range    TSH 2.76 0.40 - 4.00 mU/L   CBC with platelets and differential   Result Value Ref Range    WBC Count 10.0 4.0 - 11.0 10e3/uL    RBC Count 4.74 4.40 - 5.90 10e6/uL    Hemoglobin 13.8 13.3 - 17.7 g/dL    Hematocrit 40.5 40.0 - 53.0 %    MCV 85 78 - 100 fL    MCH 29.1 26.5 - 33.0 pg    MCHC 34.1 31.5 - 36.5 g/dL    RDW 12.8 10.0 - 15.0 %    Platelet Count 256 150 - 450 10e3/uL    % Neutrophils 69 %    % Lymphocytes 24 %    % Monocytes 6 %    % Eosinophils 0 %    % Basophils 0 %    % Immature Granulocytes 1 %    NRBCs per 100 WBC 0 <1 /100    Absolute Neutrophils 7.0 1.6 - 8.3 10e3/uL    Absolute Lymphocytes 2.4 0.8 - 5.3 10e3/uL    Absolute Monocytes 0.6 0.0 - 1.3 10e3/uL    Absolute Eosinophils 0.0 0.0 - 0.7 10e3/uL    Absolute Basophils 0.0 0.0 - 0.2 10e3/uL    Absolute Immature Granulocytes 0.1 <=0.4 10e3/uL    Absolute NRBCs 0.0 10e3/uL   Glucose by meter   Result Value Ref Range    GLUCOSE BY METER POCT 238 (H) 70 - 99 mg/dL   Glucose by meter   Result Value Ref Range    GLUCOSE BY METER POCT 184 (H) 70 - 99 mg/dL   Glucose by meter   Result Value Ref Range    GLUCOSE BY METER POCT 71 70 - 99 mg/dL        Trending Labs: (lithium levels, ANC, etc.) Phenytoin levels checked on admission. Level was 10.      Assessment   Diagnostic Impression:  Isac Li is a 67 year old male with a past psychiatric history of major depressive disorder admitted from the  ER on 04/27/2022 due to concern for SI in the context of loss of a recent close friend to suicide.. Significant symptoms include SI, depressed and sleep issues. His last psychiatric hospitalization was 20 years ago and last visit with a psychiatrist was around 15 years ago. Sees his primary care physician for psychiatric medications and follows with an outpatient therapist. Current psychosocial stressors include recent loss of a close friend to suicide who was also Cheondoism.  Patient's support system includes his sister and some members of the Cheondoism community.  Substance use does not appear to be playing a contributing role in the patient's presentation.  There is genetic loading for none known. Medical history does appear to be significant for seizures and prior TBI as well as sleep apnea requiring CPAP which broke and he has been unable to replace. The MSE is notable for sad, tearful, and at times distraught affect, talkative engagement, and open conversation with treatment team. He denies self injurious behaviors.      His reported symptoms of depressed mood,  SI, worthlessness, poor selfesteem, guiltyness, low energy  and sleep issues are consistent with his historic diagnosis of major depressive disorder. These issues are also likely compounded by grief related to friend's recent death by suicide, trauma as a child, and ongoing struggle with compulsive sexual behavior as well as ongoing internal conflict between his sexual identity and Cheondoism alex. Optimization of medications to target these symptom clusters in addition to evaluation of adequate outpatient supports will be targets for treatment during this admission.      Given that he currently has  SI and marked depression, patient warrants inpatient psychiatric hospitalization to maintain his safety. Disposition pending clinical stabilization, medication optimization and development of an appropriate discharge plan.    Principal Diagnosis:     Major Depressive Disorder, severe, recurrent    Secondary psychiatric diagnoses of concern this admission:     Compulsive Sexual Behavior    Alcohol Use Disorder (historical    Psychiatric course:  Isac Li was admitted to [facility] as a voluntary patient. His PTA aspirin, atorvastatin, lisinopril, metformin, phenytoin, and venlafaxine were continued.    Isac Li continued to meet criteria for inpatient hospitalization medication optimization, inpatient stabilization, and appropriate discharge planning.     Medical course:   Isac Li was physically examined by the ED prior to being transferred to the unit and was found to be medically stable and appropriate for admission.      Plan   Today's Changes:     continue Venlafaxine 300 mg PO daily and     Start Seroquel 50 mg at bedtime as augmenting agent  _______________________________________________________________________  Psychiatric diagnoses and management:  Principal Diagnosis:     Major Depressive Disorder, severe, recurrent  ? Resume venlafaxine 300mg at bedtime  ? Start Seroquel 50 mg at bedtime for Venlafaxine augmentation  ? Discussed future consideration of changing venlafaxine to another newer agent if augmentation with Seroquel is ineffective    Secondary Psychiatric Diagnoses:     Compulsive Sexual Behavior  ? Cont Venlafaxine     Alcohol Use Disorder (historical)    Additional Planning:    Continue to monitor for and stabilize: SI and depressed    Patient will be treated in therapeutic milieu with appropriate individual and group therapies as described.    Would benefit from support group for queer community in the [employer]    Scheduled Medications (summary):  Current  Facility-Administered Medications   Medication Dose Route Frequency     aspirin  81 mg Oral Daily     atorvastatin  20 mg Oral At Bedtime     insulin aspart  1-7 Units Subcutaneous TID AC     insulin glargine  60 Units Subcutaneous At Bedtime     [Held by provider] insulin lispro  18-26 Units Subcutaneous TID AC     lisinopril  5 mg Oral Daily     metFORMIN  2,000 mg Oral Daily with supper     phenytoin  500 mg Oral At Bedtime     QUEtiapine  50 mg Oral At Bedtime     venlafaxine  300 mg Oral At Bedtime       PRN Medications (summary):  Current Facility-Administered Medications   Medication Dose Route Frequency     acetaminophen  650 mg Oral Q4H PRN     alum & mag hydroxide-simethicone  30 mL Oral Q4H PRN     glucose  15-30 g Oral Q15 Min PRN    Or     dextrose  25-50 mL Intravenous Q15 Min PRN    Or     glucagon  1 mg Subcutaneous Q15 Min PRN     hydrOXYzine  25 mg Oral Q4H PRN     melatonin  3 mg Oral At Bedtime PRN     OLANZapine  5 mg Oral TID PRN    Or     OLANZapine  5 mg Intramuscular TID PRN     polyethylene glycol  17 g Oral Daily PRN       Discontinued Medications (& Rationale):    None.     Consults:    None.     Legal Status:    Voluntary     SIO:    No    Pt has not required locked seclusion or restraints in the past 24 hours to maintain safety, please refer to RN documentation for further details.    Disposition:    TBD, pending clinical stabilization, medication optimization, and formulation of a safe discharge plan.     Safety Assessment:   Behavioral Orders   Procedures     Code 1 - Restrict to Unit     Discontinue 1:1 attendant for suicide risk     Order Specific Question:   I have performed an in person assessment of the patient     Answer:   Based on this assessment the patient no longer requires a one on one attendant at this point in time.     Order Specific Question:   Rationale     Answer:   Patient States able to remain safe in hospital     Routine Programming     As clinically indicated      Sexual precautions     Status 15     Every 15 minutes.     Suicide precautions     Patients on Suicide Precautions should have a Combination Diet ordered that includes a Diet selection(s) AND a Behavioral Tray selection for Safe Tray - with utensils, or Safe Tray - NO utensils        ____________________________________________________________________  Pertinent Medical diagnoses and management:    Diabetes, Type II  ? Continue PTA insulin regimen  ? Continue PTA metformin  ? Continue PTA lisinopril    History of Seizure disorder  ? Continue PTA phenytoin    Hypercholesteremia  ? Continue PTA atorvastatin  ____________________________________________________________________  Patient seen and discussed with attending physician, Dr. Edel Lee M.D. who is in agreement with my assessment and plan.    SONU CASTRO, MS3  [facility]    Julio C Fonseca M.D.  PGY-1 Psychiatry Resident     Attestation:  I, Edel Lee MD, have personally performed an examination of this patient and I have reviewed the resident's documentation.  I have edited the note to reflect all relevant changes.  I have discussed this patient with the house staff on 4/28/2022.  I agree with resident findings and plan in today's note and yesterdays resident H&P.  I have reviewed all vitals and laboratory findings.      I certifiy that the inpatient services were ordered in accordance with the Medicare regulations governing the order. This includes certification that hospital inpatient services are reasonable and necessary and in the case of services not specified as inpatient-only under 42 .22(n), that they are appropriately provided as inpatient services in accordance with the 2-midnight benchmark under 42 .3(e).     The reason for inpatient status is Major Depression.    Edel Lee M.D.,Ph.D.

## 2022-04-28 NOTE — PLAN OF CARE
"Problem: Suicide Risk  Goal: Absence of Self-Harm  Outcome: Ongoing, Progressing     Goal Outcome Evaluation:    Pt pleasant and cooperative upon approach. He spent most of his time in his room after making phone calls, throughout the shift.   Pt continues to contract for safety. He did not demonstrate any self injurious behavior.     Initial B mg/dl. Bedtime B mg/dl.     Due medications given. He denies experiencing side effects.     BP (!) 173/95 (BP Location: Left arm, Patient Position: Sitting, Cuff Size: Adult Large)   Pulse 89   Temp 98.7  F (37.1  C) (Oral)   Resp 16   Ht 1.791 m (5' 10.5\")   Wt 137 kg (302 lb)   SpO2 96%   BMI 42.72 kg/m       Resident on call notified regarding high blood pressure reading. IM consult in place.     Needs attended to. No further concerns noted as of this time.                 "

## 2022-04-28 NOTE — PLAN OF CARE
04/27/22 2008   Patient Belongings   Did you bring any home meds/supplements to the hospital?  No   Patient Belongings remains with patient;locker;sent to security per site process   Patient Belongings Remaining with Patient clothing;keys;shoes   Patient Belongings Put in Hospital Secure Location (Security or Locker, etc.) [employer] item;wallet;cash/credit card   Belongings Search Yes   Clothing Search Yes   Second Staff Mohan CLINE.     A               Admission:BG machine, , tooth brush, parodontax, car n 18 keys,shirt, phone 2-socks,3-underwear,jacket,pants, hat,belt.  Security  Check book..full range of motion #[id number] check leaves, think visa debit , think bank visa credit card n [employer] delta card.  I am responsible for any personal items that are not sent to the safe or pharmacy.  [facility] is not responsible for loss, theft or damage of any property in my possession.    Signature:  _________________________________ Date: _______  Time: _____                                              Staff Signature:  ____________________________ Date: ________  Time: _____      2nd Staff person, if patient is unable/unwilling to sign:    Signature: ________________________________ Date: ________  Time: _____     Discharge:  [facility] has returned all of my personal belongings:    Signature: _________________________________ Date: ________  Time: _____                                          Staff Signature:  ____________________________ Date: ________  Time: _____              Goal Outcome Evaluation:

## 2022-04-28 NOTE — PHARMACY-ADMISSION MEDICATION HISTORY
Admission Medication History Completed by Pharmacy    See [facility] Admission Navigator for allergy information, preferred outpatient pharmacy, prior to admission medications and immunization status.     Medication History Sources:     Surescripts (fill history), CareEverywhere, and patient interview (in person w/ PPE - mask and face shield)    Changes made to PTA medication list (reason):    Added: None    Deleted: None    Changed: per patient  o Takes venlafaxine 300 mg at bedtime instead of during daytime    Additional Information:    Insulin sliding scale - per patient, he usually takes between 24-28 units of insulin with each meal. His Humalog rx indicates to take 18-26 units with each meal per sliding scale, but patient unable to verbally provide scale information and reported he brought a chart with him (writer unable to locate in belongings). Chart not available on CareEverywhere or per Chart Review either. Patient reports taking his blood sugar at varying times in regards to meals, sometimes pre, post, or during meals.    Prior to Admission medications    Medication Sig Last Dose Taking? Auth Provider   aspirin (ASA) 81 MG EC tablet Take 1 tablet (81 mg) by mouth daily Past Month at Unknown time Yes Marquise Russ MD   atorvastatin (LIPITOR) 20 MG tablet TAKE 1 TABLET BY MOUTH AT BEDTIME 4/26/2022 at Unknown time Yes Marquise Russ MD   insulin glargine (BASAGLAR KWIKPEN) 100 UNIT/ML pen [INSULIN GLARGINE (BASAGLAR KWIKPEN) 100 UNIT/ML (3 ML) PEN] ADMINISTER 60 UNITS UNDER THE SKIN DAILY AT BEDTIME 4/26/2022 at 60 units Yes Marquise Russ MD   insulin lispro (HUMALOG KWIKPEN) 100 UNIT/ML (1 unit dial) KWIKPEN ADMINISTER 18 TO 26 UNITS UNDER THE SKIN WITH EACH MEAL PER SLIDING SCALE 4/27/2022 at 30 units this morning Yes Marquise Russ MD   lisinopril (ZESTRIL) 5 MG tablet TAKE 1 TABLET(5 MG) BY MOUTH DAILY 4/26/2022 at Unknown time Yes Marquise Russ MD   metFORMIN (GLUCOPHAGE-XR) 500 MG 24 hr  "tablet TAKE 4 TABLETS BY MOUTH WITH THE EVENING MEAL 4/26/2022 at Unknown time Yes Marquise Russ MD   pen needle, diabetic (BD ULTRA-FINE YAHAIRA PEN NEEDLE) 32 gauge x 5/32\" Ndle [PEN NEEDLE, DIABETIC (BD ULTRA-FINE YAHAIRA PEN NEEDLE) 32 GAUGE X 5/32\" NDLE] USE DAILY WITH NOVOLOG AND LEVEMIR PENS AS DIRECTED 4/27/2022 at Unknown time Yes Marquise Russ MD   phenytoin (DILANTIN) 100 MG capsule TAKE 5 CAPSULES BY MOUTH EVERY NIGHT AT BEDTIME 4/26/2022 at Unknown time Yes Marquise Russ MD   venlafaxine (EFFEXOR-XR) 150 MG 24 hr capsule Take 300 mg by mouth At Bedtime 4/26/2022 at Unknown time Yes Marquise Russ MD       Date completed: 04/27/22    Medication history completed by:     Nicollette McMann, Estrada  [facility]  Emergency Department: Ascom *[id number]  "

## 2022-04-28 NOTE — PLAN OF CARE
04/28/22 1603   Group Therapy Session   Group Attendance attended group session   Time Session Began 1115   Time Session Ended 1200   Total Time patient participated (minutes) 45   Total # Attendees 4   Group Type psychotherapeutic   Group Topic Covered emotions/expression   Group Session Detail Empowering Quesions   Patient Response/Contribution cooperative with task;discussed personal experience with topic;expressed understanding of topic   Patient Response Detail Joined in Group discussion

## 2022-04-28 NOTE — ED NOTES
Bed: IN01  Expected date:   Expected time:   Means of arrival:   Comments:  Darryn; saravanan; [birth year]; was seen on 4/24/22 and too sick; daughter driving;      Leigh Ann Yu MD  04/27/22 1926

## 2022-04-28 NOTE — PLAN OF CARE
04/28/22 1533   Group Therapy Session   Group Attendance attended group session   Time Session Began 1115   Time Session Ended 0200   Total Time patient participated (minutes) 45   Total # Attendees 4   Group Type psychotherapeutic   Group Topic Covered emotions/expression   Group Session Detail Empowering questions   Patient Response/Contribution able to recall/repeat info presented;cooperative with task;expressed understanding of topic   Patient Response Detail Pt joined in group discussion and shared some of his personal concerns.

## 2022-04-28 NOTE — PLAN OF CARE
Goal Outcome Evaluation:    SITUATION  Received pt, 67 year old, male, came in for SI with plan.     BACKGROUND    Per report, pt claimed having increased depression. He is on leave of absence from work since 2022 due to increased depression. Pt claimed having suicidal ideations with a plans to shoot himself, overdose on medications or involve himself in a motor vehicular accident. Pt claimed he owns a gun.   Pt has history of suicide attempts 2x in . Claimed he cut himself and drowned himself. He further claimed having severe SI back in .   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   Pt has history of major depressive disorder.   Pt has Diabetes, Type II, has history of seizure disorder.     ASSESSMENT  Presents with sad affect. He was tearful during the admission interview. Pt claimed having depression that was triggered when his close friend  of suicide. Pt claimed having suicidal thoughts. Pt contracts for safety. He did not demonstrate any self injurious behavior.   Pt denies HI. He denies experiencing any type of hallucinations. Denies having anxiety.     COVID negative    RECOMMENDATION  On sexual and suicide precautions. Pt to see psychiatrist and IM consult and to have Spiritual consult in AM.

## 2022-04-29 LAB
GLUCOSE BLDC GLUCOMTR-MCNC: 114 MG/DL (ref 70–99)
GLUCOSE BLDC GLUCOMTR-MCNC: 132 MG/DL (ref 70–99)
GLUCOSE BLDC GLUCOMTR-MCNC: 178 MG/DL (ref 70–99)
GLUCOSE BLDC GLUCOMTR-MCNC: 217 MG/DL (ref 70–99)
GLUCOSE BLDC GLUCOMTR-MCNC: 273 MG/DL (ref 70–99)

## 2022-04-29 PROCEDURE — 250N000013 HC RX MED GY IP 250 OP 250 PS 637: Performed by: STUDENT IN AN ORGANIZED HEALTH CARE EDUCATION/TRAINING PROGRAM

## 2022-04-29 PROCEDURE — 250N000013 HC RX MED GY IP 250 OP 250 PS 637

## 2022-04-29 PROCEDURE — 124N000002 HC R&B MH UMMC

## 2022-04-29 PROCEDURE — 99232 SBSQ HOSP IP/OBS MODERATE 35: CPT | Mod: GC | Performed by: PSYCHIATRY & NEUROLOGY

## 2022-04-29 PROCEDURE — G0177 OPPS/PHP; TRAIN & EDUC SERV: HCPCS

## 2022-04-29 PROCEDURE — 250N000013 HC RX MED GY IP 250 OP 250 PS 637: Performed by: PHYSICIAN ASSISTANT

## 2022-04-29 RX ADMIN — METFORMIN HYDROCHLORIDE 2000 MG: 750 TABLET, EXTENDED RELEASE ORAL at 18:24

## 2022-04-29 RX ADMIN — VENLAFAXINE HYDROCHLORIDE 300 MG: 75 CAPSULE, EXTENDED RELEASE ORAL at 21:45

## 2022-04-29 RX ADMIN — INSULIN ASPART 2 UNITS: 100 INJECTION, SOLUTION INTRAVENOUS; SUBCUTANEOUS at 18:23

## 2022-04-29 RX ADMIN — INSULIN ASPART 1 UNITS: 100 INJECTION, SOLUTION INTRAVENOUS; SUBCUTANEOUS at 12:27

## 2022-04-29 RX ADMIN — ASPIRIN 81 MG CHEWABLE TABLET 81 MG: 81 TABLET CHEWABLE at 08:37

## 2022-04-29 RX ADMIN — ATORVASTATIN CALCIUM 20 MG: 20 TABLET, FILM COATED ORAL at 21:45

## 2022-04-29 RX ADMIN — LISINOPRIL 5 MG: 5 TABLET ORAL at 08:37

## 2022-04-29 RX ADMIN — INSULIN GLARGINE 60 UNITS: 100 INJECTION, SOLUTION SUBCUTANEOUS at 21:45

## 2022-04-29 RX ADMIN — PHENYTOIN SODIUM 500 MG: 100 CAPSULE ORAL at 21:45

## 2022-04-29 RX ADMIN — QUETIAPINE FUMARATE 50 MG: 50 TABLET ORAL at 21:45

## 2022-04-29 ASSESSMENT — ACTIVITIES OF DAILY LIVING (ADL)
DRESS: INDEPENDENT
HYGIENE/GROOMING: INDEPENDENT
LAUNDRY: WITH SUPERVISION
HYGIENE/GROOMING: INDEPENDENT;SHOWER
DRESS: INDEPENDENT
LAUNDRY: WITH SUPERVISION
ORAL_HYGIENE: INDEPENDENT
ORAL_HYGIENE: INDEPENDENT

## 2022-04-29 NOTE — PROGRESS NOTES
04/28/22 2200   Group Therapy Session   Group Attendance attended group session   Time Session Began 2000   Time Session Ended 2100   Total Time patient participated (minutes) 60   Total # Attendees 4   Group Type psychotherapeutic   Group Topic Covered emotions/expression;cognitive therapy techniques;structured socialization   Group Session Detail what is in your heart/ emotional regulation DBT   Patient Response/Contribution cooperative with task;discussed personal experience with topic;listened actively   Patient Response Detail mood neal and alfonso   Pt did a very nice job in art piece depicting his alex and comfort in [facility]. He used a [employer] quote. When he described it he got teary and says he feels lonely and like a loser at times. Writer validated that sometimes Art Therapy evokes emotion and that is ok, he was apologetic for his tears.

## 2022-04-29 NOTE — PROGRESS NOTES
"    ----------------------------------------------------------------------------------------------------------  [facility]  Psychiatric Progress Note  Hospital Day #2    Isac Li MRN# [id number]   Age: 67 year old YOB: 1955   Date of Admission: 4/27/2022     Subjective   The patient was discussed with the treatment team and chart notes were reviewed.      Identifier: Isac Li is a 67 year old male with a past psychiatric history of major depressive disorder admitted from the  ER on 04/27/2022 due to concern for SI in the context of loss of a recent close friend to suicide. Significant symptoms include worsening SI, depressed mood worthlessness, poor selfesteem, guiltiness, low energy  and sleep issues over past 2 weeks. His last psychiatric hospitalization was 20 years ago. Current psychosocial stressors include recent loss of a close friend to suicide. Patient is on hospital day #2. Assessment is that the current presentation is consistent with diagnosis of MDD, recurrent and severe.     Sleep:  6.5 hours (04/29/22 0620)  Prescribed Medications: Taken as prescribed  PRN Psychiatric Medications:   No PRN medications given or requested.     Overnight Nursing Notes/Staff Report:  \"During community meeting this morning, he endorsed active SI but did not divulge his plan/method. When writer checked in with him afterward, he stated: \"I just can't stop thinking about putting the gun in my mouth.\" He stated he feels \"down and listless today.\" He then began apologizing and became quite tearful.\"    \"Patient appears sleeping resting in bed during rounds. No complains of pain and discomfort so far. No behavioral issues noted.\"    Patient interview:  Pt reports that he is \"doing well today.\" He said that he slept well and thinks that the Seroquel helped. He would like to keep current medication regimen consisting of Venlafaxine and Seroquel as augmenting agent vs. Tapering " "off Venlafaxine and trial of different antidepressant including Vilazodone.   Pt reports that he has access to a firearm in his home and has ammunition for it too. When discussing the risk of the firearm to him given his current suicidal ideations he agreed to a plan to lock the gun away and to give the key to someone else so that he could not act impulsively.  Patient currently delia for safety.  Denies AH/VH. No significant/new side effects from medications. No other questions or concerns at this time.      ROS   ROS was negative unless noted above.     Objective   Vitals:  Temp: 97.7  F (36.5  C) (Temp  Min: 97.7  F (36.5  C)  Max: 98.1  F (36.7  C))    (No data recorded)  SpO2: 98 % (SpO2  Min: 98 %  Max: 98 %)  Pulse: 84 (Pulse  Min: 78  Max: 84)  BP: (!) 146/81  Systolic (24hrs), Av , Min:145 , Max:146   Diastolic (24hrs), Av, Min:76, Max:81    Mental Status Examination:  Oriented to: Fully oriented to person, place and situation  General: Awake and Alert  Appearance:  appears stated age, Grooming is adequate and Dressed in dress shirt and hospital scrub pants  Behavior:  calm, cooperative and engaged  Eye Contact:  good  Psychomotor:  no abnormal motor symptoms appreciated; no catatonia present  Speech:  appropriate volume/tone and prossody  Language: Fluent in English with appropriate syntax and vocabulary.  Mood:  \"Doing well\"  Affect:  appropriate and congruent with mood, although minimizing SI  Thought Process:  coherent, linear, logical and goal directed  Thought Content: No active SI currently denies /HI/AH/VH, No HI; No apparent delusions  Associations:  intact  Insight:  good due to reason for hospitalization and realistic expectations regarding further steps. Future-oriented.  Judgment:  fair due to voluntary admission in setting of active SI w plan, but depression is stil clouding judgment.  Attention Span: grossly intact  Concentration:  Adequate during > 20 minute " conversation  Recent and Remote Memory:  Intact  Fund of Knowledge: Consistent with level of formal education      Allergies     Allergies   Allergen Reactions     Sulfa (Sulfonamide Antibiotics) [Sulfa Drugs] Unknown        Labs & Imaging     Results for orders placed or performed during the hospital encounter of 04/27/22 (from the past 24 hour(s))   Glucose by meter   Result Value Ref Range    GLUCOSE BY METER POCT 243 (H) 70 - 99 mg/dL   Glucose by meter   Result Value Ref Range    GLUCOSE BY METER POCT 275 (H) 70 - 99 mg/dL   Glucose by meter   Result Value Ref Range    GLUCOSE BY METER POCT 132 (H) 70 - 99 mg/dL   Glucose by meter   Result Value Ref Range    GLUCOSE BY METER POCT 114 (H) 70 - 99 mg/dL   Glucose by meter   Result Value Ref Range    GLUCOSE BY METER POCT 178 (H) 70 - 99 mg/dL       Trending Labs: (lithium levels, ANC, etc.) Phenytoin levels checked on admission. Level was 10.      Assessment   Diagnostic Impression:  Isac Li is a 67 year old male with a past psychiatric history of major depressive disorder admitted from the  ER on 04/27/2022 due to concern for SI in the context of loss of a recent close friend to suicide.. Significant symptoms include SI, depressed and sleep issues. His last psychiatric hospitalization was 20 years ago and last visit with a psychiatrist was around 15 years ago. Sees his primary care physician for psychiatric medications and follows with an outpatient therapist. Current psychosocial stressors include recent loss of a close friend to suicide who was also Christian.  Patient's support system includes his sister and some members of the Christian community.  Substance use does not appear to be playing a contributing role in the patient's presentation.  There is genetic loading for none known. Medical history does appear to be significant for seizures and prior TBI as well as sleep apnea requiring CPAP which broke and he has been unable to replace. The MSE is  notable for sad, tearful, and at times distraught affect, talkative engagement, and open conversation with treatment team. He denies self injurious behaviors.      His reported symptoms of depressed mood,  SI, worthlessness, poor selfesteem, guiltyness, low energy  and sleep issues are consistent with his historic diagnosis of major depressive disorder. These issues are also likely compounded by grief related to friend's recent death by suicide, trauma as a child, and ongoing struggle with compulsive sexual behavior as well as ongoing internal conflict between his sexual identity and Mu-ism alex. Optimization of medications to target these symptom clusters in addition to evaluation of adequate outpatient supports will be targets for treatment during this admission.      Given that he currently has SI and marked depression, patient warrants inpatient psychiatric hospitalization to maintain his safety. Disposition pending clinical stabilization, medication optimization and development of an appropriate discharge plan.    Principal Diagnosis:     Major Depressive Disorder, severe, recurrent    Secondary psychiatric diagnoses of concern this admission:     Compulsive Sexual Behavior    Alcohol Use Disorder (historical    Psychiatric course:  Isac Li was admitted to [facility] as a voluntary patient. His PTA aspirin, atorvastatin, lisinopril, metformin, phenytoin, and venlafaxine were continued.    Isac Li continued to meet criteria for inpatient hospitalization medication optimization, inpatient stabilization, and appropriate discharge planning.     Medical course:   Isac Li was physically examined by the ED prior to being transferred to the unit and was found to be medically stable and appropriate for admission. 4/28 IM consult  And followed for management of hyperglycemia and insuline management.      Plan   Today's Changes:   - continue current regimen and re-assess on  5/2  _______________________________________________________________________  Psychiatric diagnoses and management:  Principal Diagnosis:     Major Depressive Disorder, severe, recurrent without psychotic features  ? Continue venlafaxine 300mg at bedtime  ? Continue Seroquel 50 mg at bedtime for Venlafaxine augmentation  ? Discussed future consideration of changing venlafaxine in outpatient settings to another newer agent if augmentation with Seroquel is ineffective     Secondary Psychiatric Diagnoses:     Compulsive Sexual Behavior  ? Cont Venlafaxine     Alcohol Use Disorder (historical)    Additional Planning:    Continue to monitor for and stabilize: SI and depressed    Patient will be treated in therapeutic milieu with appropriate individual and group therapies as described.    Would benefit from community support groups    Will coordinate access and gun safety at home      Scheduled Medications (summary):  Current Facility-Administered Medications   Medication Dose Route Frequency     aspirin  81 mg Oral Daily     atorvastatin  20 mg Oral At Bedtime     insulin aspart  1-7 Units Subcutaneous TID AC     insulin glargine  60 Units Subcutaneous At Bedtime     lisinopril  5 mg Oral Daily     metFORMIN  2,000 mg Oral Daily with supper     phenytoin  500 mg Oral At Bedtime     QUEtiapine  50 mg Oral At Bedtime     venlafaxine  300 mg Oral At Bedtime       PRN Medications (summary):  Current Facility-Administered Medications   Medication Dose Route Frequency     acetaminophen  650 mg Oral Q4H PRN     alum & mag hydroxide-simethicone  30 mL Oral Q4H PRN     glucose  15-30 g Oral Q15 Min PRN    Or     dextrose  25-50 mL Intravenous Q15 Min PRN    Or     glucagon  1 mg Subcutaneous Q15 Min PRN     hydrOXYzine  25 mg Oral Q4H PRN     melatonin  3 mg Oral At Bedtime PRN     OLANZapine  5 mg Oral TID PRN    Or     OLANZapine  5 mg Intramuscular TID PRN     polyethylene glycol  17 g Oral Daily PRN       Discontinued  Medications (& Rationale):    None.     Consults:    Internal Medicine consult for uncontrolled T2DM with insulin use, appreciate recs.    Legal Status:    Voluntary     SIO:    No    Pt has not required locked seclusion or restraints in the past 24 hours to maintain safety, please refer to RN documentation for further details.    Disposition:    TBD, pending clinical stabilization, medication optimization, and formulation of a safe discharge plan.     Safety Assessment:   Behavioral Orders   Procedures     Code 1 - Restrict to Unit     Discontinue 1:1 attendant for suicide risk     Order Specific Question:   I have performed an in person assessment of the patient     Answer:   Based on this assessment the patient no longer requires a one on one attendant at this point in time.     Order Specific Question:   Rationale     Answer:   Patient States able to remain safe in hospital     Routine Programming     As clinically indicated     Sexual precautions     Status 15     Every 15 minutes.     Suicide precautions     Patients on Suicide Precautions should have a Combination Diet ordered that includes a Diet selection(s) AND a Behavioral Tray selection for Safe Tray - with utensils, or Safe Tray - NO utensils        ____________________________________________________________________  Pertinent Medical diagnoses and management:    IM following patient, appreciate help, for further details please see note on 4/28.    #Uncontrolled diabetes mellitus type II, insulin dependent.  #Hyperlipidemia  #Obesity  #ROSSI  Most recent A1C from toay is 9.8. Patient has not checked his blood sugars at home for 2-3 weeks. He does not count carbohydrates. He gives humalog insulin based on a sliding scale before meals but he usually doesn't check blood glucose prior to insulin and gives 20-26 units. He was started on js home dose of Lantus 60 units on admission. Overall he remains hyperglycemic with 0250am glucose low at 71.   - Cont PTA  Lantus 60 units at bedtime  - Novolog medium resistance sliding scale QAC   - Hold on Novolog sliding scale  at bedtime due to low glucose at 0250am  - Blood glucose monitoring QAC and at bedtime  - Hypoglycemia protocol  - F/U primary care outpt. He does not follow with Endocrinology.   - Cont ACE inhibitor for renoprotection (patient denies history of HTN and not documented in chart)  - Restart PTA Aspirin   - Cont PTA atorvastatin    # History of Seizure disorder  - Continue PTA phenytoin, last blood level on 4/27 was 10.0. Therapeutic Range: 10.0-20.0 mg/L  ____________________________________________________________________  Patient seen and discussed with attending physician, Dr. Edel Lee M.D. who is in agreement with my assessment and plan.    SONU CASTRO, MS3  [facility]    Julio C Fonseca M.D.  PGY-1 Psychiatry Resident     Attestation:  This patient has been seen and evaluated by me, Edel Lee MD.  I have discussed this patient with the house staff team including the resident and medical student and I agree with the findings and plan in this note.    I have reviewed today's vital signs, medications, labs and imaging. Edel Lee MD , PhD.

## 2022-04-29 NOTE — PLAN OF CARE
"[facility] Occupational Therapy Daily Note     04/29/22 1248   Group Therapy Session   Group Attendance attended group session   Time Session Began 1110   Time Session Ended 1200   Total Time patient participated (minutes) 45   Total # Attendees 5   Group Type task skill;life skill   Group Topic Covered coping skills/lifestyle management   Group Session Detail collaborative multi-step hands-on meal preparation group. Education was provided on cooking/baking as a significant occupation for role and routine fulfillment, delayed gratification and socialization.       Patient Response/Contribution cooperative with task;organized   Patient Response Detail reported baking/cooking/eating as one of the \"few things I have left to enjoy\". Demonstrated adequate process, performance, and collaborative social interaction skills, specifically leadership, sequencing, attention to detail, and organization.      María Segura OT on 4/29/2022 at 12:48 PM    "

## 2022-04-29 NOTE — PLAN OF CARE
Patient appears sleeping resting in bed during rounds. No complains of pain and discomfort so far. No behavioral issues noted. Will continue to monitor the patient and provide therapeutic intervention as needed. Will continue with q 15 mins check for safety. Continue with current plan of care.       0323: BG- 132 this shift- no correction needed.        Patient had 6.5 hours of sleep this shift.

## 2022-04-29 NOTE — PLAN OF CARE
"Pt took a nap earlier in the shift and later came out and was watching tv in the lounge. Pt presented with flat affect but brightens upon approach. Pt denies for anxiety or depression saying \"I am doing well\". Pt denied SI/SIB this evening. Endorsed mild pain 3/10 on his neck and shoulder. Pt ate supper 100%. Compliant with medications and blood sugar checks. Pt attended psychotherapy group this evening.    Problem: Suicide Risk  Goal: Absence of Self-Harm  Outcome: Ongoing, Progressing     Problem: Suicidal Behavior  Goal: Suicidal Behavior is Absent or Managed  Outcome: Ongoing, Progressing   Goal Outcome Evaluation:                      "

## 2022-04-29 NOTE — PLAN OF CARE
Pt has been primarily isolative and withdrawn to self today. He does brighten significantly upon approach and during interactions with staff and other patients, but it also does appear that this may be an attempt to mask underlying symptoms of depression. Pt has spent much of his time today resting/sleeping in bed. Not currently endorsing active SI/SIB. BG before breakfast was 114. No sliding scale Novolog required. BG before lunch was 178 for which he received 1 unit of Novolog. No safety concerns at this time.     Problem: Plan of Care - These are the overarching goals to be used throughout the patient stay.    Goal: Plan of Care Review/Shift Note  Description: The Plan of Care Review/Shift note should be completed every shift.  The Outcome Evaluation is a brief statement about your assessment that the patient is improving, declining, or no change.  This information will be displayed automatically on your shift note.  Flowsheets  Taken 4/29/2022 1423  Plan of Care Reviewed With: patient  Taken 4/29/2022 1141  Plan of Care Reviewed With: patient

## 2022-04-30 LAB
GLUCOSE BLDC GLUCOMTR-MCNC: 101 MG/DL (ref 70–99)
GLUCOSE BLDC GLUCOMTR-MCNC: 132 MG/DL (ref 70–99)
GLUCOSE BLDC GLUCOMTR-MCNC: 196 MG/DL (ref 70–99)
GLUCOSE BLDC GLUCOMTR-MCNC: 212 MG/DL (ref 70–99)
GLUCOSE BLDC GLUCOMTR-MCNC: 84 MG/DL (ref 70–99)

## 2022-04-30 PROCEDURE — 250N000013 HC RX MED GY IP 250 OP 250 PS 637: Performed by: STUDENT IN AN ORGANIZED HEALTH CARE EDUCATION/TRAINING PROGRAM

## 2022-04-30 PROCEDURE — 250N000013 HC RX MED GY IP 250 OP 250 PS 637

## 2022-04-30 PROCEDURE — H2032 ACTIVITY THERAPY, PER 15 MIN: HCPCS

## 2022-04-30 PROCEDURE — 250N000013 HC RX MED GY IP 250 OP 250 PS 637: Performed by: PHYSICIAN ASSISTANT

## 2022-04-30 PROCEDURE — 124N000002 HC R&B MH UMMC

## 2022-04-30 RX ADMIN — VENLAFAXINE HYDROCHLORIDE 300 MG: 75 CAPSULE, EXTENDED RELEASE ORAL at 21:13

## 2022-04-30 RX ADMIN — PHENYTOIN SODIUM 500 MG: 100 CAPSULE ORAL at 21:13

## 2022-04-30 RX ADMIN — LISINOPRIL 5 MG: 5 TABLET ORAL at 09:04

## 2022-04-30 RX ADMIN — METFORMIN HYDROCHLORIDE 2000 MG: 750 TABLET, EXTENDED RELEASE ORAL at 18:11

## 2022-04-30 RX ADMIN — INSULIN ASPART 2 UNITS: 100 INJECTION, SOLUTION INTRAVENOUS; SUBCUTANEOUS at 18:08

## 2022-04-30 RX ADMIN — INSULIN GLARGINE 60 UNITS: 100 INJECTION, SOLUTION SUBCUTANEOUS at 21:13

## 2022-04-30 RX ADMIN — CARBAMIDE PEROXIDE 6.5% 3 DROP: 6.5 LIQUID AURICULAR (OTIC) at 21:18

## 2022-04-30 RX ADMIN — ATORVASTATIN CALCIUM 20 MG: 20 TABLET, FILM COATED ORAL at 21:13

## 2022-04-30 RX ADMIN — CARBAMIDE PEROXIDE 6.5% 3 DROP: 6.5 LIQUID AURICULAR (OTIC) at 16:52

## 2022-04-30 RX ADMIN — QUETIAPINE FUMARATE 50 MG: 50 TABLET ORAL at 21:13

## 2022-04-30 RX ADMIN — ASPIRIN 81 MG CHEWABLE TABLET 81 MG: 81 TABLET CHEWABLE at 09:04

## 2022-04-30 ASSESSMENT — ACTIVITIES OF DAILY LIVING (ADL)
HYGIENE/GROOMING: INDEPENDENT
LAUNDRY: WITH SUPERVISION
ORAL_HYGIENE: INDEPENDENT
DRESS: INDEPENDENT
HYGIENE/GROOMING: INDEPENDENT

## 2022-04-30 NOTE — PROGRESS NOTES
"Brief Cross Cover Note    Subjective:  Notified by nursing that ear wax buildup visualized in left ear and patient reporting difficulty hearing. Requesting order for ear wax removal and Debrox solution.    Objective:  BP (!) 162/83 (BP Location: Left arm, Patient Position: Sitting, Cuff Size: Adult Regular)   Pulse 81   Temp 97.2  F (36.2  C) (Temporal)   Resp 16   Ht 1.791 m (5' 10.5\")   Wt 135.3 kg (298 lb 3.2 oz)   SpO2 98%   BMI 42.18 kg/m        Assessment & Plan  Ear wax removal and Debrox solution ordered.    Rachelle Das MD  PGY-2 Psychiatry Resident        "

## 2022-04-30 NOTE — PLAN OF CARE
Problem: Sleep Disturbance  Goal: Adequate Sleep/Rest  Outcome: Ongoing, Progressing   Goal Outcome Evaluation:    Patient appears to have slept for 6.5 hours. Endorsed no new concerns overnight during encounters. 0200 BG was 84. No requests for prn's. Will continue to monitor and offer support.

## 2022-04-30 NOTE — PLAN OF CARE
"Pt has been primarily isolative to his room today. He has spent most of the day resting/sleeping in bed. When prompted by writer about how how he was feeling this morning, he simply stated, \"I think I'm doing ok.\" He appeared tearful when stating this, however. He also stated he is struggling with many feelings of guilt. He denied any active SI/SIB thoughts/urges. He is calm, pleasant and makes jokes with staff frequently. BG at breakfast time was 101. No Novolog required. BG at lunch time was 132; no Novolog required. No safety concerns at this time.     Problem: Behavioral Health Plan of Care  Goal: Plan of Care Review  Recent Flowsheet Documentation  Taken 4/30/2022 1234 by Sangita Elkins  Plan of Care Reviewed With: patient  Patient Agreement with Plan of Care: agrees     "

## 2022-05-01 LAB
GLUCOSE BLDC GLUCOMTR-MCNC: 107 MG/DL (ref 70–99)
GLUCOSE BLDC GLUCOMTR-MCNC: 108 MG/DL (ref 70–99)
GLUCOSE BLDC GLUCOMTR-MCNC: 165 MG/DL (ref 70–99)
GLUCOSE BLDC GLUCOMTR-MCNC: 228 MG/DL (ref 70–99)
GLUCOSE BLDC GLUCOMTR-MCNC: 313 MG/DL (ref 70–99)

## 2022-05-01 PROCEDURE — 250N000013 HC RX MED GY IP 250 OP 250 PS 637: Performed by: PHYSICIAN ASSISTANT

## 2022-05-01 PROCEDURE — 250N000013 HC RX MED GY IP 250 OP 250 PS 637: Performed by: STUDENT IN AN ORGANIZED HEALTH CARE EDUCATION/TRAINING PROGRAM

## 2022-05-01 PROCEDURE — 250N000013 HC RX MED GY IP 250 OP 250 PS 637

## 2022-05-01 PROCEDURE — 124N000002 HC R&B MH UMMC

## 2022-05-01 PROCEDURE — G0177 OPPS/PHP; TRAIN & EDUC SERV: HCPCS

## 2022-05-01 RX ADMIN — QUETIAPINE FUMARATE 50 MG: 50 TABLET ORAL at 21:25

## 2022-05-01 RX ADMIN — ASPIRIN 81 MG CHEWABLE TABLET 81 MG: 81 TABLET CHEWABLE at 10:43

## 2022-05-01 RX ADMIN — VENLAFAXINE HYDROCHLORIDE 300 MG: 75 CAPSULE, EXTENDED RELEASE ORAL at 21:31

## 2022-05-01 RX ADMIN — CARBAMIDE PEROXIDE 6.5% 3 DROP: 6.5 LIQUID AURICULAR (OTIC) at 10:44

## 2022-05-01 RX ADMIN — ATORVASTATIN CALCIUM 20 MG: 20 TABLET, FILM COATED ORAL at 21:25

## 2022-05-01 RX ADMIN — PHENYTOIN SODIUM 500 MG: 100 CAPSULE ORAL at 21:30

## 2022-05-01 RX ADMIN — INSULIN GLARGINE 60 UNITS: 100 INJECTION, SOLUTION SUBCUTANEOUS at 21:35

## 2022-05-01 RX ADMIN — CARBAMIDE PEROXIDE 6.5% 3 DROP: 6.5 LIQUID AURICULAR (OTIC) at 21:26

## 2022-05-01 RX ADMIN — INSULIN ASPART 1 UNITS: 100 INJECTION, SOLUTION INTRAVENOUS; SUBCUTANEOUS at 12:15

## 2022-05-01 RX ADMIN — INSULIN ASPART 2 UNITS: 100 INJECTION, SOLUTION INTRAVENOUS; SUBCUTANEOUS at 18:12

## 2022-05-01 RX ADMIN — METFORMIN HYDROCHLORIDE 2000 MG: 750 TABLET, EXTENDED RELEASE ORAL at 18:18

## 2022-05-01 RX ADMIN — LISINOPRIL 5 MG: 5 TABLET ORAL at 10:44

## 2022-05-01 ASSESSMENT — ACTIVITIES OF DAILY LIVING (ADL)
HYGIENE/GROOMING: INDEPENDENT
ORAL_HYGIENE: INDEPENDENT
ORAL_HYGIENE: INDEPENDENT
HYGIENE/GROOMING: INDEPENDENT
LAUNDRY: WITH SUPERVISION
DRESS: INDEPENDENT
LAUNDRY: WITH SUPERVISION
DRESS: INDEPENDENT

## 2022-05-01 NOTE — PLAN OF CARE
"   04/30/22 2100   Group Therapy Session   Group Attendance attended group session   Time Session Began 2000   Time Session Ended 2100   Total Time patient participated (minutes) 55   Total # Attendees 4   Group Type expressive therapy  (art therapy)   Group Topic Covered relaxation techniques   Group Session Detail mindfulness and watercolor   Patient Response/Contribution cooperative with task;discussed personal experience with topic;organized   Patient Response Detail Isac presented as calm and cooperative. He participated in the check in body outline drawing, representing his physical pain and emotional pain. He participated in listening to a guided meditation on deep breathing and representing his breathing using watercolors. He appeared calm and focused. He shared his painting with writer and described a black shape as a \"coffin.\" He became teary eyed when talking about his dark feelings. He also described some bright colors as \"hope\" and \"alva\" and reported feeling many different feelings throughout the day. He made a second painting using just bright colors and described it as being more \"joyful.\"  He interacted appropriately with peers, making connective comments and using humor.   Goal Outcome Evaluation:                      "

## 2022-05-01 NOTE — PLAN OF CARE
"Pt has been intermittently visible in the milieu today. He was awake earlier than usual this morning, sitting in the lounge around 0700. After eating breakfast, he returned to his room and napped for a couple of hours. Morning medications administered, including ear drops. Writer visualized that the earwax in his left ear has become softer and more of liquid type consistency. Pt still c/o difficulty hearing and is frustrated by this. Does state that he has been having hearing loss over some time. Took a shower today, and stated it helped him \"feel better- physically.\" Pt continues to present as friendly and jovial when interacting with staff and other patients but it does appear as an attempt to mask underlying feelings of depression and sadness. BG at breakfast time was 107. No Novolog needed. BG at lunch was 165, and 1 unit of Novolog was administered.      Problem: Plan of Care - These are the overarching goals to be used throughout the patient stay.    Goal: Plan of Care Review/Shift Note  Description: The Plan of Care Review/Shift note should be completed every shift.  The Outcome Evaluation is a brief statement about your assessment that the patient is improving, declining, or no change.  This information will be displayed automatically on your shift note.  Recent Flowsheet Documentation  Taken 5/1/2022 1151 by Sangita Elkins  Plan of Care Reviewed With: patient     "

## 2022-05-01 NOTE — PLAN OF CARE
Problem: Sleep Disturbance  Goal: Adequate Sleep/Rest  Outcome: Ongoing, Progressing     Patient was sleeping when shift started with easy and non labored breathing. He was briefly awakened at 0200 for BG check, it was 108 and readily went back to sleep. Continues on SI/Sexual precautions. No safety events. Slept for about 6.5 hours.

## 2022-05-01 NOTE — PLAN OF CARE
Goal Outcome Evaluation:    Plan of Care Reviewed With: patient      Pt sat in the milieu for some time watching TV. Denied feeling suicidal or depressed. No evidence of depression or psychosis. Ate dinner and snack. Attended and participated in art group.     Blood sugars : 196 and 212. Coverage given as ordered.

## 2022-05-02 ENCOUNTER — DOCUMENTATION ONLY (OUTPATIENT)
Dept: BEHAVIORAL HEALTH | Facility: CLINIC | Age: 67
End: 2022-05-02
Payer: MEDICARE

## 2022-05-02 LAB
GLUCOSE BLDC GLUCOMTR-MCNC: 149 MG/DL (ref 70–99)
GLUCOSE BLDC GLUCOMTR-MCNC: 177 MG/DL (ref 70–99)
GLUCOSE BLDC GLUCOMTR-MCNC: 233 MG/DL (ref 70–99)
GLUCOSE BLDC GLUCOMTR-MCNC: 282 MG/DL (ref 70–99)
GLUCOSE BLDC GLUCOMTR-MCNC: 296 MG/DL (ref 70–99)

## 2022-05-02 PROCEDURE — G0177 OPPS/PHP; TRAIN & EDUC SERV: HCPCS

## 2022-05-02 PROCEDURE — 250N000013 HC RX MED GY IP 250 OP 250 PS 637: Performed by: PHYSICIAN ASSISTANT

## 2022-05-02 PROCEDURE — H2032 ACTIVITY THERAPY, PER 15 MIN: HCPCS

## 2022-05-02 PROCEDURE — 250N000013 HC RX MED GY IP 250 OP 250 PS 637

## 2022-05-02 PROCEDURE — 250N000013 HC RX MED GY IP 250 OP 250 PS 637: Performed by: STUDENT IN AN ORGANIZED HEALTH CARE EDUCATION/TRAINING PROGRAM

## 2022-05-02 PROCEDURE — 99207 PR NO BILLABLE SERVICE THIS VISIT: CPT | Performed by: STUDENT IN AN ORGANIZED HEALTH CARE EDUCATION/TRAINING PROGRAM

## 2022-05-02 PROCEDURE — 124N000002 HC R&B MH UMMC

## 2022-05-02 PROCEDURE — 99232 SBSQ HOSP IP/OBS MODERATE 35: CPT | Mod: GC | Performed by: PSYCHIATRY & NEUROLOGY

## 2022-05-02 PROCEDURE — 250N000012 HC RX MED GY IP 250 OP 636 PS 637: Performed by: STUDENT IN AN ORGANIZED HEALTH CARE EDUCATION/TRAINING PROGRAM

## 2022-05-02 RX ADMIN — ASPIRIN 81 MG CHEWABLE TABLET 81 MG: 81 TABLET CHEWABLE at 09:07

## 2022-05-02 RX ADMIN — INSULIN GLARGINE 60 UNITS: 100 INJECTION, SOLUTION SUBCUTANEOUS at 22:23

## 2022-05-02 RX ADMIN — MELATONIN TAB 3 MG 3 MG: 3 TAB at 01:03

## 2022-05-02 RX ADMIN — LISINOPRIL 5 MG: 5 TABLET ORAL at 09:07

## 2022-05-02 RX ADMIN — INSULIN ASPART 1 UNITS: 100 INJECTION, SOLUTION INTRAVENOUS; SUBCUTANEOUS at 09:08

## 2022-05-02 RX ADMIN — QUETIAPINE FUMARATE 50 MG: 50 TABLET ORAL at 21:32

## 2022-05-02 RX ADMIN — PHENYTOIN SODIUM 500 MG: 100 CAPSULE ORAL at 21:32

## 2022-05-02 RX ADMIN — ACETAMINOPHEN 650 MG: 325 TABLET ORAL at 03:29

## 2022-05-02 RX ADMIN — HYDROXYZINE HYDROCHLORIDE 25 MG: 25 TABLET, FILM COATED ORAL at 03:29

## 2022-05-02 RX ADMIN — METFORMIN HYDROCHLORIDE 2000 MG: 750 TABLET, EXTENDED RELEASE ORAL at 17:49

## 2022-05-02 RX ADMIN — INSULIN ASPART 2 UNITS: 100 INJECTION, SOLUTION INTRAVENOUS; SUBCUTANEOUS at 13:09

## 2022-05-02 RX ADMIN — CARBAMIDE PEROXIDE 6.5% 3 DROP: 6.5 LIQUID AURICULAR (OTIC) at 21:31

## 2022-05-02 RX ADMIN — VENLAFAXINE HYDROCHLORIDE 300 MG: 75 CAPSULE, EXTENDED RELEASE ORAL at 21:32

## 2022-05-02 RX ADMIN — CARBAMIDE PEROXIDE 6.5% 3 DROP: 6.5 LIQUID AURICULAR (OTIC) at 09:11

## 2022-05-02 RX ADMIN — ATORVASTATIN CALCIUM 20 MG: 20 TABLET, FILM COATED ORAL at 21:32

## 2022-05-02 ASSESSMENT — ACTIVITIES OF DAILY LIVING (ADL)
DRESS: SCRUBS (BEHAVIORAL HEALTH);INDEPENDENT
HYGIENE/GROOMING: INDEPENDENT
HYGIENE/GROOMING: INDEPENDENT
ORAL_HYGIENE: INDEPENDENT
ORAL_HYGIENE: INDEPENDENT
DRESS: INDEPENDENT;SCRUBS (BEHAVIORAL HEALTH)
LAUNDRY: WITH SUPERVISION

## 2022-05-02 NOTE — PROGRESS NOTES
Prior Authorization **INITIATED**    Medication: Viibryd tablets  Insurance Company: [employer] ([employer]) - Phone [phone] Fax [phone]  Pharmacy Filling the Rx: n/a - Patient has not yet been discharged, and there are no outpatient orders for this medication yet  Start Date: 5/2/2022  Reference #: CoverMyMeds Key: [id number] - [place] Case ID: [id number]  Comments:  Proactive Prior Authorization      Emmanuelle Sanchez [facility] Liaison  Pronouns: She/Her/Hers    [facility] [place] [place] [facility]  [facility]  [address] [place] [address] [address] [address]   [email or web address]  [email or web address]   Phone: [phone]  Pager: [phone]  Fax: [phone]

## 2022-05-02 NOTE — PLAN OF CARE
05/01/22 1442   Group Therapy Session   Group Attendance attended group session   Time Session Began 1330   Time Session Ended 1420   Total Time patient participated (minutes) 50   Total # Attendees 5   Group Type expressive therapy   Group Topic Covered balanced lifestyle   Group Session Detail OT Clinic   Patient Response/Contribution cooperative with task

## 2022-05-02 NOTE — PLAN OF CARE
[facility] Occupational Therapy Group Intervention Note     05/02/22 1455   Group Therapy Session   Group Attendance attended group session   Time Session Began 1310   Time Session Ended 1430   Total Time patient participated (minutes) 70   Total # Attendees 6   Group Type psychoeducation   Group Topic Covered balanced lifestyle;relapse prevention   Group Session Detail 8 Dimensions of Wellness'. Discussion-based group was used to facilitate insight development on holistic whole-person wellness related to occupational performance and satisfaction.   Patient Response/Contribution discussed personal experience with topic;listened actively;organized   Patient Response Detail congruent affect. expressed understanding of topic. insightful into 'self-inflicting barriers' versus realistic barriers. reported greatest difficulty with social well-being and how to engage within the community / create new relationships, however, is motivated to do so.     María Segura OT on 5/2/2022 at 2:58 PM

## 2022-05-02 NOTE — PROGRESS NOTES
"    ----------------------------------------------------------------------------------------------------------  [facility]  Psychiatric Progress Note  Hospital Day #5    Isac Li MRN# [id number]   Age: 67 year old YOB: 1955   Date of Admission: 4/27/2022     Subjective   The patient was discussed with the treatment team and chart notes were reviewed.      Identifier: Isac Li is a 67 year old male with a past psychiatric history of major depressive disorder admitted from the  ER on 04/27/2022 due to concern for SI in the context of loss of a recent close friend to suicide. Significant symptoms include worsening SI, depressed mood worthlessness, poor selfesteem, guiltiness, low energy  and sleep issues over past 2 weeks. His last psychiatric hospitalization was 20 years ago. Current psychosocial stressors include recent loss of a close friend to suicide. Patient is on hospital day #5. Assessment is that the current presentation is consistent with diagnosis of MDD, recurrent and severe.     Sleep: 6.5 hours (4/30), 6.5 hours (5/1),  6.5 hours (05/02/22 0630)  Prescribed Medications: Taken as prescribed  PRN Psychiatric Medications:   -Melatonin, sleep  -Hydroxyzine 0329, anxiety       Overnight Nursing Notes/Staff Report:  \"Pt has been primarily isolative to his room today. He has spent most of the day resting/sleeping in bed. When prompted by writer about how how he was feeling this morning, he simply stated, \"I think I'm doing ok.\" He appeared tearful when stating this, however. He also stated he is struggling with many feelings of guilt. He denied any active SI/SIB thoughts/urges.\"      Patient interview:  Isac states he feels 'better today,\" although mentions did not sleep well last night. Isac reports depressive symptoms persists and overall feels like Venlafaxine has not really helped much over past months despite taking max daily dose and notices no " "improvement with added seroquel for augmentation and reports would like to try one of the new antidepresants previously discussed. CL for medication coverage/prior authorization is needed and will discuss with patient further for cross-titration plan.   Patient reports mood is better and makes some jokes during interview which has been consistent since admission. MSE is notable for still restricted affect. Denies SE from medications and contracts for safety at this time. No other questions or concerns at this time.       ROS   ROS was negative unless noted above.     Objective   Vitals:  Temp: 97.2  F (36.2  C) (Temp  Min: 97.2  F (36.2  C)  Max: 97.3  F (36.3  C))  Resp: 18 (Resp  Min: 18  Max: 18)  SpO2: 98 % (SpO2  Min: 96 %  Max: 98 %)  Pulse: 80 (Pulse  Min: 80  Max: 82)  BP: (!) 142/82  Systolic (24hrs), Av , Min:131 , Max:142   Diastolic (24hrs), Av, Min:70, Max:83    Mental Status Examination:   Oriented to: Fully oriented to person, place and situation  General: Awake and Alert  Appearance:  appears stated age, Grooming is adequate and Dressed in hospital scrubs and wearing glasses. Grooming is adequate  Behavior:  calm, cooperative and engaged, somewhat sad  Eye Contact:  good  Psychomotor:  no abnormal motor symptoms appreciated; no catatonia present  Speech:  appropriate volume/tone and prossody  Language: Fluent in English with appropriate syntax and vocabulary.  Mood:  \"better todayl\"  Affect:  appropriate and congruent with mood, although minimizing SI and mood still somewhat blunted  Thought Process:  coherent, linear, logical and goal directed  Thought Content: No active SI currently denies /HI/AH/VH, No HI; No apparent delusions  Associations:  intact  Insight:  good due to reason for hospitalization and realistic expectations regarding further steps. Future-oriented.  Judgment:  fair due to voluntary admission in setting of active SI w plan  Attention Span: grossly intact  Concentration:  " Adequate during > 20 minute conversation  Recent and Remote Memory:  Intact and adequate for conversation   Fund of Knowledge: Consistent with level of formal education      Allergies     Allergies   Allergen Reactions     Sulfa (Sulfonamide Antibiotics) [Sulfa Drugs] Unknown        Labs & Imaging     Results for orders placed or performed during the hospital encounter of 04/27/22 (from the past 24 hour(s))   Glucose by meter   Result Value Ref Range    GLUCOSE BY METER POCT 165 (H) 70 - 99 mg/dL   Glucose by meter   Result Value Ref Range    GLUCOSE BY METER POCT 228 (H) 70 - 99 mg/dL   Glucose by meter   Result Value Ref Range    GLUCOSE BY METER POCT 313 (H) 70 - 99 mg/dL   Glucose by meter   Result Value Ref Range    GLUCOSE BY METER POCT 177 (H) 70 - 99 mg/dL   Glucose by meter   Result Value Ref Range    GLUCOSE BY METER POCT 149 (H) 70 - 99 mg/dL       Trending Labs: (lithium levels, ANC, etc.) Phenytoin levels checked on admission. Level was 10.      Assessment   Diagnostic Impression:  Isac Li is a 67 year old male with a past psychiatric history of major depressive disorder admitted from the  ER on 04/27/2022 due to concern for SI in the context of loss of a recent close friend to suicide.. Significant symptoms include SI, depressed and sleep issues. His last psychiatric hospitalization was 20 years ago and last visit with a psychiatrist was around 15 years ago. Sees his primary care physician for psychiatric medications and follows with an outpatient therapist. Current psychosocial stressors include recent loss of a close friend to suicide who was also [employer].  Patient's support system includes his sister and some members of the [employer] community.  Substance use does not appear to be playing a contributing role in the patient's presentation.  There is genetic loading for none known. Medical history does appear to be significant for seizures and prior TBI as well as sleep apnea requiring CPAP  which broke and he has been unable to replace. The MSE is notable for sad, tearful, and at times distraught affect, talkative engagement, and open conversation with treatment team. He denies self injurious behaviors.      His reported symptoms of depressed mood,  SI, worthlessness, poor selfesteem, guiltyness, low energy  and sleep issues are consistent with his historic diagnosis of major depressive disorder. These issues are also likely compounded by grief related to friend's recent death by suicide, trauma as a child, and ongoing struggle with compulsive sexual behavior as well as ongoing internal conflict between his sexual identity and [employer] a[employer]. Optimization of medications to target these symptom clusters in addition to evaluation of adequate outpatient supports will be targets for treatment during this admission.      Given that he currently has SI and marked depression, patient warrants inpatient psychiatric hospitalization to maintain his safety. Disposition pending clinical stabilization, medication optimization and development of an appropriate discharge plan.    Principal Diagnosis:     Major Depressive Disorder, severe, recurrent    Secondary psychiatric diagnoses of concern this admission:     Compulsive Sexual Behavior    Alcohol Use Disorder (historical    Psychiatric course:  Isac Li was admitted to [facility] as a voluntary patient. His PTA aspirin, atorvastatin, lisinopril, metformin, phenytoin, and venlafaxine were continued.    Isac Li continued to meet criteria for inpatient hospitalization medication optimization, inpatient stabilization, and appropriate discharge planning.     Medical course:   Isac Li was physically examined by the ED prior to being transferred to the unit and was found to be medically stable and appropriate for admission. 4/28 IM consult  And followed for management of hyperglycemia and insuline management.      Plan   Today's Changes:   - Pharmacy CL  consult for Vybriid and Trintellix coverage. Prior authorization and coverage determined  -Double portion ok and monitor glycemia closely   -Patient care order: Ok for patient to be seen by  and receive communion   -Will discuss tomorrow cross-titratation Venlafaxine-Vilazodone or Vortioxetine  _______________________________________________________________________  Psychiatric diagnoses and management:  Principal Diagnosis:     Major Depressive Disorder, severe, recurrent without psychotic features  ? Continue venlafaxine 300mg at bedtime  ? Continue Seroquel 50 mg at bedtime for Venlafaxine augmentation  ? Will discuss future consideration of cross-titratation Venlafaxine-Vilazodone or Vortioxetine     Secondary Psychiatric Diagnoses:     Compulsive Sexual Behavior  ? Cont Venlafaxine     Alcohol Use Disorder (historical)    Additional Planning:    Continue to monitor for and stabilize: SI and depressed    Patient will be treated in therapeutic milieu with appropriate individual and group therapies as described.    Would benefit from community support groups    Will coordinate access and gun safety at home      Scheduled Medications (summary):  Current Facility-Administered Medications   Medication Dose Route Frequency     aspirin  81 mg Oral Daily     atorvastatin  20 mg Oral At Bedtime     carbamide peroxide  3 drop Left Ear BID     insulin aspart  1-7 Units Subcutaneous TID AC     insulin glargine  60 Units Subcutaneous At Bedtime     lisinopril  5 mg Oral Daily     metFORMIN  2,000 mg Oral Daily with supper     phenytoin  500 mg Oral At Bedtime     QUEtiapine  50 mg Oral At Bedtime     venlafaxine  300 mg Oral At Bedtime       PRN Medications (summary):  Current Facility-Administered Medications   Medication Dose Route Frequency     acetaminophen  650 mg Oral Q4H PRN     alum & mag hydroxide-simethicone  30 mL Oral Q4H PRN     glucose  15-30 g Oral Q15 Min PRN    Or     dextrose  25-50 mL Intravenous Q15  Min PRN    Or     glucagon  1 mg Subcutaneous Q15 Min PRN     hydrOXYzine  25 mg Oral Q4H PRN     melatonin  3 mg Oral At Bedtime PRN     OLANZapine  5 mg Oral TID PRN    Or     OLANZapine  5 mg Intramuscular TID PRN     polyethylene glycol  17 g Oral Daily PRN       Discontinued Medications (& Rationale):    None.     Consults:    Internal Medicine consult for uncontrolled T2DM with insulin use, appreciate recs.    Legal Status:    Voluntary     SIO:    No    Pt has not required locked seclusion or restraints in the past 24 hours to maintain safety, please refer to RN documentation for further details.    Disposition:    TBD, pending clinical stabilization, medication optimization, and formulation of a safe discharge plan.     Safety Assessment:   Behavioral Orders   Procedures     Code 1 - Restrict to Unit     Discontinue 1:1 attendant for suicide risk     Order Specific Question:   I have performed an in person assessment of the patient     Answer:   Based on this assessment the patient no longer requires a one on one attendant at this point in time.     Order Specific Question:   Rationale     Answer:   Patient States able to remain safe in hospital     Routine Programming     As clinically indicated     Sexual precautions     Status 15     Every 15 minutes.     Suicide precautions     Patients on Suicide Precautions should have a Combination Diet ordered that includes a Diet selection(s) AND a Behavioral Tray selection for Safe Tray - with utensils, or Safe Tray - NO utensils        ____________________________________________________________________  Pertinent Medical diagnoses and management:    IM following patient, appreciate help, for further details please see note on 4/28.    #Uncontrolled diabetes mellitus type II, insulin dependent.  #Hyperlipidemia  #Obesity  #ROSSI  Most recent A1C from toay is 9.8. Patient has not checked his blood sugars at home for 2-3 weeks. He does not count carbohydrates. He  gives humalog insulin based on a sliding scale before meals but he usually doesn't check blood glucose prior to insulin and gives 20-26 units. He was started on js home dose of Lantus 60 units on admission. Overall he remains hyperglycemic with 0250am glucose low at 71.   - Cont PTA Lantus 60 units at bedtime  - Novolog medium resistance sliding scale QAC   - Hold on Novolog sliding scale  at bedtime due to low glucose at 0250am  - Blood glucose monitoring QAC and at bedtime  - Hypoglycemia protocol  - F/U primary care outpt. He does not follow with Endocrinology.   - Cont ACE inhibitor for renoprotection (patient denies history of HTN and not documented in chart)  - Restart PTA Aspirin   - Cont PTA atorvastatin    # History of Seizure disorder  - Continue PTA phenytoin, last blood level on 4/27 was 10.0. Therapeutic Range: 10.0-20.0 mg/L  ____________________________________________________________________  Patient seen and discussed with attending physician, Dr. Edel Lee M.D. who is in agreement with my assessment and plan.    Julio C Fonseca M.D.  PGY-1 Psychiatry Resident     Attestation:  This patient has been seen and evaluated by me, Edel Lee MD.  I have discussed this patient with the house staff team including the resident and medical student and I agree with the findings and plan in this note.    I have reviewed today's vital signs, medications, labs and imaging. Edel Lee MD , PhD.

## 2022-05-02 NOTE — PLAN OF CARE
Assessment/Intervention/Current Symtoms and Care Coordination  The patient's care was discussed with the treatment team and chart notes were reviewed.   Patient met with team. He continues to report marked depressive sx's- but reports yesterday was first day he did not have SI.  Patient was seen by IM re: Diabetes mgmt.  Patient requested his  be able to come to offer holy communion.  Was authorized.    Discharge Plan or Goal  Patient will return home when stable/safe  Patient will be referred for outpatient Psychiatry       Barriers to Discharge   Severity of depression  Ongoing SI  Medication mgmt per MD's     Referral Status  None today     Legal Status     Voluntary

## 2022-05-02 NOTE — PROGRESS NOTES
Prior Authorization **APPROVED**    Authorization Effective Date: 5/2/2022  Authorization Expiration Date: 12/31/2099  Medication: Trintellix tabs **APPROVED**  Approved Dose/Quantity: 1 tablet daily  Reference #: CoverMyMeds Key: [id number] - PA Case ID: [id number]   Insurance Company: [employer] ([employer]) - Phone [phone] Fax [phone]  Expected CoPay: $115, $15 after copay card  CoPay Card Available: Yes    Foundation Assistance Needed: n/a  Which Pharmacy is filling the prescription (Not needed for infusion/clinic administered): n/a - Patient has not yet been discharged, and there are no outpatient orders for this medication yet  Comments:  Proactive Prior Authorization      Copay Card:                Emmanuelle Sanchez CPhT  [place] Discharge Pharmacy Liaison  Pronouns: She/Her/Hers    [facility]  [address] [place] [address]  [address] [place] [address] [address] [address]   [email or web address]  [email or web address]   Phone: [phone]  Pager: [phone]  Fax: [phone]

## 2022-05-02 NOTE — PLAN OF CARE
Problem: Plan of Care - These are the overarching goals to be used throughout the patient stay.    Goal: Absence of Hospital-Acquired Illness or Injury  Outcome: Ongoing, Progressing  Intervention: Prevent Skin Injury  Recent Flowsheet Documentation  Taken 5/2/2022 1802 by Hugo Goodrich RN  Body Position: position changed independently     Problem: Plan of Care - These are the overarching goals to be used throughout the patient stay.    Goal: Optimal Comfort and Wellbeing  Intervention: Provide Person-Centered Care  Recent Flowsheet Documentation  Taken 5/2/2022 1802 by Hugo Goodrich RN  Trust Relationship/Rapport:    care explained    choices provided    emotional support provided    empathic listening provided    questions answered    questions encouraged    reassurance provided    thoughts/feelings acknowledged     Problem: Suicide Risk  Goal: Absence of Self-Harm  Outcome: Ongoing, Progressing   Goal Outcome Evaluation:    Plan of Care Reviewed With: patient     Alert and oriented x4, patient was pleasant, cooperative and medication compliant. Visible and social in milieu. He denied pain or discomfort, independent with all ADLs. Denied SI/HI, contracted for safety. Denied anxiety or depression.

## 2022-05-02 NOTE — PROGRESS NOTES
05/01/22 2200   Group Therapy Session   Group Attendance attended group session   Time Session Began 2020   Time Session Ended 2120   Total Time patient participated (minutes) 60   Total # Attendees 3   Group Type psychotherapeutic   Group Topic Covered coping skills/lifestyle management;emotions/expression;structured socialization   Group Session Detail Process/ mixed media collage   Patient Response/Contribution cooperative with task;discussed personal experience with topic;listened actively;offered helpful suggestions to peers;unable to interrupt patient;verbalizations were off topic;other (see comments)   Pt said he had a good day and this is the first time he has had not suicidal thoughts today since he has been on the unit. He had a good visit with his sister he reported. He overshares and he does it in a way that is unexpected and difficult to redirect until after the fact.  He was speaking of guilt and shame about pornography addiction ( current )  and also about being promiscuous  when he was in his 20s. He may like resources for addiction currently.  Writer just would change the subject when he would over share. He also was supportive of peers in the group processing conversation about holistic coping with mental health.

## 2022-05-02 NOTE — PROGRESS NOTES
Prior Authorization **INITIATED**    Medication: Trintellix tabs  Insurance Company: [employer] ([employer]) - Phone [phone] Fax [phone]  Pharmacy Filling the Rx: n/a - Patient has not yet been discharged, and there are no outpatient orders for this medication yet  Start Date: 5/2/2022  Reference #: CoverMyMeds Key: [id number] - PA Case ID: [id number]  Comments:  Proactive Prior Authorization      Emmanuelle Sanchez CPhT  [place] Discharge Pharmacy Liaison  Pronouns: She/Her/Hers    [facility]  [facility]  [address] [place] [address] [address] [id number]   [email or web address]  [email or web address]   Phone: [phone]  Pager: [phone]  Fax: [phone]

## 2022-05-02 NOTE — PLAN OF CARE
Problem: Sleep Disturbance  Goal: Adequate Sleep/Rest  Outcome: Ongoing, Progressing  Patient alert, had cheese stick for snacks @ beginning of the shift. BG-177 @ 0055. Patient has intermittent non-productive cough. Patient complained of unable to sleep-Melatonin given. Patient complained of R shoulder pain requested pain medication-Tylenol given for R shoulder and Hydroxyzine given for anxiety. Patient denies SI/SIB or any AV/VH. Patient able to communicate needs. Will continue to monitor patient and notify MD as needed. Will continue with current plan of care.    Patient had 6.5 hours of sleep this shift. No other complaints so far.

## 2022-05-02 NOTE — CONSULTS
Consulted by Dr Julio C Fonseca to run a test claim for Viibryd and Trintellix (cross-titration with venlafaxine).    Patient has pharmacy benefits through [employer] ([employer]). Per insurance, both of these medications require prior authorization.    This process has been started--please see separate notes linked from Prior Authorization Encounters for details/updates regarding this PA.      Please feel free to contact me with any other test claims, prior authorizations, or insurance questions regarding outpatient medications.     Thanks!      Emmanuelle Sanchez [facility] Discharge Pharmacy Liaison  Pronouns: She/Her/Hers    [facility]  [address] [place] [address]  [address] [place] [address] [address] [address]   [email or web address]  [email or web address]   Phone: [phone]  Pager: [phone]  Fax: [phone]

## 2022-05-02 NOTE — DISCHARGE INSTRUCTIONS
Behavioral Discharge Planning and Instructions    Summary:   You were admitted to [facility] on 4/27/22 with worsening depression, suicidal ideation under the care of Edel Lee MD.  You met with Dr. Lee and his team daily for ongoing psychiatric assessment and medication management.  You had opportunities to participate in therapeutic groups on the unit.     At this time you report your mood is stabilizing and you report you are not having thoughts or intent to harm yourself or others. You will be discharged home.    You have been referred to Adult Mental Health Case Management in [facility]. They will reach out to you to initiate services. If you have not heard from anybody in the next 2 week, call [facility] at [phone] option [phone] to ask about the status of your Adult Mental Health Case Management referral.    Disposition:  Home    Main Diagnosis:   Major Depressive Disorder    Health Care Follow-up:   Appointment: Therapist: Antony Matute: 5/19/22 at 10:30am   [facility]: [address] [place] [address] [address], [place] [address]   Phone: [phone]    PCP:   Dr. Marquise Russ: 6/21/22 at 8:50am  [facility]: [address] [place] [address]   Phone: [phone]  This appointment is scheduled in person    Psychiatry:   Dr. Jez Da Silva: 6/16/22 at 12:20pm   [facility]:  [address] [place] [address]  Phone: [phone]      Fax: [phone]  This appointment is scheduled in person.    Endocrinology:    Dr Nancy Smith: 9/19/22 at 12:00pm  [facility]: [address] [facility] [facility], [address], [place], [place]   Phone:  [phone]   (Please call prior to appointment, this appointment is listed as a video visit)    Outpatient Day Programming:  Thad Alvarado  Initial Assessment will be at the [facility]  [id number] [place] [id number] [phone]  Intake  "appointments for day treatment:   Thursday 5/19/22 at 3:00 (please arrive at 2:30pm) with Pretty Loyd  AND Friday 5/10/22 at 10am (please arrive at 9:30) with Pretty Loyd  These appointments are scheduled in person.  Let Pretty know that you would like to attend the program in [place].  [facility] [place]  [address] [facility]  [address]  [place] [address]    Major Treatments, Procedures and Findings:   Medications were  managed throughout your stay. An internal medicine consult was completed during your stay. You had the opportunity to participate in treatment programming while on the unit including occupational therapy, mental health support and education and spiritual services.     Symptoms to Report:   Please report if you are experiencing increased aggression and/or confusion, problematic loss of sleep, worsening mood, or thoughts of suicide to your treatment team or notify your primary provider.   IF THE SYMPTOMS YOU ARE EXPERIENCING ARE A MEDICAL EMERGENCY, CALL 911 IMMEDIATELY    Lifestyle Adjustment:   1. Adjust your lifestyle to get enough sleep, relaxation, exercise and good nutrition.  Continue to develop healthy coping skills to decrease stress and promote a healthy and sober lifestyle.  2. Abstain from all substances of abuse.  3. Take medications as prescribed.  Please work with your doctor to discuss any concerns you have with your medications or side effects you may be experiencing.  4. Follow up with appointments as scheduled.      Resources:   *[facility] Crisis: COPE: ([phone]) 24 hour mobile crisis support for people having a mental health crisis in [facility].   *Acute Psychiatric Services ([phone]). 24-hour walk-in crisis psychiatric support at [facility]; Emergency Medications Clinic available 7:30am - 2:00pm  *[employer]: Text  \"life\"  to [phone]   A trained crisis counselor will respond immediately  *Crisis Connection: ([phone])  " 24-hour confidential telephone counseling   *[facility] Emergency Room: [phone]  *[facility] Connection ([facility]) : [facility] connects people seeking recovery to resources that help foster and sustain long-term recovery. Whether you are seeking resources for treatment, transportation, housing, job training, education, health or other pathways to recovery, [facility] is a great place to start.    [phone]      [email or web address]     General Medication Instructions:   See your medication sheet(s) for instructions.   Take all medicines as directed.  Make no changes unless your doctor suggests them.   Go to all your doctor visits.  Be sure to have all your required lab tests. This way, your medicines can be refilled on time.  Do not use any drugs not prescribed by your doctor.    Advance Directives:   Scanned document on file with [employer]? No scanned doc  Is document scanned? Pt states no documents  Honoring Choices Your Rights Handout: Informed and given  Was more information offered? Materials given    The Treatment team has appreciated the opportunity to work with you. If you have any questions or concerns about your recent admission, you can contact the unit which can receive your call 24 hours a day, 7 days a week. They will be able to get in touch with a Provider if needed. The unit number is [phone]        Diabetes Management Team Discharge Instructions    Glucose Control Regimen:   -Basaglar 25 units at bedtime   -Ozempic 0.5 mg once weekly, if tolerating, please increase to 1 mg weekly in one week.   -Metformin XR 2000 mg daily with dinner   -Humalog sliding scale with meals:    For Pre-Meal  - 164 give 1 unit.   For Pre-Meal  - 189 give 2 units.   For Pre-Meal  - 214 give 3 units.   For Pre-Meal  - 239 give 4 units.   For Pre-Meal  - 264 give 5 units.   For Pre-Meal  - 289 give 6 units.   For Pre-Meal  - 314 give 7 units.    For Pre-Meal  - 339 give 8 units.   For Pre-Meal  - 364 give 9 units.   For Pre-Meal BG greater than or equal to 365 give 10 units      Blood Glucose Checks: three times daily before meals, and at bedtime.    Endocrinology Outpatient follow up: An appointment request was sent to the [facility] End[facility]inology Clinic coordinator to schedule your outpatient diabetes appointment 1-2 weeks from discharge. Please call the clinic at [phone] if you do not have an appointment scheduled on discharge, or if you have non-urgent questions regarding your blood sugars or insulin.     If you have urgent questions or concerns regarding your blood sugars or insulin, you may contact [phone] (the main hospital ). Ask to speak with the endocrinologist on call.    Your target A1c value is less than 7%. Your most recent A1c is 10.3.     Thank you for letting the Diabetes Management Team be involved in your care!

## 2022-05-02 NOTE — PLAN OF CARE
Problem: Behavioral Health Plan of Care  Goal: Optimal Comfort and Wellbeing  Outcome: Ongoing, Progressing   Goal Outcome Evaluation:    Pt was visible in milieu, he attended and participated in group. He denies Anxiety, depression and thought of self harm. He had 100% of his dinner and snacks. Medication complaint, blood sugar 228 and 313 he received coverage and scheduled Lantus.

## 2022-05-02 NOTE — PROGRESS NOTES
Prior Authorization **APPROVED**    Authorization Effective Date: 5/2/2022  Authorization Expiration Date: 12/31/2099  Medication: Viibryd 10mg tablets **APPROVED**  Approved Dose/Quantity: 1 tablet daily  Reference #: CoverMyMeds Key: [id number] - [place] Case ID: [id number]   Insurance Company: [employer] ([employer]) - Phone [phone] Fax [phone]  Expected CoPay: $15     CoPay Card Available: No    Foundation Assistance Needed: n/a  Which Pharmacy is filling the prescription (Not needed for infusion/clinic administered): n/a - Patient has not yet been discharged, and there are no outpatient orders for this medication yet  Comments:  Proactive Prior Authorization        Emmanuelle Sanchez [facility] Liaison  Pronouns: She/Her/Hers    [facility]  [facility]  [address] [place] [address] [address] [address]   [email or web address]  [email or web address]   Phone: [phone]  Pager: [phone]  Fax: [phone]

## 2022-05-02 NOTE — PROGRESS NOTES
Brief Medicine Progress Note     Chart checked today.     #Uncontrolled diabetes mellitus type II, insulin dependent.   Most recent A1C from toay is 9.8 on 4/28. Pt has not been checking BG at home. Does not count carb. He gives humalog insulin based on a sliding scale before meals but he usually doesn't check blood glucose prior to insulin and gives 20-26 units. He was started on js home dose of Lantus 60 units on admission.Since admission, he has continued to run hyperglycemic with levels ranging 149-313 (5/1). His lows appear to occur in the middle of the night, down to 84 at 0225 on 4/30.   - Since 4/30 we have held bedtime dose of Novolog. This has helped with overnight night low but patient remains hyperglycemic overall.  - Cont PTA Lantus 60 units at bedtime  - increase Novolog to high resistance sliding scale today (5/2)  - Continue to hold bedtime correction dose for now   -Hypoglycemia protocol  -F/U primary care outpt. He does not follow with Endocrinology.   -Cont ACE inhibitor for renoprotection (patient denies history of HTN and not documented in chart)  -Restarted PTA Aspirin     Will continue to follow for diabetes recommendations     BRITNEY McnamaraC  Internal Medicine [facility] Hospitalist  [facility]  Pager [phone]

## 2022-05-03 LAB
GLUCOSE BLDC GLUCOMTR-MCNC: 176 MG/DL (ref 70–99)
GLUCOSE BLDC GLUCOMTR-MCNC: 194 MG/DL (ref 70–99)
GLUCOSE BLDC GLUCOMTR-MCNC: 206 MG/DL (ref 70–99)
GLUCOSE BLDC GLUCOMTR-MCNC: 295 MG/DL (ref 70–99)
GLUCOSE BLDC GLUCOMTR-MCNC: 312 MG/DL (ref 70–99)

## 2022-05-03 PROCEDURE — 250N000013 HC RX MED GY IP 250 OP 250 PS 637

## 2022-05-03 PROCEDURE — 250N000013 HC RX MED GY IP 250 OP 250 PS 637: Performed by: STUDENT IN AN ORGANIZED HEALTH CARE EDUCATION/TRAINING PROGRAM

## 2022-05-03 PROCEDURE — 250N000013 HC RX MED GY IP 250 OP 250 PS 637: Performed by: PHYSICIAN ASSISTANT

## 2022-05-03 PROCEDURE — 124N000002 HC R&B MH UMMC

## 2022-05-03 PROCEDURE — 250N000012 HC RX MED GY IP 250 OP 636 PS 637: Performed by: STUDENT IN AN ORGANIZED HEALTH CARE EDUCATION/TRAINING PROGRAM

## 2022-05-03 PROCEDURE — H2032 ACTIVITY THERAPY, PER 15 MIN: HCPCS

## 2022-05-03 PROCEDURE — 99207 PR NO BILLABLE SERVICE THIS VISIT: CPT | Performed by: STUDENT IN AN ORGANIZED HEALTH CARE EDUCATION/TRAINING PROGRAM

## 2022-05-03 PROCEDURE — 90853 GROUP PSYCHOTHERAPY: CPT

## 2022-05-03 PROCEDURE — 99232 SBSQ HOSP IP/OBS MODERATE 35: CPT | Mod: GC | Performed by: PSYCHIATRY & NEUROLOGY

## 2022-05-03 RX ORDER — VILAZODONE HYDROCHLORIDE 10 MG/1
10 TABLET ORAL DAILY
Status: DISCONTINUED | OUTPATIENT
Start: 2022-05-03 | End: 2022-05-09

## 2022-05-03 RX ADMIN — ACETAMINOPHEN 650 MG: 325 TABLET ORAL at 17:11

## 2022-05-03 RX ADMIN — VILAZODONE HYDROCHLORIDE 10 MG: 10 TABLET ORAL at 13:13

## 2022-05-03 RX ADMIN — LISINOPRIL 5 MG: 5 TABLET ORAL at 08:28

## 2022-05-03 RX ADMIN — VENLAFAXINE HYDROCHLORIDE 300 MG: 75 CAPSULE, EXTENDED RELEASE ORAL at 21:41

## 2022-05-03 RX ADMIN — PHENYTOIN SODIUM 500 MG: 100 CAPSULE ORAL at 21:41

## 2022-05-03 RX ADMIN — METFORMIN HYDROCHLORIDE 2000 MG: 750 TABLET, EXTENDED RELEASE ORAL at 17:59

## 2022-05-03 RX ADMIN — CARBAMIDE PEROXIDE 6.5% 3 DROP: 6.5 LIQUID AURICULAR (OTIC) at 12:12

## 2022-05-03 RX ADMIN — INSULIN GLARGINE 62 UNITS: 100 INJECTION, SOLUTION SUBCUTANEOUS at 21:40

## 2022-05-03 RX ADMIN — ASPIRIN 81 MG CHEWABLE TABLET 81 MG: 81 TABLET CHEWABLE at 08:28

## 2022-05-03 RX ADMIN — ATORVASTATIN CALCIUM 20 MG: 20 TABLET, FILM COATED ORAL at 21:40

## 2022-05-03 RX ADMIN — QUETIAPINE FUMARATE 50 MG: 50 TABLET ORAL at 21:40

## 2022-05-03 RX ADMIN — CARBAMIDE PEROXIDE 6.5% 3 DROP: 6.5 LIQUID AURICULAR (OTIC) at 21:48

## 2022-05-03 ASSESSMENT — ACTIVITIES OF DAILY LIVING (ADL)
ORAL_HYGIENE: INDEPENDENT
ORAL_HYGIENE: INDEPENDENT
HYGIENE/GROOMING: INDEPENDENT
HYGIENE/GROOMING: INDEPENDENT
DRESS: INDEPENDENT;SCRUBS (BEHAVIORAL HEALTH)
DRESS: INDEPENDENT

## 2022-05-03 NOTE — PLAN OF CARE
"Pt denies SI.  Pt states feels his mood is getting better. Pt happy about starting new medications.  Pt has gone to some groups, eaten meals, sat in lounge watching TV.  Pt right war was irrigated and much ear wax came out.  Pt showered earlier today and changed his bed sheets.    Problem: Plan of Care - These are the overarching goals to be used throughout the patient stay.    Goal: Plan of Care Review/Shift Note  Description: The Plan of Care Review/Shift note should be completed every shift.  The Outcome Evaluation is a brief statement about your assessment that the patient is improving, declining, or no change.  This information will be displayed automatically on your shift note.  Outcome: Ongoing, Progressing  Goal: Patient-Specific Goal (Individualized)  Description: You can add care plan individualizations to a care plan. Examples of Individualization might be:  \"Parent requests to be called daily at 9am for status\", \"I have a hard time hearing out of my right ear\", or \"Do not touch me to wake me up as it startles me\".  Outcome: Ongoing, Progressing  Goal: Absence of Hospital-Acquired Illness or Injury  Outcome: Ongoing, Progressing  Goal: Optimal Comfort and Wellbeing  Outcome: Ongoing, Progressing  Goal: Readiness for Transition of Care  Outcome: Ongoing, Progressing   Goal Outcome Evaluation:                      "

## 2022-05-03 NOTE — PROGRESS NOTES
05/02/22 2100   Group Therapy Session   Group Attendance attended group session   Time Session Began 2000   Time Session Ended 2100   Total Time patient participated (minutes) 60   Total # Attendees 7   Group Type expressive therapy   Group Topic Covered emotions/expression   Patient Response/Contribution cooperative with task     Art Therapy directive was to create four images in response to a handout on holistic health. Pt was an engaged participant, focused on task for the full duration of group. Pt used a variety of drawing materials (chalk and oil pastels, colored pencils). Pt finished drawings and briefly shared with group. Pt wrote words for all of the drawings and created a personal timeline of sorts including people, places and things that are important to pt.   Pts mood was calm, pleasant participant.

## 2022-05-03 NOTE — PROGRESS NOTES
SPIRITUAL HEALTH SERVICES  SPIRITUAL ASSESSMENT Progress Note  [facility]     REFERRAL SOURCE: I did visit this morning patient [place] per follow up visit. Pt was sitting and watching TV. Pt was so glad to have this  in order to chat his spiritual struggle. Pt is [employer] and he called his own  to come over for confession, forgiveness, absolution and spiritual care giving visit. Pt is so excited today because he is going to see his own  visitation planned for this afternoon. As pt requested, I gave him a Bible and a very short size rosary, to be prepared spiritually before his own  show up. Pt think, by confessing his all sins for the  he trust, he believe his lives getting better than before. I also shared some hope giving messages with the pt and at the end of our conversation, I offered him a prayer.    PLAN: I will remain open to provide spiritual care for the pt as needed.    Guy Peterson M.Div. ([place]), M.Th., D.Min., [facility]  Staff   Pager [phone]

## 2022-05-03 NOTE — PLAN OF CARE
05/03/22 1536   Group Therapy Session   Group Attendance attended group session   Time Session Began 1420   Time Session Ended 1505   Total Time patient participated (minutes) 45   Total # Attendees 5   Group Type psychotherapeutic   Group Topic Covered coping skills/lifestyle management   Group Session Detail Mindfulness, identifying values and barriers.   Patient Response/Contribution listened actively;expressed understanding of topic;discussed personal experience with topic;offered helpful suggestions to peers   Patient Response Detail Participated appropriately, identified not know who he is and what he wants.

## 2022-05-03 NOTE — PLAN OF CARE
Problem: Sleep Disturbance  Goal: Adequate Sleep/Rest  Outcome: Ongoing, Progressing    Patient appears sleeping resting comfortably in bed during rounds. BG-194 @ 0207-no insulin correction needed @ noc. Patient no complaint of pain and discomfort so far. Patient denies SI/SIB or any AV/VH. Patient able to communicate needs. Continues on q 15 minutes check for safety. Patient appeared to have slept for 6.75 hours this shift. Will continue to monitor patient and notify MD as needed. Will continue with current plan of care.

## 2022-05-03 NOTE — PLAN OF CARE
Assessment/Intervention/Current Symtoms and Care Coordination  The patient's care was discussed with the treatment team and chart notes were reviewed.   Patient met with team. He continues to report marked depressive sx's- but reports he may be starting to feel a little better  Patient has been attending groups, compliant, cooperative and pleasant   Patient looking forward to  his  visiting to offer holy communion.  CTC discussed 55 + outpatient program.  Patient has no computer at home, or smart phone so stated that this wasn't an option for him.   Also discussed the [facility], however he stated he has not heard good things about this program.       Discharge Plan or Goal  Patient will return home when stable/safe  Patient will be referred for outpatient Psychiatry        Barriers to Discharge   Severity of depression  Medication mgmt per MD's     Referral Status  None today     Legal Status     Voluntary

## 2022-05-03 NOTE — PROGRESS NOTES
"    ----------------------------------------------------------------------------------------------------------  [facility]  Psychiatric Progress Note  Hospital Day #6    Isac Li MRN# [id number]   Age: 67 year old YOB: 1955   Date of Admission: 4/27/2022     Subjective   The patient was discussed with the treatment team and chart notes were reviewed.      Identifier: Isac Li is a 67 year old male with a past psychiatric history of major depressive disorder admitted from the  ER on 04/27/2022 due to concern for SI in the context of loss of a recent close friend to suicide. Significant symptoms include worsening SI, depressed mood worthlessness, poor selfesteem, guiltiness, low energy  and sleep issues over past 2 weeks. His last psychiatric hospitalization was 20 years ago. Current psychosocial stressors include recent loss of a close friend to suicide. Patient is on hospital day #6. Assessment is that the current presentation is consistent with diagnosis of MDD, recurrent and severe.     Sleep:   6.75 hours (05/03/22 0600)  Prescribed Medications: Taken as prescribed  PRN Psychiatric Medications:   -Tylenol 650 mg PO        Overnight Nursing Notes/Staff Report:  \"Alert and oriented x4, patient was pleasant, cooperative and medication compliant. Visible and social in milieu. He denied pain or discomfort, independent with all ADLs. Denied SI/HI, contracted for safety. Denied anxiety or depression.\"    \"Patient appears sleeping resting comfortably in bed during rounds. BG-194 @ 0207-no insulin correction needed @ noc. Patient no complaint of pain and discomfort so far. Patient denies SI/SIB or any AV/VH.\"      Patient interview:  Isac mentions that today for the first time in a long time he found himself singing and making  he attributes this to felling \"a bit better\" since he has been able to acknowledge he has \"not been ok for a long time\" He mentioned that he " "feels like his humor is coming back and it is making him feel more like himself since he first got here.    \"I'm so grateful of being here, depression is not something you wish upon your worst enemy\" and that he hopes he will get better once he discharges home.  Treatment options were previously discussed and per Pharmacy CL notes, prior authorization for Vybriid and Trintellix was approved. Patient agreed with treatment team recommendations and is interested in starting corss-titration, starting Vibriid 10 mg PO today.   MSE is notable for affect range being more full, passive SI persisting and improving. Patient is future-oriented.   Denies SE from medications and has no further questions at this time.        ROS   ROS was negative unless noted above.     Objective   Vitals:  Temp: 97.2  F (36.2  C) (Temp  Min: 97.2  F (36.2  C)  Max: 97.2  F (36.2  C))  Resp: 18 (Resp  Min: 18  Max: 18)  SpO2: 98 % (SpO2  Min: 98 %  Max: 98 %)  Pulse: 80 (Pulse  Min: 80  Max: 80)  BP: (!) 142/82  Systolic (24hrs), Av , Min:142 , Max:142   Diastolic (24hrs), Av, Min:82, Max:82    Mental Status Examination:   Oriented to: Fully oriented to person, place and situation  General: Awake and Alert  Appearance:  appears stated age, Grooming is adequate and Dressed in hospital scrubs and wearing glasses. Grooming is adequate  Behavior:  calm, cooperative and engaged, teary at times when talking about events leading to hospitalization  Eye Contact:  good  Psychomotor:  no abnormal motor symptoms appreciated; no catatonia present  Speech:  appropriate volume/tone and prossody  Language: Fluent in English with appropriate syntax and vocabulary.  Mood:  \"good today\"  Affect:  appropriate and congruent with mood, although mood still somewhat blunted  Thought Process:  coherent, linear, logical and goal directed  Thought Content: No active SI currently denies /HI/AH/VH, No HI; No apparent delusions  Associations:  intact  Insight:  " good due to reason for hospitalization and realistic expectations regarding further steps. Future-oriented.  Judgment:  fair due to voluntary admission in setting of active SI w plan  Attention Span: grossly intact  Concentration:  Adequate during > 20 minute conversation  Recent and Remote Memory:  Intact and adequate for conversation   Fund of Knowledge: Consistent with level of formal education      Allergies     Allergies   Allergen Reactions     Sulfa (Sulfonamide Antibiotics) [Sulfa Drugs] Unknown        Labs & Imaging     Results for orders placed or performed during the hospital encounter of 04/27/22 (from the past 24 hour(s))   Glucose by meter   Result Value Ref Range    GLUCOSE BY METER POCT 149 (H) 70 - 99 mg/dL   Glucose by meter   Result Value Ref Range    GLUCOSE BY METER POCT 233 (H) 70 - 99 mg/dL   Glucose by meter   Result Value Ref Range    GLUCOSE BY METER POCT 282 (H) 70 - 99 mg/dL   Glucose by meter   Result Value Ref Range    GLUCOSE BY METER POCT 296 (H) 70 - 99 mg/dL   Glucose by meter   Result Value Ref Range    GLUCOSE BY METER POCT 194 (H) 70 - 99 mg/dL   Glucose by meter   Result Value Ref Range    GLUCOSE BY METER POCT 176 (H) 70 - 99 mg/dL       Trending Labs: (lithium levels, ANC, etc.) Phenytoin levels checked on admission. Level was 10.      Assessment   Diagnostic Impression:  Isac Li is a 67 year old male with a past psychiatric history of major depressive disorder admitted from the  ER on 04/27/2022 due to concern for SI in the context of loss of a recent close friend to suicide.. Significant symptoms include SI, depressed and sleep issues. His last psychiatric hospitalization was 20 years ago and last visit with a psychiatrist was around 15 years ago. Sees his primary care physician for psychiatric medications and follows with an outpatient therapist. Current psychosocial stressors include recent loss of a close friend to suicide who was also Mandaen.  Patient's support  system includes his sister and some members of the [place] community.  Substance use does not appear to be playing a contributing role in the patient's presentation.  There is genetic loading for none known. Medical history does appear to be significant for seizures and prior TBI as well as sleep apnea requiring CPAP which broke and he has been unable to replace. The MSE is notable for sad, tearful, and at times distraught affect, talkative engagement, and open conversation with treatment team. He denies self injurious behaviors.      His reported symptoms of depressed mood,  SI, worthlessness, poor selfesteem, guiltyness, low energy  and sleep issues are consistent with his historic diagnosis of major depressive disorder. These issues are also likely compounded by grief related to friend's recent death by suicide, trauma as a child, and ongoing struggle with compulsive sexual behavior as well as ongoing internal conflict between his sexual identity and [employer] alex. Optimization of medications to target these symptom clusters in addition to evaluation of adequate outpatient supports will be targets for treatment during this admission.      Given that he currently has SI and marked depression, patient warrants inpatient psychiatric hospitalization to maintain his safety. Disposition pending clinical stabilization, medication optimization and development of an appropriate discharge plan.    Principal Diagnosis:     Major Depressive Disorder, severe, recurrent    Secondary psychiatric diagnoses of concern this admission:     Compulsive Sexual Behavior    Alcohol Use Disorder (historical)    Psychiatric course:  Isac Li was admitted to [facility] as a voluntary patient. His PTA aspirin, atorvastatin, lisinopril, metformin, phenytoin, and venlafaxine were continued. 4/28 Seroquel 50 mg PO as augmenting agent to selective serotonin reuptake inhibitor. 5/2 Pharmacy CL consult for Vibriid/Trintellix coverage and  prior authorization was placed and was approved. 5/3 Cross-titration was started by adding Vybriid 10 mg PO daily with food.       Isac Li continued to meet criteria for inpatient hospitalization medication optimization, inpatient stabilization, and appropriate discharge planning.     Medical course:   Isac Li was physically examined by the ED prior to being transferred to the unit and was found to be medically stable and appropriate for admission. 4/28 IM consult  And followed for management of hyperglycemia and insuline management.      Plan   Today's Changes:   -Start cross-titratation Venlafaxine-Vilazodone (Vybriid): Start Vybriid 10 mg PO daily with food.   -Re-assess tomorrow to consider start decreasing Venlafaxine.     _______________________________________________________________________  Psychiatric diagnoses and management:  Principal Diagnosis:     Major Depressive Disorder, severe, recurrent without psychotic features  -Start cross-titratation Venlafaxine-Vilazodone (Vybriid): Start Vybriid 10 mg PO daily with food.   -Re-assess tomorrow to consider start decreasing Venlafaxine.  -Continue Seroquel 50 mg at bedtime      Secondary Psychiatric Diagnoses:     Compulsive Sexual Behavior  ? Cont Venlafaxine     Alcohol Use Disorder (historical)    Additional Planning:    Continue to monitor for and stabilize: SI and depressed    Patient will be treated in therapeutic milieu with appropriate individual and group therapies as described.    Would benefit from community support groups    Will coordinate access and gun safety at home      Scheduled Medications (summary):  Current Facility-Administered Medications   Medication Dose Route Frequency     aspirin  81 mg Oral Daily     atorvastatin  20 mg Oral At Bedtime     carbamide peroxide  3 drop Left Ear BID     insulin aspart  1-10 Units Subcutaneous TID AC     insulin glargine  60 Units Subcutaneous At Bedtime     lisinopril  5 mg Oral Daily      metFORMIN  2,000 mg Oral Daily with supper     phenytoin  500 mg Oral At Bedtime     QUEtiapine  50 mg Oral At Bedtime     venlafaxine  300 mg Oral At Bedtime       PRN Medications (summary):  Current Facility-Administered Medications   Medication Dose Route Frequency     acetaminophen  650 mg Oral Q4H PRN     alum & mag hydroxide-simethicone  30 mL Oral Q4H PRN     glucose  15-30 g Oral Q15 Min PRN    Or     dextrose  25-50 mL Intravenous Q15 Min PRN    Or     glucagon  1 mg Subcutaneous Q15 Min PRN     hydrOXYzine  25 mg Oral Q4H PRN     melatonin  3 mg Oral At Bedtime PRN     OLANZapine  5 mg Oral TID PRN    Or     OLANZapine  5 mg Intramuscular TID PRN     polyethylene glycol  17 g Oral Daily PRN       Discontinued Medications (& Rationale):    None.     Consults:    Internal Medicine consult for uncontrolled T2DM with insulin use, appreciate recs.    Legal Status:    Voluntary     SIO:    No    Pt has not required locked seclusion or restraints in the past 24 hours to maintain safety, please refer to RN documentation for further details.    Disposition:    TBD, pending clinical stabilization, medication optimization, and formulation of a safe discharge plan.     Safety Assessment:   Behavioral Orders   Procedures     Code 1 - Restrict to Unit     Discontinue 1:1 attendant for suicide risk     Order Specific Question:   I have performed an in person assessment of the patient     Answer:   Based on this assessment the patient no longer requires a one on one attendant at this point in time.     Order Specific Question:   Rationale     Answer:   Patient States able to remain safe in hospital     Routine Programming     As clinically indicated     Sexual precautions     Status 15     Every 15 minutes.     Suicide precautions     Patients on Suicide Precautions should have a Combination Diet ordered that includes a Diet selection(s) AND a Behavioral Tray selection for Safe Tray - with utensils, or Safe Tray - NO  utensils        ____________________________________________________________________  Pertinent Medical diagnoses and management:    IM currently following patient, appreciate help, for further details please see note on 4/28.    #Hyperlipidemia  #Uncontrolled diabetes mellitus type II, insulin dependent.   Most recent A1C from toay is 9.8 on 4/28. Pt has not been checking BG at home. Does not count carb. He gives humalog insulin based on a sliding scale before meals but he usually doesn't check blood glucose prior to insulin and gives 20-26 units. He was started on js home dose of Lantus 60 units on admission.Since admission, he has continued to run hyperglycemic with levels ranging 149-313 (5/1). His lows appear to occur in the middle of the night, down to 84 at 0225 on 4/30.   - Since 4/30 we have held bedtime dose of Novolog. This has helped with overnight night low but patient remains hyperglycemic overall.  - Cont PTA Lantus 60 units at bedtime  - increase Novolog to high resistance sliding scale today (5/2)  - Continue to hold bedtime correction dose for now   -Hypoglycemia protocol  -F/U primary care outpt. He does not follow with Endocrinology.   -Cont ACE inhibitor for renoprotection (patient denies history of HTN and not documented in chart)  -Restarted PTA Aspirin      Will continue to follow for diabetes recommendations     # History of Seizure disorder  - Continue PTA phenytoin, last blood level on 4/27 was 10.0. Therapeutic Range: 10.0-20.0 mg/L  ____________________________________________________________________  Patient seen and discussed with attending physician, Dr. Edel Lee M.D. who is in agreement with my assessment and plan.    Julio C Fonseca M.D.  PGY-1 Psychiatry Resident     Attestation:  This patient has been seen and evaluated by me, Edel Lee MD.  I have discussed this patient with the house staff team including the resident and medical student and I agree with the findings and  plan in this note.    I have reviewed today's vital signs, medications, labs and imaging. Edel Lee MD , PhD.

## 2022-05-03 NOTE — PROGRESS NOTES
Brief Medicine Progress Note     Chart checked today.     #Uncontrolled diabetes mellitus type II, insulin dependent.   Most recent A1C from toay is 9.8 on 4/28. Pt has not been checking BG at home. Does not count carb. He gives humalog insulin based on a sliding scale before meals but he usually doesn't check blood glucose prior to insulin and gives 20-26 units. He was started on his home dose of Lantus 60 units on admission.Since admission, he has continued to run hyperglycemic with levels ranging 149-313 (5/1). His lows appear to occur in the middle of the night, down to 84 at 0225 on 4/30.   - Since increasing his sliding scale insulin to high resistance yesterday he remains hyperglycemic   - Increase Lantuss from 60 to 62 units today (5/3)  - Continue Novolog to high resistance sliding scale (started 5/2)  - Continue to hold bedtime correction dose for now   - Hypoglycemia protocol  - F/U primary care outpt. He does not follow with Endocrinology.   - Cont ACE inhibitor for renoprotection (patient denies history of HTN and not documented in chart)  -Restarted PTA Aspirin     Will continue to follow for diabetes recommendations     Mohan Munoz PA-C  Internal Medicine [facility] Hospitalist  [facility]  Pager [phone]

## 2022-05-04 LAB
GLUCOSE BLDC GLUCOMTR-MCNC: 128 MG/DL (ref 70–99)
GLUCOSE BLDC GLUCOMTR-MCNC: 182 MG/DL (ref 70–99)
GLUCOSE BLDC GLUCOMTR-MCNC: 186 MG/DL (ref 70–99)
GLUCOSE BLDC GLUCOMTR-MCNC: 206 MG/DL (ref 70–99)
GLUCOSE BLDC GLUCOMTR-MCNC: 272 MG/DL (ref 70–99)

## 2022-05-04 PROCEDURE — G0177 OPPS/PHP; TRAIN & EDUC SERV: HCPCS

## 2022-05-04 PROCEDURE — 99222 1ST HOSP IP/OBS MODERATE 55: CPT | Performed by: PHYSICIAN ASSISTANT

## 2022-05-04 PROCEDURE — 250N000013 HC RX MED GY IP 250 OP 250 PS 637: Performed by: PHYSICIAN ASSISTANT

## 2022-05-04 PROCEDURE — 124N000002 HC R&B MH UMMC

## 2022-05-04 PROCEDURE — 250N000013 HC RX MED GY IP 250 OP 250 PS 637: Performed by: STUDENT IN AN ORGANIZED HEALTH CARE EDUCATION/TRAINING PROGRAM

## 2022-05-04 PROCEDURE — 250N000013 HC RX MED GY IP 250 OP 250 PS 637

## 2022-05-04 PROCEDURE — H2032 ACTIVITY THERAPY, PER 15 MIN: HCPCS

## 2022-05-04 PROCEDURE — 99232 SBSQ HOSP IP/OBS MODERATE 35: CPT | Mod: GC | Performed by: PSYCHIATRY & NEUROLOGY

## 2022-05-04 RX ADMIN — ASPIRIN 81 MG CHEWABLE TABLET 81 MG: 81 TABLET CHEWABLE at 08:30

## 2022-05-04 RX ADMIN — VILAZODONE HYDROCHLORIDE 10 MG: 10 TABLET ORAL at 08:32

## 2022-05-04 RX ADMIN — QUETIAPINE FUMARATE 50 MG: 50 TABLET ORAL at 21:48

## 2022-05-04 RX ADMIN — INSULIN GLARGINE 62 UNITS: 100 INJECTION, SOLUTION SUBCUTANEOUS at 21:44

## 2022-05-04 RX ADMIN — ATORVASTATIN CALCIUM 20 MG: 20 TABLET, FILM COATED ORAL at 21:48

## 2022-05-04 RX ADMIN — MELATONIN TAB 3 MG 3 MG: 3 TAB at 02:06

## 2022-05-04 RX ADMIN — LISINOPRIL 5 MG: 5 TABLET ORAL at 08:30

## 2022-05-04 RX ADMIN — ACETAMINOPHEN 650 MG: 325 TABLET ORAL at 17:06

## 2022-05-04 RX ADMIN — MELATONIN TAB 3 MG 3 MG: 3 TAB at 21:57

## 2022-05-04 RX ADMIN — VENLAFAXINE HYDROCHLORIDE 300 MG: 75 CAPSULE, EXTENDED RELEASE ORAL at 21:48

## 2022-05-04 RX ADMIN — PHENYTOIN SODIUM 500 MG: 100 CAPSULE ORAL at 21:47

## 2022-05-04 RX ADMIN — METFORMIN HYDROCHLORIDE 2000 MG: 750 TABLET, EXTENDED RELEASE ORAL at 17:48

## 2022-05-04 ASSESSMENT — ACTIVITIES OF DAILY LIVING (ADL)
LAUNDRY: WITH SUPERVISION
ORAL_HYGIENE: INDEPENDENT
DRESS: INDEPENDENT
ORAL_HYGIENE: INDEPENDENT
DRESS: INDEPENDENT
HYGIENE/GROOMING: INDEPENDENT
HYGIENE/GROOMING: INDEPENDENT

## 2022-05-04 NOTE — PROGRESS NOTES
05/03/22 2200   Group Therapy Session   Time Session Began 2010   Time Session Ended 2100   Total Time patient participated (minutes) 50   Total # Attendees 5   Group Type expressive therapy   Group Topic Covered cognitive activities;leisure exploration/use of leisure time;emotions/expression   Group Session Detail Music Bingo- 90s and 2000s   Patient Response/Contribution cooperative with task   Patient Response Detail Participated in Music Therapy intervention of Music Bingo today.  Goals of session were focusing, memory recall, positive distraction, emotional containment and social cohesion.  Pt response was engaged and cooperative.  At the end of session, Isac requested a hymn and began to sob as it played.  MT checked in with him afterwards and he appeared to have a safe and successful emotional release.

## 2022-05-04 NOTE — PLAN OF CARE
"This morning pt reported feeling \"groggy\" and like he was \"drugged.\" He was observed to be less energetic than he usually is in the mornings. He reported poor sleep compared to his usual quality/amount of sleep. He was given PRN Melatonin last night around 0200, but he is now requesting to have this offered at . He stated yesterday on the unit was \"challenging\" for him and other patients d/t another pt's erratic, aggressive behavior. BG at breakfast was 128. No Novolog required. Lunch time BG was 206, and he received 3 units of Novolog. Pt appears overall depressed and sad today. Passive suicidal ideation. No specific plan or intent. After lunch he reported he was feeling more alert than the morning.    Update: Pt is now on carb counts, along with sliding scale insuilin at meal times. See order for further details. Pt has been informed of new order and instructed to turn meal receipt into RN at meal times.     Problem: Plan of Care - These are the overarching goals to be used throughout the patient stay.    Goal: Plan of Care Review/Shift Note  Description: The Plan of Care Review/Shift note should be completed every shift.  The Outcome Evaluation is a brief statement about your assessment that the patient is improving, declining, or no change.  This information will be displayed automatically on your shift note.  Recent Flowsheet Documentation  Taken 5/4/2022 1152 by Sangita Elkins  Plan of Care Reviewed With: patient     "

## 2022-05-04 NOTE — PLAN OF CARE
[facility] Occupational Therapy Group Intervention Note       05/04/22 1306   Group Therapy Session   Group Attendance attended group session   Time Session Began 1110   Time Session Ended 1200   Total Time patient participated (minutes) 50   Total # Attendees 3   Group Type life skill   Group Topic Covered coping skills/lifestyle management;emotions/expression   Group Session Detail Leisure exploration and participation group offered for increased intrinsic motivation to engage in social, non-obligatory occupations via a group game. Structured group was used to promote positive milieu interaction and collaboration.    Patient Response/Contribution cooperative with task;expressed readiness to alter behaviors;discussed personal experience with topic;organized   Patient Response Detail Congruent affect. Highly engaged, insightful, and open with others. Reported recognition of masking pain and self-defeat with humor, hope for the future, and positive affirmations.      María Segura OT on 5/4/2022 at 1:07 PM

## 2022-05-04 NOTE — CONSULTS
Consulted by Tamela Gotti with Inpatient Diabetes Management Team to run a test claim for Preferred Glucose Meter/Supplies, Lantus, Novolog, and Metformin ER.    Patient has pharmacy benefits through [employer] (pre-paid medical assistance plan). Per insurance, the following are covered and preferred under the plan:      Metformin ER tabs - $8    Basaglar pens - $25    Humalog pens/vials - $25    OneTouch meter/strips/lancets - $26/$9/$25    The following are not covered under the plan:    Metformin ER Osmotic tabs    Lantus    Semglee    Novolog    Fiasp    Insulin Aspart    Insulin Lispro    Admelog    Accu-chek meter/strips/lancets    Contour meter/strips/lancets    Freestyle Lite meter/strips/lancets      Please feel free to contact me with any other test claims, prior authorizations, or insurance questions regarding outpatient medications.     Thanks!      Emmanuelle Sanchez CPhT  [facility] Liaison  Pronouns: She/Her/Hers    [facility]  [address] [place] [address]   [email or web address]   Phone: [phone]  Pager: [phone]  Fax: [phone]

## 2022-05-04 NOTE — PLAN OF CARE
Problem: Sleep Disturbance  Goal: Adequate Sleep/Rest  Outcome: Ongoing, Progressing  Patient BG-186 @ 0202. Patient complained of unable to sleep-Melatonin given. Patient denies Psychotic symptoms. Patient is calm and cooperative with his care. Patient appears sleeping during rounds. Will continue to monitor patient and provide therapeutic intervention as needed. Will continue with q 15 mins checks for safety.     Patient had 4 hours of sleep this shift.

## 2022-05-04 NOTE — PLAN OF CARE
Problem: Suicide Risk  Goal: Absence of Self-Harm  Outcome: Ongoing, Progressing     Problem: Behavioral Health Plan of Care  Goal: Adheres to Safety Considerations for Self and Others  Outcome: Ongoing, Progressing  Flowsheets (Taken 5/3/2022 1906)  Adheres to Safety Considerations for Self and Others: making progress toward outcome     Problem: Behavioral Health Plan of Care  Goal: Absence of New-Onset Illness or Injury  Outcome: Ongoing, Progressing     Problem: Plan of Care - These are the overarching goals to be used throughout the patient stay.    Goal: Plan of Care Review/Shift Note  Description: The Plan of Care Review/Shift note should be completed every shift.  The Outcome Evaluation is a brief statement about your assessment that the patient is improving, declining, or no change.  This information will be displayed automatically on your shift note.  Recent Flowsheet Documentation  Taken 5/3/2022 1711 by Hugo Goodrich RN  Plan of Care Reviewed With: patient     Problem: Plan of Care - These are the overarching goals to be used throughout the patient stay.    Goal: Absence of Hospital-Acquired Illness or Injury  Intervention: Prevent Skin Injury  Recent Flowsheet Documentation  Taken 5/3/2022 1711 by Hugo Goodrich RN  Body Position: position changed independently  Intervention: Prevent and Manage VTE (Venous Thromboembolism) Risk  Recent Flowsheet Documentation  Taken 5/3/2022 1711 by Hugo Goodrich RN  Activity Management:    ambulated in gutierrez    ambulated in room    ambulated to bathroom     Problem: Plan of Care - These are the overarching goals to be used throughout the patient stay.    Goal: Optimal Comfort and Wellbeing  Intervention: Monitor Pain and Promote Comfort  Recent Flowsheet Documentation  Taken 5/3/2022 1711 by Hugo Goodrich RN  Pain Management Interventions: medication (see MAR)     Problem: Plan of Care - These are the overarching goals to be used throughout the  patient stay.    Goal: Optimal Comfort and Wellbeing  Intervention: Provide Person-Centered Care  Recent Flowsheet Documentation  Taken 5/3/2022 1711 by Hugo Goodrich RN  Trust Relationship/Rapport:    care explained    choices provided    emotional support provided    empathic listening provided    questions answered    questions encouraged    reassurance provided    thoughts/feelings acknowledged   Goal Outcome Evaluation:    Plan of Care Reviewed With: patient     Alert and oriented x4, pleasant, cooperative and compliant with medications. Afternoon not going ok because of behavior incident of a peer that happed in milieu during day shift. Patient able to acknowledge that he should focus on himself. Visible in milieu, social and engaged with peers and staff members. Rated depression at 2, denied anxiety, no SI/HI, contracted for safety. Right shoulder pain 2/10, Tylenol PRN given. Visited with  during visiting time.

## 2022-05-04 NOTE — PLAN OF CARE
[facility] Occupational Therapy Brief Assessment      05/04/22 1045   General Information   Date Initially Attended OT 05/02/22   Special Considerations in the context of OT group interventions   Clinical Impression   Affect Appropriate to situation   Orientation Oriented to person, place and time   Appearance and ADLs Neatly groomed   Attention to Internal Stimuli No observed signs   Interaction Skills Interacts appropriately with staff;Interacts appropriately with peers   Ability to Communicate Needs Independent   Verbal Content Articulate;Appropriate to topic   Ability to Maintain Boundaries Maintains appropriate physical boundaries;Maintains appropriate verbal boundaries   Participation Initiates participation   Concentration Concentrates 30+ minutes   Ability to Concentrate With structure   Follows and Comprehends Directions Needs further assessment   Memory Delayed and immediate recall intact   Organization Needs further assessment   Ability to Apply and Learn Concepts Applies within group structure   Frustrations / Stress Tolerance Independently identifies sources of frustration/stress   Level of Insight Some insight   Self Esteem Takes risks with support and encouragement;Accepts positive feedback     María Segura OT on 5/4/2022 at 10:49 AM

## 2022-05-04 NOTE — CONSULTS
NEW INPATIENT DIABETES MANAGEMENT CONSULT  Isac Li  Age: 67 year old  MRN # [id number]   YOB: 1955    Chief Complaint: SI  Reason for Consult: assistance with insulin  Consulting Provider: Mohan Munoz PA-C    History of Present Illness:   Isac is a 67 year old male with PMHx TIIDM, depression, HTN, HLP who was admitted 4/27/22 to inpatient psychiatry for stabilization for SI in the context of recent loss of friend to suicide.     Isac reports a longstanding history of TIIDM x greater than 10 years. Managed with basal, bolus insulin and Metformin PTA. Poorly controlled, with most recent A1c 10.3%. He endorses that he bases his mealtime insulin completely on gestalt, does not carb count, and has missed doses of insulin, or stopped taking insulin completely for a period of days to weeks, when his depression is getting worse.     On admission, PTA Metformin was resumed. Lantus was started in place of PTA Basaglar at 60 units daily, and increased to 62 units on 5/3.   He is experiencing a pattern of prandial hyperglycemia in the context of his mealtime insulin not having been ordered, followed by improved fasting control with his basal insulin/Metformin having been continued.     Isac has a meter at home, does not routinely check BG. No recent lows. He has never been on a GLP1 agonist, though he is familiar with the names from commercials on TV. He thinks he may be interested in starting one, but isn't sure how long he'll be in the hospital for. He denies personal history of pancreatitis or thyroid cancer. He has multiple family members with hx cancer- mom with breast cancer. No known family history of thyroid cancer.    Diabetes Mellitus Type: II  Duration:  >10 years  Diabetic Complications: peripheral neuropathy   Prior to Admission Diabetes Regimen:      Frequency of checks: infrequent, some less than, and some greater than, 200  Metformin XR 2000 mg with dinner  Basaglar 62 units at  "bedtime  Humalog 18-26 units TID with meals- he does not formally count carbs, and doesn't really systematically estimate either.  Usual BG control PTA:  uncontrolled, with A1c 10.3% on 3/30/22  Able to Detect Hypoglycemia: once, years ago. Occurred at work. Was symptomatic, doesn't remember how low it went. None since.   Usual Diabetes Care Provider: PCP, Dr. Russ  Primary Care Provider: Marquise Russ  Factors Impacting IP Glucose Control: omission of prandial insulin  Current Diet: Orders Placed This Encounter      Regular Diet Adult       10 point ROS completed with pertinent positives and negatives noted in the HPI  Past medical, family and social histories are reviewed and updated.    Social History    Tobacco: former    Alcohol: no    Marital Status: single    Place of Residence: [place], [place]    Family History   no reported FHx diabetes.    Physical Exam   /79 (Patient Position: Sitting)   Pulse 79   Temp 98  F (36.7  C)   Resp 16   Ht 1.791 m (5' 10.5\")   Wt 134 kg (295 lb 6.4 oz)   SpO2 99%   BMI 41.79 kg/m    General: pleasant, in moderate distress, tearful having conversation with his nurse about his past.   HEENT: normocephalic, atraumatic. Oral mucous membranes moist.   Lungs: unlabored respiration, no cough  ABD: rounded, nondistended  Skin: warm and dry, no obvious lesions  MSK:  moves all extremities  Lymp:  no LE edema   Mental status:  alert, oriented to self, place, time  Psych:   calm and appropriate interaction, tearful recounting some history with his bedside RN    Most Recent Laboratory Tests:  Recent Labs   Lab 04/27/22  1615   HGB 13.8     Recent Labs   Lab 04/28/22  0744   A1C 9.8*     Recent Labs   Lab 04/27/22  1615   CR 0.82     Recent Labs   Lab 05/04/22  1743 05/04/22  1235 05/04/22  0827 05/04/22  0202 05/03/22  2116 05/03/22  1748   * 206* 128* 186* 312* 295*       Assessment:   1) Insulin dependent Type 2 diabetes, uncontrolled (A1c 10.3%)    Plan: " "   -Metformin XR 2000 mg daily with dinner   -Lantus 62 units daily HS   -Novolog 1:5g CHO coverage added with meals, snacks- to begin with dinner tonight.    -Novolog high intensity sliding scale AC and HS   -BG monitoring TID AC, HS, 0200   -hypoglycemia protocol   -recommend high/very high consistent carb diet with carb counting protocol- note that he currently has \"double portions ok\"- and this will be a barrier to effective BG control.    -once BG stabilize and dispo date is known, can consider GLP1 agonist initiation, to improve glycemic control and minimize insulin requirements.   -diabetes education needs are not anticipated.   -on discharge, will recommend outpatient follow up with PCP, and perhaps referral to endocrine/diabetes specialty service.     Discussed plan of care with patient, nursing, and primary team via this note.  Thank you for this consult; Inpatient Diabetes will continue to follow.     To contact Endocrine Diabetes service:   From 8AM-4PM: page inpatient diabetes provider that is following the patient  For questions or updates from 4PM-8AM: page the diabetes job code for on call fellow: [phone]    80 minutes spent on the date of the encounter doing chart review, history and exam, documentation and further activities per the note      Over 50% of my time on the unit was spent counseling the patient and/or coordinating care regarding acute hyperglycemia management.  See note for details.    Roslyn Alas PA-C  Inpatient Diabetes Management Service  Pager [phone]        "

## 2022-05-04 NOTE — PLAN OF CARE
"[facility] Occupational Therapy Group Intervention Note     05/04/22 1447   Group Therapy Session   Group Attendance attended group session   Time Session Began 1310   Time Session Ended 1430   Total Time patient participated (minutes) 70   Total # Attendees 66   Group Type task skill;life skill   Group Topic Covered coping skills/lifestyle management   Group Session Detail clinic - coping skill exploration, creative expression within personally meaningful activities, and observation of social, cognitive, and kinesthetic performance skills   Patient Response/Contribution cooperative with task;discussed personal experience with topic   Patient Response Detail Congruent affect while he joshua a detailed picture of the Agnes Lu. Wrote a long list of names (hospital staff and peers) 'who will be held in her heart' and became tearful when he stated \"even myself\". Excused self d/t these overwhelming emotions.     María Segura, OT on 5/4/2022 at 2:49 PM      "

## 2022-05-04 NOTE — PROGRESS NOTES
"    ----------------------------------------------------------------------------------------------------------  [facility]  Psychiatric Progress Note  Hospital Day #7    Isac Li MRN# [id number]   Age: 67 year old YOB: 1955   Date of Admission: 4/27/2022     Subjective   The patient was discussed with the treatment team and chart notes were reviewed.      Identifier:  Isac Li is a 67 year old male with a past psychiatric history of major depressive disorder admitted from the  ER on 04/27/2022 due to concern for SI in the context of loss of a recent close friend to suicide. Significant symptoms include worsening SI, depressed mood worthlessness, poor selfesteem, guiltiness, low energy  and sleep issues over past 2 weeks. His last psychiatric hospitalization was 20 years ago. Current psychosocial stressors include recent loss of a close friend to suicide. Patient is on hospital day #7. Assessment is that the current presentation is consistent with diagnosis of MDD, recurrent and severe.    Sleep:  4 hours (05/04/22 0645)  Prescribed Medications: Taken as prescribed  PRN Psychiatric Medications:     Melatonin 3mg PO (0206)    Overnight Nursing Notes/Staff Report:  \"Pt denies SI. Pt states feels his mood is getting better. Pt happy about starting new medications.  Pt has gone to some groups, eaten meals, sat in lounge watching TV.  Pt right war was irrigated and much ear wax came out.  Pt showered earlier today and changed his bed sheets.\"    \"Patient complained of unable to sleep-Melatonin given. Patient denies Psychotic symptoms. Patient is calm and cooperative with his care. Patient appears sleeping during rounds.\"    Patient interview:  Isac Li states that they feel \"a bit drugged\" this morning as he didn't sleep well last night. His appetite is normal for him. His high point yesterday was speaking to the [employer]. He shared that the [employer] \"reminded me who " "I am, a forgiven sinner, someone who has depression, and a sex addiction.\" He had a rough reaction to one of the other patient's disruption yesterday but talking to one of the nurses helped.    Pt is happy about his medication change and is wanting to find out what works. Pt feels groggy but reports no other SE from medications. He asked about his official diagnosis and seemed to accept his major depressive disorder dx.  He has no further questions at this time.    MSE was notable for drowsy, passive SI persisting and improving, pt is future-orientated. Pt engaged in conversation       Objective   Vitals:  Temp: 98  F (36.7  C) (Temp  Min: 97.2  F (36.2  C)  Max: 98  F (36.7  C))  Resp: 16 (Resp  Min: 16  Max: 16)  SpO2: 99 % (SpO2  Min: 97 %  Max: 99 %)  Pulse: 79 (Pulse  Min: 79  Max: 79)  BP: 137/79  Systolic (24hrs), Av , Min:137 , Max:137   Diastolic (24hrs), Av, Min:79, Max:79    Mental Status Examination:  Oriented to:  Fully orientated to person, place, and situation,  Appearance:  appears stated age, Grooming is adequate, Dressed in hospital scrubs and wearing glasses  Behavior:  calm, cooperative and engaged  Eye Contact:  good  Psychomotor:  no abnormal motor symptoms appreciated; no catatonia present  Speech:  appropriate volume/tone,  Language: Fluent in English with appropriate syntax and vocabulary.  Mood:  \"I feel drugged\"   Affect:  appropriate, mood more blunted today  Thought Process:  coherent, logical and goal directed  Thought Content: No SI/HI/AH/VH, No HI, No VH and No AH; No apparent delusions  Associations:  intact  Insight:  good due to understanding and accepting his diagnosis  Judgment:  fair due to voluntary admission in setting of active SI w plan  Attention Span: grossly intact  Concentration:  grossly intact  Recent and Remote Memory:  grossly intact  Fund of Knowledge: Consistent with level of formal education     Allergies     Allergies   Allergen Reactions     Sulfa " (Sulfonamide Antibiotics) [Sulfa Drugs] Unknown        Labs & Imaging     Results for orders placed or performed during the hospital encounter of 04/27/22 (from the past 24 hour(s))   Glucose by meter   Result Value Ref Range    GLUCOSE BY METER POCT 206 (H) 70 - 99 mg/dL   Glucose by meter   Result Value Ref Range    GLUCOSE BY METER POCT 295 (H) 70 - 99 mg/dL   Glucose by meter   Result Value Ref Range    GLUCOSE BY METER POCT 312 (H) 70 - 99 mg/dL   Glucose by meter   Result Value Ref Range    GLUCOSE BY METER POCT 186 (H) 70 - 99 mg/dL   Glucose by meter   Result Value Ref Range    GLUCOSE BY METER POCT 128 (H) 70 - 99 mg/dL            Assessment   Diagnostic Impression:  Isac Li is a 67 year old male with a past psychiatric history of major depressive disorder admitted from the  ER on 04/27/2022 due to concern for SI in the context of loss of a recent close friend to suicide.. Significant symptoms include SI, depressed and sleep issues. His last psychiatric hospitalization was 20 years ago and last visit with a psychiatrist was around 15 years ago. Sees his primary care physician for psychiatric medications and follows with an outpatient therapist. Current psychosocial stressors include recent loss of a close friend to suicide who was also Hindu.  Patient's support system includes his sister and some members of the Hindu community.  Substance use does not appear to be playing a contributing role in the patient's presentation.  There is genetic loading for none known. Medical history does appear to be significant for seizures and prior TBI as well as sleep apnea requiring CPAP which broke and he has been unable to replace. The MSE is notable for sad, tearful, and at times distraught affect, talkative engagement, and open conversation with treatment team. He denies self injurious behaviors.      His reported symptoms of depressed mood,  SI, worthlessness, poor selfesteem, guiltyness, low energy  and  sleep issues are consistent with his historic diagnosis of major depressive disorder. These issues are also likely compounded by grief related to friend's recent death by suicide, trauma as a child, and ongoing struggle with compulsive sexual behavior as well as ongoing internal conflict between his sexual identity and Hindu alex. Optimization of medications to target these symptom clusters in addition to evaluation of adequate outpatient supports will be targets for treatment during this admission.      Given that he currently has SI and marked depression, patient warrants inpatient psychiatric hospitalization to maintain his safety. Disposition pending clinical stabilization, medication optimization and development of an appropriate discharge plan.    Principal Diagnosis:     Major Depressive Disorder, severe, recurrent     Secondary psychiatric diagnoses of concern this admission:     Compulsive Sexual Behavior    Alcohol Use Disorder (historical)     Psychiatric course:  Isac Li was admitted to [facility] as a voluntary patient. His PTA aspirin, atorvastatin, lisinopril, metformin, phenytoin, and venlafaxine were continued.   4/28 Seroquel 50 mg PO as augmenting agent to selective serotonin reuptake inhibitor.   5/2 Pharmacy CL consult for Vibriid/Trintellix coverage and prior authorization was placed and was approved.   5/3 Cross-titration was started by adding Vybriid 10 mg PO daily with food.   5/4 Cross-titration continued with 10mg PO Vybriid daily and tapering venlafaxine to 225mg PO. Debrox 3 drops is being discontinued after ear wax sufficiently removed from pts ear        Isac Li continued to meet criteria for inpatient hospitalization medication optimization, inpatient stabilization, and appropriate discharge planning.    Medical course:   Isac Li was physically examined by the ED prior to being transferred to the unit and was found to be medically stable and appropriate for  admission.  5/03 IM consult and followed for management of hyperglycemia and insulin management.     Plan   Today's Changes:     Decrease Venlafaxine to 225mg PO daily    Discontinue Debrox for ear wax  _______________________________________________________________________  Psychiatric diagnoses and management:  Principal Diagnosis:     Major Depressive Disorder, severe, recurrent without psychotic features  -Continue cross-titratation Venlafaxine-Vilazodone (Vybriid): Vybriid 10 mg PO daily with food.   -Decrease Venlafaxine to 225mg PO daily  -Continue Seroquel 50 mg at bedtime      Secondary Psychiatric Diagnoses:     Compulsive Sexual Behavior  ? Cont Venlafaxine     Alcohol Use Disorder (historical)     Additional Planning:    Continue to monitor for and stabilize: SI and depressed    Patient will be treated in therapeutic milieu with appropriate individual and group therapies as described.    Would benefit from community support groups    Will coordinate access and gun safety at home    Scheduled Medications (summary):    aspirin  81 mg Oral Daily     atorvastatin  20 mg Oral At Bedtime     carbamide peroxide  3 drop Left Ear BID     insulin aspart  1-10 Units Subcutaneous TID AC     insulin glargine  62 Units Subcutaneous At Bedtime     lisinopril  5 mg Oral Daily     metFORMIN  2,000 mg Oral Daily with supper     phenytoin  500 mg Oral At Bedtime     QUEtiapine  50 mg Oral At Bedtime     venlafaxine  300 mg Oral At Bedtime     vilazodone  10 mg Oral Daily       PRN Medications (summary):  acetaminophen, alum & mag hydroxide-simethicone, glucose **OR** dextrose **OR** glucagon, hydrOXYzine, melatonin, OLANZapine **OR** OLANZapine, polyethylene glycol    Discontinued Medications (& Rationale):    Carbamide peroxide (Debrox) 3 drops  o Ear wax sufficiently removed     Consults:    Internal medicine consult for uncontrolled T2DM with insulin use    Legal Status:    Voluntary     SIO:    No    Pt has not  "required locked seclusion or restraints in the past 24 hours to maintain safety, please refer to RN documentation for further details.    Disposition:    TBD, pending clinical stabilization, medication optimization, and formulation of a safe discharge plan.    Safety Assessment:   Behavioral Orders   Procedures     Code 1 - Restrict to Unit     Discontinue 1:1 attendant for suicide risk     Order Specific Question:   I have performed an in person assessment of the patient     Answer:   Based on this assessment the patient no longer requires a one on one attendant at this point in time.     Order Specific Question:   Rationale     Answer:   Patient States able to remain safe in hospital     Routine Programming     As clinically indicated     Sexual precautions     Status 15     Every 15 minutes.     Suicide precautions     Patients on Suicide Precautions should have a Combination Diet ordered that includes a Diet selection(s) AND a Behavioral Tray selection for Safe Tray - with utensils, or Safe Tray - NO utensils        ____________________________________________________________________  Pertinent Medical diagnoses and management:    IM currently following patient, apperciate help, for further details please see note on 05/03    #Uncontrolled diabetes mellitus type II, insulin dependent.   Being managed by medicine  \"Most recent A1C from toay is 9.8 on 4/28. Pt has not been checking BG at home. Does not count carb. He gives humalog insulin based on a sliding scale before meals but he usually doesn't check blood glucose prior to insulin and gives 20-26 units. He was started on his home dose of Lantus 60 units on admission.Since admission, he has continued to run hyperglycemic with levels ranging 149-313 (5/1). His lows appear to occur in the middle of the night, down to 84 at 0225 on 4/30.   - Since increasing his sliding scale insulin to high resistance yesterday he remains hyperglycemic   - Increase Lantuss from " "60 to 62 units today (5/3)  - Continue Novolog to high resistance sliding scale (started 5/2)  - Continue to hold bedtime correction dose for now   - Hypoglycemia protocol  - F/U primary care outpt. He does not follow with Endocrinology.   - Cont ACE inhibitor for renoprotection (patient denies history of HTN and not documented in chart)  -Restarted PTA Aspirin\"   ____________________________________________________________________  Patient seen and discussed with attending physician, Dr. Edel Lee MD who is in agreement with my assessment and plan.    Mckenna Taylor  Medical Student MS3    I was present with the medical student who participated in the service and in the documentation of the note. I have verified the history and personally performed the physical exam and medical decision making. I agree with the assessment and plan of care as documented in the note and have made changes to the note as appropriate.   Jun Gonzalez MD MPH  PGY 1 Psychiatry resident    Attestation:  This patient has been seen and evaluated by me, Edel Lee MD.  I have discussed this patient with the house staff team including the resident and medical student and I agree with the findings and plan in this note.    I have reviewed today's vital signs, medications, labs and imaging. Edel Lee MD , PhD.      "

## 2022-05-04 NOTE — PLAN OF CARE
Assessment/Intervention/Current Symtoms and Care Coordination  The patient's care was discussed with the treatment team and chart notes were reviewed.   Patient met with team. He reports that yesterday an incident with another patient really distressed him. Was grateful that his  visited him last night. He continues to struggle with shame and guilt, but states that spiritual support is very helpful, calming for him.  Patient remains depressed.  He is very concerned that he will be discharged home before he is ready- depression improved. Patient continues to be deemed high risk for suicide. (male, single, isolated, access to fire arms, recent loss of friend to suicide, H/O substance abuse)    Discharge Plan or Goal  Patient will return home when stable/safe  Patient will be referred for outpatient Psychiatry     Barriers to Discharge   Severity of depression  Medication mgmt per MD's     Referral Status  None today     Legal Status     Voluntary

## 2022-05-05 LAB
GLUCOSE BLDC GLUCOMTR-MCNC: 148 MG/DL (ref 70–99)
GLUCOSE BLDC GLUCOMTR-MCNC: 151 MG/DL (ref 70–99)
GLUCOSE BLDC GLUCOMTR-MCNC: 207 MG/DL (ref 70–99)
GLUCOSE BLDC GLUCOMTR-MCNC: 238 MG/DL (ref 70–99)
GLUCOSE BLDC GLUCOMTR-MCNC: 70 MG/DL (ref 70–99)
GLUCOSE BLDC GLUCOMTR-MCNC: 75 MG/DL (ref 70–99)
GLUCOSE BLDC GLUCOMTR-MCNC: 78 MG/DL (ref 70–99)
SARS-COV-2 RNA RESP QL NAA+PROBE: NEGATIVE

## 2022-05-05 PROCEDURE — 99232 SBSQ HOSP IP/OBS MODERATE 35: CPT | Mod: GC | Performed by: PSYCHIATRY & NEUROLOGY

## 2022-05-05 PROCEDURE — 250N000013 HC RX MED GY IP 250 OP 250 PS 637: Performed by: STUDENT IN AN ORGANIZED HEALTH CARE EDUCATION/TRAINING PROGRAM

## 2022-05-05 PROCEDURE — 124N000002 HC R&B MH UMMC

## 2022-05-05 PROCEDURE — 90853 GROUP PSYCHOTHERAPY: CPT

## 2022-05-05 PROCEDURE — G0177 OPPS/PHP; TRAIN & EDUC SERV: HCPCS

## 2022-05-05 PROCEDURE — 87635 SARS-COV-2 COVID-19 AMP PRB: CPT | Performed by: STUDENT IN AN ORGANIZED HEALTH CARE EDUCATION/TRAINING PROGRAM

## 2022-05-05 PROCEDURE — 250N000013 HC RX MED GY IP 250 OP 250 PS 637

## 2022-05-05 PROCEDURE — 99233 SBSQ HOSP IP/OBS HIGH 50: CPT | Performed by: NURSE PRACTITIONER

## 2022-05-05 PROCEDURE — 250N000013 HC RX MED GY IP 250 OP 250 PS 637: Performed by: PHYSICIAN ASSISTANT

## 2022-05-05 RX ORDER — VENLAFAXINE HYDROCHLORIDE 75 MG/1
225 CAPSULE, EXTENDED RELEASE ORAL AT BEDTIME
Status: DISCONTINUED | OUTPATIENT
Start: 2022-05-05 | End: 2022-05-06

## 2022-05-05 RX ADMIN — ASPIRIN 81 MG CHEWABLE TABLET 81 MG: 81 TABLET CHEWABLE at 09:40

## 2022-05-05 RX ADMIN — QUETIAPINE FUMARATE 50 MG: 50 TABLET ORAL at 21:12

## 2022-05-05 RX ADMIN — VILAZODONE HYDROCHLORIDE 10 MG: 10 TABLET ORAL at 09:40

## 2022-05-05 RX ADMIN — LISINOPRIL 5 MG: 5 TABLET ORAL at 09:41

## 2022-05-05 RX ADMIN — METFORMIN HYDROCHLORIDE 2000 MG: 750 TABLET, EXTENDED RELEASE ORAL at 17:54

## 2022-05-05 RX ADMIN — MELATONIN TAB 3 MG 3 MG: 3 TAB at 21:19

## 2022-05-05 RX ADMIN — PHENYTOIN SODIUM 500 MG: 100 CAPSULE ORAL at 21:12

## 2022-05-05 RX ADMIN — VENLAFAXINE HYDROCHLORIDE 225 MG: 75 CAPSULE, EXTENDED RELEASE ORAL at 21:12

## 2022-05-05 RX ADMIN — ATORVASTATIN CALCIUM 20 MG: 20 TABLET, FILM COATED ORAL at 21:12

## 2022-05-05 ASSESSMENT — ACTIVITIES OF DAILY LIVING (ADL)
HYGIENE/GROOMING: INDEPENDENT
DRESS: INDEPENDENT;SCRUBS (BEHAVIORAL HEALTH)
ORAL_HYGIENE: INDEPENDENT
LAUNDRY: WITH SUPERVISION
DRESS: INDEPENDENT
LAUNDRY: WITH SUPERVISION
HYGIENE/GROOMING: INDEPENDENT
ORAL_HYGIENE: INDEPENDENT

## 2022-05-05 NOTE — CONSULTS
Consulted by Tamela Gotti & Stephanie Montenegro with Inpatient Diabetes Management Team to run a test claim for Preferred Glucose Meter/Supplies, Lantus, Novolog, Metformin ER, and GLP-1 receptor agonist drugs..    Patient has pharmacy benefits through [employer] ([employer]). Per insurance, the following are covered and preferred under the plan:      Ozempic - $25    Metformin ER tabs - $8    Basaglar pens - $25    Humalog pens/vials - $25    OneTouch meter/strips/lancets - $26/$9/$25    The following are not covered under the plan:    Bydureon BCISE    Trulicity    Victoza    Adlyxin    Byetta    Rybelsus    Metformin ER Osmotic tabs    Lantus    Semglee    Novolog    Fiasp    Insulin Aspart    Insulin Lispro    Admelog    Accu-chek meter/strips/lancets    Contour meter/strips/lancets    Freestyle Lite meter/strips/lancets      Please feel free to contact me with any other test claims, prior authorizations, or insurance questions regarding outpatient medications.     Thanks!      Emmanuelle Sanchez CPhT  [facility] Liaison  Pronouns: She/Her/Hers    [facility]  [facility]  [address] [place] [address] [address] [address]   [email or web address]  [email or web address]   Phone: [phone]  Pager: [phone]  Fax: [phone]

## 2022-05-05 NOTE — PLAN OF CARE
Problem: Sleep Disturbance  Goal: Adequate Sleep/Rest  Outcome: Ongoing, Progressing  Patient BG-75 @ 0244. Patient sleeps in between care. Patient denies Psychotic symptoms. Patient is calm and cooperative with his care.Will continue to monitor patient and provide therapeutic intervention as needed. Will continue with q 15 mins checks for safety.       Patient 6.25 hours of sleep this shift.    Rechecked BG-78 @ 0707.

## 2022-05-05 NOTE — PLAN OF CARE
[facility] Occupational Therapy Group Intervention Note     05/05/22 1413   Group Therapy Session   Group Attendance attended group session   Time Session Began 1310   Time Session Ended 1400   Total Time patient participated (minutes) 45   Total # Attendees 6   Group Type community   Group Topic Covered relationship;emotions/expression   Group Session Detail discussion and utilization of social emotional supports. education provided on various types of community resources and supportive relationship.    Patient Response/Contribution cooperative with task;discussed personal experience with topic   Patient Response Detail congruent affect. completed a personal assessment of his/her support network as a method for coping, which he identified one friend whom he feels comfortable with and is supportive of his spiritual self.      María Segura OT on 5/5/2022 at 2:13 PM

## 2022-05-05 NOTE — PLAN OF CARE
05/05/22 1038   Individualization/Patient Specific Goals   Patient Personal Strengths community support;coping skills;expressive of emotions;expressive of needs;family/social support;independent living skills;insight into illness/situation;intellectual cognitive skills;medication/treatment adherence;motivated for recovery;motivated for treatment;positive educational history;positive vocational history;relationship stability;resilient;self-awareness;self-reliant;socioeconomic stability;spiritual/[employer]aritan support;stable living environment;tolerant   Patient Vulnerabilities adverse childhood experience(s);traumatic event   Anxieties, Fears or Concerns None   Individualized Care Needs Would like ongoing spiritual/Samaritan support   Patient-Specific Goals (Include Timeframe) 1. stabilization of mood disorder symptoms, 2. Absence of SI, 3. Medication mgmt, 4. Psych f/u care in place   Interprofessional Rounds   Participants CTC;OT;patient;psychiatrist;nursing  (med students, pharm student)   Team Discussion   Participants Dr. Lee, Dr. Gonzalez, Pretty Camacho RN, Sangeeta Raines mA.LP, Med students, Pharm student   Progress Patient continues to be easily tearful. Reports some improvement in depressive sx's -  fleeting SI. Patient attends groups, has been pleasant, coorperative.  Meds are being changed, adjusted per MD's.   Anticipated length of stay 5-7 days   Continued Stay Criteria/Rationale Severe depression, suicidal ideation, decline in functioning   Medical/Physical Diabetes, Seizure D/O   Plan Patient will be seen daily by Psychiatry team. Medications will be reviewed/adjusted per MD's, Outpatient providers will be contacted for care coordination.  Patient will need referral for outpatient Psychiatry.  CTC will continue to assess needs, ensure appropraite f/u care is in place.   Rationale for change in precautions or plan No change in plan/precautions   Safety Plan Patient will complete safety plan   Anticipated  Discharge Disposition home or self-care

## 2022-05-05 NOTE — PROGRESS NOTES
"    ----------------------------------------------------------------------------------------------------------  [facility]  Psychiatric Progress Note  Hospital Day #8    Isac Li MRN# [id number]   Age: 67 year old YOB: 1955   Date of Admission: 4/27/2022     Subjective   The patient was discussed with the treatment team and chart notes were reviewed.      Identifier:  Isac Li is a 67 year old male with a past psychiatric history of major depressive disorder admitted from the  ER on 04/27/2022 due to concern for SI in the context of loss of a recent close friend to suicide. Significant symptoms include worsening SI, depressed mood worthlessness, poor selfesteem, guiltiness, low energy  and sleep issues over past 2 weeks. His last psychiatric hospitalization was 20 years ago. Current psychosocial stressors include recent loss of a close friend to suicide. Patient is on hospital day #8. Assessment is that the current presentation is consistent with diagnosis of MDD, recurrent and severe.    Sleep:  6.25 hours (05/05/22 0615)  Prescribed Medications: Taken as prescribed  PRN Psychiatric Medications: acetaminophen, alum & mag hydroxide-simethicone, glucose **OR** dextrose **OR** glucagon, hydrOXYzine, insulin aspart, melatonin, OLANZapine **OR** OLANZapine, polyethylene glycol       Overnight Nursing Notes/Staff Report:  Isac was engaged in group, in one session his response was \"Congruent affect. Highly engaged, insightful, and open with others. Reported recognition of masking pain and self-defeat with humor, hope for the future, and positive affirmations.\"    During group on life skills Isac had \"Congruent affect while he joshua a detailed picture of the [facility]. Wrote a long list of names (hospital staff and peers) 'who will be held in her heart' and became tearful when he stated \"even myself\". Excused self d/t these overwhelming emotions.\"    Overnight \"Patient " "-75 @ 0244. Patient sleeps in between care. Patient denies Psychotic symptoms. Patient is calm and cooperative with his care.\"    Patient interview:  Isac Li states that they feel \"alight\" about being in the hospital today and a \"tad bit\" foggy but better than yesterday. He explained he felt peaceful and it made him feel good to help out another patient who was tearful by listening and offering a hug.     During the interview, Isac began weeping while discussing his late wife, Ginny. He shared stories about the two of them and how she accepted him for who he is. He is still grieving her loss. He shared that he \"requires touch\" and this has been difficult during the pandemic. He remained teary through the rest of the interview, taking pauses to let out his emotion.     We discussed insurance coverage and the possibility of him going home. He asked for the \"best case and worst case\" scenario. After explaining he may have to go home tomorrow worst case or through the weekend best case he began to weep again. He is worried about going back home and would like to stay until he is feeling better or at least not like this.     The gun situation is still not under control at his home.              Objective   Vitals:  Temp: 98.3  F (36.8  C) (Temp  Min: 98  F (36.7  C)  Max: 98.3  F (36.8  C))  Resp: 20 (Resp  Min: 20  Max: 20)  SpO2: 97 % (SpO2  Min: 94 %  Max: 97 %)  Pulse: 77 (Pulse  Min: 77  Max: 81)  BP: (!) 154/83  Systolic (24hrs), Av , Min:147 , Max:154   Diastolic (24hrs), Av, Min:79, Max:83    Mental Status Examination:  Oriented to: Fully orientated to person, place, and situation,  Appearance:  appears stated age, Grooming is adequate, Dressed in hospital scrubs and wearing glasses  Behavior:  calm, cooperative and engaged, teary  Eye Contact:  good  Psychomotor:  no abnormal motor symptoms appreciated; no catatonia present  Speech:  appropriate volume/tone and teary when sharing " "emotions  Language: Fluent in English with appropriate syntax and vocabulary.  Mood:  \"alright\"  Affect:  sad, tearful and anxious about leaving  Thought Process:  coherent and linear  Thought Content: suicidal ideation (passive), No HI, No VH and No AH; No apparent delusions  Associations:  intact  Insight:  fair due to understanding he has major depression but not fully aware of what that entails  Judgment:  fair due to voluntary admission in setting of active SI w plan  Attention Span: grossly intact  Concentration:  grossly intact  Recent and Remote Memory:  not formally assessed and grossly intact  Fund of Knowledge: consistent with level of formal education     Allergies     Allergies   Allergen Reactions     Sulfa (Sulfonamide Antibiotics) [Sulfa Drugs] Unknown        Labs & Imaging     Results for orders placed or performed during the hospital encounter of 04/27/22 (from the past 24 hour(s))   Glucose by meter   Result Value Ref Range    GLUCOSE BY METER POCT 206 (H) 70 - 99 mg/dL   Glucose by meter   Result Value Ref Range    GLUCOSE BY METER POCT 272 (H) 70 - 99 mg/dL   Glucose by meter   Result Value Ref Range    GLUCOSE BY METER POCT 182 (H) 70 - 99 mg/dL   Glucose by meter   Result Value Ref Range    GLUCOSE BY METER POCT 75 70 - 99 mg/dL   Glucose by meter   Result Value Ref Range    GLUCOSE BY METER POCT 78 70 - 99 mg/dL   Glucose by meter   Result Value Ref Range    GLUCOSE BY METER POCT 70 70 - 99 mg/dL   Glucose by meter   Result Value Ref Range    GLUCOSE BY METER POCT 151 (H) 70 - 99 mg/dL   Asymptomatic COVID-19 Virus (Coronavirus) by PCR Nasopharyngeal    Specimen: Nasopharyngeal; Swab   Result Value Ref Range    SARS CoV2 PCR Negative Negative    Narrative    Testing was performed using the bennett  SARS-CoV-2 & Influenza A/B Assay on the bennett  Sophie  System.  This test should be ordered for the detection of SARS-COV-2 in individuals who meet SARS-CoV-2 clinical and/or epidemiological criteria. " Test performance is unknown in asymptomatic patients.  This test is for in vitro diagnostic use under the FDA EUA for laboratories certified under CLIA to perform moderate and/or high complexity testing. This test has not been FDA cleared or approved.  A negative test does not rule out the presence of PCR inhibitors in the specimen or target RNA in concentration below the limit of detection for the assay. The possibility of a false negative should be considered if the patient's recent exposure or clinical presentation suggests COVID-19.  [facility] are certified under the Clinical Laboratory Improvement Amendments of 1988 (CLIA-88) as qualified to perform moderate and/or high complexity laboratory testing.            Assessment   Diagnostic Impression:  Isac Li is a 67 year old male with a past psychiatric history of major depressive disorder admitted from the  ER on 04/27/2022 due to concern for SI in the context of loss of a recent close friend to suicide.. Significant symptoms include SI, depressed and sleep issues. His last psychiatric hospitalization was 20 years ago and last visit with a psychiatrist was around 15 years ago. Sees his primary care physician for psychiatric medications and follows with an outpatient therapist. Current psychosocial stressors include recent loss of a close friend to suicide who was also [employer].  Patient's support system includes his sister and some members of the Alevism community.  Substance use does not appear to be playing a contributing role in the patient's presentation.  There is genetic loading for none known. Medical history does appear to be significant for seizures and prior TBI as well as sleep apnea requiring CPAP which broke and he has been unable to replace. The MSE is notable for sad, tearful, and at times distraught affect, talkative engagement, and open conversation with treatment team. He denies self injurious behaviors.      His  reported symptoms of depressed mood,  SI, worthlessness, poor selfesteem, guiltyness, low energy  and sleep issues are consistent with his historic diagnosis of major depressive disorder. These issues are also likely compounded by grief related to friend's recent death by suicide, trauma as a child, and ongoing struggle with compulsive sexual behavior as well as ongoing internal conflict between his sexual identity and Bahai alex. Optimization of medications to target these symptom clusters in addition to evaluation of adequate outpatient supports will be targets for treatment during this admission.      Given that he currently has SI and marked depression, patient warrants inpatient psychiatric hospitalization to maintain his safety. Disposition pending clinical stabilization, medication optimization and development of an appropriate discharge plan.    Principal Diagnosis:     Major Depressive Disorder, severe, recurrent    Secondary psychiatric diagnoses of concern this admission:     Compulsive Sexual Behavior    Alcohol Use Disorder (hisorical)    Psychiatric course:  Isac Li was admitted to [facility] as a voluntary patient. His PTA aspirin, atorvastatin, lisinopril, metformin, phenytoin, and venlafaxine were continued.   4/28 Seroquel 50 mg PO as augmenting agent to selective serotonin reuptake inhibitor.   5/2 Pharmacy CL consult for Vibriid/Trintellix coverage and prior authorization was placed and was approved.   5/3 Cross-titration was started by adding Vybriid 10 mg PO daily with food.   5/4 Cross-titration continued with 10mg PO Vybriid daily and tapering venlafaxine to 225mg PO was ordered. Debrox 3 drops is being discontinued after ear wax sufficiently removed from pts ear. 300mg venlafaxine was given 5/4 and tapered 225mg PO dose scheduled for tonight 5/5    Isac Li continued to meet criteria for inpatient hospitalization medication optimization, inpatient stabilization, and  appropriate discharge planning.    Medical course:   Isac Li was physically examined by the ED prior to being transferred to the unit and was found to be medically stable and appropriate for admission.      Plan   Today's Changes:     Give Venlafaxine 225mg PO daily dose tonight 5/5 per schedule    Taper Venlafaxine to 150mg PO daily dose for tomorrow 5/6  _______________________________________________________________________  Psychiatric diagnoses and management:  Principal Diagnosis:     Major Depressive Disorder, severe, recurrent without psychotic features  -Continue cross-titratation Venlafaxine-Vilazodone (Vybriid): Vybriid 10 mg PO daily with food.   -Decrease Venlafaxine to 225mg PO daily  -Continue Seroquel 50 mg at bedtime   - Tomorrow decrease Venlafaxine to 150mg PO daily    Secondary Psychiatric Diagnoses:     Compulsive Sexual Behavior  ? Cont Venlafaxine     Alcohol Use Disorder (historical)    Additional Planning:    Continue to monitor for and stabilize: SI and depressed    Patient will be treated in therapeutic milieu with appropriate individual and group therapies as described.    Would benefit from community support groups    Will coordinate access and gun safety at home    Scheduled Medications (summary):    aspirin  81 mg Oral Daily     atorvastatin  20 mg Oral At Bedtime     insulin aspart   Subcutaneous TID AC     insulin aspart  1-10 Units Subcutaneous TID AC     insulin glargine  55 Units Subcutaneous At Bedtime     lisinopril  5 mg Oral Daily     metFORMIN  2,000 mg Oral Daily with supper     phenytoin  500 mg Oral At Bedtime     QUEtiapine  50 mg Oral At Bedtime     venlafaxine  225 mg Oral At Bedtime     vilazodone  10 mg Oral Daily       PRN Medications (summary):  acetaminophen, alum & mag hydroxide-simethicone, glucose **OR** dextrose **OR** glucagon, hydrOXYzine, insulin aspart, melatonin, OLANZapine **OR** OLANZapine, polyethylene glycol    Discontinued Medications (&  "Rationale):        Consults:    IM following for T2DM  Endocrinology consulted for T2DM 5/4   Legal Status:    Voluntary     SIO:    No     Pt has not required locked seclusion or restraints in the past 24 hours to maintain safety, please refer to RN documentation for further details.    Disposition:    TBD, pending clinical stabilization, medication optimization, and formulation of a safe discharge plan.     Safety Assessment:   Behavioral Orders   Procedures     Code 1 - Restrict to Unit     Discontinue 1:1 attendant for suicide risk     Order Specific Question:   I have performed an in person assessment of the patient     Answer:   Based on this assessment the patient no longer requires a one on one attendant at this point in time.     Order Specific Question:   Rationale     Answer:   Patient States able to remain safe in hospital     Routine Programming     As clinically indicated     Sexual precautions     Status 15     Every 15 minutes.     Suicide precautions     Patients on Suicide Precautions should have a Combination Diet ordered that includes a Diet selection(s) AND a Behavioral Tray selection for Safe Tray - with utensils, or Safe Tray - NO utensils        ____________________________________________________________________  Pertinent Medical diagnoses and management:    IM currently following patient, apperciate help, for further details please see note on 05/03     #Uncontrolled diabetes mellitus type II, insulin dependent.   Being managed by medicine  \"Most recent A1C from toay is 9.8 on 4/28. Pt has not been checking BG at home. Does not count carb. He gives humalog insulin based on a sliding scale before meals but he usually doesn't check blood glucose prior to insulin and gives 20-26 units. He was started on his home dose of Lantus 60 units on admission.Since admission, he has continued to run hyperglycemic with levels ranging 149-313 (5/1). His lows appear to occur in the middle of the night, " "down to 84 at 0225 on 4/30.   - Since increasing his sliding scale insulin to high resistance yesterday he remains hyperglycemic   - Increase Lantuss from 60 to 62 units today (5/3)  - Continue Novolog to high resistance sliding scale (started 5/2)  - Continue to hold bedtime correction dose for now   - Hypoglycemia protocol  - F/U primary care outpt. He does not follow with Endocrinology.   - Cont ACE inhibitor for renoprotection (patient denies history of HTN and not documented in chart)  -Restarted PTA Aspirin\"     Endocrinology consult (5/4)  Assessment:   1) Insulin dependent Type 2 diabetes, uncontrolled (A1c 10.3%)     Plan:                  -Metformin XR 2000 mg daily with dinner                 -Lantus 62 units daily HS                 -Novolog 1:5g CHO coverage added with meals, snacks- to begin with dinner tonight.                  -Novolog high intensity sliding scale AC and HS                 -BG monitoring TID AC, HS, 0200                 -hypoglycemia protocol                 -recommend high/very high consistent carb diet with carb counting protocol- note that he currently has \"double portions ok\"- and this will be a barrier to effective BG control.                  -once BG stabilize and dispo date is known, can consider GLP1 agonist initiation, to improve glycemic control and minimize insulin requirements.                 -diabetes education needs are not anticipated.                 -on discharge, will recommend outpatient follow up with PCP, and perhaps referral to endocrine/diabetes specialty service.      Discussed plan of care with patient, nursing, and primary team via this note.  Thank you for this consult; Inpatient Diabetes will continue to follow.      To contact Endocrine Diabetes service:   From 8AM-4PM: page inpatient diabetes provider that is following the patient  For questions or updates from 4PM-8AM: page the diabetes job code for on call fellow: " [phone]  ____________________________________________________________________  Patient seen and discussed with attending physician, Dr. Edel Lee MD who is in agreement with my assessment and plan.    Mckenna Taylor  Medical Student MS3  I was present with the medical student who participated in the service and in the documentation of the note. I have verified the history and personally performed the physical exam and medical decision making. I agree with the assessment and plan of care as documented in the note and have made changes to the note as appropriate.   Jun Gonzalez MD MPH  PGY 1 Psychiatry resident    Attestation:  This patient has been seen and evaluated by me, Edel Lee MD.  I have discussed this patient with the house staff team including the resident and medical student and I agree with the findings and plan in this note.    I have reviewed today's vital signs, medications, labs and imaging. Edel Lee MD , PhD.

## 2022-05-05 NOTE — PROGRESS NOTES
"                          Diabetes Consult Daily  Progress Note          Assessment/Plan:     HPI:  Isac is a 67 year old male with PMHx TIIDM, depression, HTN, HLP who was admitted 4/27/22 to inpatient psychiatry for stabilization for SI in the context of recent loss of friend to suicide.        Assessment:     1)   Insulin dependent Type 2 diabetes, uncontrolled (A1c 10.3%)  2)  Obesity; BMI 42     Plan:                  -Metformin XR 2000 mg daily with dinner                 -Decrease Lantus 52  units daily HS                 -Novolog 1:5g CHO coverage with meals, snacks - (TBD what we will need for home: fixed dose vs GLP-1)                 -Novolog high intensity sliding scale AC and HS                 -BG monitoring TID AC, HS, 0200                 -hypoglycemia protocol                 -recommend high/very high consistent carb diet with carb counting protocol- note that he currently has \"double portions ok\"- and this will be a barrier to effective BG control.                  -once BG stabilize and dispo date is known, can consider GLP1 agonist initiation, to improve glycemic control and minimize insulin requirements.                 -diabetes education needs are not anticipated.                 -on discharge, will recommend outpatient follow up with PCP, and perhaps referral to endocrine/diabetes specialty service.     Patient has pharmacy benefits through [employer] (pre-paid medical assistance plan). Per insurance, the following are covered and preferred under the plan:       Metformin ER tabs - $8    Basaglar pens - $25    Humalog pens/vials - $25    OneTouch meter/strips/lancets - $26/$9/$25     The following are not covered under the plan:    Metformin ER Osmotic tabs    Lantus    Semglee    Novolog    Fiasp    Insulin Aspart    Insulin Lispro    Admelog    Accu-chek meter/strips/lancets    Contour meter/strips/lancets    Freestyle Lite meter/strips/lancets    ** Will add on coverage for a GLP-1 today " "for future planning      Plan discussed with patient, RAHEEM, bedside RN/primary team via this note.           Interval History:     The last 24 hours progress and nursing notes reviewed.      0200 BG trended to 75->78->70 .  Will reduce HS lantus approx 15% for safety.   Prandial insulin started last evening with dinner.  Will trend today and make adjustments as indicated.       BG trend:      In discussion with Isac this AM, he shared he is living off the last of his savings.   He has been on a [employer]IANNE from his job and he is not sure he will go/be able to return.  When talking about what he will do when his money runs out, he becomes very tearful \"you're not supposed to ask those questions\".   He states when faced with choosing food or rent, he always chooses rent.   Fears a barrier to obtaining assistance will be that he has not been able to file any tax returns for 2-3 years 2/2 to depression.   He agrees that he is confident he will NOT carb count at home but could do a fixed dose of Novolog for meals.     Finances are a huge barrier, as is food access and motivation.     He is up to the idea of starting a GLP-1 - thinks weekly would be his preference.   Will add a test claim today for this  Discussed GLP1 with patient he denies history of pancreatitis, hyperplasia of thyroid gland, personal or family hx of thyroid CA, ETOH use.     Met with RAHEEM and RN to share the above conversation.      Will work on stability with the added cho coverage, adjust Lantus as indicated and send a test claim today.  Based on results of test claim can either work toward fixed doses       Planned Procedures/surgeries: none  D5W-containing solutions/medications: none    Recent Labs   Lab 05/05/22  0244 05/04/22  2130 05/04/22  1743 05/04/22  1235 05/04/22  0827 05/04/22  0202   GLC 75 182* 272* 206* 128* 186*           Nutrition:     Orders Placed This Encounter      Regular Diet Adult          PTA Regimen:     Frequency of checks: " "infrequent, some less than, and some greater than, 200  Metformin XR 2000 mg with dinner  Basaglar 62 units at bedtime  Humalog 18-26 units TID with meals- he does not formally count carbs, and doesn't really systematically estimate either.  Usual BG control PTA:  uncontrolled, with A1c 10.3% on 3/30/22  Able to Detect Hypoglycemia: once, years ago. Occurred at work. Was symptomatic, doesn't remember how low it went. None since.   Usual Diabetes Care Provider: PCP, Dr. Russ  Primary Care Provider: Marquise Russ  Factors Impacting IP Glucose Control: omission of prandial insulin          Review of Systems:   CC: Denies all          Medications:   Steroid planning:  no  Tube Feeding: no       Physical Exam:   BP (!) 147/79 (BP Location: Right arm, Patient Position: Sitting, Cuff Size: Adult Large)   Pulse 81   Temp 98  F (36.7  C) (Oral)   Resp 20   Ht 1.791 m (5' 10.5\")   Wt 134 kg (295 lb 6.4 oz)   SpO2 94%   BMI 41.79 kg/m      General:   A&O, NAD, pleasant, in group. Well nourished   HEENT:  NC/AT. MMM, EOMI, Anicteric  Lungs:  unremarkable, no new cough, no SOB  ABD:   rounded, soft  Extremities:  Moving all extremities  Skin:  warm and dry, no obvious lesions/rash/bleeding  Neuro:  No focal neurological deficits  Psych:   Cooperative, depressed mood, good eye contact, congruent affect - intermittently tearful, minimizing          Data:     Lab Results   Component Value Date    A1C 9.8 04/28/2022    A1C 10.3 03/30/2022    A1C 11.5 12/22/2021    A1C 8.7 09/21/2021    A1C 10.5 06/10/2021        CBC RESULTS:   Recent Labs   Lab Test 04/27/22  1615   WBC 10.0   RBC 4.74   HGB 13.8   HCT 40.5   MCV 85   MCH 29.1   MCHC 34.1   RDW 12.8        Recent Labs   Lab Test 05/05/22  0244 05/04/22  2130 04/27/22  2047 04/27/22  1615 12/22/21  1021   NA  --   --   --  138 142   POTASSIUM  --   --   --  4.0 4.3   CHLORIDE  --   --   --  106 108*   CO2  --   --   --  21 23   ANIONGAP  --   --   --  11 11   GLC " 75 182*   < > 241* 100   BUN  --   --   --  19 22   CR  --   --   --  0.82 0.78   [place]  --   --   --  8.6 9.0    < > = values in this interval not displayed.     Liver Function Studies -   Recent Labs   Lab Test 04/27/22  1615   PROTTOTAL 7.1   ALBUMIN 3.5   BILITOTAL 0.3   ALKPHOS 149   AST 9   ALT 25     No results found for: INR      NEVA Del Cid CNP   Inpatient Diabetes Management Service  Pager - [phone]  Available on [employer]   Friday - Monday 0800 - 1600 hrs    To contact Endocrine Diabetes service:   From 8AM-4PM: page inpatient diabetes provider who is following the patient that day (see filed or incomplete progress notes/consult notes).  If uncertain of provider assignment: page job code [phone]. (To page job code in-house dial 3 stars, 777 then enter number).  For questions or updates AFTER HOURS from 4PM-8AM: page the diabetes job code for on call fellow: [phone]    Please notify inpatient diabetes service if changes are planned to steroids, nutrition, or if procedures are planned requiring prolonged NPO status.Diabetes Management Team job code: [phone]    I spent a total of 35 minutes on the date of the encounter doing chart review, history and exam, documentation and further activities per the note.  Over 50% of my time on the unit was spent counseling the patient and/or coordinating care regarding acute hyperglycemic management.  See note for details.

## 2022-05-05 NOTE — PLAN OF CARE
Patient denied all psych symptoms and contracted for safety.He spent the majority of time this shift out in the milieu and engaged with peers and staff. Blood glucose level just before breakfast was 70 mg/dl. After breakfast, it was checked again and was 151 mg/dl. Just before lunch, blood glucose level was 148 mg/dl. 6 units and 16 units of Rapid acting insulin was given with breakfast and lunch time carb count respectively. Patient was pleasant and humorous during his interactions with writer.     Problem: Plan of Care - These are the overarching goals to be used throughout the patient stay.    Goal: Plan of Care Review/Shift Note  Description: The Plan of Care Review/Shift note should be completed every shift.  The Outcome Evaluation is a brief statement about your assessment that the patient is improving, declining, or no change.  This information will be displayed automatically on your shift note.  Outcome: Ongoing, Progressing  Flowsheets  Taken 5/5/2022 1302  Plan of Care Reviewed With: patient  Overall Patient Progress: improving  Taken 5/5/2022 1126  Plan of Care Reviewed With: patient     Problem: Suicide Risk  Goal: Absence of Self-Harm  Outcome: Ongoing, Progressing   Goal Outcome Evaluation:    Plan of Care Reviewed With: patient     Overall Patient Progress: improving

## 2022-05-05 NOTE — PLAN OF CARE
Problem: Plan of Care - These are the overarching goals to be used throughout the patient stay.    Goal: Absence of Hospital-Acquired Illness or Injury  Outcome: Ongoing, Progressing  Intervention: Prevent Skin Injury  Recent Flowsheet Documentation  Taken 5/4/2022 1706 by Hugo Goodrich RN  Body Position: position changed independently  Intervention: Prevent and Manage VTE (Venous Thromboembolism) Risk  Recent Flowsheet Documentation  Taken 5/4/2022 1706 by Hugo Goodrich RN  Activity Management:    ambulated in gutierrez    ambulated in room    ambulated to bathroom     Problem: Plan of Care - These are the overarching goals to be used throughout the patient stay.    Goal: Absence of Hospital-Acquired Illness or Injury  Intervention: Prevent Skin Injury  Recent Flowsheet Documentation  Taken 5/4/2022 1706 by Hugo Goodrich RN  Body Position: position changed independently     Problem: Plan of Care - These are the overarching goals to be used throughout the patient stay.    Goal: Absence of Hospital-Acquired Illness or Injury  Intervention: Prevent and Manage VTE (Venous Thromboembolism) Risk  Recent Flowsheet Documentation  Taken 5/4/2022 1706 by Hugo Goodrich RN  Activity Management:    ambulated in gutierrez    ambulated in room    ambulated to bathroom     Problem: Plan of Care - These are the overarching goals to be used throughout the patient stay.    Goal: Optimal Comfort and Wellbeing  Outcome: Ongoing, Progressing  Intervention: Monitor Pain and Promote Comfort  Recent Flowsheet Documentation  Taken 5/4/2022 1706 by Hugo Goodrich RN  Pain Management Interventions: medication (see MAR)  Intervention: Provide Person-Centered Care  Recent Flowsheet Documentation  Taken 5/4/2022 1706 by Hugo Goodrich RN  Trust Relationship/Rapport:    care explained    choices provided    emotional support provided    empathic listening provided    questions answered    questions encouraged     reassurance provided    thoughts/feelings acknowledged     Problem: Plan of Care - These are the overarching goals to be used throughout the patient stay.    Goal: Optimal Comfort and Wellbeing  Intervention: Monitor Pain and Promote Comfort  Recent Flowsheet Documentation  Taken 5/4/2022 1706 by Hugo Goodrich, RN  Pain Management Interventions: medication (see MAR)     Problem: Plan of Care - These are the overarching goals to be used throughout the patient stay.    Goal: Optimal Comfort and Wellbeing  Intervention: Provide Person-Centered Care  Recent Flowsheet Documentation  Taken 5/4/2022 1706 by Hugo Goodrich, RN  Trust Relationship/Rapport:    care explained    choices provided    emotional support provided    empathic listening provided    questions answered    questions encouraged    reassurance provided    thoughts/feelings acknowledged   Goal Outcome Evaluation:    Plan of Care Reviewed With: patient     Patient was pleasant, cooperative and compliant with medications. Expressed feeling guilty about past doings and desires after attending AM group. Able to focus on alex and belief. Right shoulder pain managed with PRN Tylenol. Denied anxiety or depression, denied SI/HI, contracted for safety. Visible in milieu, engaged with peers and staff members. Melatonin PRN at HS er his request.

## 2022-05-05 NOTE — PLAN OF CARE
[facility] Occupational Therapy Group Intervention Note     05/05/22 1208   Group Therapy Session   Group Attendance attended group session   Time Session Began 1110   Time Session Ended 1200   Total Time patient participated (minutes) 50   Total # Attendees 5   Group Type task skill;life skill   Group Topic Covered coping skills/lifestyle management   Group Session Detail clinic - coping skill exploration, creative expression within personally meaningful activities, and observation of social, cognitive, and kinesthetic performance skills   Patient Response/Contribution cooperative with task;organized;discussed personal experience with topic   Patient Response Detail congruent affect. created similar meaningful creative expression project similar to yesterday. expressed hopefulness associated with his project.     María Segura OT on 5/5/2022 at 12:09 PM

## 2022-05-05 NOTE — PROGRESS NOTES
Behavioral Health  Note   Behavioral Health  Spirituality Group Note     Unit 20    Name: Isac Li    YOB: 1955   MRN: [id number]    Age: 67 year old     Patient attended -led group, which included discussion of spirituality, coping with illness and building resilience.   Patient attended group for 1.0 hrs.   patient demonstrated an appreciation of topic's application for their personal circumstances.     [facility]  Staff    Page [phone]

## 2022-05-05 NOTE — PROGRESS NOTES
05/04/22 2100   Group Therapy Session   Group Attendance attended group session   Time Session Began 2000   Time Session Ended 2050   Total Time patient participated (minutes) 50   Total # Attendees 7   Group Type recreation   Group Topic Covered relaxation techniques   Group Session Detail stress reduction   Patient Response/Contribution cooperative with task;discussed personal experience with topic   Patient Response Detail Pt actively participated in a structured Therapeutic Recreation group with a focus on leisure participation, socializing, and exercise. Pt participated in the guided exercise for the full duration of the group. Pt followed along, engaged in the guided chair exercise routine and added to the discussion prompts throughout the routine.  Pt was encouraged to use positive imagery with the deep breathing and stretching to foster relaxation, improves focus, and reduce stress.

## 2022-05-05 NOTE — PLAN OF CARE
05/05/22 5907   Group Therapy Session   Group Attendance attended group session   Total Time patient participated (minutes) 4   Group Type psychotherapeutic   Group Topic Covered coping skills/lifestyle management;anger/conflict management;emotions/expression   Patient Response/Contribution cooperative with task;listened actively;offered helpful suggestions to peers;organized   Patient Response Detail Patient was attentive throughout group.  Very supportive to other patients

## 2022-05-06 LAB
GLUCOSE BLDC GLUCOMTR-MCNC: 103 MG/DL (ref 70–99)
GLUCOSE BLDC GLUCOMTR-MCNC: 121 MG/DL (ref 70–99)
GLUCOSE BLDC GLUCOMTR-MCNC: 166 MG/DL (ref 70–99)
GLUCOSE BLDC GLUCOMTR-MCNC: 212 MG/DL (ref 70–99)
GLUCOSE BLDC GLUCOMTR-MCNC: 216 MG/DL (ref 70–99)
GLUCOSE BLDC GLUCOMTR-MCNC: 72 MG/DL (ref 70–99)

## 2022-05-06 PROCEDURE — 250N000013 HC RX MED GY IP 250 OP 250 PS 637: Performed by: STUDENT IN AN ORGANIZED HEALTH CARE EDUCATION/TRAINING PROGRAM

## 2022-05-06 PROCEDURE — 124N000002 HC R&B MH UMMC

## 2022-05-06 PROCEDURE — 99233 SBSQ HOSP IP/OBS HIGH 50: CPT | Performed by: NURSE PRACTITIONER

## 2022-05-06 PROCEDURE — 250N000013 HC RX MED GY IP 250 OP 250 PS 637

## 2022-05-06 PROCEDURE — H2032 ACTIVITY THERAPY, PER 15 MIN: HCPCS

## 2022-05-06 PROCEDURE — 250N000013 HC RX MED GY IP 250 OP 250 PS 637: Performed by: PHYSICIAN ASSISTANT

## 2022-05-06 PROCEDURE — G0177 OPPS/PHP; TRAIN & EDUC SERV: HCPCS

## 2022-05-06 RX ORDER — VENLAFAXINE HYDROCHLORIDE 75 MG/1
150 CAPSULE, EXTENDED RELEASE ORAL AT BEDTIME
Status: DISCONTINUED | OUTPATIENT
Start: 2022-05-06 | End: 2022-05-09

## 2022-05-06 RX ADMIN — LISINOPRIL 5 MG: 5 TABLET ORAL at 09:40

## 2022-05-06 RX ADMIN — INSULIN ASPART: 100 INJECTION, SOLUTION INTRAVENOUS; SUBCUTANEOUS at 18:29

## 2022-05-06 RX ADMIN — PHENYTOIN SODIUM 500 MG: 100 CAPSULE ORAL at 21:43

## 2022-05-06 RX ADMIN — QUETIAPINE FUMARATE 50 MG: 50 TABLET ORAL at 21:43

## 2022-05-06 RX ADMIN — METFORMIN HYDROCHLORIDE 2000 MG: 750 TABLET, EXTENDED RELEASE ORAL at 18:14

## 2022-05-06 RX ADMIN — VILAZODONE HYDROCHLORIDE 10 MG: 10 TABLET ORAL at 09:40

## 2022-05-06 RX ADMIN — ATORVASTATIN CALCIUM 20 MG: 20 TABLET, FILM COATED ORAL at 21:43

## 2022-05-06 RX ADMIN — VENLAFAXINE HYDROCHLORIDE 150 MG: 75 CAPSULE, EXTENDED RELEASE ORAL at 21:43

## 2022-05-06 RX ADMIN — ASPIRIN 81 MG CHEWABLE TABLET 81 MG: 81 TABLET CHEWABLE at 09:40

## 2022-05-06 ASSESSMENT — ACTIVITIES OF DAILY LIVING (ADL)
LAUNDRY: WITH SUPERVISION
DRESS: INDEPENDENT
DRESS: INDEPENDENT
ORAL_HYGIENE: INDEPENDENT
HYGIENE/GROOMING: INDEPENDENT
ORAL_HYGIENE: INDEPENDENT
HYGIENE/GROOMING: INDEPENDENT

## 2022-05-06 NOTE — PLAN OF CARE
Assessment/Intervention/Current Symtoms and Care Coordination  The patient's care was discussed with the treatment team and chart notes were reviewed.   Patient met with team. Patient remains markedly depressed though able to put on a facade. Patient cried at the drop of a hat when discussing his situation- is quite hopeless.  He remains concerned that he will be discharged home before he is ready- depression improved. Patient continues to be deemed high risk for suicide. (male, single, isolated, access to fire arms, recent loss of friend to suicide, H/O substance abuse)   Discussed with patient his plans financially as he has no income with being on work [employer].  Patient has small savings which he is concerned about going through ( ~$ 6, ooo) for rent, food.  He is hoping that he can return to work in a month- if he is feeling better.  Discussed getting a [employer].  He stated that his sister is a recently retired SWer and can help him with resources.  Also discussed [facility] services however, there is a 4-6 wait list for these services.     Discharge Plan or Goal  Patient will return home when stable/safe  Patient will be referred for outpatient Psychiatry     Barriers to Discharge   Severity of depression  Medication mgmt per MD's     Referral Status  None today     Legal Status     Voluntary

## 2022-05-06 NOTE — PLAN OF CARE
Problem: Plan of Care - These are the overarching goals to be used throughout the patient stay.    Goal: Absence of Hospital-Acquired Illness or Injury  Outcome: Ongoing, Progressing  Intervention: Prevent Skin Injury  Recent Flowsheet Documentation  Taken 5/6/2022 1732 by Hugo Goodrich RN  Body Position: position changed independently  Intervention: Prevent and Manage VTE (Venous Thromboembolism) Risk  Recent Flowsheet Documentation  Taken 5/6/2022 1732 by Hugo Goodrich RN  Activity Management:    ambulated in gutierrez    ambulated in room    ambulated to bathroom     Problem: Plan of Care - These are the overarching goals to be used throughout the patient stay.    Goal: Absence of Hospital-Acquired Illness or Injury  Intervention: Prevent Skin Injury  Recent Flowsheet Documentation  Taken 5/6/2022 1732 by Hugo Goodrich RN  Body Position: position changed independently     Problem: Plan of Care - These are the overarching goals to be used throughout the patient stay.    Goal: Absence of Hospital-Acquired Illness or Injury  Intervention: Prevent and Manage VTE (Venous Thromboembolism) Risk  Recent Flowsheet Documentation  Taken 5/6/2022 1732 by Hugo Goodrich RN  Activity Management:    ambulated in gutierrez    ambulated in room    ambulated to bathroom     Problem: Plan of Care - These are the overarching goals to be used throughout the patient stay.    Goal: Optimal Comfort and Wellbeing  Outcome: Ongoing, Progressing  Intervention: Provide Person-Centered Care  Recent Flowsheet Documentation  Taken 5/6/2022 1732 by Hugo Goodrich RN  Trust Relationship/Rapport:    care explained    choices provided    emotional support provided    empathic listening provided    questions answered    questions encouraged    reassurance provided    thoughts/feelings acknowledged     Problem: Plan of Care - These are the overarching goals to be used throughout the patient stay.    Goal: Optimal Comfort and  Wellbeing  Intervention: Provide Person-Centered Care  Recent Flowsheet Documentation  Taken 5/6/2022 1732 by Hugo Goodrich, RN  Trust Relationship/Rapport:    care explained    choices provided    emotional support provided    empathic listening provided    questions answered    questions encouraged    reassurance provided    thoughts/feelings acknowledged   Goal Outcome Evaluation:    Plan of Care Reviewed With: patient     Patient is alert an d oriented x4, able to communicate needs. He was pleasant, cooperative and compliant with medication. Visible on unit, social and interactive in milieu. He denied any pain, denied anxiety or depression.

## 2022-05-06 NOTE — PLAN OF CARE
Pt slept for 6 hours. No prn medications administered this shift. Pt blood glucose at 0219 hrs was 72. Pt was given a snack and went back to sleep. Will monitor.    Problem: Sleep Disturbance  Goal: Adequate Sleep/Rest  Outcome: Ongoing, Progressing   Goal Outcome Evaluation:

## 2022-05-06 NOTE — PROGRESS NOTES
"    ----------------------------------------------------------------------------------------------------------  [facility]  Psychiatric Progress Note  Hospital Day #9    Isac Li MRN# [id number]   Age: 67 year old YOB: 1955   Date of Admission: 4/27/2022     Subjective   The patient was discussed with the treatment team and chart notes were reviewed.      Identifier: Isac Li is a 67 year old male with a past psychiatric history of major depressive disorder admitted from the  ER on 04/27/2022 due to concern for SI in the context of loss of a recent close friend to suicide. Significant symptoms include worsening SI, depressed mood worthlessness, poor selfesteem, guiltiness, low energy  and sleep issues over past 2 weeks. His last psychiatric hospitalization was 20 years ago. Current psychosocial stressors include recent loss of a close friend to suicide. Patient is on hospital day #9. Assessment is that the current presentation is consistent with diagnosis of MDD, recurrent and severe.    Sleep:  6 hours (05/06/22 0615)  Prescribed Medications: Taken as prescribed  PRN Psychiatric Medications: acetaminophen, alum & mag hydroxide-simethicone, glucose **OR** dextrose **OR** glucagon, hydrOXYzine, insulin aspart, melatonin, OLANZapine **OR** OLANZapine, polyethylene glycol   Melatonin 5mg PO @ 2119 on 5/5    Overnight Nursing Notes/Staff Report:  \"Isac participated in OT clinic this evening, where he initiated a chosen project (simple coloring) and stayed focused on task while chatting sociably with peers for the duration of the session. Tone light and bright. Pleasant demeanor.\"  \"Pt presented with a full range affect and was tearful at times when in conversation. Pt stated that he was feeling down before the  called and gave him good news that he would be able to stay through the weekend. Pt was anxious about leaving to soon before he was ready. Denied " "SI/SIB or hallucinations. Pt was otherwise calm, cooperative, and pleasant.\"  Patient interview:  Isac Li states that they feel \"better\" about being in the hospital today . Grateful to be staying in the hospital over the weekend. Does not feel ready to go home. Inquired about outpatient psychiatry services.       Objective   Vitals:  Temp: 98.2  F (36.8  C) (Temp  Min: 98.2  F (36.8  C)  Max: 98.3  F (36.8  C))    (No data recorded)  SpO2: 97 % (SpO2  Min: 97 %  Max: 97 %)  Pulse: 81 (Pulse  Min: 77  Max: 81)  BP: 128/82  Systolic (24hrs), Av , Min:128 , Max:154   Diastolic (24hrs), Av, Min:82, Max:83    Mental Status Examination:  Oriented to: Fully orientated to person, place, and situation,  Appearance:  appears stated age, Grooming is adequate, Dressed in hospital scrubs and wearing glasses  Behavior:  calm, cooperative and engaged, teary  Eye Contact:  good  Psychomotor:  no abnormal motor symptoms appreciated; no catatonia present  Speech:  appropriate volume/tone and teary when sharing emotions  Language: Fluent in English with appropriate syntax and vocabulary.  Mood:  \"good\"  Affect:  sad, tearful and anxious about leaving  Thought Process:  coherent and linear, not thought blockade  Thought Content:  No SI/HI, No VH and No AH; No apparent delusions  Associations:  intact  Insight:  fair   Judgment:  fair   Attention Span: grossly intact  Concentration:  grossly intact     Allergies     Allergies   Allergen Reactions    Sulfa (Sulfonamide Antibiotics) [Sulfa Drugs] Unknown        Labs & Imaging     Results for orders placed or performed during the hospital encounter of 22 (from the past 24 hour(s))   Glucose by meter   Result Value Ref Range    GLUCOSE BY METER POCT 151 (H) 70 - 99 mg/dL   Asymptomatic COVID-19 Virus (Coronavirus) by PCR Nasopharyngeal    Specimen: Nasopharyngeal; Swab   Result Value Ref Range    SARS CoV2 PCR Negative Negative    Narrative    Testing was performed " using the bennett  SARS-CoV-2 & Influenza A/B Assay on the bennett  Sophie  System.  This test should be ordered for the detection of SARS-COV-2 in individuals who meet SARS-CoV-2 clinical and/or epidemiological criteria. Test performance is unknown in asymptomatic patients.  This test is for in vitro diagnostic use under the FDA EUA for laboratories certified under CLIA to perform moderate and/or high complexity testing. This test has not been FDA cleared or approved.  A negative test does not rule out the presence of PCR inhibitors in the specimen or target RNA in concentration below the limit of detection for the assay. The possibility of a false negative should be considered if the patient's recent exposure or clinical presentation suggests COVID-19.  [facility] are certified under the Clinical Laboratory Improvement Amendments of 1988 (CLIA-88) as qualified to perform moderate and/or high complexity laboratory testing.   Glucose by meter   Result Value Ref Range    GLUCOSE BY METER POCT 148 (H) 70 - 99 mg/dL   Glucose by meter   Result Value Ref Range    GLUCOSE BY METER POCT 207 (H) 70 - 99 mg/dL   Glucose by meter   Result Value Ref Range    GLUCOSE BY METER POCT 238 (H) 70 - 99 mg/dL   Glucose by meter   Result Value Ref Range    GLUCOSE BY METER POCT 72 70 - 99 mg/dL   Glucose by meter   Result Value Ref Range    GLUCOSE BY METER POCT 103 (H) 70 - 99 mg/dL            Assessment   Diagnostic Impression:  Isac Li is a 67 year old male with a past psychiatric history of major depressive disorder admitted from the  ER on 04/27/2022 due to concern for SI in the context of loss of a recent close friend to suicide.. Significant symptoms include SI, depressed and sleep issues. His last psychiatric hospitalization was 20 years ago and last visit with a psychiatrist was around 15 years ago. Sees his primary care physician for psychiatric medications and follows with an outpatient therapist.  Current psychosocial stressors include recent loss of a close friend to suicide who was also [employer].  Patient's support system includes his sister and some members of the [employer] community.  Substance use does not appear to be playing a contributing role in the patient's presentation.  There is genetic loading for none known. Medical history does appear to be significant for seizures and prior TBI as well as sleep apnea requiring CPAP which broke and he has been unable to replace. The MSE is notable for sad, tearful, and at times distraught affect, talkative engagement, and open conversation with treatment team. He denies self injurious behaviors.      His reported symptoms of depressed mood,  SI, worthlessness, poor selfesteem, guiltyness, low energy  and sleep issues are consistent with his historic diagnosis of major depressive disorder. These issues are also likely compounded by grief related to friend's recent death by suicide, trauma as a child, and ongoing struggle with compulsive sexual behavior as well as ongoing internal conflict between his sexual identity and [employer] alex. Optimization of medications to target these symptom clusters in addition to evaluation of adequate outpatient supports will be targets for treatment during this admission.      Given that he currently has SI and marked depression, patient warrants inpatient psychiatric hospitalization to maintain his safety. Disposition pending clinical stabilization, medication optimization and development of an appropriate discharge plan.    Principal Diagnosis:   Major Depressive Disorder, severe, recurrent    Secondary psychiatric diagnoses of concern this admission:   Compulsive Sexual Behavior  Alcohol Use Disorder (hisorical)    Psychiatric course:  Isac Li was admitted to [facility] as a voluntary patient. His PTA aspirin, atorvastatin, lisinopril, metformin, phenytoin, and venlafaxine were continued.   4/28 Seroquel 50 mg PO as  augmenting agent to selective serotonin reuptake inhibitor.   5/2 Pharmacy CL consult for Vibriid/Trintellix coverage and prior authorization was placed and was approved.   5/3 Cross-titration was started by adding Vybriid 10 mg PO daily with food.   5/4 Cross-titration continued with 10mg PO Vybriid daily and tapering venlafaxine to 225mg PO was ordered. Debrox 3 drops  discontinued after ear wax sufficiently removed. 300mg venlafaxine was given 5/4 and tapered 225mg PO dose scheduled for tonight 5/5 5/5 Cross-titration continued with 10mg PO Vybriid daily and tapering venlafaxine to 225mg PO was given  5/6 Cross-titration continued with 10mg PO Vybriid daily and tapering venlafaxine to 150mg PO     Isac Li continued to meet criteria for inpatient hospitalization medication optimization, inpatient stabilization, and appropriate discharge planning.      Medical course:   Isac Li was physically examined by the ED prior to being transferred to the unit and was found to be medically stable and appropriate for admission.      Plan   Today's Changes:   Tapering Venlafaxine to 150mg PO daily   _______________________________________________________________________  Psychiatric diagnoses and management:  Principal Diagnosis:   Major Depressive Disorder, severe, recurrent without psychotic features  -Continue cross-titratation Venlafaxine-Vilazodone (Vybriid): Vybriid 10 mg PO daily with food.   -Decrease Venlafaxine to 150mg PO daily  -Continue Seroquel 50 mg at bedtime     Secondary Psychiatric Diagnoses:   Compulsive Sexual Behavior  Cont Venlafaxine   Alcohol Use Disorder (historical)    Additional Planning:  Continue to monitor for and stabilize: SI and depressed  Patient will be treated in therapeutic milieu with appropriate individual and group therapies as described.  Would benefit from community support groups  Will coordinate access and gun safety at home    Scheduled Medications (summary):   aspirin   81 mg Oral Daily    atorvastatin  20 mg Oral At Bedtime    insulin aspart   Subcutaneous Daily with breakfast    insulin aspart   Subcutaneous Daily with lunch    insulin aspart   Subcutaneous Daily with supper    insulin aspart  1-5 Units Subcutaneous At Bedtime    insulin aspart  1-10 Units Subcutaneous TID AC    insulin glargine  48 Units Subcutaneous At Bedtime    lisinopril  5 mg Oral Daily    metFORMIN  2,000 mg Oral Daily with supper    phenytoin  500 mg Oral At Bedtime    QUEtiapine  50 mg Oral At Bedtime    venlafaxine  225 mg Oral At Bedtime    vilazodone  10 mg Oral Daily       PRN Medications (summary):  acetaminophen, alum & mag hydroxide-simethicone, glucose **OR** dextrose **OR** glucagon, hydrOXYzine, insulin aspart, melatonin, OLANZapine **OR** OLANZapine, polyethylene glycol    Discontinued Medications (& Rationale):      Consults:  IM following for T2DM  Endocrinology consulted for T2DM 5/4     Legal Status:  Voluntary     SIO:  No  Pt has not required locked seclusion or restraints in the past 24 hours to maintain safety, please refer to RN documentation for further details.    Disposition:  TBD, pending clinical stabilization, medication optimization, and formulation of a safe discharge plan.    Safety Assessment:   Behavioral Orders   Procedures    Code 1 - Restrict to Unit    Discontinue 1:1 attendant for suicide risk     Order Specific Question:   I have performed an in person assessment of the patient     Answer:   Based on this assessment the patient no longer requires a one on one attendant at this point in time.     Order Specific Question:   Rationale     Answer:   Patient States able to remain safe in hospital    Routine Programming     As clinically indicated    Sexual precautions    Status 15     Every 15 minutes.    Suicide precautions     Patients on Suicide Precautions should have a Combination Diet ordered that includes a Diet selection(s) AND a Behavioral Tray selection for Safe  "Tray - with utensils, or Safe Tray - NO utensils        ____________________________________________________________________  Pertinent Medical diagnoses and management:  IM currently following patient, apperciate help, for further details please see note on 05/03     #Uncontrolled diabetes mellitus type II, insulin dependent.   Being managed by medicine  \"Most recent A1C from toay is 9.8 on 4/28. Pt has not been checking BG at home. Does not count carb. He gives humalog insulin based on a sliding scale before meals but he usually doesn't check blood glucose prior to insulin and gives 20-26 units. He was started on his home dose of Lantus 60 units on admission.Since admission, he has continued to run hyperglycemic with levels ranging 149-313 (5/1). His lows appear to occur in the middle of the night, down to 84 at 0225 on 4/30.   - Since increasing his sliding scale insulin to high resistance yesterday he remains hyperglycemic   - Increase Lantuss from 60 to 62 units today (5/3)  - Continue Novolog to high resistance sliding scale (started 5/2)  - Continue to hold bedtime correction dose for now   - Hypoglycemia protocol  - F/U primary care outpt. He does not follow with Endocrinology.   - Cont ACE inhibitor for renoprotection (patient denies history of HTN and not documented in chart)  -Restarted PTA Aspirin\"   Endocrinology consult (5/4)  Assessment:   1) Insulin dependent Type 2 diabetes, uncontrolled (A1c 10.3%)     Plan:                  -Metformin XR 2000 mg daily with dinner                 -Lantus 62 units daily HS                 -Novolog 1:5g CHO coverage added with meals, snacks- to begin with dinner tonight.                  -Novolog high intensity sliding scale AC and HS                 -BG monitoring TID AC, HS, 0200                 -hypoglycemia protocol                 -recommend high/very high consistent carb diet with carb counting protocol- note that he currently has \"double portions ok\"- and this " will be a barrier to effective BG control.                  -once BG stabilize and dispo date is known, can consider GLP1 agonist initiation, to improve glycemic control and minimize insulin requirements.                 -diabetes education needs are not anticipated.                 -on discharge, will recommend outpatient follow up with PCP, and perhaps referral to endocrine/diabetes specialty service.      Discussed plan of care with patient, nursing, and primary team via this note.  Thank you for this consult; Inpatient Diabetes will continue to follow.      To contact Endocrine Diabetes service:   From 8AM-4PM: page inpatient diabetes provider that is following the patient  For questions or updates from 4PM-8AM: page the diabetes job code for on call fellow: [phone]      Mckenna Taylor  Medical Student MS3    I was present with the medical student who participated in the service and in the documentation of the note. I have verified the history and personally performed the physical exam and medical decision making. I agree with the assessment and plan of care as documented in the note and have made changes to the note as appropriate.     Jun Gonzalez MD MPH  PGY 1 Psychiatry resident    ___________  I have reviewed this note, but did not examine this patient today. Divina Linton MD

## 2022-05-06 NOTE — PLAN OF CARE
Problem: Plan of Care - These are the overarching goals to be used throughout the patient stay.    Goal: Plan of Care Review/Shift Note  Outcome: Ongoing, Progressing  Flowsheets (Taken 5/5/2022 2213)  Plan of Care Reviewed With: patient    Pt presented with a full range affect and was tearful at times when in conversation. Pt stated that he was feeling down before the  called and gave him good news that he would be able to stay through the weekend. Pt was anxious about leaving to soon before he was ready. Denied SI/SIB or hallucinations. Pt participated in evening group and watched TV in the lounge. Pt's BG were 207 and 238 respectively. Pt also received Novolog for snack coverage. Pt was otherwise calm, cooperative, and pleasant.

## 2022-05-06 NOTE — PROGRESS NOTES
"                          Diabetes Consult Daily  Progress Note          Assessment/Plan:     HPI:  Isac is a 67 year old male with PMHx TIIDM, depression, HTN, HLP who was admitted 4/27/22 to inpatient psychiatry for stabilization for SI in the context of recent loss of friend to suicide.        Assessment:     1)   Insulin dependent Type 2 diabetes, uncontrolled (A1c 10.3%)  2)  Obesity; BMI 42     Plan:                  -Metformin XR 2000 mg daily with dinner (1700)                 -Decrease Lantus 48  units daily HS                 -Novolog 1:5g CHO coverage with breakfast/lunch/snacks and 1:4 for dinner - (TBD what we will need for home: fixed dose vs GLP-1)                 -Novolog high intensity sliding scale TID AC and reduce to medium resistance for HS                 -BG monitoring TID AC, HS, 0200                 -hypoglycemia protocol                 -recommend high/very high consistent carb diet with carb counting protocol- note that he currently has \"double portions ok\"- and this will be a barrier to effective BG control.                  -once BG stabilize and dispo date is known, can consider GLP1 agonist initiation, to improve glycemic control and minimize insulin requirements.                 -diabetes education needs are not anticipated.                 -on discharge, will recommend outpatient follow up with PCP, and perhaps referral to endocrine/diabetes specialty service.     Patient has pharmacy benefits through [employer] ([employer] Per insurance, the following are covered and preferred under the plan:       Ozempic - $25    Metformin ER tabs - $8    Basaglar pens - $25    Humalog pens/vials - $25    OneTouch meter/strips/lancets - $26/$9/$25     The following are not covered under the plan:    Bydureon BCISE    Trulicity    Victoza    Adlyxin    Byetta    Rybelsus    Metformin ER Osmotic tabs    Lantus    Semglee    Novolog    Fiasp    Insulin Aspart    Insulin " Lispro    Admelog    Accu-chek meter/strips/lancets    Contour meter/strips/lancets    Freestyle Lite meter/strips/lancets        Plan discussed with patient, bedside RN/primary team via this note.         Interval History:     The last 24 hours progress and nursing notes reviewed.      0200 BG trended to 72 overnoc - will reduce lantus and increase dinner cho coverage and reduce the sliding scale insulin resistance at HS.       BG trend:    Isac is very grateful for the detailed discussion on his BG and the patterns.     We reviewed the changes to be implemented and he is agreeable.  Reviewed the options from the pharm liaison and he is ok with the $25.00 co-pay for the once weekly Ozempic and is excited about this option.  Once his BG stabilize just a bit more, can plan to initiate Victoza (while inpatient) then plan for Ozempic out patient if he has no issues with tolerance.        Carry over:  Discussed GLP1 with patient he denies history of pancreatitis, hyperplasia of thyroid gland, personal or family hx of thyroid CA, ETOH use, gastroparesis or any other GI concerns.     Planned Procedures/surgeries: none  D5W-containing solutions/medications: none    Recent Labs   Lab 05/06/22  0219 05/05/22  2101 05/05/22  1757 05/05/22  1239 05/05/22  0946 05/05/22  0807   GLC 72 238* 207* 148* 151* 70           Nutrition:     Orders Placed This Encounter      Regular Diet Adult          PTA Regimen:     Frequency of checks: infrequent, some less than, and some greater than, 200  Metformin XR 2000 mg with dinner  Basaglar 62 units at bedtime  Humalog 18-26 units TID with meals- he does not formally count carbs, and doesn't really systematically estimate either.  Usual BG control PTA:  uncontrolled, with A1c 10.3% on 3/30/22  Able to Detect Hypoglycemia: once, years ago. Occurred at work. Was symptomatic, doesn't remember how low it went. None since.   Usual Diabetes Care Provider: PCP, Dr. Russ  Primary Care Provider:  "Marquise Russ  Factors Impacting IP Glucose Control: omission of prandial insulin          Review of Systems:   CC: Denies all          Medications:   Steroid planning:  no  Tube Feeding: no       Physical Exam:   /82   Pulse 81   Temp 98.2  F (36.8  C) (Oral)   Resp 20   Ht 1.791 m (5' 10.5\")   Wt 111.7 kg (246 lb 4.8 oz)   SpO2 97%   BMI 34.84 kg/m      General:   A&O, NAD, pleasant, in group. Well nourished   HEENT:  NC/AT. MMM, EOMI, Anicteric  Lungs:  unremarkable, no new cough, no SOB  ABD:   rounded, soft  Extremities:  Moving all extremities  Skin:  warm and dry, no obvious lesions/rash/bleeding  Neuro:  No focal neurological deficits  Psych:   Cooperative, depressed mood, good eye contact, congruent affect         Data:     Lab Results   Component Value Date    A1C 9.8 04/28/2022    A1C 10.3 03/30/2022    A1C 11.5 12/22/2021    A1C 8.7 09/21/2021    A1C 10.5 06/10/2021        CBC RESULTS: Recent Labs   Lab Test 04/27/22  1615   WBC 10.0   RBC 4.74   HGB 13.8   HCT 40.5   MCV 85   MCH 29.1   MCHC 34.1   RDW 12.8        Recent Labs   Lab Test 05/06/22  0814 05/06/22  0219 04/27/22  2047 04/27/22  1615 12/22/21  1021   NA  --   --   --  138 142   POTASSIUM  --   --   --  4.0 4.3   CHLORIDE  --   --   --  106 108*   CO2  --   --   --  21 23   ANIONGAP  --   --   --  11 11   * 72   < > 241* 100   BUN  --   --   --  19 22   CR  --   --   --  0.82 0.78   [place]  --   --   --  8.6 9.0    < > = values in this interval not displayed.     Liver Function Studies -   Recent Labs   Lab Test 04/27/22  1615   PROTTOTAL 7.1   ALBUMIN 3.5   BILITOTAL 0.3   ALKPHOS 149   AST 9   ALT 25     No results found for: INR      NEVA Del Cid CNP   Inpatient Diabetes Management Service  Pager - [phone]  Available on [employer]   Friday - Monday 0800 - 1600 hrs    To contact Endocrine Diabetes service:   From 8AM-4PM: page inpatient diabetes provider who is following the patient that day (see filed " or incomplete progress notes/consult notes).  If uncertain of provider assignment: page job code [phone]. (To page job code in-house dial 3 stars, 777 then enter number).  For questions or updates AFTER HOURS from 4PM-8AM: page the diabetes job code for on call fellow: [phone]    Please notify inpatient diabetes service if changes are planned to steroids, nutrition, or if procedures are planned requiring prolonged NPO status.Diabetes Management Team job code: [phone]    I spent a total of 35 minutes on the date of the encounter doing chart review, history and exam, documentation and further activities per the note.  Over 50% of my time on the unit was spent counseling the patient and/or coordinating care regarding acute hyperglycemic management.  See note for details.

## 2022-05-06 NOTE — PROVIDER NOTIFICATION
05/05/22 8255   Patient Belongings   Did you bring any home meds/supplements to the hospital?  No   Patient Belongings remains with patient;locker   Patient Belongings Remaining with Patient glasses;clothing   Patient Belongings Put in Hospital Secure Location (Security or Locker, etc.) none   Belongings Search Yes   Clothing Search Yes   Second Staff Tu       Pt has in locker [employer] insurance card, [place] ID, Medicare insurance card, black sunglasses.  On pt pink PJ's, black earrings  ..A               Admission:  I am responsible for any personal items that are not sent to the safe or pharmacy.  [facility] is not responsible for loss, theft or damage of any property in my possession.    Signature:  _________________________________ Date: _______  Time: _____                                              Staff Signature:  ____________________________ Date: ________  Time: _____      2nd Staff person, if patient is unable/unwilling to sign:    Signature: ________________________________ Date: ________  Time: _____     Discharge:  [facility] has returned all of my personal belongings:    Signature: _________________________________ Date: ________  Time: _____                                          Staff Signature:  ____________________________ Date: ________  Time: _____

## 2022-05-06 NOTE — PLAN OF CARE
"[facility] Occupational Therapy Group Intervention Note     05/06/22 1227   Group Therapy Session   Group Attendance attended group session   Time Session Began 1010   Time Session Ended 1200   Total Time patient participated (minutes) 90   Total # Attendees 4   Group Type task skill;expressive therapy;life skill   Group Topic Covered coping skills/lifestyle management;emotions/expression   Group Session Detail topic group for general health and coping: Collage focused on insight development specifically personal development, supports/barriers, goals, and interests related to recovery.    Patient Response/Contribution cooperative with task;organized;discussed personal experience with topic   Patient Response Detail able to organize and apply concepts to collage, sequence activity, and clean-up. Demonstrated openness with peers; Pt identified a few images (animal-based) that feels enjoyable; further, stated \"I didn't have alva when I got here.. I can see it now\".     María Segura, OT on 5/6/2022 at 12:36 PM      "

## 2022-05-06 NOTE — PLAN OF CARE
"[facility] Therapy [facility] Intervention Note     05/06/22 1414   Group Therapy Session   Group Attendance attended group session   Time Session Began 1320   Time Session Ended 1410   Total Time patient participated (minutes) 50   Total # Attendees 2   Group Type life skill;recreation   Group Topic Covered relaxation techniques;coping skills/lifestyle management   Group Session Detail leisure exploration and sensory integration group offered for increased intrinsic motivation to engage in social, non-obligatory occupations, promote positive milieu interaction and variation of sensory experiences. Note: Group was held outside ([place]) with 2:1 staff.    Patient Response/Contribution cooperative with task;discussed personal experience with topic   Patient Response Detail Pt verbally contracted for safety and safe return to unit. Full participation while watering flowers and conversing with others with a brightened affect. Reported increased mood / perspective as stated \"I don't remember recognizing such a blue romelia before.. I didn't realize how long I was depressed for\" (suspected ~3yrs)      María Segura, OT on 5/6/2022 at 2:24 PM      "

## 2022-05-06 NOTE — PLAN OF CARE
Patient denies suicidal and homicidal ideation and contracts for safety. He denies visual and auditory hallucinations and does not seem to be responding to internal stimuli. He appeared sad when in his room alone but would brighten up when writer entered his room. Blood glucose at breakfast was 103 mg/dl and 166 mg/dl at lunch. 37 units of rapid acting insulin was given at lunch to cover carbs consumed (patient also ate an extra dinner roll from another tray.) Code 3 status was ordered so patient could go outside to the garden with Occupational Therapy.     Problem: Plan of Care - These are the overarching goals to be used throughout the patient stay.    Goal: Plan of Care Review/Shift Note  Description: The Plan of Care Review/Shift note should be completed every shift.  The Outcome Evaluation is a brief statement about your assessment that the patient is improving, declining, or no change.  This information will be displayed automatically on your shift note.  Outcome: Ongoing, Progressing  Flowsheets  Taken 5/6/2022 1349  Plan of Care Reviewed With: patient  Overall Patient Progress: improving  Taken 5/6/2022 1310  Plan of Care Reviewed With: patient  Note: Continues to deny suicidal ideation.     Problem: Plan of Care - These are the overarching goals to be used throughout the patient stay.    Goal: Optimal Comfort and Wellbeing  Outcome: Ongoing, Progressing     Problem: Suicide Risk  Goal: Absence of Self-Harm  Outcome: Ongoing, Progressing   Goal Outcome Evaluation:    Plan of Care Reviewed With: patient     Overall Patient Progress: improving

## 2022-05-06 NOTE — PLAN OF CARE
05/05/22 2217   Group Therapy Session   Group Attendance attended group session   Time Session Began 2000   Time Session Ended 2050   Total Time patient participated (minutes) 50   Total # Attendees 5   Group Type expressive therapy   Group Topic Covered balanced lifestyle   Group Session Detail OT Clinic   Patient Response/Contribution cooperative with task   Patient Response Detail Isac participated in OT clinic this evening, where he initiated a chosen project (simple coloring) and stayed focused on task while chatting sociably with peers for the duration of the session. Tone light and bright. Pleasant demeanor.

## 2022-05-07 LAB
GLUCOSE BLDC GLUCOMTR-MCNC: 116 MG/DL (ref 70–99)
GLUCOSE BLDC GLUCOMTR-MCNC: 121 MG/DL (ref 70–99)
GLUCOSE BLDC GLUCOMTR-MCNC: 150 MG/DL (ref 70–99)
GLUCOSE BLDC GLUCOMTR-MCNC: 165 MG/DL (ref 70–99)
GLUCOSE BLDC GLUCOMTR-MCNC: 214 MG/DL (ref 70–99)
GLUCOSE BLDC GLUCOMTR-MCNC: 84 MG/DL (ref 70–99)

## 2022-05-07 PROCEDURE — 250N000013 HC RX MED GY IP 250 OP 250 PS 637: Performed by: PHYSICIAN ASSISTANT

## 2022-05-07 PROCEDURE — 124N000002 HC R&B MH UMMC

## 2022-05-07 PROCEDURE — 250N000013 HC RX MED GY IP 250 OP 250 PS 637: Performed by: STUDENT IN AN ORGANIZED HEALTH CARE EDUCATION/TRAINING PROGRAM

## 2022-05-07 PROCEDURE — 99233 SBSQ HOSP IP/OBS HIGH 50: CPT | Performed by: NURSE PRACTITIONER

## 2022-05-07 PROCEDURE — 250N000009 HC RX 250: Performed by: NURSE PRACTITIONER

## 2022-05-07 PROCEDURE — 250N000012 HC RX MED GY IP 250 OP 636 PS 637: Performed by: NURSE PRACTITIONER

## 2022-05-07 PROCEDURE — 250N000013 HC RX MED GY IP 250 OP 250 PS 637

## 2022-05-07 RX ORDER — LIRAGLUTIDE 6 MG/ML
0.6 INJECTION SUBCUTANEOUS DAILY
Status: DISCONTINUED | OUTPATIENT
Start: 2022-05-07 | End: 2022-05-09

## 2022-05-07 RX ADMIN — VILAZODONE HYDROCHLORIDE 10 MG: 10 TABLET ORAL at 08:25

## 2022-05-07 RX ADMIN — LISINOPRIL 5 MG: 5 TABLET ORAL at 08:25

## 2022-05-07 RX ADMIN — VENLAFAXINE HYDROCHLORIDE 150 MG: 75 CAPSULE, EXTENDED RELEASE ORAL at 22:08

## 2022-05-07 RX ADMIN — QUETIAPINE FUMARATE 50 MG: 50 TABLET ORAL at 22:08

## 2022-05-07 RX ADMIN — INSULIN ASPART 8 UNITS: 100 INJECTION, SOLUTION INTRAVENOUS; SUBCUTANEOUS at 18:53

## 2022-05-07 RX ADMIN — PHENYTOIN SODIUM 500 MG: 100 CAPSULE ORAL at 22:08

## 2022-05-07 RX ADMIN — ASPIRIN 81 MG CHEWABLE TABLET 81 MG: 81 TABLET CHEWABLE at 08:25

## 2022-05-07 RX ADMIN — ATORVASTATIN CALCIUM 20 MG: 20 TABLET, FILM COATED ORAL at 22:08

## 2022-05-07 RX ADMIN — LIRAGLUTIDE 0.6 MG: 6 INJECTION SUBCUTANEOUS at 08:33

## 2022-05-07 RX ADMIN — INSULIN GLARGINE 40 UNITS: 100 INJECTION, SOLUTION SUBCUTANEOUS at 22:07

## 2022-05-07 RX ADMIN — METFORMIN HYDROCHLORIDE 2000 MG: 750 TABLET, EXTENDED RELEASE ORAL at 18:52

## 2022-05-07 RX ADMIN — MELATONIN TAB 3 MG 3 MG: 3 TAB at 22:16

## 2022-05-07 ASSESSMENT — ACTIVITIES OF DAILY LIVING (ADL)
HYGIENE/GROOMING: INDEPENDENT
DRESS: INDEPENDENT
ORAL_HYGIENE: INDEPENDENT
HYGIENE/GROOMING: INDEPENDENT
LAUNDRY: WITH SUPERVISION
DRESS: INDEPENDENT
ORAL_HYGIENE: INDEPENDENT

## 2022-05-07 NOTE — PROGRESS NOTES
[facility] Diabetes Management  Daily Note           Assessment and Plan:   HPI: Isac is a 67 year old male with PMHx TIIDM, depression, HTN, HLP who was admitted 4/27/22 to inpatient psychiatry for stabilization for SI in the context of recent loss of friend to suicide.       Assessment:      1)   Insulin dependent Type 2 diabetes, uncontrolled (A1c 10.3%)  2)  Obesity; BMI 42    Plan:    -start victoza 0.6 mg subcutaneous daily this AM   -Metformin XR 2000 mg daily with dinner (1700)   -decrease Lantus to 40 units from 48 units at HS   -decrease Novolog to 1:8g CHO from Novolog 1:5g CHO coverage with breakfast/lunch/snacks and 1:4 for dinner - (TBD what we will need for home: fixed dose vs GLP-1)   -Novolog high intensity sliding scale TID AC and medium resistance for HS   -BG monitoring TID AC, HS, 0200   -hypoglycemia protocol   -carb counting protocol   -diabetes education needs not anticipated     Outpatient follow up: on discharge, will recommend outpatient follow up with PCP, and perhaps referral to endocrine/diabetes specialty service.   Plan discussed with patient and primary team through this note.        Interval History and Assessment: interval glucose trend reviewed:        BG up into 200s post prandially at bedtime and 1400 yesterday.  Agreeable to Victoza while in hospital and Ozempic weekly on discharge.  Will reduce Lantus and novolog for Victoza initation.     Patient excited about starting victoza.  Agreeable to ozempic weekly on discharge and cost of $25.  Denies nausea, vomiting, abdominal pain, diarrhea.  Eating well.    Current nutritional intake and type: Orders Placed This Encounter      Regular Diet Adult      Planned Procedures/surgeries: none  Steroid planning: none  D5W-containing solutions/medications: none    PTA Diabetes Regimen:   Frequency of checks: infrequent, some less than, and some greater than, 200  Metformin XR 2000 mg with dinner  Basaglar 62 units at bedtime  Humalog 18-26 units  TID with meals- he does not formally count carbs, and doesn't really systematically estimate either.  Usual BG control PTA:  uncontrolled, with A1c 10.3% on 3/30/22  Able to Detect Hypoglycemia: once, years ago. Occurred at work. Was symptomatic, doesn't remember how low it went. None since.   Usual Diabetes Care Provider: PCP, Dr. Russ  Primary Care Provider: Marquise Russ  Factors Impacting IP Glucose Control: omission of prandial insulin         Discharge Planning: TBD           Diabetes History:   Type of Diabetes: Type 2 Diabetes Mellitus  Lab Results   Component Value Date    A1C 9.8 04/28/2022    A1C 10.3 03/30/2022    A1C 11.5 12/22/2021    A1C 8.7 09/21/2021    A1C 10.5 06/10/2021              Review of Systems:     The Review of Systems is negative other than noted in the Interval History.           Medications:     Current Facility-Administered Medications   Medication     acetaminophen (TYLENOL) tablet 650 mg     alum & mag hydroxide-simethicone (MAALOX) suspension 30 mL     aspirin (ASA) chewable tablet 81 mg     atorvastatin (LIPITOR) tablet 20 mg     glucose gel 15-30 g    Or     dextrose 50 % injection 25-50 mL    Or     glucagon injection 1 mg     hydrOXYzine (ATARAX) tablet 25 mg     insulin aspart (NovoLOG) injection (RAPID ACTING)     insulin aspart (NovoLOG) injection (RAPID ACTING)     insulin aspart (NovoLOG) injection (RAPID ACTING)     insulin aspart (NovoLOG) injection (RAPID ACTING)     insulin aspart (NovoLOG) injection (RAPID ACTING)     insulin aspart (NovoLOG) injection (RAPID ACTING)     insulin glargine (LANTUS PEN) injection 48 Units     lisinopril (ZESTRIL) tablet 5 mg     melatonin tablet 3 mg     metFORMIN (GLUCOPHAGE-XR) 24 hr tablet 2,000 mg     OLANZapine (zyPREXA) tablet 5 mg    Or     OLANZapine (zyPREXA) injection 5 mg     phenytoin (DILANTIN) CR capsule 500 mg     polyethylene glycol (MIRALAX) Packet 17 g     QUEtiapine (SEROquel) tablet 50 mg     venlafaxine  "(EFFEXOR XR) 24 hr capsule 150 mg     vilazodone (VIIBRYD) tablet 10 mg            Physical Exam:    /82   Pulse 81   Temp 98.5  F (36.9  C) (Oral)   Resp 16   Ht 1.791 m (5' 10.5\")   Wt 111.7 kg (246 lb 4.8 oz)   SpO2 96%   BMI 34.84 kg/m    General: pleasant, in no distress. Sitting at edge of bed.   HEENT: normocephalic, atraumatic. Oral mucous membranes moist.   Lungs: unlabored respiration, no cough  ABD: rounded, nondistended  Skin: warm and dry, no obvious lesions  MSK:  moves all extremities  Lymp:  no LE edema   Mental status:  alert, oriented to self, place, time  Psych:  affect, calm and appropriate interaction             Data:     Recent Labs   Lab 05/07/22  0209 05/06/22  2126 05/06/22  1746 05/06/22  1447 05/06/22  1237 05/06/22  0814   GLC 84 216* 121* 212* 166* 103*     Lab Results   Component Value Date    WBC 10.0 04/27/2022    WBC 8.6 09/21/2021    WBC 8.9 06/17/2020    HGB 13.8 04/27/2022    HGB 13.2 (L) 09/21/2021    HGB 14.9 06/17/2020    HCT 40.5 04/27/2022    HCT 39.5 (L) 09/21/2021    HCT 43.3 06/17/2020    MCV 85 04/27/2022    MCV 86 09/21/2021    MCV 88 06/17/2020     04/27/2022     09/21/2021     06/17/2020     Lab Results   Component Value Date     04/27/2022     12/22/2021     06/10/2021    POTASSIUM 4.0 04/27/2022    POTASSIUM 4.3 12/22/2021    POTASSIUM 4.6 06/10/2021    CHLORIDE 106 04/27/2022    CHLORIDE 108 (H) 12/22/2021    CHLORIDE 104 06/10/2021    CO2 21 04/27/2022    CO2 23 12/22/2021    CO2 19 (L) 06/10/2021    GLC 84 05/07/2022     (H) 05/06/2022     (H) 05/06/2022     Lab Results   Component Value Date    BUN 19 04/27/2022    BUN 22 12/22/2021    BUN 19 06/10/2021     Lab Results   Component Value Date    TSH 2.76 04/27/2022     Lab Results   Component Value Date    AST 9 04/27/2022    AST 17 12/22/2021    AST 19 06/10/2021    ALT 25 04/27/2022    ALT 30 12/22/2021    ALT 22 06/10/2021    ALKPHOS 149 " 04/27/2022    ALKPHOS 144 (H) 12/22/2021    ALKPHOS 157 (H) 06/10/2021           35 minutes spent on the date of the encounter doing chart review, history and exam, documentation and further activities per the note      Over 50% of my time on the unit was spent counseling the patient and/or coordinating care regarding acute hyperglycemia management.  See note for details.    To contact Endocrine Diabetes service:   From 8AM-4PM: page inpatient diabetes provider that is following the patient  For questions or updates from 4PM-8AM: page the diabetes job code for on call fellow: [phone]    NEVA Jacinto, CNP  Inpatient Diabetes Management Service  Pager [phone]

## 2022-05-07 NOTE — PROGRESS NOTES
05/06/22 2100   Group Therapy Session   Group Attendance attended group session   Time Session Began 2000   Time Session Ended 2100   Total Time patient participated (minutes) 55   Total # Attendees 5   Group Type recreation   Group Topic Covered leisure exploration/use of leisure time   Group Session Detail TR leisure group   Patient Response/Contribution cooperative with task   Patient Response Detail Pt attended the structured Therapeutic Recreation group, participating in a group activity. Pt participated in group discussion, leisure participation, and social engagement to gain self-esteem, manage behaviors, improve social skills, decrease isolation, and reduce anxiety/depression.   Pt remained focused and engaged throughout group activity.  Pt mood was sociable and was appropriate with interactions, contributing to the clues and descriptions throughout the activity. Pt was a full participant for the duration of the group. Pt had a bright affect, often laughing and joking with peers appropriately.

## 2022-05-07 NOTE — PLAN OF CARE
Problem: Sleep Disturbance  Goal: Adequate Sleep/Rest  Outcome: Ongoing, Progressing     Patient appeared to have slept for  5.75  hours. No complain of pain and no prn medications were administered.At 0200, blood sugar  84 and pt requested for one banana, one apple, and one string cheese. He denied SI/SIB. 15 minutes safety checks was in place. No behavior or safety concerns noted. Staff will continue to offer support to pt.

## 2022-05-07 NOTE — PLAN OF CARE
"Patient denied all psych symptoms and contracted for safety. She spent the majority of time this shift out in the milieu and was engaged with peers and staff. Patient asked that the blinds in his room be opened so he could see outside. He sat on his bed looking out the window looking sad. He reported that he is still \"struggling\" with his sexuality. Blood glucose at breakfast was 116 mg/dl and 121 mg/dl at lunch.     Problem: Plan of Care - These are the overarching goals to be used throughout the patient stay.    Goal: Optimal Comfort and Wellbeing  Outcome: Ongoing, Progressing     Problem: Suicide Risk  Goal: Absence of Self-Harm  Outcome: Ongoing, Progressing   Goal Outcome Evaluation:    Plan of Care Reviewed With: patient     Overall Patient Progress: improving           "

## 2022-05-08 LAB
GLUCOSE BLDC GLUCOMTR-MCNC: 105 MG/DL (ref 70–99)
GLUCOSE BLDC GLUCOMTR-MCNC: 106 MG/DL (ref 70–99)
GLUCOSE BLDC GLUCOMTR-MCNC: 143 MG/DL (ref 70–99)
GLUCOSE BLDC GLUCOMTR-MCNC: 228 MG/DL (ref 70–99)
GLUCOSE BLDC GLUCOMTR-MCNC: 243 MG/DL (ref 70–99)

## 2022-05-08 PROCEDURE — 250N000013 HC RX MED GY IP 250 OP 250 PS 637

## 2022-05-08 PROCEDURE — 250N000013 HC RX MED GY IP 250 OP 250 PS 637: Performed by: PHYSICIAN ASSISTANT

## 2022-05-08 PROCEDURE — H2032 ACTIVITY THERAPY, PER 15 MIN: HCPCS

## 2022-05-08 PROCEDURE — 124N000002 HC R&B MH UMMC

## 2022-05-08 PROCEDURE — 250N000013 HC RX MED GY IP 250 OP 250 PS 637: Performed by: STUDENT IN AN ORGANIZED HEALTH CARE EDUCATION/TRAINING PROGRAM

## 2022-05-08 PROCEDURE — 99232 SBSQ HOSP IP/OBS MODERATE 35: CPT | Performed by: NURSE PRACTITIONER

## 2022-05-08 RX ADMIN — PHENYTOIN SODIUM 500 MG: 100 CAPSULE ORAL at 21:45

## 2022-05-08 RX ADMIN — METFORMIN HYDROCHLORIDE 2000 MG: 750 TABLET, EXTENDED RELEASE ORAL at 18:27

## 2022-05-08 RX ADMIN — VENLAFAXINE HYDROCHLORIDE 150 MG: 75 CAPSULE, EXTENDED RELEASE ORAL at 21:45

## 2022-05-08 RX ADMIN — VILAZODONE HYDROCHLORIDE 10 MG: 10 TABLET ORAL at 08:42

## 2022-05-08 RX ADMIN — ATORVASTATIN CALCIUM 20 MG: 20 TABLET, FILM COATED ORAL at 21:45

## 2022-05-08 RX ADMIN — INSULIN ASPART 11 UNITS: 100 INJECTION, SOLUTION INTRAVENOUS; SUBCUTANEOUS at 18:33

## 2022-05-08 RX ADMIN — QUETIAPINE FUMARATE 50 MG: 50 TABLET ORAL at 21:45

## 2022-05-08 RX ADMIN — LISINOPRIL 5 MG: 5 TABLET ORAL at 08:43

## 2022-05-08 RX ADMIN — ASPIRIN 81 MG CHEWABLE TABLET 81 MG: 81 TABLET CHEWABLE at 08:42

## 2022-05-08 RX ADMIN — LIRAGLUTIDE 0.6 MG: 6 INJECTION SUBCUTANEOUS at 08:43

## 2022-05-08 RX ADMIN — MELATONIN TAB 3 MG 3 MG: 3 TAB at 21:52

## 2022-05-08 ASSESSMENT — ACTIVITIES OF DAILY LIVING (ADL)
DRESS: INDEPENDENT
HYGIENE/GROOMING: INDEPENDENT
ORAL_HYGIENE: INDEPENDENT
DRESS: INDEPENDENT;SCRUBS (BEHAVIORAL HEALTH)
HYGIENE/GROOMING: INDEPENDENT;HANDWASHING
ORAL_HYGIENE: INDEPENDENT
LAUNDRY: WITH SUPERVISION

## 2022-05-08 NOTE — PROGRESS NOTES
[facility] Diabetes Management  Daily Note           Assessment and Plan:   HPI: Isac is a 67 year old male with PMHx TIIDM, depression, HTN, HLP who was admitted 4/27/22 to inpatient psychiatry for stabilization for SI in the context of recent loss of friend to suicide.       Assessment:      1)   Insulin dependent Type 2 diabetes, uncontrolled (A1c 10.3%)  2)  Obesity; BMI 42    Plan:    -continue victoza 0.6 mg subcutaneous daily this AM-->consider increase in 2-3 days   -Metformin XR 2000 mg daily with dinner (1700)   -decrease Lantus to 35 units from 40 units at HS   -decrease Novolog to 1:10g CHO from Novolog 1:8g CHO coverage AC (TBD what we will need for home: fixed dose vs GLP-1)   -Novolog high intensity sliding scale TID AC and medium resistance for HS   -BG monitoring TID AC, HS, 0200   -hypoglycemia protocol   -carb counting protocol   -diabetes education needs not anticipated     Outpatient follow up: on discharge, will recommend outpatient follow up with PCP, and perhaps referral to endocrine/diabetes specialty service.   Plan discussed with patient and primary team through this note.        Interval History and Assessment: interval glucose trend reviewed:        BG improved with initiation of Victoza yesterday.  Lantus reduced.   this AM.  Will decrease novolog further to 1:10g CHO from 1:8g CHO and trend BG.  Will decrease Lantus to 35 units at bedtime.      Patient excited about starting victoza.  Agreeable to ozempic weekly on discharge and cost of $25.  Denies nausea, vomiting, abdominal pain, diarrhea.  Eating well.  Just feeling tired this AM.    Current nutritional intake and type: Orders Placed This Encounter      Regular Diet Adult      Planned Procedures/surgeries: none  Steroid planning: none  D5W-containing solutions/medications: none    PTA Diabetes Regimen:   Frequency of checks: infrequent, some less than, and some greater than, 200  Metformin XR 2000 mg with dinner  Basaglar 62 units at  bedtime  Humalog 18-26 units TID with meals- he does not formally count carbs, and doesn't really systematically estimate either.  Usual BG control PTA:  uncontrolled, with A1c 10.3% on 3/30/22  Able to Detect Hypoglycemia: once, years ago. Occurred at work. Was symptomatic, doesn't remember how low it went. None since.   Usual Diabetes Care Provider: PCP, Dr. Russ  Primary Care Provider: Marquise Russ  Factors Impacting IP Glucose Control: omission of prandial insulin         Discharge Planning: TBD           Diabetes History:   Type of Diabetes: Type 2 Diabetes Mellitus  Lab Results   Component Value Date    A1C 9.8 04/28/2022    A1C 10.3 03/30/2022    A1C 11.5 12/22/2021    A1C 8.7 09/21/2021    A1C 10.5 06/10/2021              Review of Systems:     The Review of Systems is negative other than noted in the Interval History.           Medications:     Current Facility-Administered Medications   Medication     acetaminophen (TYLENOL) tablet 650 mg     alum & mag hydroxide-simethicone (MAALOX) suspension 30 mL     aspirin (ASA) chewable tablet 81 mg     atorvastatin (LIPITOR) tablet 20 mg     glucose gel 15-30 g    Or     dextrose 50 % injection 25-50 mL    Or     glucagon injection 1 mg     hydrOXYzine (ATARAX) tablet 25 mg     insulin aspart (NovoLOG) injection (RAPID ACTING)     insulin aspart (NovoLOG) injection (RAPID ACTING)     insulin aspart (NovoLOG) injection (RAPID ACTING)     insulin aspart (NovoLOG) injection (RAPID ACTING)     insulin aspart (NovoLOG) injection (RAPID ACTING)     insulin aspart (NovoLOG) injection (RAPID ACTING)     insulin glargine (LANTUS PEN) injection 40 Units     liraglutide (VICTOZA) injection 0.6 mg     lisinopril (ZESTRIL) tablet 5 mg     melatonin tablet 3 mg     metFORMIN (GLUCOPHAGE-XR) 24 hr tablet 2,000 mg     OLANZapine (zyPREXA) tablet 5 mg    Or     OLANZapine (zyPREXA) injection 5 mg     phenytoin (DILANTIN) CR capsule 500 mg     polyethylene glycol (MIRALAX)  "Packet 17 g     QUEtiapine (SEROquel) tablet 50 mg     venlafaxine (EFFEXOR XR) 24 hr capsule 150 mg     vilazodone (VIIBRYD) tablet 10 mg            Physical Exam:    /85 (BP Location: Right arm, Patient Position: Sitting)   Pulse 85   Temp 97  F (36.1  C) (Oral)   Resp 16   Ht 1.791 m (5' 10.5\")   Wt 111.7 kg (246 lb 4.8 oz)   SpO2 97%   BMI 34.84 kg/m        GENERAL : In no apparent distress over the phone  RESP: normal respiratory effort. No cough  HEENT: hearing intact to conversational volume  NEURO: awake, alert, responds appropriately to questions.               Data:     Recent Labs   Lab 05/08/22  0212 05/07/22  2146 05/07/22  1746 05/07/22  1241 05/07/22  1010 05/07/22  0815   * 214* 150* 121* 165* 116*     Lab Results   Component Value Date    WBC 10.0 04/27/2022    WBC 8.6 09/21/2021    WBC 8.9 06/17/2020    HGB 13.8 04/27/2022    HGB 13.2 (L) 09/21/2021    HGB 14.9 06/17/2020    HCT 40.5 04/27/2022    HCT 39.5 (L) 09/21/2021    HCT 43.3 06/17/2020    MCV 85 04/27/2022    MCV 86 09/21/2021    MCV 88 06/17/2020     04/27/2022     09/21/2021     06/17/2020     Lab Results   Component Value Date     04/27/2022     12/22/2021     06/10/2021    POTASSIUM 4.0 04/27/2022    POTASSIUM 4.3 12/22/2021    POTASSIUM 4.6 06/10/2021    CHLORIDE 106 04/27/2022    CHLORIDE 108 (H) 12/22/2021    CHLORIDE 104 06/10/2021    CO2 21 04/27/2022    CO2 23 12/22/2021    CO2 19 (L) 06/10/2021     (H) 05/08/2022     (H) 05/07/2022     (H) 05/07/2022     Lab Results   Component Value Date    BUN 19 04/27/2022    BUN 22 12/22/2021    BUN 19 06/10/2021     Lab Results   Component Value Date    TSH 2.76 04/27/2022     Lab Results   Component Value Date    AST 9 04/27/2022    AST 17 12/22/2021    AST 19 06/10/2021    ALT 25 04/27/2022    ALT 30 12/22/2021    ALT 22 06/10/2021    ALKPHOS 149 04/27/2022    ALKPHOS 144 (H) 12/22/2021    ALKPHOS 157 (H) " 06/10/2021         Follow up was done today via virtual/ telephone encounter, due to high risk patients and implementing social distancing due to COVID 19.   5 minutes spent on phone with patient  20 minutes spent on documentation, coordination of care          To contact Endocrine Diabetes service:   From 8AM-4PM: page inpatient diabetes provider that is following the patient  For questions or updates from 4PM-8AM: page the diabetes job code for on call fellow: [phone]    NEVA Jacinto, CNP  Inpatient Diabetes Management Service  Pager [phone]

## 2022-05-08 NOTE — PLAN OF CARE
"Patient denied all psych symptoms apart from depression, which she rated at 2/10. He had a flat affect for the most part but was pleasant on approach. He looked more depressed in the morning than he did in the afternoon. When asked to describe how he was feeling this morning, he replied \"flat.\" He expressed being upset about a peer's outburst earlier this shift. He said that when that happens, he asks himself whether he is \"in the right place.\" Patient spent prolonged periods of time out in the lounge but had minimal engagement with peers. Blood glucose level at breakfast was 106 mg/dl and 143 mg/dl at lunch.     Problem: Suicide Risk  Goal: Absence of Self-Harm  Outcome: Ongoing, Progressing     Problem: Behavioral Health Plan of Care  Goal: Optimized Coping Skills in Response to Life Stressors  Outcome: Ongoing, Progressing   Goal Outcome Evaluation:    Plan of Care Reviewed With: patient                 "

## 2022-05-08 NOTE — PLAN OF CARE
Problem: Plan of Care - These are the overarching goals to be used throughout the patient stay.    Goal: Absence of Hospital-Acquired Illness or Injury  Outcome: Ongoing, Progressing  Intervention: Prevent Skin Injury  Recent Flowsheet Documentation  Taken 5/7/2022 1715 by Hugo Goodrich RN  Body Position: position changed independently  Intervention: Prevent and Manage VTE (Venous Thromboembolism) Risk  Recent Flowsheet Documentation  Taken 5/7/2022 1715 by Hugo Goodrich RN  Activity Management:    ambulated in [place]ierrez    ambulated in room    ambulated to bathroom     Problem: Plan of Care - These are the overarching goals to be used throughout the patient stay.    Goal: Absence of Hospital-Acquired Illness or Injury  Intervention: Prevent and Manage VTE (Venous Thromboembolism) Risk  Recent Flowsheet Documentation  Taken 5/7/2022 1715 by Hugo Goodrich RN  Activity Management:    ambulated in gutierrez    ambulated in room    ambulated to bathroom     Problem: Plan of Care - These are the overarching goals to be used throughout the patient stay.    Goal: Optimal Comfort and Wellbeing  Outcome: Ongoing, Progressing  Intervention: Provide Person-Centered Care  Recent Flowsheet Documentation  Taken 5/7/2022 1715 by Hugo Goodrich RN  Trust Relationship/Rapport:    care explained    choices provided    emotional support provided    empathic listening provided    questions answered     Problem: Behavioral Health Plan of Care  Goal: Optimal Comfort and Wellbeing  Outcome: Ongoing, Progressing  Intervention: Provide Person-Centered Care  Recent Flowsheet Documentation  Taken 5/7/2022 1715 by Hugo Goodrich RN  Trust Relationship/Rapport:    care explained    choices provided    emotional support provided    empathic listening provided    questions answered     Problem: Behavioral Health Plan of Care  Goal: Absence of New-Onset Illness or Injury  Outcome: Ongoing, Progressing  Intervention: Identify  and Manage Fall Risk  Recent Flowsheet Documentation  Taken 5/7/2022 1715 by Hugo Goodrich, RN  Safety Measures:    environmental rounds completed    safety rounds completed   Goal Outcome Evaluation:    Plan of Care Reviewed With: patient     Patient appeared flat, blunted during assessment denied anxiety or depression verbalized not want to talk about any of the assessment questions. He denied pain or discomfort. Visible in milieu, less interactive than usual, spent his time watching tv. Had visit with sister, visit went well. Cooperative and compliant with medications. Melatonin PRN at HS per his request.

## 2022-05-08 NOTE — PLAN OF CARE
Problem: Sleep Disturbance  Goal: Adequate Sleep/Rest  Outcome: Ongoing, Not Progressing        Patient appeared to have slept for 6.50 hours. No complain of pain and no prn medications were administered.At 0200, blood sugar  105. Pt did not request for food or snack during shift. He denied SI/SIB. 15 minutes safety checks was in place. No behavior or safety concerns noted. Staff will continue to offer support to pt.

## 2022-05-09 LAB
GLUCOSE BLDC GLUCOMTR-MCNC: 113 MG/DL (ref 70–99)
GLUCOSE BLDC GLUCOMTR-MCNC: 136 MG/DL (ref 70–99)
GLUCOSE BLDC GLUCOMTR-MCNC: 159 MG/DL (ref 70–99)
GLUCOSE BLDC GLUCOMTR-MCNC: 162 MG/DL (ref 70–99)
GLUCOSE BLDC GLUCOMTR-MCNC: 263 MG/DL (ref 70–99)

## 2022-05-09 PROCEDURE — 250N000012 HC RX MED GY IP 250 OP 636 PS 637: Performed by: NURSE PRACTITIONER

## 2022-05-09 PROCEDURE — 124N000002 HC R&B MH UMMC

## 2022-05-09 PROCEDURE — 250N000013 HC RX MED GY IP 250 OP 250 PS 637: Performed by: PHYSICIAN ASSISTANT

## 2022-05-09 PROCEDURE — 250N000013 HC RX MED GY IP 250 OP 250 PS 637: Performed by: STUDENT IN AN ORGANIZED HEALTH CARE EDUCATION/TRAINING PROGRAM

## 2022-05-09 PROCEDURE — 99233 SBSQ HOSP IP/OBS HIGH 50: CPT | Performed by: NURSE PRACTITIONER

## 2022-05-09 PROCEDURE — H2032 ACTIVITY THERAPY, PER 15 MIN: HCPCS

## 2022-05-09 PROCEDURE — G0177 OPPS/PHP; TRAIN & EDUC SERV: HCPCS

## 2022-05-09 PROCEDURE — 99232 SBSQ HOSP IP/OBS MODERATE 35: CPT | Mod: GC | Performed by: PSYCHIATRY & NEUROLOGY

## 2022-05-09 PROCEDURE — 250N000013 HC RX MED GY IP 250 OP 250 PS 637

## 2022-05-09 RX ORDER — VILAZODONE HYDROCHLORIDE 10 MG/1
20 TABLET ORAL DAILY
Status: DISCONTINUED | OUTPATIENT
Start: 2022-05-10 | End: 2022-05-13 | Stop reason: HOSPADM

## 2022-05-09 RX ORDER — VENLAFAXINE HYDROCHLORIDE 75 MG/1
75 CAPSULE, EXTENDED RELEASE ORAL AT BEDTIME
Status: DISCONTINUED | OUTPATIENT
Start: 2022-05-09 | End: 2022-05-13 | Stop reason: HOSPADM

## 2022-05-09 RX ORDER — LIRAGLUTIDE 6 MG/ML
1.2 INJECTION SUBCUTANEOUS DAILY
Status: DISCONTINUED | OUTPATIENT
Start: 2022-05-10 | End: 2022-05-12

## 2022-05-09 RX ADMIN — HYDROXYZINE HYDROCHLORIDE 25 MG: 25 TABLET, FILM COATED ORAL at 23:19

## 2022-05-09 RX ADMIN — INSULIN ASPART 22 UNITS: 100 INJECTION, SOLUTION INTRAVENOUS; SUBCUTANEOUS at 18:49

## 2022-05-09 RX ADMIN — LISINOPRIL 5 MG: 5 TABLET ORAL at 08:49

## 2022-05-09 RX ADMIN — LIRAGLUTIDE 0.6 MG: 6 INJECTION SUBCUTANEOUS at 08:49

## 2022-05-09 RX ADMIN — VILAZODONE HYDROCHLORIDE 10 MG: 10 TABLET ORAL at 08:49

## 2022-05-09 RX ADMIN — ACETAMINOPHEN 650 MG: 325 TABLET ORAL at 23:18

## 2022-05-09 RX ADMIN — QUETIAPINE FUMARATE 50 MG: 50 TABLET ORAL at 21:30

## 2022-05-09 RX ADMIN — VENLAFAXINE HYDROCHLORIDE 75 MG: 75 CAPSULE, EXTENDED RELEASE ORAL at 21:30

## 2022-05-09 RX ADMIN — ASPIRIN 81 MG CHEWABLE TABLET 81 MG: 81 TABLET CHEWABLE at 08:49

## 2022-05-09 RX ADMIN — METFORMIN HYDROCHLORIDE 2000 MG: 750 TABLET, EXTENDED RELEASE ORAL at 18:34

## 2022-05-09 RX ADMIN — MELATONIN TAB 3 MG 3 MG: 3 TAB at 21:41

## 2022-05-09 RX ADMIN — PHENYTOIN SODIUM 500 MG: 100 CAPSULE ORAL at 21:30

## 2022-05-09 RX ADMIN — ATORVASTATIN CALCIUM 20 MG: 20 TABLET, FILM COATED ORAL at 21:30

## 2022-05-09 ASSESSMENT — ACTIVITIES OF DAILY LIVING (ADL)
ORAL_HYGIENE: INDEPENDENT
DRESS: INDEPENDENT
LAUNDRY: WITH SUPERVISION
ORAL_HYGIENE: INDEPENDENT
LAUNDRY: WITH SUPERVISION
DRESS: INDEPENDENT
HYGIENE/GROOMING: INDEPENDENT
HYGIENE/GROOMING: INDEPENDENT

## 2022-05-09 NOTE — PROGRESS NOTES
05/08/22 2100   Group Therapy Session   Time Session Began 2000   Time Session Ended 2058   Total Time patient participated (minutes) 58   Total # Attendees 4-5   Group Type expressive therapy   Group Topic Covered emotions/expression;coping skills/lifestyle management;leisure exploration/use of leisure time;structured socialization   Group Session Detail 80s and 90s [employer] [place]   Patient Response/Contribution cooperative with task   Patient Response Detail Participated in Music Therapy intervention of [employer] today.  Goals of session were focusing, memory recall, positive distraction, emotional containment and social cohesion.  Pt response was engaged and cooperative.   Showed some outreach socially within the group, was appropriate and positive.

## 2022-05-09 NOTE — PLAN OF CARE
Patient denied all psych symptoms and contracted for safety. Her affect was for the most part flat and he had minimal engagement with peers even while out in the milieu. Blood glucose at breakfast was 136 mg/dl and 162 mg/dl at lunch. Please see note by [facility] dated today.     Problem: Plan of Care - These are the overarching goals to be used throughout the patient stay.    Goal: Plan of Care Review/Shift Note  Description: The Plan of Care Review/Shift note should be completed every shift.  The Outcome Evaluation is a brief statement about your assessment that the patient is improving, declining, or no change.  This information will be displayed automatically on your shift note.  Outcome: Ongoing, Progressing  Flowsheets  Taken 5/9/2022 1404  Plan of Care Reviewed With: patient  Overall Patient Progress: improving  Taken 5/9/2022 1345  Plan of Care Reviewed With: patient     Problem: Suicide Risk  Goal: Absence of Self-Harm  Outcome: Ongoing, Progressing   Goal Outcome Evaluation:    Plan of Care Reviewed With: patient     Overall Patient Progress: improving

## 2022-05-09 NOTE — PLAN OF CARE
Problem: Plan of Care - These are the overarching goals to be used throughout the patient stay.    Goal: Absence of Hospital-Acquired Illness or Injury  Outcome: Ongoing, Progressing  Intervention: Prevent Skin Injury  Recent Flowsheet Documentation  Taken 5/8/2022 1700 by Hugo Goodrich RN  Body Position: position changed independently  Intervention: Prevent and Manage VTE (Venous Thromboembolism) Risk  Recent Flowsheet Documentation  Taken 5/8/2022 1700 by Hugo Goodrich RN  Activity Management:    ambulated in gutierrez    ambulated in room    ambulated to bathroom  Goal: Optimal Comfort and Wellbeing  Outcome: Ongoing, Progressing  Intervention: Provide Person-Centered Care  Recent Flowsheet Documentation  Taken 5/8/2022 1700 by Hugo Goodrich RN  Trust Relationship/Rapport:    care explained    choices provided    empathic listening provided    emotional support provided    questions answered    questions encouraged    reassurance provided    thoughts/feelings acknowledged     Problem: Suicide Risk  Goal: Absence of Self-Harm  Outcome: Ongoing, Progressing     Problem: Behavioral Health Plan of Care  Goal: Optimal Comfort and Wellbeing  Outcome: Ongoing, Progressing  Intervention: Provide Person-Centered Care  Recent Flowsheet Documentation  Taken 5/8/2022 1700 by Hugo Goodrich RN  Trust Relationship/Rapport:    care explained    choices provided    empathic listening provided    emotional support provided    questions answered    questions encouraged    reassurance provided    thoughts/feelings acknowledged   Goal Outcome Evaluation:    Plan of Care Reviewed With: patient     Alert and oriented x4, able to communicate needs and verbalize feelings. Pleasant, cooperative and compliant with medication. Visible in milieu, social, engaged with peers and staff members. Attributed anxiety to discharge planning, believes he might be discharged tomorrow. Rated anxiety at 2, depression at 1, denied  SI/HI, contracted for safety. No pain or discomfort. Able to participate in group activity.

## 2022-05-09 NOTE — PROGRESS NOTES
"    ----------------------------------------------------------------------------------------------------------  [facility]  Psychiatric Progress Note  Hospital Day #12    Isac Li MRN# [id number]   Age: 67 year old YOB: 1955   Date of Admission: 4/27/2022     Subjective   The patient was discussed with the treatment team and chart notes were reviewed.      Identifier: Isac Li is a 67 year old male with a past psychiatric history of major depressive disorder admitted from the  ER on 04/27/2022 due to concern for SI in the context of loss of a recent close friend to suicide. Significant symptoms include worsening SI, depressed mood worthlessness, poor selfesteem, guiltiness, low energy  and sleep issues over past 2 weeks. His last psychiatric hospitalization was 20 years ago. Current psychosocial stressors include recent loss of a close friend to suicide. Patient is on hospital day #12. Assessment is that the current presentation is consistent with diagnosis of MDD, recurrent and severe.    Sleep:  6 hours (05/09/22 0600)  Prescribed Medications: Taken as prescribed  PRN Psychiatric Medications: acetaminophen, alum & mag hydroxide-simethicone, glucose **OR** dextrose **OR** glucagon, hydrOXYzine, insulin aspart, melatonin, OLANZapine **OR** OLANZapine, polyethylene glycol     Melatonin 5mg PO @ 2152 on 5/8    Overnight Nursing Notes/Staff Report:    Per nursing (5/8): \"Patient denied all psych symptoms apart from depression, which he rated at 2/10. He had a flat affect for the most part but was pleasant on approach. He looked more depressed in the morning. When asked to describe how he was feeling this morning, he replied \"flat.\" Patient spent prolonged periods of time out in the lounge but had minimal engagement with peers.\"    Per social work 5/9: \"Patient met with team. Patient very upset/crying that insurance may not cover further days.  He reports he does not " "feel safe discharging home.  He remains markedly depressed though admits he is able to put on a facade. \"    Patient interview:  Isac Li states that they feel \"pensive and angry\" about being in the hospital today. He had questions about remaining in the hospital, stating \"I don't think I'm ready to go\" and he feels safe but if he was discharged today \"I wouldn't bet a nickel that I would make to .\"     Pt was anxious and upset at the thought of leaving the hospital, asking for the doctor to advocate for his stay. He shared that the thought of leaving was very stressful and \"my stomach is doing back flips.\" He became teary as the team explained his options, including possibly going to a crisis bed. He shared that you \"don't realize how fragile life can be until you're thinking of ending it.\"    His sleep was \"restless\" over the weekend, mostly due to anxiety over today's meeting. His appetite has been \"just fine\" and Isac reports no physical side effects from medication.     MSE notable for labile affect, sad, tearful, anxious about leaving, irritable when discussing leaving hospital.    The gun situation is still not under control at his home.     Objective   Vitals:  Temp: 97.7  F (36.5  C) (Temp  Min: 97.7  F (36.5  C)  Max: 97.9  F (36.6  C))  Resp: 16 (Resp  Min: 16  Max: 18)    (No data recorded)  Pulse: 89 (Pulse  Min: 86  Max: 89)  BP: 129/83  Systolic (24hrs), Av , Min:129 , Max:146   Diastolic (24hrs), Av, Min:83, Max:85    Mental Status Examination:  Oriented to: Fully orientated to person, place, and situation,  Appearance:  appears stated age, Grooming is adequate, Dressed in hospital scrubs and wearing glasses  Behavior:  cooperative and engaged, teary  Eye Contact:  good  Psychomotor:  no abnormal motor symptoms appreciated; no catatonia present  Speech:  appropriate volume/tone, teary when sharing emotions  Language: Fluent in English with appropriate syntax and " "vocabulary.  Mood:  \"pensive and angry\"  Affect:  labile, sad, tearful, anxious about leaving, irritable when discussing leaving hospital  Thought Process:  coherent and linear, not thought blockade  Thought Content:  No SI/HI, No VH and No AH; No apparent delusions  Associations:  intact  Insight:  fair   Judgment:  fair   Attention Span: grossly intact  Concentration:  grossly intact     Allergies     Allergies   Allergen Reactions     Sulfa (Sulfonamide Antibiotics) [Sulfa Drugs] Unknown        Labs & Imaging     Results for orders placed or performed during the hospital encounter of 04/27/22 (from the past 24 hour(s))   Glucose by meter   Result Value Ref Range    GLUCOSE BY METER POCT 228 (H) 70 - 99 mg/dL   Glucose by meter   Result Value Ref Range    GLUCOSE BY METER POCT 243 (H) 70 - 99 mg/dL   Glucose by meter   Result Value Ref Range    GLUCOSE BY METER POCT 113 (H) 70 - 99 mg/dL   Glucose by meter   Result Value Ref Range    GLUCOSE BY METER POCT 136 (H) 70 - 99 mg/dL   Glucose by meter   Result Value Ref Range    GLUCOSE BY METER POCT 162 (H) 70 - 99 mg/dL            Assessment   Diagnostic Impression:  Isac Li is a 67 year old male with a past psychiatric history of major depressive disorder admitted from the  ER on 04/27/2022 due to concern for SI in the context of loss of a recent close friend to suicide.. Significant symptoms include SI, depressed and sleep issues. His last psychiatric hospitalization was 20 years ago and last visit with a psychiatrist was around 15 years ago. Sees his primary care physician for psychiatric medications and follows with an outpatient therapist. Current psychosocial stressors include recent loss of a close friend to suicide who was also Buddhist.  Patient's support system includes his sister and some members of the Buddhist community.  Substance use does not appear to be playing a contributing role in the patient's presentation.  There is genetic loading for none " known. Medical history does appear to be significant for seizures and prior TBI as well as sleep apnea requiring CPAP which broke and he has been unable to replace. The MSE is notable for sad, tearful, and at times distraught affect, talkative engagement, and open conversation with treatment team. He denies self injurious behaviors.      His reported symptoms of depressed mood,  SI, worthlessness, poor selfesteem, guiltyness, low energy  and sleep issues are consistent with his historic diagnosis of major depressive disorder. These issues are also likely compounded by grief related to friend's recent death by suicide, trauma as a child, and ongoing struggle with compulsive sexual behavior as well as ongoing internal conflict between his sexual identity and Qu[employer] alex. Optimization of medications to target these symptom clusters in addition to evaluation of adequate outpatient supports will be targets for treatment during this admission.      Given that he currently has SI and marked depression, patient warrants inpatient psychiatric hospitalization to maintain his safety. Disposition pending clinical stabilization, medication optimization and development of an appropriate discharge plan.    Principal Diagnosis:     Major Depressive Disorder, severe, recurrent    Secondary psychiatric diagnoses of concern this admission:     Compulsive Sexual Behavior    Alcohol Use Disorder (hisorical)    Psychiatric course:  Isac Li was admitted to [facility] as a voluntary patient. His PTA aspirin, atorvastatin, lisinopril, metformin, phenytoin, and venlafaxine were continued.     4/28 Seroquel 50 mg PO as augmenting agent to selective serotonin reuptake inhibitor.     5/2 Pharmacy CL consult for Vibriid/Trintellix coverage and prior authorization was placed and was approved.     5/3 Cross-titration was started by adding Vybriid 10 mg PO daily with food.     5/4 Cross-titration continued with 10mg PO Vybriid daily and  tapering venlafaxine to 225mg PO was ordered. Debrox 3 drops  discontinued after ear wax sufficiently removed. 300mg venlafaxine was given 5/4 and tapered 225mg PO dose scheduled for tonight 5/5 5/5 Cross-titration continued with 10mg PO Vybriid daily and tapering venlafaxine to 225mg PO was given    5/6 Cross-titration continued with 10mg PO Vybriid daily and tapering venlafaxine to 150mg PO    5/9 Cross-titration continued with 20mg PO Vybriid daily and tapering venlafaxine to 75mg PO     Isac Li continued to meet criteria for inpatient hospitalization medication optimization, inpatient stabilization, and appropriate discharge planning.      Medical course:   Isac Li was physically examined by the ED prior to being transferred to the unit and was found to be medically stable and appropriate for admission.      Plan   Today's Changes:     Tapering Venlafaxine to 75mg PO daily     Increasing Viibryd to 20mg PO daily  _______________________________________________________________________  Psychiatric diagnoses and management:  Principal Diagnosis:     Major Depressive Disorder, severe, recurrent without psychotic features  -Continue cross-titratation Venlafaxine-Vilazodone (Vybriid):   -Decrease Venlafaxine to 75mg PO daily  -Continue Seroquel 50 mg at bedtime   -Increase Vybriid 20 mg PO daily with food    Secondary Psychiatric Diagnoses:   1. Compulsive Sexual Behavior  ? Cont Venlafaxine   2. Alcohol Use Disorder (historical)    Additional Planning:    Continue to monitor for and stabilize: SI and depressed    Patient will be treated in therapeutic milieu with appropriate individual and group therapies as described.    Would benefit from community support groups    Will coordinate access and gun safety at home    Scheduled Medications (summary):    aspirin  81 mg Oral Daily     atorvastatin  20 mg Oral At Bedtime     insulin aspart   Subcutaneous Daily with breakfast     insulin aspart    Subcutaneous Daily with lunch     insulin aspart   Subcutaneous Daily with supper     insulin aspart  1-5 Units Subcutaneous At Bedtime     insulin aspart  1-10 Units Subcutaneous TID AC     insulin glargine  35 Units Subcutaneous At Bedtime     liraglutide  0.6 mg Subcutaneous Daily     lisinopril  5 mg Oral Daily     metFORMIN  2,000 mg Oral Daily with supper     phenytoin  500 mg Oral At Bedtime     QUEtiapine  50 mg Oral At Bedtime     venlafaxine  75 mg Oral At Bedtime     [START ON 5/10/2022] vilazodone  20 mg Oral Daily       PRN Medications (summary):  acetaminophen, alum & mag hydroxide-simethicone, glucose **OR** dextrose **OR** glucagon, hydrOXYzine, insulin aspart, melatonin, OLANZapine **OR** OLANZapine, polyethylene glycol    Discontinued Medications (& Rationale):        Consults:    IM following for T2DM    Endocrinology consulted for T2DM 5/9    Legal Status:    Voluntary     SIO:    No    Pt has not required locked seclusion or restraints in the past 24 hours to maintain safety, please refer to RN documentation for further details.    Disposition:    TBD, pending clinical stabilization, medication optimization, and formulation of a safe discharge plan.    Safety Assessment:   Behavioral Orders   Procedures     Code 3     Discontinue 1:1 attendant for suicide risk     Order Specific Question:   I have performed an in person assessment of the patient     Answer:   Based on this assessment the patient no longer requires a one on one attendant at this point in time.     Order Specific Question:   Rationale     Answer:   Patient States able to remain safe in hospital     Routine Programming     As clinically indicated     Sexual precautions     Status 15     Every 15 minutes.     Suicide precautions     Patients on Suicide Precautions should have a Combination Diet ordered that includes a Diet selection(s) AND a Behavioral Tray selection for Safe Tray - with utensils, or Safe Tray - NO utensils       "  ____________________________________________________________________  Pertinent Medical diagnoses and management:    Endocrinology currently following patient, apperciate help, for further details please see note on 05/09    Assessment:      1)   Insulin dependent Type 2 diabetes, uncontrolled (A1c 10.3%)  2)  Obesity; BMI 42     Plan:                  -Victoza 0.6 mg subcutaneously  q AM: plan for increase to 1.2 mg in AM -->(home plan jo be to STOP the Victoza and change to once weekly Ozempic to start at 0.25 mg and titrate per outpatient provider)                 -Metformin XR 2000 mg daily with dinner (1700)                 -Lantus 35 units daily HS                 -Adjusted to Novolog 1:8g CHO coverage with meals - (TBD what we will need for home: fixed dose + GLP-1?)                 -Novolog high intensity sliding scale TID AC and medium resistance for HS                 -BG monitoring TID AC, HS, 0200                 -hypoglycemia protocol                 -recommend high/very high consistent carb diet with carb counting protocol- note that he currently has \"double portions ok\"- and this will be a barrier to effective BG control.                  -diabetes education; TBD for GLP-1 and +/- fixed doses                     -on discharge, will recommend outpatient follow up with PCP, and perhaps referral to endocrine/diabetes specialty service.      NEVA Del Cid CNP   Inpatient Diabetes Management Service  Pager - [phone]  Available on [place]   Friday - Monday 0800 - 1600 hrs     To contact Endocrine Diabetes service:   From 8AM-4PM: page inpatient diabetes provider who is following the patient that day (see filed or incomplete progress notes/consult notes).  If uncertain of provider assignment: page job code [id number]. (To page job code in-house dial 3 stars, [phone] then enter number).  For questions or updates AFTER HOURS from 4PM-8AM: page the diabetes job code for on call fellow: [phone]   "       Mckenna Taylor  Medical Student MS3  I was present with the medical student who participated in the service and in the documentation of the note. I have verified the history and personally performed the physical exam and medical decision making. I agree with the assessment and plan of care as documented in the note and have made changes to the note as appropriate.   Jun Gonzalez MD MPH  PGY 1 Psychiatry resident      Attestation:  This patient has been seen and evaluated by me, Edel Lee MD.  I have discussed this patient with the house staff team including the resident and medical student and I agree with the findings and plan in this note.    I have reviewed today's vital signs, medications, labs and imaging. Edel Lee MD , PhD.

## 2022-05-09 NOTE — PROGRESS NOTES
Time Session Began 1315   Time Session Ended 1445   Total Time patient participated (minutes) 80   Total # Attendees 4   Group Type expressive therapy   Group Topic Covered balanced lifestyle;coping skills/lifestyle management;emotions/expression;leisure exploration/use of leisure time;self-care activities   Group Session Detail Hope & Acceptance: MT shared 4 live original songs that brought up themes of Hope, Perseverance, Acceptance and Self-Care/reflection.  Pts had the opportunity to reflect on song themes with writing and sharing in the group setting.      MT song share group: Second afternoon MT group where patients had the opportunity to share songs that were meaningful to them and hear song that were significant to other people in group to foster understanding, compassion, group cohesion and intrapersonal reflection time.    Patient Response/Contribution cooperative with task   Patient Response Detail Isac entered group 10 minutes into first afternoon session, but folded in well, responsive to the songs shared and showing sensitivity.    During second session, become more emotive during a song that reminds him of his late wife, and felt comfortable to let down and cry and talk briefly about her in front of the group.  Appeared to have a safe and successful emotional release.      At the end of the afternoon, he inquired about the MT equipment, as he would like to obtain some for himself at home because he sees the value in having a good sound system, and believes this would be helpful to him in aftercare.

## 2022-05-09 NOTE — PROGRESS NOTES
"                          Diabetes Consult Daily  Progress Note          Assessment/Plan:     HPI:  Isac is a 67 year old male with PMHx TIIDM, depression, HTN, HLP who was admitted 4/27/22 to inpatient psychiatry for stabilization for SI in the context of recent loss of friend to suicide.      Assessment:     1)   Insulin dependent Type 2 diabetes, uncontrolled (A1c 10.3%)  2)  Obesity; BMI 42     Plan:       -Victoza 0.6 mg subcutaneously  q AM: plan for increase to 1.2 mg in AM -->(home plan jo be to STOP the Victoza and change to once weekly Ozempic to start at 0.25 mg and titrate per outpatient provider)      -Metformin XR 2000 mg daily with dinner (1700)                 -Lantus 35 units daily HS                 -Adjusted to Novolog 1:8g CHO coverage with meals - (TBD what we will need for home: fixed dose + GLP-1?)                 -Novolog high intensity sliding scale TID AC and medium resistance for HS                 -BG monitoring TID AC, HS, 0200                 -hypoglycemia protocol                 -recommend high/very high consistent carb diet with carb counting protocol- note that he currently has \"double portions ok\"- and this will be a barrier to effective BG control.                  -diabetes education; TBD for GLP-1 and +/- fixed doses                    -on discharge, will recommend outpatient follow up with PCP, and perhaps referral to endocrine/diabetes specialty service.       Recommendations for Discharge:   Instructions to patient were posted in AVS and discussed on day of discharge.   Medications and supplies are to be ordered by primary service on discharge.   *please use the DIAB non-branded discharge supply order set ([id number])*     -blood glucose monitoring three times daily before meals and at bedtime   -Glargine (Lantus, or Basaglar, per insurance coverage) 35 units daily in the HS (2200)  -Once weekly Ozempic 0.25 mg per week - titration will be done outpatient  -Aspart (Novolog " "or Humalog, per insurance coverage): ** units TID with meals, 1:**g CHO with meals and snacks  -Aspart (Novolog or Humalog, per insurance coverage): high intensity sliding scale (see below)    Correction Scale - HIGH INSULIN RESISTANCE DOSING      Do Not give Correction Insulin if Pre-Meal BG less than 140.    For Pre-Meal  - 164 give 1 unit.    For Pre-Meal  - 189 give 2 units.    For Pre-Meal  - 214 give 3 units.    For Pre-Meal  - 239 give 4 units.    For Pre-Meal  - 264 give 5 units.    For Pre-Meal  - 289 give 6 units.    For Pre-Meal  - 314 give 7 units.    For Pre-Meal  - 339 give 8 units.    For Pre-Meal  - 364 give 9 units.    For Pre-Meal BG greater than or equal to 365 give 10 units   To be given with prandial insulin, and based on pre-meal blood glucose.        Medium Resistance at HS/Bedtime    MEDIUM INSULIN RESISTANCE DOSING     Do Not give Bedtime Correction Insulin if BG less than  200.    For  - 249 give 1 units.    For  - 299 give 2 units.    For  - 349 give 3 units.    For  -399 give 4 units.    For BG greater than or equal to 400 give 5 units.        -follow up with [facility] in 1-2 weeks after discharge (appointment request sent to clinic coordinator on 05/09/22)   -upon discharge, patient will need the following supplies: Metformin XR tablets, Ozempic pen, Lantus Solostar pens, Novolog Flexpens, \"BD\" (32G x 4mm) insulin pen needles, glucometer, lancets, and test strips (if brand of meter not known, can be ordered \"no brand specified\" and note to pharmacy: \"can substitute per insurance coverage\"), sharps container, alcohol swabs.       Plan discussed with patient, bedside RN/primary team via this note.         Interval History:     The last 24 hours progress and nursing notes reviewed.      BG trend:        Isac is doing well - no GI side effects at all  He is ok with an increase in Victoza for " "tomorrow  He will be staying - discharge date is unknown as of this AM        Carry over:  Discussed GLP1 with patient he denies history of pancreatitis, hyperplasia of thyroid gland, personal or family hx of thyroid CA, ETOH use, gastroparesis or any other GI concerns.     Planned Procedures/surgeries: none  D5W-containing solutions/medications: none    Recent Labs   Lab 05/09/22  0216 05/08/22  2132 05/08/22  1820 05/08/22  1224 05/08/22  0802 05/08/22  0212   * 243* 228* 143* 106* 105*           Nutrition:     Orders Placed This Encounter      Regular Diet Adult          PTA Regimen:     Frequency of checks: infrequent, some less than, and some greater than, 200  Metformin XR 2000 mg with dinner  Basaglar 62 units at bedtime  Humalog 18-26 units TID with meals- he does not formally count carbs, and doesn't really systematically estimate either.  Usual BG control PTA:  uncontrolled, with A1c 10.3% on 3/30/22  Able to Detect Hypoglycemia: once, years ago. Occurred at work. Was symptomatic, doesn't remember how low it went. None since.   Usual Diabetes Care Provider: PCP, Dr. Russ  Primary Care Provider: Marquise Russ  Factors Impacting IP Glucose Control: omission of prandial insulin          Review of Systems:   CC: Denies all          Medications:   Steroid planning:  no  Tube Feeding: no       Physical Exam:   BP (!) 146/85 (BP Location: Right arm, Patient Position: Sitting)   Pulse 86   Temp 97.9  F (36.6  C) (Oral)   Resp 18   Ht 1.791 m (5' 10.5\")   Wt 111.7 kg (246 lb 4.8 oz)   SpO2 97%   BMI 34.84 kg/m      General:   A&O, NAD, pleasant. Well nourished   HEENT:  NC/AT. MMM, EOMI, Anicteric  Lungs:  unremarkable, no new cough, no SOB  ABD:   rounded, soft  Extremities:  Moving all extremities  Skin:  warm and dry, no obvious lesions/rash/bleeding  Neuro:  No focal neurological deficits  Psych:   Cooperative, depressed mood, good eye contact, congruent affect         Data:     Lab Results "   Component Value Date    A1C 9.8 04/28/2022    A1C 10.3 03/30/2022    A1C 11.5 12/22/2021    A1C 8.7 09/21/2021    A1C 10.5 06/10/2021        CBC RESULTS: Recent Labs   Lab Test 04/27/22  1615   WBC 10.0   RBC 4.74   HGB 13.8   HCT 40.5   MCV 85   MCH 29.1   MCHC 34.1   RDW 12.8        Recent Labs   Lab Test 05/09/22  1241 05/09/22  0823 04/27/22  2047 04/27/22  1615 12/22/21  1021   NA  --   --   --  138 142   POTASSIUM  --   --   --  4.0 4.3   CHLORIDE  --   --   --  106 108*   CO2  --   --   --  21 23   ANIONGAP  --   --   --  11 11   * 136*   < > 241* 100   BUN  --   --   --  19 22   CR  --   --   --  0.82 0.78   [place]  --   --   --  8.6 9.0    < > = values in this interval not displayed.     Liver Function Studies -   Recent Labs   Lab Test 04/27/22  1615   PROTTOTAL 7.1   ALBUMIN 3.5   BILITOTAL 0.3   ALKPHOS 149   AST 9   ALT 25     No results found for: INR      NEVA Del Cid CNP   Inpatient Diabetes Management Service  Pager - [phone]  Available on [place]   Friday - Monday 0800 - 1600 hrs    To contact Endocrine Diabetes service:   From 8AM-4PM: page inpatient diabetes provider who is following the patient that day (see filed or incomplete progress notes/consult notes).  If uncertain of provider assignment: page job code [phone]. (To page job code in-house dial 3 stars, [phone] then enter number).  For questions or updates AFTER HOURS from 4PM-8AM: page the diabetes job code for on call fellow: [phone]    Please notify inpatient diabetes service if changes are planned to steroids, nutrition, or if procedures are planned requiring prolonged NPO status.Diabetes Management Team job code: [phone]    I spent a total of 35 minutes on the date of the encounter doing chart review, history and exam, documentation and further activities per the note.  Over 50% of my time on the unit was spent counseling the patient and/or coordinating care regarding acute hyperglycemic management.  See note for  details.

## 2022-05-09 NOTE — PLAN OF CARE
"[facility] Occupational Therapy Group Intervention Note     05/09/22 1210   Group Therapy Session   Group Attendance attended group session   Time Session Began 1100   Time Session Ended 1150   Total Time patient participated (minutes) 50   Total # Attendees 4   Group Type task skill;life skill   Group Topic Covered cognitive activities;coping skills/lifestyle management;relaxation techniques   Group Session Detail clinic - coping skill exploration, creative expression within personally meaningful activities, and observation of social, cognitive, and kinesthetic performance skills   Patient Response/Contribution cooperative with task;organized;discussed personal experience with topic   Patient Response Detail Congruent affect. Sustained attention for ~45 mins to draw / color an inspirational sign that read \"Go Life\" with intent to display in room.      María Segura OT on 5/9/2022 at 12:11 PM    "

## 2022-05-09 NOTE — PLAN OF CARE
Problem: Sleep Disturbance  Goal: Adequate Sleep/Rest  5/9/2022 0551 by Sondra Santiago, RN  Outcome: Ongoing, Progressing     Patient appeared to have slept for 6 hours. No complain of pain and no prn medications were administered.At 0216, blood sugar  113. Pt did not request for food or snack during shift. He denied SI/SIB. 15 minutes safety checks was in place. No behavior or safety concerns noted. Staff will continue to offer support to pt.

## 2022-05-09 NOTE — PLAN OF CARE
Assessment/Intervention/Current Symtoms and Care Coordination  The patient's care was discussed with the treatment team and chart notes were reviewed.   Patient met with team. Patient very upset/crying that insurance may not cover further days.  He reports he does not feel safe discharging home- said if dc'd now he can not say he will be alive by June 1.  Heremains markedly depressed though admits he is able to put on a facade.   Patient continues to be deemed high risk for suicide. (male, single, isolated, access to fire arms, recent loss of friend to suicide, H/O substance abuse)   Discussed the option of crisis bed placement prior to his returning home. Patient appears to be open to this idea, but still does not feel safe to leave the hospital.    Discussed getting a [facility] SW[facility]er.  He stated that his sister is a recently retired SWer and can help him with resources.  Also discussed [facility] services however, there is a 4-6 wait list for these services.     Discharge Plan or Goal  Consider Crisis bed placement prior to returning home to [facility].  Patient will return home when stable/safe  Outpatient Psychiatry  Endocrinology F/U     Barriers to Discharge   Severity of depression- suicidal ideation if discharges home  Medication mgmt per MD's     Referral Status  Patient has been referred to [facility] for med mgmt  MD will order referral for outpatient endocrinology f/u    Legal Status     Voluntary

## 2022-05-10 LAB
GLUCOSE BLDC GLUCOMTR-MCNC: 110 MG/DL (ref 70–99)
GLUCOSE BLDC GLUCOMTR-MCNC: 136 MG/DL (ref 70–99)
GLUCOSE BLDC GLUCOMTR-MCNC: 145 MG/DL (ref 70–99)
GLUCOSE BLDC GLUCOMTR-MCNC: 196 MG/DL (ref 70–99)
GLUCOSE BLDC GLUCOMTR-MCNC: 85 MG/DL (ref 70–99)

## 2022-05-10 PROCEDURE — 250N000013 HC RX MED GY IP 250 OP 250 PS 637: Performed by: PHYSICIAN ASSISTANT

## 2022-05-10 PROCEDURE — 250N000013 HC RX MED GY IP 250 OP 250 PS 637

## 2022-05-10 PROCEDURE — 124N000002 HC R&B MH UMMC

## 2022-05-10 PROCEDURE — 99232 SBSQ HOSP IP/OBS MODERATE 35: CPT | Mod: GC | Performed by: PSYCHIATRY & NEUROLOGY

## 2022-05-10 PROCEDURE — 250N000013 HC RX MED GY IP 250 OP 250 PS 637: Performed by: STUDENT IN AN ORGANIZED HEALTH CARE EDUCATION/TRAINING PROGRAM

## 2022-05-10 PROCEDURE — 99233 SBSQ HOSP IP/OBS HIGH 50: CPT | Performed by: NURSE PRACTITIONER

## 2022-05-10 RX ADMIN — ASPIRIN 81 MG CHEWABLE TABLET 81 MG: 81 TABLET CHEWABLE at 08:38

## 2022-05-10 RX ADMIN — ATORVASTATIN CALCIUM 20 MG: 20 TABLET, FILM COATED ORAL at 20:22

## 2022-05-10 RX ADMIN — PHENYTOIN SODIUM 500 MG: 100 CAPSULE ORAL at 20:22

## 2022-05-10 RX ADMIN — LISINOPRIL 5 MG: 5 TABLET ORAL at 08:38

## 2022-05-10 RX ADMIN — VENLAFAXINE HYDROCHLORIDE 75 MG: 75 CAPSULE, EXTENDED RELEASE ORAL at 20:22

## 2022-05-10 RX ADMIN — QUETIAPINE FUMARATE 50 MG: 50 TABLET ORAL at 20:22

## 2022-05-10 RX ADMIN — VILAZODONE HYDROCHLORIDE 20 MG: 10 TABLET ORAL at 08:38

## 2022-05-10 RX ADMIN — LIRAGLUTIDE 1.2 MG: 6 INJECTION SUBCUTANEOUS at 08:38

## 2022-05-10 RX ADMIN — INSULIN ASPART 3 UNITS: 100 INJECTION, SOLUTION INTRAVENOUS; SUBCUTANEOUS at 18:30

## 2022-05-10 RX ADMIN — MELATONIN TAB 3 MG 3 MG: 3 TAB at 20:23

## 2022-05-10 RX ADMIN — METFORMIN HYDROCHLORIDE 2000 MG: 750 TABLET, EXTENDED RELEASE ORAL at 17:52

## 2022-05-10 ASSESSMENT — ACTIVITIES OF DAILY LIVING (ADL)
ORAL_HYGIENE: INDEPENDENT
ORAL_HYGIENE: INDEPENDENT
HYGIENE/GROOMING: INDEPENDENT
LAUNDRY: WITH SUPERVISION
DRESS: INDEPENDENT
DRESS: INDEPENDENT;SCRUBS (BEHAVIORAL HEALTH)
HYGIENE/GROOMING: INDEPENDENT
HYGIENE/GROOMING: INDEPENDENT
ORAL_HYGIENE: INDEPENDENT
DRESS: INDEPENDENT
LAUNDRY: WITH SUPERVISION

## 2022-05-10 NOTE — PLAN OF CARE
"[facility] Occupational Therapy Group Intervention Note     05/09/22 2055   Group Therapy Session   Group Attendance attended group session   Time Session Began 2000   Time Session Ended 2050   Total Time patient participated (minutes) 45   Total # Attendees 1   Group Type life skill   Group Topic Covered coping skills/lifestyle management;relapse prevention;self-care activities   Group Session Detail mental health management group focused on self-care planning. Education was provided on various ways to take care of one's emotional, physical, social, mental, and occupational self.     Patient Response/Contribution cooperative with task;discussed personal experience with topic;organized   Patient Response Detail  able to identify >2 self-care strategies per domain of 'My Self Care Plan\" worksheet, specifically showering every other day \"which is way more than I was before I came\", routine cleaning, positive self-talk, call a friend, and keep up on bills. Expressed motivation to follow through post discharge.      María Segura OT on 5/9/2022 at 8:56 PM      "

## 2022-05-10 NOTE — PLAN OF CARE
Problem: Sleep Disturbance  Goal: Adequate Sleep/Rest  Outcome: Ongoing, Progressing     Patient appeared to have slept for 4.50 hours. No complain of pain and no prn medications were administered.At 0200, blood sugar  85. Pt requested for 4 oz of orange juice and one cheese during shift. He denied SI/SIB. 15 minutes safety checks was in place. No behavior or safety concerns noted. Staff will continue to offer support to pt.
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
## 2022-05-10 NOTE — PROGRESS NOTES
"    ----------------------------------------------------------------------------------------------------------  [facility]  Psychiatric Progress Note  Hospital Day #13    Isac Li MRN# [id number]   Age: 67 year old YOB: 1955   Date of Admission: 4/27/2022     Subjective   The patient was discussed with the treatment team and chart notes were reviewed.      Identifier: Isac Li is a 67 year old male with a past psychiatric history of major depressive disorder admitted from the  ER on 04/27/2022 due to concern for SI in the context of loss of a recent close friend to suicide. Significant symptoms include worsening SI, depressed mood worthlessness, poor selfesteem, guiltiness, low energy  and sleep issues over past 2 weeks. His last psychiatric hospitalization was 20 years ago. Current psychosocial stressors include recent loss of a close friend to suicide. Patient is on hospital day #13. Assessment is that the current presentation is consistent with diagnosis of MDD, recurrent and severe.    Sleep:  4.5 hours (05/10/22 0706)  Prescribed Medications: Taken as prescribed  PRN Psychiatric Medications:     5/9-Hydroxyzine 25mg PO @ 2319    Overnight Nursing Notes/Staff Report:    Per music therapist, \"emotive during a song that reminds him of his late wife, and felt comfortable to let down and cry and talk briefly about her in front of the group.  Appeared to have a safe and successful emotional release\"    Per nursing, \"Patient denied all psych symptoms and contracted for safety. He spent the majority of time this shift out in the milieu and played chess with one of his peers.\"    Patient interview:  Isac felt \"woozy\" today and \"flat\" like his \"affect was a 0.0\" He did not sleep well last night and was very tired. He shared \"if I go home feeling like this it's not going to be good.\"    His anxiety has been coming and going. He enjoyed playing chess yesterday for the " "first time in years. His appetite has been just fine. He asked about any updates from insurance and is anxious about the possibility of discharge.     Objective   Vitals:  Temp: 98.5  F (36.9  C) (Temp  Min: 98.5  F (36.9  C)  Max: 98.5  F (36.9  C))  Resp: 16 (Resp  Min: 16  Max: 16)    (No data recorded)  Pulse: 84 (Pulse  Min: 84  Max: 84)  BP: 113/69  Systolic (24hrs), Av , Min:113 , Max:113   Diastolic (24hrs), Av, Min:69, Max:69    Mental Status Examination:  Oriented to: Fully orientated to person, place, and situation,  Appearance:  appears stated age, Grooming is adequate, Dressed in hospital scrubs and wearing glasses  Behavior:  cooperative and engaged  Eye Contact:  good  Psychomotor:  no abnormal motor symptoms appreciated; no catatonia present  Speech:  appropriate volume/tone, slow  Language: Fluent in English with appropriate syntax and vocabulary.  Mood:  \"woozy\"  Affect:  anxious about leaving, blunted, and flat  Thought Process:  coherent and linear, no thought blockade  Thought Content:  No SI/HI, No VH and No AH; No apparent delusions  Associations:  intact  Insight:  fair   Judgment:  fair   Attention Span: grossly intact  Concentration:  grossly intact     Allergies     Allergies   Allergen Reactions     Sulfa (Sulfonamide Antibiotics) [Sulfa Drugs] Unknown        Labs & Imaging     Results for orders placed or performed during the hospital encounter of 22 (from the past 24 hour(s))   Glucose by meter   Result Value Ref Range    GLUCOSE BY METER POCT 159 (H) 70 - 99 mg/dL   Glucose by meter   Result Value Ref Range    GLUCOSE BY METER POCT 263 (H) 70 - 99 mg/dL   Glucose by meter   Result Value Ref Range    GLUCOSE BY METER POCT 85 70 - 99 mg/dL   Glucose by meter   Result Value Ref Range    GLUCOSE BY METER POCT 110 (H) 70 - 99 mg/dL   Glucose by meter   Result Value Ref Range    GLUCOSE BY METER POCT 136 (H) 70 - 99 mg/dL            Assessment   Diagnostic Impression:  Isac CRUZ" Guillermo is a 67 year old male with a past psychiatric history of major depressive disorder admitted from the  ER on 04/27/2022 due to concern for SI in the context of loss of a recent close friend to suicide.. Significant symptoms include SI, depressed and sleep issues. His last psychiatric hospitalization was 20 years ago and last visit with a psychiatrist was around 15 years ago. Sees his primary care physician for psychiatric medications and follows with an outpatient therapist. Current psychosocial stressors include recent loss of a close friend to suicide who was also Sabianist.  Patient's support system includes his sister and some members of the Sabianist community.  Substance use does not appear to be playing a contributing role in the patient's presentation.  There is genetic loading for none known. Medical history does appear to be significant for seizures and prior TBI as well as sleep apnea requiring CPAP which broke and he has been unable to replace. The MSE is notable for sad, tearful, and at times distraught affect, talkative engagement, and open conversation with treatment team. He denies self injurious behaviors.      His reported symptoms of depressed mood,  SI, worthlessness, poor selfesteem, guiltyness, low energy  and sleep issues are consistent with his historic diagnosis of major depressive disorder. These issues are also likely compounded by grief related to friend's recent death by suicide, trauma as a child, and ongoing struggle with compulsive sexual behavior as well as ongoing internal conflict between his sexual identity and Sabianist alex. Optimization of medications to target these symptom clusters in addition to evaluation of adequate outpatient supports will be targets for treatment during this admission.      Given that he currently has SI and marked depression, patient warrants inpatient psychiatric hospitalization to maintain his safety. Disposition pending clinical stabilization,  medication optimization and development of an appropriate discharge plan.    Principal Diagnosis:     Major Depressive Disorder, severe, recurrent    Secondary psychiatric diagnoses of concern this admission:     Compulsive Sexual Behavior    Alcohol Use Disorder (hisorical)    Psychiatric course:  Isac Li was admitted to [facility] as a voluntary patient. His PTA aspirin, atorvastatin, lisinopril, metformin, phenytoin, and venlafaxine were continued.     4/28 Seroquel 50 mg PO as augmenting agent to selective serotonin reuptake inhibitor.     5/2 Pharmacy CL consult for Vibriid/Trintellix coverage and prior authorization was placed and was approved.     5/3 Cross-titration was started by adding Vybriid 10 mg PO daily with food.     5/4 Cross-titration continued with 10mg PO Vybriid daily and tapering venlafaxine to 225mg PO was ordered. Debrox 3 drops  discontinued after ear wax sufficiently removed. 300mg venlafaxine was given 5/4 and tapered 225mg PO dose scheduled for tonight 5/5 5/5 Cross-titration continued with 10mg PO Vybriid daily and tapering venlafaxine to 225mg PO was given    5/6 Cross-titration continued with 10mg PO Vybriid daily and tapering venlafaxine to 150mg PO    5/9 Cross-titration continued with 20mg PO Vybriid daily and tapering venlafaxine to 75mg PO     Isac Li continued to meet criteria for inpatient hospitalization medication optimization, inpatient stabilization, and appropriate discharge planning.      Medical course:   Isac Li was physically examined by the ED prior to being transferred to the unit and was found to be medically stable and appropriate for admission.      Plan   Today's Changes:   No med changes for today  _______________________________________________________________________  Psychiatric diagnoses and management:  Principal Diagnosis:     Major Depressive Disorder, severe, recurrent without psychotic features  -Continue cross-titratation  Venlafaxine-Vilazodone (Vybriid):   -Venlafaxine to 75mg PO daily  -Continue Seroquel 50 mg at bedtime   -Vybriid 20 mg PO daily with food    Secondary Psychiatric Diagnoses:   1. Compulsive Sexual Behavior  ? Cont Venlafaxine   2. Alcohol Use Disorder (historical)    Additional Planning:    Continue to monitor for and stabilize: SI and depressed    Patient will be treated in therapeutic milieu with appropriate individual and group therapies as described.    Would benefit from community support groups    Will coordinate access and gun safety at home     will meet with pt about employer     Scheduled Medications (summary):    aspirin  81 mg Oral Daily     atorvastatin  20 mg Oral At Bedtime     insulin aspart   Subcutaneous Daily with breakfast     insulin aspart   Subcutaneous Daily with lunch     insulin aspart   Subcutaneous Daily with supper     insulin aspart  1-5 Units Subcutaneous At Bedtime     insulin aspart  1-10 Units Subcutaneous TID AC     insulin glargine  28 Units Subcutaneous At Bedtime     liraglutide  1.2 mg Subcutaneous Daily     lisinopril  5 mg Oral Daily     metFORMIN  2,000 mg Oral Daily with supper     phenytoin  500 mg Oral At Bedtime     QUEtiapine  50 mg Oral At Bedtime     venlafaxine  75 mg Oral At Bedtime     vilazodone  20 mg Oral Daily       PRN Medications (summary):  acetaminophen, alum & mag hydroxide-simethicone, glucose **OR** dextrose **OR** glucagon, hydrOXYzine, insulin aspart, melatonin, OLANZapine **OR** OLANZapine, polyethylene glycol    Discontinued Medications (& Rationale):        Consults:    IM following for T2DM    Endocrinology consulted for T2DM 5/10    Legal Status:    Voluntary     SIO:    No    Pt has not required locked seclusion or restraints in the past 24 hours to maintain safety, please refer to RN documentation for further details.    Disposition:    TBD, pending clinical stabilization, medication optimization, and formulation of a safe  discharge plan.    Safety Assessment:   Behavioral Orders   Procedures     Code 3     Discontinue 1:1 attendant for suicide risk     Order Specific Question:   I have performed an in person assessment of the patient     Answer:   Based on this assessment the patient no longer requires a one on one attendant at this point in time.     Order Specific Question:   Rationale     Answer:   Patient States able to remain safe in hospital     Routine Programming     As clinically indicated     Sexual precautions     Status 15     Every 15 minutes.     Suicide precautions     Patients on Suicide Precautions should have a Combination Diet ordered that includes a Diet selection(s) AND a Behavioral Tray selection for Safe Tray - with utensils, or Safe Tray - NO utensils        ____________________________________________________________________  Pertinent Medical diagnoses and management:    Endocrinology currently following patient, apperciate help, for further details please see note on 05/10    -increase victoza to 1.2 mg from 0.6 mg subcutaneous daily this AM                 -Metformin XR 2000 mg daily with dinner (1700)                 -decrease Lantus to 28 units from 35 units at HS                 -decrease Novolog to 1:15g CHO from Novolog 1:8g CHO coverage AC (TBD what we will need for home: fixed dose vs GLP-1)                 -Novolog high intensity sliding scale TID AC and medium resistance for HS                 -BG monitoring TID AC, HS, 0200                 -hypoglycemia protocol                 -carb counting protocol                 -diabetes education needs not anticipated                   Outpatient follow up: on discharge, will recommend outpatient follow up with PCP, and perhaps referral to endocrine/diabetes specialty service.   Plan discussed with patient and primary team through this note.         To contact Endocrine Diabetes service:   From 8AM-4PM: page inpatient diabetes provider who is following the  patient that day (see filed or incomplete progress notes/consult notes).  If uncertain of provider assignment: page job code [phone]. (To page job code in-house dial 3 stars, [phone] then enter number).  For questions or updates AFTER HOURS from 4PM-8AM: page the diabetes job code for on call fellow: [phone]       Mckenna Taylor  Medical Student MS3  I was present with the medical student who participated in the service and in the documentation of the note. I have verified the history and personally performed the physical exam and medical decision making. I agree with the assessment and plan of care as documented in the note and have made changes to the note as appropriate.   Jun Gonzalez MD MPH  PGY 1 Psychiatry resident      Attestation:  This patient has been seen and evaluated by me, Edel Lee MD.  I have discussed this patient with the house staff team including the resident and medical student and I agree with the findings and plan in this note.    I have reviewed today's vital signs, medications, labs and imaging. Edel Lee MD , PhD.

## 2022-05-10 NOTE — PROGRESS NOTES
IP Diabetes Management  Daily Note           Assessment and Plan:   HPI: Isac is a 67 year old male with PMHx TIIDM, depression, HTN, HLP who was admitted 4/27/22 to inpatient psychiatry for stabilization for SI in the context of recent loss of friend to suicide.       Assessment:      1)   Insulin dependent Type 2 diabetes, uncontrolled (A1c 10.3%)  2)  Obesity; BMI 42    Plan:    -increase victoza to 1.2 mg from 0.6 mg subcutaneous daily this AM   -Metformin XR 2000 mg daily with dinner (1700)   -decrease Lantus to 28 units from 35 units at HS   -decrease Novolog to 1:15g CHO from Novolog 1:8g CHO coverage AC (TBD what we will need for home: fixed dose vs GLP-1)   -Novolog high intensity sliding scale TID AC and medium resistance for HS   -BG monitoring TID AC, HS, 0200   -hypoglycemia protocol   -carb counting protocol   -diabetes education needs not anticipated     Outpatient follow up: on discharge, will recommend outpatient follow up with PCP, and perhaps referral to endocrine/diabetes specialty service.   Plan discussed with patient and primary team through this note.        Interval History and Assessment: interval glucose trend reviewed:        Feeling ok, some lightheadedness today.  Adjusting psychiatric medications.  Walking the unit around 0030 last night, anxious.  Then BG to 85.  Exercise was likely a contributing factor.  But with increase in Victoza will decrease novolog and Lantus.         Current nutritional intake and type: Orders Placed This Encounter      Regular Diet Adult      Planned Procedures/surgeries: none  Steroid planning: none  D5W-containing solutions/medications: none    PTA Diabetes Regimen:   Frequency of checks: infrequent, some less than, and some greater than, 200  Metformin XR 2000 mg with dinner  Basaglar 62 units at bedtime  Humalog 18-26 units TID with meals- he does not formally count carbs, and doesn't really systematically estimate either.  Usual BG control  PTA:  uncontrolled, with A1c 10.3% on 3/30/22  Able to Detect Hypoglycemia: once, years ago. Occurred at work. Was symptomatic, doesn't remember how low it went. None since.   Usual Diabetes Care Provider: PCP, Dr. Russ  Primary Care Provider: Marquise Russ  Factors Impacting IP Glucose Control: omission of prandial insulin         Discharge Planning: TBD           Diabetes History:   Type of Diabetes: Type 2 Diabetes Mellitus  Lab Results   Component Value Date    A1C 9.8 04/28/2022    A1C 10.3 03/30/2022    A1C 11.5 12/22/2021    A1C 8.7 09/21/2021    A1C 10.5 06/10/2021              Review of Systems:     The Review of Systems is negative other than noted in the Interval History.           Medications:     Current Facility-Administered Medications   Medication     acetaminophen (TYLENOL) tablet 650 mg     alum & mag hydroxide-simethicone (MAALOX) suspension 30 mL     aspirin (ASA) chewable tablet 81 mg     atorvastatin (LIPITOR) tablet 20 mg     glucose gel 15-30 g    Or     dextrose 50 % injection 25-50 mL    Or     glucagon injection 1 mg     hydrOXYzine (ATARAX) tablet 25 mg     insulin aspart (NovoLOG) injection (RAPID ACTING)     insulin aspart (NovoLOG) injection (RAPID ACTING)     insulin aspart (NovoLOG) injection (RAPID ACTING)     insulin aspart (NovoLOG) injection (RAPID ACTING)     insulin aspart (NovoLOG) injection (RAPID ACTING)     insulin aspart (NovoLOG) injection (RAPID ACTING)     insulin glargine (LANTUS PEN) injection 35 Units     liraglutide (VICTOZA) injection 1.2 mg     lisinopril (ZESTRIL) tablet 5 mg     melatonin tablet 3 mg     metFORMIN (GLUCOPHAGE-XR) 24 hr tablet 2,000 mg     OLANZapine (zyPREXA) tablet 5 mg    Or     OLANZapine (zyPREXA) injection 5 mg     phenytoin (DILANTIN) CR capsule 500 mg     polyethylene glycol (MIRALAX) Packet 17 g     QUEtiapine (SEROquel) tablet 50 mg     venlafaxine (EFFEXOR XR) 24 hr capsule 75 mg     vilazodone (VIIBRYD) tablet 20 mg             "Physical Exam:    /69   Pulse 84   Temp 98.5  F (36.9  C) (Oral)   Resp 16   Ht 1.791 m (5' 10.5\")   Wt 111.7 kg (246 lb 4.8 oz)   SpO2 97%   BMI 34.84 kg/m        GENERAL : In no apparent distress over the phone  RESP: normal respiratory effort. No cough  HEENT: hearing intact to conversational volume  NEURO: awake, alert, responds appropriately to questions.               Data:     Recent Labs   Lab 05/10/22  0159 05/09/22  2111 05/09/22  1821 05/09/22  1241 05/09/22  0823 05/09/22  0216   GLC 85 263* 159* 162* 136* 113*     Lab Results   Component Value Date    WBC 10.0 04/27/2022    WBC 8.6 09/21/2021    WBC 8.9 06/17/2020    HGB 13.8 04/27/2022    HGB 13.2 (L) 09/21/2021    HGB 14.9 06/17/2020    HCT 40.5 04/27/2022    HCT 39.5 (L) 09/21/2021    HCT 43.3 06/17/2020    MCV 85 04/27/2022    MCV 86 09/21/2021    MCV 88 06/17/2020     04/27/2022     09/21/2021     06/17/2020     Lab Results   Component Value Date     04/27/2022     12/22/2021     06/10/2021    POTASSIUM 4.0 04/27/2022    POTASSIUM 4.3 12/22/2021    POTASSIUM 4.6 06/10/2021    CHLORIDE 106 04/27/2022    CHLORIDE 108 (H) 12/22/2021    CHLORIDE 104 06/10/2021    CO2 21 04/27/2022    CO2 23 12/22/2021    CO2 19 (L) 06/10/2021    GLC 85 05/10/2022     (H) 05/09/2022     (H) 05/09/2022     Lab Results   Component Value Date    BUN 19 04/27/2022    BUN 22 12/22/2021    BUN 19 06/10/2021     Lab Results   Component Value Date    TSH 2.76 04/27/2022     Lab Results   Component Value Date    AST 9 04/27/2022    AST 17 12/22/2021    AST 19 06/10/2021    ALT 25 04/27/2022    ALT 30 12/22/2021    ALT 22 06/10/2021    ALKPHOS 149 04/27/2022    ALKPHOS 144 (H) 12/22/2021    ALKPHOS 157 (H) 06/10/2021         Follow up was done today via virtual/ telephone encounter, due to high risk patients and implementing social distancing due to COVID 19.   10 minutes spent on phone with patient  25 minutes " spent on documentation, coordination of care          To contact Endocrine Diabetes service:   From 8AM-4PM: page inpatient diabetes provider that is following the patient  For questions or updates from 4PM-8AM: page the diabetes job code for on call fellow: [phone]    NEVA Jacinto, CNP  Inpatient Diabetes Management Service  Pager [phone]

## 2022-05-10 NOTE — PLAN OF CARE
Patient denied all psych symptoms and contracted for safety. He spent the majority of time this shift out in the milieu and played chess with one of his peers. He also later had a long, seemingly happy conversation with another. He requested PRN melatonin at bedtime.     Problem: Plan of Care - These are the overarching goals to be used throughout the patient stay.    Goal: Plan of Care Review/Shift Note  Description: The Plan of Care Review/Shift note should be completed every shift.  The Outcome Evaluation is a brief statement about your assessment that the patient is improving, declining, or no change.  This information will be displayed automatically on your shift note.  5/9/2022 1404 by Mohan Vazquez, RN  Outcome: Ongoing, Progressing  Flowsheets  Taken 5/9/2022 1404  Plan of Care Reviewed With: patient  Overall Patient Progress: improving  Taken 5/9/2022 1345  Plan of Care Reviewed With: patient     Problem: Plan of Care - These are the overarching goals to be used throughout the patient stay.    Goal: Optimal Comfort and Wellbeing  Outcome: Ongoing, Progressing     Problem: Suicide Risk  Goal: Absence of Self-Harm  5/9/2022 2150 by Mohan Vazquez, RN  Outcome: Ongoing, Progressing   Goal Outcome Evaluation:    Plan of Care Reviewed With: patient     Overall Patient Progress: improving

## 2022-05-10 NOTE — PLAN OF CARE
Assessment/Intervention/Current Symtoms and Care Coordination  -Chart review  -Meeting with team  -Met with pt and addressed patient needs/concerns  -Sent letter to patient's employer at his request and provided the dates of his current hospitalization.  This was sent by email to [email or web address].  A copy of the letter was also provided to the patient  -Made follow up appointment and added to AVS:  Appointment: Endocrinology:  Dr Nancy Smith: 9/19/22 at 12:00pm  [facility]: [address] [place] [address], [address] [address]   Phone :  [phone]       Discharge Plan or Goal  Home with OP provider follow up     Barriers to Discharge   Sx stabilization     Referral Status  Endocrinology:  Dr Nancy Smith: 9/19/22 at 12:00pm  [facility]: [address] [place] [address], [address] [place] [address], [place], [place]   Phone :  [phone]        Legal Status  Voluntary

## 2022-05-11 LAB
GLUCOSE BLDC GLUCOMTR-MCNC: 116 MG/DL (ref 70–99)
GLUCOSE BLDC GLUCOMTR-MCNC: 119 MG/DL (ref 70–99)
GLUCOSE BLDC GLUCOMTR-MCNC: 138 MG/DL (ref 70–99)
GLUCOSE BLDC GLUCOMTR-MCNC: 216 MG/DL (ref 70–99)
GLUCOSE BLDC GLUCOMTR-MCNC: 237 MG/DL (ref 70–99)

## 2022-05-11 PROCEDURE — 124N000002 HC R&B MH UMMC

## 2022-05-11 PROCEDURE — 250N000013 HC RX MED GY IP 250 OP 250 PS 637: Performed by: PHYSICIAN ASSISTANT

## 2022-05-11 PROCEDURE — 99232 SBSQ HOSP IP/OBS MODERATE 35: CPT | Mod: GC | Performed by: PSYCHIATRY & NEUROLOGY

## 2022-05-11 PROCEDURE — 250N000013 HC RX MED GY IP 250 OP 250 PS 637: Performed by: STUDENT IN AN ORGANIZED HEALTH CARE EDUCATION/TRAINING PROGRAM

## 2022-05-11 PROCEDURE — 250N000013 HC RX MED GY IP 250 OP 250 PS 637

## 2022-05-11 PROCEDURE — 99207 PR NO BILLABLE SERVICE THIS VISIT: CPT | Performed by: NURSE PRACTITIONER

## 2022-05-11 PROCEDURE — G0177 OPPS/PHP; TRAIN & EDUC SERV: HCPCS

## 2022-05-11 RX ADMIN — VENLAFAXINE HYDROCHLORIDE 75 MG: 75 CAPSULE, EXTENDED RELEASE ORAL at 20:56

## 2022-05-11 RX ADMIN — ATORVASTATIN CALCIUM 20 MG: 20 TABLET, FILM COATED ORAL at 20:56

## 2022-05-11 RX ADMIN — MELATONIN TAB 3 MG 3 MG: 3 TAB at 21:07

## 2022-05-11 RX ADMIN — LISINOPRIL 5 MG: 5 TABLET ORAL at 09:06

## 2022-05-11 RX ADMIN — ASPIRIN 81 MG CHEWABLE TABLET 81 MG: 81 TABLET CHEWABLE at 09:06

## 2022-05-11 RX ADMIN — LIRAGLUTIDE 1.2 MG: 6 INJECTION SUBCUTANEOUS at 09:05

## 2022-05-11 RX ADMIN — INSULIN ASPART 8 UNITS: 100 INJECTION, SOLUTION INTRAVENOUS; SUBCUTANEOUS at 18:05

## 2022-05-11 RX ADMIN — QUETIAPINE FUMARATE 50 MG: 50 TABLET ORAL at 20:56

## 2022-05-11 RX ADMIN — PHENYTOIN SODIUM 500 MG: 100 CAPSULE ORAL at 20:56

## 2022-05-11 RX ADMIN — VILAZODONE HYDROCHLORIDE 20 MG: 10 TABLET ORAL at 09:06

## 2022-05-11 RX ADMIN — METFORMIN HYDROCHLORIDE 2000 MG: 750 TABLET, EXTENDED RELEASE ORAL at 18:06

## 2022-05-11 ASSESSMENT — ACTIVITIES OF DAILY LIVING (ADL)
HYGIENE/GROOMING: INDEPENDENT
ORAL_HYGIENE: INDEPENDENT
LAUNDRY: WITH SUPERVISION
DRESS: INDEPENDENT
DRESS: INDEPENDENT;SCRUBS (BEHAVIORAL HEALTH)
ORAL_HYGIENE: INDEPENDENT
LAUNDRY: WITH SUPERVISION
HYGIENE/GROOMING: INDEPENDENT

## 2022-05-11 NOTE — PLAN OF CARE
"Pt has been mostly isolative to his room today. He was social and presented with a brighter affect this morning during breakfast time; he socialized with another patient and told jokes while laughing and smiling. Pt stated he \"had a tough day yesterday.\" He attributed this to his recent medication changes and the high level of activity/intensity on the unit lately. He also stated he did not sleep well last night, which is not typical for him. Morning BG was 138; receieved 3 units of Novolog for carb coverage. Lunch BG was 116; received 9 units of Novolog for carb coverage. Disposition planning includes possible discharge to crisis bed pending a plan is created to remove firearms from his home to reduce safety risk re: past SI/attempts.     Problem: Behavioral Health Plan of Care  Goal: Plan of Care Review  Recent Flowsheet Documentation  Taken 5/11/2022 1136 by Sangita Elkins  Plan of Care Reviewed With: patient  Patient Agreement with Plan of Care: agrees     "

## 2022-05-11 NOTE — PLAN OF CARE
Problem: Sleep Disturbance  Goal: Adequate Sleep/Rest  Outcome: Ongoing, Progressing     Pt appeared to have slept for 6 hours. At 0234, blood sugar check 119. No snack or fluid was given during shift. No PRN medication was given. 15 minutes safety checks was in place. Staff to continue to offer support to pt.

## 2022-05-11 NOTE — PROGRESS NOTES
"    ----------------------------------------------------------------------------------------------------------  [facility]  Psychiatric Progress Note  Hospital Day #14    Isac Li MRN# [id number]   Age: 67 year old YOB: 1955   Date of Admission: 4/27/2022     Subjective   The patient was discussed with the treatment team and chart notes were reviewed.      Identifier: Isac Li is a 67 year old male with a past psychiatric history of major depressive disorder admitted from the  ER on 04/27/2022 due to concern for SI in the context of loss of a recent close friend to suicide. Significant symptoms include worsening SI, depressed mood worthlessness, poor selfesteem, guiltiness, low energy  and sleep issues over past 2 weeks. His last psychiatric hospitalization was 20 years ago. Current psychosocial stressors include recent loss of a close friend to suicide. Patient is on hospital day #14. Assessment is that the current presentation is consistent with diagnosis of MDD, recurrent and severe.    Sleep:  6 hours (05/11/22 0600)  Prescribed Medications: Taken as prescribed  PRN Psychiatric Medications: acetaminophen, alum & mag hydroxide-simethicone, glucose **OR** dextrose **OR** glucagon, hydrOXYzine, insulin aspart, melatonin, OLANZapine **OR** OLANZapine, polyethylene glycol     Overnight Nursing Notes/Staff Report:  \"Pt presented with blunted/flat affect, withdrawn, and did not socialize with peers as he has done in previous days. Pt presents with depressed mood. Endorsed anxiety 3/10 and depression 3/10. Denied SI/SIB/AH/VH. Pt ate only about 25% of dinner and said he was not hungry tonight. Pt asked to take his HS meds earlier because he wanted to bed early.\"     Patient interview:  Isac felt \"better than yesterday\" noting he felt flat yesterday. His sleep was \"fitful\" early in the evening but improved around 2 am. Yesterday he experienced some nausea and lack of " "appetite, likely due to side effects of medication cross-titration. Pt was teary at times and rated his sense of hope a 4/10. He agreed with the medical team opinion that he is \"not quite ready to go.\"    Gun safety plan was discussed with patient, Mr. Li will identify someone who will take possession over the gun over the next couple of days.    Possible intermediate housing options will be discussed with  today. Pt wants to talk though options and figure out what is the next best step for him.     Objective   Vitals:  Temp: 97.7  F (36.5  C) (Temp  Min: 97.7  F (36.5  C)  Max: 98  F (36.7  C))  Resp: 16 (Resp  Min: 16  Max: 16)  SpO2: 96 % (SpO2  Min: 96 %  Max: 98 %)  Pulse: 88 (Pulse  Min: 88  Max: 88)  BP: 131/75  Systolic (24hrs), Av , Min:131 , Max:131   Diastolic (24hrs), Av, Min:75, Max:75    Mental Status Examination:  Oriented to:  Fully orientated to person, place, and situation   General: Awake and Alert  Appearance:  appears stated age, Grooming is adequate and Dressed in scrubs and wearing glasses  Behavior:  calm, cooperative and engaged  Eye Contact:  appropriate  Psychomotor:  no abnormal motor symptoms appreciated; no catatonia present  Speech:  appropriate volume/tone  Language: Fluent in English with appropriate syntax and vocabulary.  Mood:  \"better\"  Affect:  appropriate, congruent with mood and tearful  Thought Process:  coherent, linear and no thought blockade  Thought Content: No SI/HI/AH/VH; No apparent delusions  Associations:  intact  Insight:  fair due to acknowledges diagnosis of MDD  Judgment:  fair due to voluntary status  Attention Span: Appropriate  Concentration:  grossly intact     Allergies     Allergies   Allergen Reactions     Sulfa (Sulfonamide Antibiotics) [Sulfa Drugs] Unknown        Labs & Imaging     Results for orders placed or performed during the hospital encounter of 22 (from the past 24 hour(s))   Glucose by meter   Result Value Ref " Range    GLUCOSE BY METER POCT 136 (H) 70 - 99 mg/dL   Glucose by meter   Result Value Ref Range    GLUCOSE BY METER POCT 145 (H) 70 - 99 mg/dL   Glucose by meter   Result Value Ref Range    GLUCOSE BY METER POCT 196 (H) 70 - 99 mg/dL   Glucose by meter   Result Value Ref Range    GLUCOSE BY METER POCT 119 (H) 70 - 99 mg/dL   Glucose by meter   Result Value Ref Range    GLUCOSE BY METER POCT 138 (H) 70 - 99 mg/dL            Assessment   Diagnostic Impression:  Isac Li is a 67 year old male with a past psychiatric history of major depressive disorder admitted from the  ER on 04/27/2022 due to concern for SI in the context of loss of a recent close friend to suicide.. Significant symptoms include SI, depressed and sleep issues. His last psychiatric hospitalization was 20 years ago and last visit with a psychiatrist was around 15 years ago. Sees his primary care physician for psychiatric medications and follows with an outpatient therapist. Current psychosocial stressors include recent loss of a close friend to suicide who was also [employer].  Patient's support system includes his sister and some members of the [employer] community.  Substance use does not appear to be playing a contributing role in the patient's presentation.  There is genetic loading for none known. Medical history does appear to be significant for seizures and prior TBI as well as sleep apnea requiring CPAP which broke and he has been unable to replace. The MSE is notable for sad, tearful, and at times distraught affect, talkative engagement, and open conversation with treatment team. He denies self injurious behaviors.      His reported symptoms of depressed mood,  SI, worthlessness, poor selfesteem, guiltyness, low energy  and sleep issues are consistent with his historic diagnosis of major depressive disorder. These issues are also likely compounded by grief related to friend's recent death by suicide, trauma as a child, and ongoing struggle  with compulsive sexual behavior as well as ongoing internal conflict between his sexual identity and Alevism alex. Optimization of medications to target these symptom clusters in addition to evaluation of adequate outpatient supports will be targets for treatment during this admission.      Given that he currently has SI and marked depression, patient warrants inpatient psychiatric hospitalization to maintain his safety. Disposition pending clinical stabilization, medication optimization and development of an appropriate discharge plan.    Principal Diagnosis:     Major Depressive Disorder, severe, recurrent    Secondary psychiatric diagnoses of concern this admission:     Compulsive Sexual Behavior    Alcohol Use Disorder (hisorical)    Psychiatric course:  Isac Li was admitted to [facility] as a voluntary patient. His PTA aspirin, atorvastatin, lisinopril, metformin, phenytoin, and venlafaxine were continued.     4/28 Seroquel 50 mg PO as augmenting agent to selective serotonin reuptake inhibitor.     5/2 Pharmacy CL consult for Vibriid/Trintellix coverage and prior authorization was placed and was approved.     5/3 Cross-titration was started by adding Vybriid 10 mg PO daily with food.     5/4 Cross-titration continued with 10mg PO Vybriid daily and tapering venlafaxine to 225mg PO was ordered. Debrox 3 drops  discontinued after ear wax sufficiently removed. 300mg venlafaxine was given 5/4 and tapered 225mg PO dose scheduled for tonight 5/5 5/5 Cross-titration continued with 10mg PO Vybriid daily and tapering venlafaxine to 225mg PO was given    5/6 Cross-titration continued with 10mg PO Vybriid daily and tapering venlafaxine to 150mg PO    5/9 Cross-titration continued with 20mg PO Vybriid daily and tapering venlafaxine to 75mg PO    5/11 Discussed gun safety plan and next steps     Isac Li continued to meet criteria for inpatient hospitalization medication optimization, inpatient stabilization,  and appropriate discharge planning.      Medical course:   Isac Li was physically examined by the ED prior to being transferred to the unit and was found to be medically stable and appropriate for admission.      Plan   Today's Changes:     No med changes for today    Discussed gun safety plan    Discussed potential discharge next steps  _______________________________________________________________________  Psychiatric diagnoses and management:  Principal Diagnosis:     Major Depressive Disorder, severe, recurrent without psychotic features  -Continue cross-titratation Venlafaxine-Vilazodone (Vybriid):   -Venlafaxine to 75mg PO daily  -Continue Seroquel 50 mg at bedtime   -Vybriid 20 mg PO daily with food    Secondary Psychiatric Diagnoses:   1. Compulsive Sexual Behavior  ? Cont Venlafaxine   2. Alcohol Use Disorder (historical)    Additional Planning:    Continue to monitor for and stabilize: SI and depressed    Patient will be treated in therapeutic milieu with appropriate individual and group therapies as described.    Would benefit from community support groups    Will coordinate access and gun safety at home     will meet with pt about employer     Scheduled Medications (summary):    aspirin  81 mg Oral Daily     atorvastatin  20 mg Oral At Bedtime     insulin aspart   Subcutaneous Daily with breakfast     insulin aspart   Subcutaneous Daily with lunch     insulin aspart   Subcutaneous Daily with supper     insulin aspart  1-5 Units Subcutaneous At Bedtime     insulin aspart  1-10 Units Subcutaneous TID AC     insulin glargine  28 Units Subcutaneous At Bedtime     liraglutide  1.2 mg Subcutaneous Daily     lisinopril  5 mg Oral Daily     metFORMIN  2,000 mg Oral Daily with supper     phenytoin  500 mg Oral At Bedtime     QUEtiapine  50 mg Oral At Bedtime     venlafaxine  75 mg Oral At Bedtime     vilazodone  20 mg Oral Daily       PRN Medications (summary):  acetaminophen, alum & mag  hydroxide-simethicone, glucose **OR** dextrose **OR** glucagon, hydrOXYzine, insulin aspart, melatonin, OLANZapine **OR** OLANZapine, polyethylene glycol    Discontinued Medications (& Rationale):        Consults:    IM following for T2DM    Endocrinology consulted for T2DM 5/10    Legal Status:    Voluntary     SIO:    No    Pt has not required locked seclusion or restraints in the past 24 hours to maintain safety, please refer to RN documentation for further details.    Disposition:    TBD, pending clinical stabilization, medication optimization, and formulation of a safe discharge plan.    Safety Assessment:   Behavioral Orders   Procedures     Code 3     Discontinue 1:1 attendant for suicide risk     Order Specific Question:   I have performed an in person assessment of the patient     Answer:   Based on this assessment the patient no longer requires a one on one attendant at this point in time.     Order Specific Question:   Rationale     Answer:   Patient States able to remain safe in hospital     Routine Programming     As clinically indicated     Sexual precautions     Status 15     Every 15 minutes.     Suicide precautions     Patients on Suicide Precautions should have a Combination Diet ordered that includes a Diet selection(s) AND a Behavioral Tray selection for Safe Tray - with utensils, or Safe Tray - NO utensils        ____________________________________________________________________  Pertinent Medical diagnoses and management:    Endocrinology currently following patient, apperciate help, for further details please see note on 05/10    -increase victoza to 1.2 mg from 0.6 mg subcutaneous daily this AM                 -Metformin XR 2000 mg daily with dinner (1700)                 -decrease Lantus to 28 units from 35 units at HS                 -decrease Novolog to 1:15g CHO from Novolog 1:8g CHO coverage AC (TBD what we will need for home: fixed dose vs GLP-1)                 -Novolog high  intensity sliding scale TID AC and medium resistance for HS                 -BG monitoring TID AC, HS, 0200                 -hypoglycemia protocol                 -carb counting protocol                 -diabetes education needs not anticipated                   Outpatient follow up: on discharge, will recommend outpatient follow up with PCP, and perhaps referral to endocrine/diabetes specialty service.   Plan discussed with patient and primary team through this note.         To contact Endocrine Diabetes service:   From 8AM-4PM: page inpatient diabetes provider who is following the patient that day (see filed or incomplete progress notes/consult notes).  If uncertain of provider assignment: page job code [phone]. (To page job code in-house dial 3 stars, [phone] then enter number).  For questions or updates AFTER HOURS from 4PM-8AM: page the diabetes job code for on call fellow: [phone]       Mckenna Taylor  Medical Student MS3  I was present with the medical student who participated in the service and in the documentation of the note. I have verified the history and personally performed the physical exam and medical decision making. I agree with the assessment and plan of care as documented in the note and have made changes to the note as appropriate.     Jun Gonzalez MD MPH  PGY 1 Psychiatry resident    Attestation:  This patient has been seen and evaluated by me, Edel Lee MD.  I have discussed this patient with the house staff team including the resident and medical student and I agree with the findings and plan in this note.    I have reviewed today's vital signs, medications, labs and imaging. Edel Lee MD , PhD.

## 2022-05-11 NOTE — PLAN OF CARE
Assessment/Intervention/Current Symtoms and Care Coordination  -Chart review  -Meeting with team  -Met with pt and addressed patient needs/concerns    Discharge Plan or Goal  Home with OP provider follow up     Barriers to Discharge   Sx stabilization     Referral Status  Pt was referred for  case management through [facility].  Requested that they expedite referral if possible.     Called [facility] regarding making a referral for day treatment but they said they are not taking any outside referrals right now and to call back in a few weeks.     Called [facility] and requested a call back to see if they would accept pt's insurance and if they natalie opening in their day treatment program. Awaiting call back.     Scheduled pt for day treatment intake appointments at [facility] in [place].  Information added to AVS.  However, it is unclear if pt's insurance would cover their program.     Called [facility].  They said they would check to see if pt's insurance would cover.  They requested that writer send over referral information, which was done.  Called to check on referral but had to leave .     Scheduled follow up therapy appointment for Thursday, May 19th at 10:30am with Antony Matute. Added to AVS.     ROIs in paper chart.      Legal Status  Voluntary

## 2022-05-11 NOTE — PLAN OF CARE
[facility] Occupational Therapy Group Intervention Note     05/11/22 1433   Group Therapy Session   Group Attendance attended group session   Time Session Began 1310   Time Session Ended 1400   Total Time patient participated (minutes) 50   Total # Attendees 3   Group Type task skill;life skill   Group Topic Covered coping skills/lifestyle management;relaxation techniques   Group Session Detail clinic - coping skill exploration, creative expression within personally meaningful activities, and observation of social, cognitive, and kinesthetic performance skills   Patient Response/Contribution cooperative with task;organized;did not discuss personal experience   Patient Response Detail Congruent affect. Attentive to peer discussion while he was actively involved in a hands-on coping skill. Did not share thoughts / contributions to discussion. Politely excused self early.      María Segura OT on 5/11/2022 at 2:36 PM

## 2022-05-11 NOTE — PROGRESS NOTES
IP Diabetes Management  Daily Note           Assessment and Plan:   HPI: Isac is a 67 year old male with PMHx TIIDM, depression, HTN, HLP who was admitted 4/27/22 to inpatient psychiatry for stabilization for SI in the context of recent loss of friend to suicide.       Assessment:      1)   Insulin dependent Type 2 diabetes, uncontrolled (A1c 10.3%)  2)  Obesity; BMI 42    Plan:    -contonue victoza 1.2 mg subcutaneous daily this AM--->will increase in 1-2 days to 1.8 mg   -Metformin XR 2000 mg daily with dinner (1700)   -decrease Lantus to 25 units from 28 units at HS   -decrease Novolog to 1:20g CHO from Novolog 1:15g CHO coverage AC (TBD what we will need for home: fixed dose vs GLP-1) starting with dinner today   -Novolog high intensity sliding scale TID AC and medium resistance for HS   -BG monitoring TID AC, HS, 0200   -hypoglycemia protocol   -carb counting protocol   -diabetes education needs not anticipated     Outpatient follow up: on discharge, will recommend outpatient follow up with PCP, and perhaps referral to endocrine/diabetes specialty service.       Interval History and Assessment: interval glucose trend reviewed:        Patient unable to talk over the phone today, per RN.  BG looking much better with increase in Victoza.  Will decrease novolog CHO coverage further with goal to discontinue this.  Will decrease Lantus slightly.  Consider increase in Victoza in 1-2 days.       Current nutritional intake and type: Orders Placed This Encounter      Regular Diet Adult      Planned Procedures/surgeries: none  Steroid planning: none  D5W-containing solutions/medications: none    PTA Diabetes Regimen:   Frequency of checks: infrequent, some less than, and some greater than, 200  Metformin XR 2000 mg with dinner  Basaglar 62 units at bedtime  Humalog 18-26 units TID with meals- he does not formally count carbs, and doesn't really systematically estimate either.  Usual BG control PTA:  uncontrolled, with  A1c 10.3% on 3/30/22  Able to Detect Hypoglycemia: once, years ago. Occurred at work. Was symptomatic, doesn't remember how low it went. None since.   Usual Diabetes Care Provider: PCP, Dr. Russ  Primary Care Provider: Marquise Russ  Factors Impacting IP Glucose Control: omission of prandial insulin         Discharge Planning: TBD           Diabetes History:   Type of Diabetes: Type 2 Diabetes Mellitus  Lab Results   Component Value Date    A1C 9.8 04/28/2022    A1C 10.3 03/30/2022    A1C 11.5 12/22/2021    A1C 8.7 09/21/2021    A1C 10.5 06/10/2021              Review of Systems:     Unable to assess           Medications:     Current Facility-Administered Medications   Medication     acetaminophen (TYLENOL) tablet 650 mg     alum & mag hydroxide-simethicone (MAALOX) suspension 30 mL     aspirin (ASA) chewable tablet 81 mg     atorvastatin (LIPITOR) tablet 20 mg     glucose gel 15-30 g    Or     dextrose 50 % injection 25-50 mL    Or     glucagon injection 1 mg     hydrOXYzine (ATARAX) tablet 25 mg     insulin aspart (NovoLOG) injection (RAPID ACTING)     insulin aspart (NovoLOG) injection (RAPID ACTING)     insulin aspart (NovoLOG) injection (RAPID ACTING)     insulin aspart (NovoLOG) injection (RAPID ACTING)     insulin aspart (NovoLOG) injection (RAPID ACTING)     insulin aspart (NovoLOG) injection (RAPID ACTING)     insulin glargine (LANTUS PEN) injection 28 Units     liraglutide (VICTOZA) injection 1.2 mg     lisinopril (ZESTRIL) tablet 5 mg     melatonin tablet 3 mg     metFORMIN (GLUCOPHAGE-XR) 24 hr tablet 2,000 mg     OLANZapine (zyPREXA) tablet 5 mg    Or     OLANZapine (zyPREXA) injection 5 mg     phenytoin (DILANTIN) CR capsule 500 mg     polyethylene glycol (MIRALAX) Packet 17 g     QUEtiapine (SEROquel) tablet 50 mg     venlafaxine (EFFEXOR XR) 24 hr capsule 75 mg     vilazodone (VIIBRYD) tablet 20 mg            Physical Exam:    /75 (BP Location: Left arm, Patient Position: Sitting, Cuff  "Size: Adult Large)   Pulse 88   Temp 98  F (36.7  C) (Oral)   Resp 16   Ht 1.791 m (5' 10.5\")   Wt 111.7 kg (246 lb 4.8 oz)   SpO2 98%   BMI 34.84 kg/m        Unable to assess            Data:     Recent Labs   Lab 05/11/22  0234 05/10/22  2056 05/10/22  1748 05/10/22  1249 05/10/22  0816 05/10/22  0159   * 196* 145* 136* 110* 85     Lab Results   Component Value Date    WBC 10.0 04/27/2022    WBC 8.6 09/21/2021    WBC 8.9 06/17/2020    HGB 13.8 04/27/2022    HGB 13.2 (L) 09/21/2021    HGB 14.9 06/17/2020    HCT 40.5 04/27/2022    HCT 39.5 (L) 09/21/2021    HCT 43.3 06/17/2020    MCV 85 04/27/2022    MCV 86 09/21/2021    MCV 88 06/17/2020     04/27/2022     09/21/2021     06/17/2020     Lab Results   Component Value Date     04/27/2022     12/22/2021     06/10/2021    POTASSIUM 4.0 04/27/2022    POTASSIUM 4.3 12/22/2021    POTASSIUM 4.6 06/10/2021    CHLORIDE 106 04/27/2022    CHLORIDE 108 (H) 12/22/2021    CHLORIDE 104 06/10/2021    CO2 21 04/27/2022    CO2 23 12/22/2021    CO2 19 (L) 06/10/2021     (H) 05/11/2022     (H) 05/10/2022     (H) 05/10/2022     Lab Results   Component Value Date    BUN 19 04/27/2022    BUN 22 12/22/2021    BUN 19 06/10/2021     Lab Results   Component Value Date    TSH 2.76 04/27/2022     Lab Results   Component Value Date    AST 9 04/27/2022    AST 17 12/22/2021    AST 19 06/10/2021    ALT 25 04/27/2022    ALT 30 12/22/2021    ALT 22 06/10/2021    ALKPHOS 149 04/27/2022    ALKPHOS 144 (H) 12/22/2021    ALKPHOS 157 (H) 06/10/2021             To contact Endocrine Diabetes service:   From 8AM-4PM: page inpatient diabetes provider that is following the patient  For questions or updates from 4PM-8AM: page the diabetes job code for on call fellow: [phone]    NEVA Jacinto, CNP  Inpatient Diabetes Management Service  Pager [phone]    "

## 2022-05-11 NOTE — PLAN OF CARE
"Pt was out in the lounge this evening. Pt presented with blunted/flat affect. Pt was withdrawn and did not socialize with peers as he has done in previous days. Pt presents with depressed mood. Pt states he feels \"down\" today. Endorsed anxiety 3/10 and depression 3/10. Denied SI/SIB/AH/VH. Pt ate only about 25% of dinner tonight and said he was not hungry tonight. Pt asked to take his HS meds earlier because he wanted to bed early. Will monitor.    Problem: Suicide Risk  Goal: Absence of Self-Harm  Outcome: Ongoing, Progressing     Problem: Behavioral Health Plan of Care  Goal: Adheres to Safety Considerations for Self and Others  Outcome: Ongoing, Progressing     Problem: Suicidal Behavior  Goal: Suicidal Behavior is Absent or Managed  Outcome: Ongoing, Progressing   Goal Outcome Evaluation:                      "

## 2022-05-12 LAB
GLUCOSE BLDC GLUCOMTR-MCNC: 130 MG/DL (ref 70–99)
GLUCOSE BLDC GLUCOMTR-MCNC: 159 MG/DL (ref 70–99)
GLUCOSE BLDC GLUCOMTR-MCNC: 167 MG/DL (ref 70–99)
GLUCOSE BLDC GLUCOMTR-MCNC: 182 MG/DL (ref 70–99)
GLUCOSE BLDC GLUCOMTR-MCNC: 202 MG/DL (ref 70–99)

## 2022-05-12 PROCEDURE — G0177 OPPS/PHP; TRAIN & EDUC SERV: HCPCS

## 2022-05-12 PROCEDURE — 90853 GROUP PSYCHOTHERAPY: CPT

## 2022-05-12 PROCEDURE — 250N000013 HC RX MED GY IP 250 OP 250 PS 637: Performed by: PHYSICIAN ASSISTANT

## 2022-05-12 PROCEDURE — 250N000013 HC RX MED GY IP 250 OP 250 PS 637: Performed by: STUDENT IN AN ORGANIZED HEALTH CARE EDUCATION/TRAINING PROGRAM

## 2022-05-12 PROCEDURE — 99232 SBSQ HOSP IP/OBS MODERATE 35: CPT | Performed by: NURSE PRACTITIONER

## 2022-05-12 PROCEDURE — 124N000002 HC R&B MH UMMC

## 2022-05-12 PROCEDURE — 250N000013 HC RX MED GY IP 250 OP 250 PS 637

## 2022-05-12 PROCEDURE — 99232 SBSQ HOSP IP/OBS MODERATE 35: CPT | Mod: GC | Performed by: PSYCHIATRY & NEUROLOGY

## 2022-05-12 RX ORDER — LIRAGLUTIDE 6 MG/ML
1.8 INJECTION SUBCUTANEOUS DAILY
Status: DISCONTINUED | OUTPATIENT
Start: 2022-05-13 | End: 2022-05-13 | Stop reason: HOSPADM

## 2022-05-12 RX ADMIN — VILAZODONE HYDROCHLORIDE 20 MG: 10 TABLET ORAL at 08:09

## 2022-05-12 RX ADMIN — LISINOPRIL 5 MG: 5 TABLET ORAL at 08:09

## 2022-05-12 RX ADMIN — HYDROXYZINE HYDROCHLORIDE 25 MG: 25 TABLET, FILM COATED ORAL at 22:44

## 2022-05-12 RX ADMIN — ASPIRIN 81 MG CHEWABLE TABLET 81 MG: 81 TABLET CHEWABLE at 08:08

## 2022-05-12 RX ADMIN — PHENYTOIN SODIUM 500 MG: 100 CAPSULE ORAL at 21:12

## 2022-05-12 RX ADMIN — MELATONIN TAB 3 MG 3 MG: 3 TAB at 22:44

## 2022-05-12 RX ADMIN — LIRAGLUTIDE 1.2 MG: 6 INJECTION SUBCUTANEOUS at 08:38

## 2022-05-12 RX ADMIN — INSULIN ASPART 6 UNITS: 100 INJECTION, SOLUTION INTRAVENOUS; SUBCUTANEOUS at 18:24

## 2022-05-12 RX ADMIN — VENLAFAXINE HYDROCHLORIDE 75 MG: 75 CAPSULE, EXTENDED RELEASE ORAL at 21:13

## 2022-05-12 RX ADMIN — METFORMIN HYDROCHLORIDE 2000 MG: 750 TABLET, EXTENDED RELEASE ORAL at 18:11

## 2022-05-12 RX ADMIN — QUETIAPINE FUMARATE 50 MG: 50 TABLET ORAL at 21:11

## 2022-05-12 RX ADMIN — ATORVASTATIN CALCIUM 20 MG: 20 TABLET, FILM COATED ORAL at 21:11

## 2022-05-12 ASSESSMENT — ACTIVITIES OF DAILY LIVING (ADL)
ORAL_HYGIENE: INDEPENDENT
DRESS: INDEPENDENT
HYGIENE/GROOMING: INDEPENDENT
LAUNDRY: WITH SUPERVISION

## 2022-05-12 NOTE — PLAN OF CARE
"Pt has been in an overall calm, pleasant mood today. His mood appears to be more stable than it has been throughout his stay here. He is less tearful, less drepssed/sad appearing. This morning he communicated a feeling of \"acceptance\" regarding is upcoming discharge and that he's feeling \"more ready to leave\" than he has previously. Collateral information from the unit's OT therapist indicates that it may most beneficial if pt is discharged before the weekend, d/t to pt's level of functioning compared to other patients on the unit currently. Pt denies any active suicidal ideation. BG this morning was 167; received 5 units of Novolog. BG at lunch was 182; received 6 units of Novolog. Pt was educated about importance of alternating injection sites of insulin and Victoza pens, and if in the same area, injections should be spaced at least 2 inches apart. Pt communicated understanding. A diabetes educator is scheduled to see pt today on the unit around 1430.      Problem: Plan of Care - These are the overarching goals to be used throughout the patient stay.    Goal: Plan of Care Review/Shift Note  Description: The Plan of Care Review/Shift note should be completed every shift.  The Outcome Evaluation is a brief statement about your assessment that the patient is improving, declining, or no change.  This information will be displayed automatically on your shift note.  Recent Flowsheet Documentation  Taken 5/12/2022 1118 by Sangita Elkins  Plan of Care Reviewed With: patient     "

## 2022-05-12 NOTE — PROGRESS NOTES
IP Diabetes Management  Daily Note           Assessment and Plan:   HPI: Isac is a 67 year old male with PMHx TIIDM, depression, HTN, HLP who was admitted 4/27/22 to inpatient psychiatry for stabilization for SI in the context of recent loss of friend to suicide.       Assessment:      1)   Insulin dependent Type 2 diabetes, uncontrolled (A1c 10.3%)  2)  Obesity; BMI 42    Plan:    -continue victoza 1.2 mg subcutaneous daily this AM--->will increase to 1.8 mg tomorrow AM   -Metformin XR 2000 mg daily with dinner (1700)   -continue Lantus 25 units at HS   -continue Novolog to 1:20g CHO  coverage AC (TBD what we will need for home: fixed dose vs GLP-1)    -Novolog high intensity sliding scale TID AC and medium resistance for HS   -BG monitoring TID AC, HS, and stop 0200 check, per pt. request   -hypoglycemia protocol   -carb counting protocol   -diabetes education needs not anticipated     Outpatient follow up: on discharge, will recommend outpatient follow up with [facility].      Interval History and Assessment: interval glucose trend reviewed:     this AM.  130 at 0200.  Higher into 200s at dinner time and evening.  He is feeling fine on Victoza-eating ok.  Denies nausea, abdominal pain, vomiting.  Not sure yet when he will discharge.  He is agreeable to increasing his Victoza to 1.8 mg tomorrow AM, anticipate that we could stop his novolog CHO coverage tomorrow.     Current nutritional intake and type: Orders Placed This Encounter      Regular Diet Adult      Planned Procedures/surgeries: none  Steroid planning: none  D5W-containing solutions/medications: none    PTA Diabetes Regimen:   Frequency of checks: infrequent, some less than, and some greater than, 200  Metformin XR 2000 mg with dinner  Basaglar 62 units at bedtime  Humalog 18-26 units TID with meals- he does not formally count carbs, and doesn't really systematically estimate either.  Usual BG control PTA:  uncontrolled, with  A1c 10.3% on 3/30/22  Able to Detect Hypoglycemia: once, years ago. Occurred at work. Was symptomatic, doesn't remember how low it went. None since.   Usual Diabetes Care Provider: PCP, Dr. Russ  Primary Care Provider: Marquise Russ  Factors Impacting IP Glucose Control: omission of prandial insulin         Discharge Planning: TBD           Diabetes History:   Type of Diabetes: Type 2 Diabetes Mellitus  Lab Results   Component Value Date    A1C 9.8 04/28/2022    A1C 10.3 03/30/2022    A1C 11.5 12/22/2021    A1C 8.7 09/21/2021    A1C 10.5 06/10/2021              Review of Systems:     The Review of Systems is negative other than noted in the Interval History.           Medications:     Current Facility-Administered Medications   Medication     acetaminophen (TYLENOL) tablet 650 mg     alum & mag hydroxide-simethicone (MAALOX) suspension 30 mL     aspirin (ASA) chewable tablet 81 mg     atorvastatin (LIPITOR) tablet 20 mg     glucose gel 15-30 g    Or     dextrose 50 % injection 25-50 mL    Or     glucagon injection 1 mg     hydrOXYzine (ATARAX) tablet 25 mg     insulin aspart (NovoLOG) injection (RAPID ACTING)     insulin aspart (NovoLOG) injection (RAPID ACTING)     insulin aspart (NovoLOG) injection (RAPID ACTING)     insulin aspart (NovoLOG) injection (RAPID ACTING)     insulin aspart (NovoLOG) injection (RAPID ACTING)     insulin aspart (NovoLOG) injection (RAPID ACTING)     insulin glargine (LANTUS PEN) injection 25 Units     liraglutide (VICTOZA) injection 1.2 mg     lisinopril (ZESTRIL) tablet 5 mg     melatonin tablet 3 mg     metFORMIN (GLUCOPHAGE-XR) 24 hr tablet 2,000 mg     OLANZapine (zyPREXA) tablet 5 mg    Or     OLANZapine (zyPREXA) injection 5 mg     phenytoin (DILANTIN) CR capsule 500 mg     polyethylene glycol (MIRALAX) Packet 17 g     QUEtiapine (SEROquel) tablet 50 mg     venlafaxine (EFFEXOR XR) 24 hr capsule 75 mg     vilazodone (VIIBRYD) tablet 20 mg            Physical Exam:    BP  "110/71   Pulse 81   Temp 97.7  F (36.5  C) (Oral)   Resp 16   Ht 1.791 m (5' 10.5\")   Wt 111.7 kg (246 lb 4.8 oz)   SpO2 98%   BMI 34.84 kg/m        GENERAL : In no apparent distress over the phone  RESP: normal respiratory effort. No cough  HEENT: hearing intact to conversational volume  NEURO: awake, alert, responds appropriately to questions.                 Data:     Recent Labs   Lab 05/12/22  0214 05/11/22  2048 05/11/22  1745 05/11/22  1242 05/11/22  0817 05/11/22  0234   * 237* 216* 116* 138* 119*     Lab Results   Component Value Date    WBC 10.0 04/27/2022    WBC 8.6 09/21/2021    WBC 8.9 06/17/2020    HGB 13.8 04/27/2022    HGB 13.2 (L) 09/21/2021    HGB 14.9 06/17/2020    HCT 40.5 04/27/2022    HCT 39.5 (L) 09/21/2021    HCT 43.3 06/17/2020    MCV 85 04/27/2022    MCV 86 09/21/2021    MCV 88 06/17/2020     04/27/2022     09/21/2021     06/17/2020     Lab Results   Component Value Date     04/27/2022     12/22/2021     06/10/2021    POTASSIUM 4.0 04/27/2022    POTASSIUM 4.3 12/22/2021    POTASSIUM 4.6 06/10/2021    CHLORIDE 106 04/27/2022    CHLORIDE 108 (H) 12/22/2021    CHLORIDE 104 06/10/2021    CO2 21 04/27/2022    CO2 23 12/22/2021    CO2 19 (L) 06/10/2021     (H) 05/12/2022     (H) 05/11/2022     (H) 05/11/2022     Lab Results   Component Value Date    BUN 19 04/27/2022    BUN 22 12/22/2021    BUN 19 06/10/2021     Lab Results   Component Value Date    TSH 2.76 04/27/2022     Lab Results   Component Value Date    AST 9 04/27/2022    AST 17 12/22/2021    AST 19 06/10/2021    ALT 25 04/27/2022    ALT 30 12/22/2021    ALT 22 06/10/2021    ALKPHOS 149 04/27/2022    ALKPHOS 144 (H) 12/22/2021    ALKPHOS 157 (H) 06/10/2021       Follow up was done today via virtual/ telephone encounter.   5 minutes spent on phone with patient  20 minutes spent on documentation, coordination of care        To contact Endocrine Diabetes service: "   From 8AM-4PM: page inpatient diabetes provider that is following the patient  For questions or updates from 4PM-8AM: page the diabetes job code for on call fellow: [phone]    NEVA Jacinto, CNP  Inpatient Diabetes Management Service  Pager [phone]

## 2022-05-12 NOTE — PLAN OF CARE
05/12/22 1148   Individualization/Patient Specific Goals   Patient Personal Strengths community support;coping skills;expressive of emotions;expressive of needs;family/social support;independent living skills;insight into illness/situation;intellectual cognitive skills;medication/treatment adherence;motivated for recovery;motivated for treatment;positive educational history;positive vocational history;relationship stability;resilient;self-awareness;self-reliant;socioeconomic stability;spiritual/Mormonism support;stable living environment;tolerant   Patient Vulnerabilities adverse childhood experience(s);traumatic event   Individualized Care Needs Would like ongoing spiritual/Mormonism support   Patient-Specific Goals (Include Timeframe) 1. stabilization of mood disorder symptoms, 2. Absence of SI, 3. Medication mgmt, 4. Psych f/u care in place   Interprofessional Rounds   Summary Patient improving   Participants CTC;OT;patient;psychiatrist;nursing   Team Discussion   Participants Sangita Roche RN, Es Florez CTC, Med students   Progress Patient improving, appears annoyed with milleau, reports he feels better   Anticipated length of stay 1-2 days   Continued Stay Criteria/Rationale Ensure smooth discharge plan.   Medical/Physical Diabetes, Seizure D/O   Rationale for change in precautions or plan Crisis beds wont take his insurance.   Anticipated Discharge Disposition home or self-care     PRECAUTIONS AND SAFETY    Behavioral Orders   Procedures     Code 3     Discontinue 1:1 attendant for suicide risk     Order Specific Question:   I have performed an in person assessment of the patient     Answer:   Based on this assessment the patient no longer requires a one on one attendant at this point in time.     Order Specific Question:   Rationale     Answer:   Patient States able to remain safe in hospital     Routine Programming     As clinically indicated     Sexual precautions     Status 15     Every 15  minutes.     Suicide precautions     Patients on Suicide Precautions should have a Combination Diet ordered that includes a Diet selection(s) AND a Behavioral Tray selection for Safe Tray - with utensils, or Safe Tray - NO utensils         Safety  Safety WDL: WDL  Patient Location: patient room, own  Observed Behavior: calm  Observed Behavior (Comment): Participating in group  Safety Measures: safety rounds completed  Diversional Activity: television  Suicidality: Status 15  Seizure precautions: seizure side rail pads  Assault: status 15  Sexual: status 15, private room

## 2022-05-12 NOTE — CONSULTS
"Diabetes Educator consult received for Isac Li, age 67, with PMHx Type 2 IDM, depression, HTN, HLP who was admitted 4/27/22 to inpatient psychiatry for stabilization for SI in the context of recent loss of friend to suicide.      Isac has had Type 2 DM for greater than 10 years.  Glycosylated hemoglobin A1C is 9.8% on 4/28/22.  Prior to admission, Isac took Basaglar, and had Lispro (humalog) with a base dose + correction, admitting he didn't always follow it.  Also had been administering insulin in same location of right abdomen without rotating sites.  Has an Accu Chek meter, lancets, and strips.  Thinks it is an Karely and states he doesn't need supplies.    Education provided:  1.  Monitoring-frequency of before meals and bedtime, importance of using information to know if he is in target range, safe sharps disposal.  He states he knows how to do this and does not need a review.  2.  Medication-reviewed action, peak, dosage, duration of Metformin, glargine and humalog/novolog.  Discussed Victoza and he informs me he will be on Ozempic once weekly in place of Victoza upon discharge.  Explained how these work.  He is fine with pen utilization and storage of insulins/GLP-1.  However, wasn't rotating sites so this reviewed in detail.  Went over correction insulin scale for pre-meals and for bedtime, noting the different threshold of each upon which to take.  He verbalizes understanding and without the insulin to CHO ratio and addition of GLP-1, won't have a \"base dose +\".  3.  CGM-Isac asked if it would ever be appropriate for him to have a CGM.  Discussed Abbott Freestyle Rony 2 and suggested he explore this as an outpatient.    Upon discharge will need:  Metformin ER per MD Aguayo (glargine) insulin pens  Humalog (lispro) insulin pens  Ozempic per endo  B-D 4 mm mithcell pen needles  Alcohol Wipes    RN aware teaching completed.    NEVA German  Diabetes Clinical Nurse " Specialist/CDC  [phone]

## 2022-05-12 NOTE — PROGRESS NOTES
Behavioral Health  Note   Behavioral Health  Spirituality Group Note     Unit 20    Name: Isac Li    YOB: 1955   MRN: [id number]    Age: 67 year old     Patient attended -led group, which included discussion of spirituality, coping with illness and building resilience.   Patient attended group for 1.0 hrs.   patient demonstrated an appreciation of topic's application for their personal circumstances.     [facility]  Staff    Page [phone]

## 2022-05-12 NOTE — PLAN OF CARE
Occupational Therapy Group Note      05/12/22 1415   Group Therapy Session   Group Attendance attended group session   Time Session Began 0105   Time Session Ended 0200   Total Time patient participated (minutes) 55  (1 charge)   Total # Attendees 2   Group Type psychotherapeutic   Group Topic Covered coping skills/lifestyle management;structured socialization   Group Session Detail Topic: Life Stories game to promote social engagement and dialogue, improve mood, support positive thinking and to facilitate reality orientation.      Patient Response/Contribution cooperative with task;discussed personal experience with topic;organized    Pt joined group and was prosocial for duration.  Appeared to enjoy sharing stories/experiences from his past.  Mood was content; Affect was bright.

## 2022-05-12 NOTE — PLAN OF CARE
Assessment/Intervention/Current Symtoms and Care Coordination:    Chart Review    Met with team to discuss patient care.    Spoke with patient about discharge planning, he reported he feels ready to go, and feels better after provider reassured him that if he feels like he needs to come back, he just needs to come to the ED. Discussed with him transportation for discharge tomorrow, he said his sister could pick him up in the morning or later in the day. Advised him to request a ride from his sister in the afternoon/evening to ensure all discharge milestones have occurred before his sister gets here.    Discharge Plan or Goal:  Discharge home with IOP and other providers as scheduled in [facility]S on 5/13/2022    Barriers to Discharge:  Transportation.    Referral Status:  No new referrals made    Legal Status:  Patient is voluntary

## 2022-05-12 NOTE — DISCHARGE SUMMARY
Psychiatric Discharge Summary    Hospital Day #15    Isac Li MRN# [id number]   Age: 67 year old YOB: 1955     Date of Admission:  4/27/2022  Date of Discharge:  05/12/2022  Admitting Physician:  Edel Lee MD  Discharge Physician:  Edel Lee MD         Event Leading to Hospitalization:     Isac Li is a 67 year old male with a past psychiatric history of major depressive disorder admitted from the  ER on 04/27/2022 due to concern for SI in the context of loss of a recent close friend to suicide.Significant symptoms include SI, depressed and sleep issues. His last psychiatric hospitalization was 20 years ago and last visit with a psychiatrist was around 15 years ago. Sees his primary care physician for psychiatric medications and follows with an outpatient therapist. Current psychosocial stressors include recent loss of a close friend to suicide who was also [employer].  Patient's support system includes his sister and some members of the [employer] community.  Substance use does not appear to be playing a contributing role in the patient's presentation. He denies self injurious behaviors.        See Admission note by Edel Lee MD on 4/27/2022 for additional details.          Diagnoses:   Assessment:   Pt's reported symptoms of depressed mood, SI, and feelings of hopelessness are consistent with his historic diagnosis of major depressive disorder. These issues are also likely compounded by grief related to friend's recent death by suicide, trauma as a child, and ongoing struggle with compulsive sexual behavior as well as ongoing internal conflict between his sexual identity and Mandaeism alex. Assessment is that the presentation is consistent with diagnosis of MDD, recurrent and severe.         Consults:     Internal Medicine     Endocrinology          Hospital Course:   Psychiatric Course:  Isac Li was admitted to [facility]  with attending Edel Lee MD as a voluntary  patient. His PTA aspirin, atorvastatin, lisinopril, metformin, phenytoin, and venlafaxine were continued.     4/28 Seroquel 50 mg PO as augmenting agent to selective serotonin reuptake inhibitor.     5/2 Pharmacy CL consult for Vibriid/Trintellix coverage and prior authorization was placed and was approved.     5/3 Cross-titration was started by adding Vybriid 10 mg PO daily with food.     5/4 Cross-titration continued with 10mg PO Vybriid daily and tapering venlafaxine to 225mg PO.    5/6 Cross-titration continued with 10mg PO Vybriid daily and tapering venlafaxine to 150mg PO    5/9 Cross-titration continued with 20mg PO Vybriid daily and tapering venlafaxine to 75mg PO    5/11 Discussed gun safety plan and next steps        Isac Li was discharged to home. At the time of discharge Isac Li was determined to not be a danger to himself or others.    Medical Course:  Pt followed by Endocrinology, see latest note 5/12  Assessment:      1)   Insulin dependent Type 2 diabetes, uncontrolled (A1c 10.3%)  2)  Obesity; BMI 42     Plan:                  -continue victoza 1.2 mg subcutaneous daily this AM--->will increase to 1.8 mg tomorrow AM                 -Metformin XR 2000 mg daily with dinner (1700)                 -continue Lantus 25 units at HS                 -continue Novolog to 1:20g CHO  coverage AC (TBD what we will need for home: fixed dose vs GLP-1)                  -Novolog high intensity sliding scale TID AC and medium resistance for HS                 -BG monitoring TID AC, HS, and stop 0200 check, per pt. request                 -hypoglycemia protocol                 -carb counting protocol                 -diabetes education needs not anticipated                   Outpatient follow up with [facility] 9/19/22.     Risk Assessment:  Isac Li has notable risk factors for self-harm, including being male, single, isolated, access to firearms, recent loss of friend to suicide, H/O  substance abuse, and health stressors.    However, risk is mitigated by strong [employer] community, stable home environment, access to a firearm, absence of ongoing substance abuse, absence of prior attempts, access to mental health care with licensed therapist and day programming.  Additional steps taken to minimize risk include: optimization of medications, development of safety plan including reaching out to sister to take possession of a firearm, or mental health care providers or returning to ED if suicidal thoughts continue. Therefore, based on all available evidence including the factors cited above, Isac Li does not appear to be at imminent risk for self-harm, and is appropriate for outpatient level of care.     This document serves as a transfer of care to Isac Li's outpatient providers.         Discharge Medications:     Current Discharge Medication List      CONTINUE these medications which have NOT CHANGED    Details   aspirin (ASA) 81 MG EC tablet Take 1 tablet (81 mg) by mouth daily    Associated Diagnoses: Controlled type 2 diabetes mellitus without complication, with long-term current use of insulin (H)      atorvastatin (LIPITOR) 20 MG tablet TAKE 1 TABLET BY MOUTH AT BEDTIME  Qty: 90 tablet, Refills: 3    Associated Diagnoses: Hyperlipidemia      insulin glargine (BASAGLAR KWIKPEN) 100 UNIT/ML pen [INSULIN GLARGINE (BASAGLAR KWIKPEN) 100 UNIT/ML (3 ML) PEN] ADMINISTER 60 UNITS UNDER THE SKIN DAILY AT BEDTIME  Qty: 54 mL, Refills: 3    Comments: If Basaglar is not covered by insurance, may substitute Lantus or Semglee or other insulin glargine product per insurance preference at same dose and frequency.    Associated Diagnoses: Type 2 diabetes mellitus without complication, with long-term current use of insulin (H)      insulin lispro (HUMALOG KWIKPEN) 100 UNIT/ML (1 unit dial) KWIKPEN ADMINISTER 18 TO 26 UNITS UNDER THE SKIN WITH EACH MEAL PER SLIDING SCALE  Qty: 15 mL, Refills: 4     "Associated Diagnoses: Controlled type 2 diabetes mellitus without complication, with long-term current use of insulin (H)      lisinopril (ZESTRIL) 5 MG tablet TAKE 1 TABLET(5 MG) BY MOUTH DAILY  Qty: 90 tablet, Refills: 1    Associated Diagnoses: Type 2 diabetes mellitus without complication, with long-term current use of insulin (H)      metFORMIN (GLUCOPHAGE-XR) 500 MG 24 hr tablet TAKE 4 TABLETS BY MOUTH WITH THE EVENING MEAL  Qty: 360 tablet, Refills: 3    Associated Diagnoses: Controlled type 2 diabetes mellitus without complication, with long-term current use of insulin (H)      pen needle, diabetic (BD ULTRA-FINE YAHAIRA PEN NEEDLE) 32 gauge x 5/32\" Ndle [PEN NEEDLE, DIABETIC (BD ULTRA-FINE YAHAIRA PEN NEEDLE) 32 GAUGE X 5/32\" NDLE] USE DAILY WITH NOVOLOG AND LEVEMIR PENS AS DIRECTED  Qty: 200 each, Refills: 1    Associated Diagnoses: Diabetes (H)      phenytoin (DILANTIN) 100 MG capsule TAKE 5 CAPSULES BY MOUTH EVERY NIGHT AT BEDTIME  Qty: 450 capsule, Refills: 0    Associated Diagnoses: Seizure (H)      venlafaxine (EFFEXOR-XR) 150 MG 24 hr capsule TAKE 2 CAPSULES(300 MG) BY MOUTH DAILY  Qty: 180 capsule, Refills: 1    Associated Diagnoses: Major depression, chronic                  Psychiatric Examination:   Oriented to:  Fully orientated to person, place, and situation   General: Awake and Alert  Appearance:  appears stated age, Grooming is adequate and Dressed in scrubs and wearing glasses  Behavior:  calm, cooperative and engaged  Eye Contact:  appropriate  Psychomotor:  no abnormal motor symptoms appreciated; no catatonia present  Speech:  appropriate volume/tone  Language: Fluent in English with appropriate syntax and vocabulary.  Mood:  \"a little anxious\"  Affect:  improved, appropriate, congruent with mood and stable  Thought Process:  coherent, linear and no thought blockade  Thought Content: No SI/HI/AH/VH; No apparent delusions  Associations:  intact  Insight:  fair due to acknowledges diagnosis of " MDD  Judgment:  fair due to voluntary status  Attention Span: Appropriate  Concentration:  grossly intact         Discharge Plan:   Appointment: Therapist: Antony Matute: 22 at 10:30am   [facility]: [address], [place], [place] [address] [phone]     Appointment: PCP: Dr. Marquise Russ: 22 at 8:50am  [facility]: [address] [place] [phone]     Appointment: Psychiatry: Dr. Jez Da Silva: 22 at 12:20pm  [facility]:  [address] [place] [address]  Phone [phone]      Fax: [phone]     Appointment: Endocrinology:  Dr Nancy Smith: 22 at 12:00pm  [facility]: [address], [place], [place]   Phone :  [phone]        [facility]:Outpatient day programming  [address], [address] [place], [place] [address] [phone]  Intake appointments for day treatment:   22 at 3:00 (please arrive at 2:30pm) AND Friday 5/10/22 at 10am (please arrive at 9:30)      Mckenna Taylor MS3    Attestation:  Pt seen and discussed with my attending, Edel Lee MD    Attestation:   The patient has been seen and evaluated by me,  Edel Lee MD. I have examined the patient today and reviewed the discharge plan with the resident and medical student. I agree with the final assessment and plan, as noted in the discharge summary. I have reviewed today's vital signs, medications, labs and imaging.  Total time discharge plannin minutes  Edel Lee MD ,Ph.D.            Appendix A: All Labs This Admission:     Results for orders placed or performed during the hospital encounter of 22   Asymptomatic COVID-19 Virus (Coronavirus) by PCR Oropharynx     Status: Normal    Specimen: Oropharynx; Swab   Result Value Ref Range    SARS CoV2 PCR Negative Negative    Narrative    Testing was performed using the bennett  SARS-CoV-2 &  Influenza A/B Assay on the bennett  Sophie  System.  This test should be ordered for the detection of SARS-COV-2 in individuals who meet SARS-CoV-2 clinical and/or epidemiological criteria. Test performance is unknown in asymptomatic patients.  This test is for in vitro diagnostic use under the FDA EUA for laboratories certified under CLIA to perform moderate and/or high complexity testing. This test has not been FDA cleared or approved.  A negative test does not rule out the presence of PCR inhibitors in the specimen or target RNA in concentration below the limit of detection for the assay. The possibility of a false negative should be considered if the patient's recent exposure or clinical presentation suggests COVID-19.  [facility] are certified under the Clinical Laboratory Improvement Amendments of 1988 (CLIA-88) as qualified to perform moderate and/or high complexity laboratory testing.   Phenytoin level     Status: Normal   Result Value Ref Range    Phenytoin 10.0   mg/L   Comprehensive metabolic panel     Status: Abnormal   Result Value Ref Range    Sodium 138 133 - 144 mmol/L    Potassium 4.0 3.4 - 5.3 mmol/L    Chloride 106 94 - 109 mmol/L    Carbon Dioxide (CO2) 21 20 - 32 mmol/L    Anion Gap 11 3 - 14 mmol/L    Urea Nitrogen 19 7 - 30 mg/dL    Creatinine 0.82 0.66 - 1.25 mg/dL    Calcium 8.6 8.5 - 10.1 mg/dL    Glucose 241 (H) 70 - 99 mg/dL    Alkaline Phosphatase 149 40 - 150 U/L    AST 9 0 - 45 U/L    ALT 25 0 - 70 U/L    Protein Total 7.1 6.8 - 8.8 g/dL    Albumin 3.5 3.4 - 5.0 g/dL    Bilirubin Total 0.3 0.2 - 1.3 mg/dL    GFR Estimate >90 >60 mL/min/1.73m2   TSH with free T4 reflex     Status: Normal   Result Value Ref Range    TSH 2.76 0.40 - 4.00 mU/L   CBC with platelets and differential     Status: None   Result Value Ref Range    WBC Count 10.0 4.0 - 11.0 10e3/uL    RBC Count 4.74 4.40 - 5.90 10e6/uL    Hemoglobin 13.8 13.3 - 17.7 g/dL    Hematocrit 40.5 40.0 - 53.0 %    MCV 85  78 - 100 fL    MCH 29.1 26.5 - 33.0 pg    MCHC 34.1 31.5 - 36.5 g/dL    RDW 12.8 10.0 - 15.0 %    Platelet Count 256 150 - 450 10e3/uL    % Neutrophils 69 %    % Lymphocytes 24 %    % Monocytes 6 %    % Eosinophils 0 %    % Basophils 0 %    % Immature Granulocytes 1 %    NRBCs per 100 WBC 0 <1 /100    Absolute Neutrophils 7.0 1.6 - 8.3 10e3/uL    Absolute Lymphocytes 2.4 0.8 - 5.3 10e3/uL    Absolute Monocytes 0.6 0.0 - 1.3 10e3/uL    Absolute Eosinophils 0.0 0.0 - 0.7 10e3/uL    Absolute Basophils 0.0 0.0 - 0.2 10e3/uL    Absolute Immature Granulocytes 0.1 <=0.4 10e3/uL    Absolute NRBCs 0.0 10e3/uL   Glucose by meter     Status: Abnormal   Result Value Ref Range    GLUCOSE BY METER POCT 238 (H) 70 - 99 mg/dL   Glucose by meter     Status: Abnormal   Result Value Ref Range    GLUCOSE BY METER POCT 184 (H) 70 - 99 mg/dL   Lipid panel reflex to direct LDL     Status: Abnormal   Result Value Ref Range    Cholesterol 191 <200 mg/dL    Triglycerides 135 <150 mg/dL    Direct Measure HDL 48 >=40 mg/dL    LDL Cholesterol Calculated 116 (H) <=100 mg/dL    Non HDL Cholesterol 143 (H) <130 mg/dL    Narrative    Cholesterol  Desirable:  <200 mg/dL    Triglycerides  Normal:  Less than 150 mg/dL  Borderline High:  150-199 mg/dL  High:  200-499 mg/dL  Very High:  Greater than or equal to 500 mg/dL    Direct Measure HDL  Female:  Greater than or equal to 50 mg/dL   Male:  Greater than or equal to 40 mg/dL    LDL Cholesterol  Desirable:  <100mg/dL  Above Desirable:  100-129 mg/dL   Borderline High:  130-159 mg/dL   High:  160-189 mg/dL   Very High:  >= 190 mg/dL    Non HDL Cholesterol  Desirable:  130 mg/dL  Above Desirable:  130-159 mg/dL  Borderline High:  160-189 mg/dL  High:  190-219 mg/dL  Very High:  Greater than or equal to 220 mg/dL   Hemoglobin A1c     Status: Abnormal   Result Value Ref Range    Hemoglobin A1C 9.8 (H) 0.0 - 5.6 %   Glucose by meter     Status: Normal   Result Value Ref Range    GLUCOSE BY METER POCT 71   - 99 mg/dL   Glucose by meter     Status: Abnormal   Result Value Ref Range    GLUCOSE BY METER POCT 159 (H) 70 - 99 mg/dL   Glucose by meter     Status: Abnormal   Result Value Ref Range    GLUCOSE BY METER POCT 272 (H) 70 - 99 mg/dL   Glucose by meter     Status: Abnormal   Result Value Ref Range    GLUCOSE BY METER POCT 243 (H) 70 - 99 mg/dL   Glucose by meter     Status: Abnormal   Result Value Ref Range    GLUCOSE BY METER POCT 275 (H) 70 - 99 mg/dL   Glucose by meter     Status: Abnormal   Result Value Ref Range    GLUCOSE BY METER POCT 132 (H) 70 - 99 mg/dL   Glucose by meter     Status: Abnormal   Result Value Ref Range    GLUCOSE BY METER POCT 114 (H) 70 - 99 mg/dL   Glucose by meter     Status: Abnormal   Result Value Ref Range    GLUCOSE BY METER POCT 178 (H) 70 - 99 mg/dL   Glucose by meter     Status: Abnormal   Result Value Ref Range    GLUCOSE BY METER POCT 217 (H) 70 - 99 mg/dL   Glucose by meter     Status: Abnormal   Result Value Ref Range    GLUCOSE BY METER POCT 273 (H) 70 - 99 mg/dL   Glucose by meter     Status: Normal   Result Value Ref Range    GLUCOSE BY METER POCT 84 70 - 99 mg/dL   Glucose by meter     Status: Abnormal   Result Value Ref Range    GLUCOSE BY METER POCT 101 (H) 70 - 99 mg/dL   Glucose by meter     Status: Abnormal   Result Value Ref Range    GLUCOSE BY METER POCT 132 (H) 70 - 99 mg/dL   Glucose by meter     Status: Abnormal   Result Value Ref Range    GLUCOSE BY METER POCT 196 (H) 70 - 99 mg/dL   Glucose by meter     Status: Abnormal   Result Value Ref Range    GLUCOSE BY METER POCT 212 (H) 70 - 99 mg/dL   Glucose by meter     Status: Abnormal   Result Value Ref Range    GLUCOSE BY METER POCT 108 (H) 70 - 99 mg/dL   Glucose by meter     Status: Abnormal   Result Value Ref Range    GLUCOSE BY METER POCT 107 (H) 70 - 99 mg/dL   Glucose by meter     Status: Abnormal   Result Value Ref Range    GLUCOSE BY METER POCT 165 (H) 70 - 99 mg/dL   Glucose by meter     Status: Abnormal    Result Value Ref Range    GLUCOSE BY METER POCT 228 (H) 70 - 99 mg/dL   Glucose by meter     Status: Abnormal   Result Value Ref Range    GLUCOSE BY METER POCT 313 (H) 70 - 99 mg/dL   Glucose by meter     Status: Abnormal   Result Value Ref Range    GLUCOSE BY METER POCT 177 (H) 70 - 99 mg/dL   Glucose by meter     Status: Abnormal   Result Value Ref Range    GLUCOSE BY METER POCT 149 (H) 70 - 99 mg/dL   Glucose by meter     Status: Abnormal   Result Value Ref Range    GLUCOSE BY METER POCT 233 (H) 70 - 99 mg/dL   Glucose by meter     Status: Abnormal   Result Value Ref Range    GLUCOSE BY METER POCT 282 (H) 70 - 99 mg/dL   Glucose by meter     Status: Abnormal   Result Value Ref Range    GLUCOSE BY METER POCT 296 (H) 70 - 99 mg/dL   Glucose by meter     Status: Abnormal   Result Value Ref Range    GLUCOSE BY METER POCT 194 (H) 70 - 99 mg/dL   Glucose by meter     Status: Abnormal   Result Value Ref Range    GLUCOSE BY METER POCT 176 (H) 70 - 99 mg/dL   Glucose by meter     Status: Abnormal   Result Value Ref Range    GLUCOSE BY METER POCT 206 (H) 70 - 99 mg/dL   Glucose by meter     Status: Abnormal   Result Value Ref Range    GLUCOSE BY METER POCT 295 (H) 70 - 99 mg/dL   Glucose by meter     Status: Abnormal   Result Value Ref Range    GLUCOSE BY METER POCT 312 (H) 70 - 99 mg/dL   Glucose by meter     Status: Abnormal   Result Value Ref Range    GLUCOSE BY METER POCT 186 (H) 70 - 99 mg/dL   Glucose by meter     Status: Abnormal   Result Value Ref Range    GLUCOSE BY METER POCT 128 (H) 70 - 99 mg/dL   Glucose by meter     Status: Abnormal   Result Value Ref Range    GLUCOSE BY METER POCT 206 (H) 70 - 99 mg/dL   Glucose by meter     Status: Abnormal   Result Value Ref Range    GLUCOSE BY METER POCT 272 (H) 70 - 99 mg/dL   Glucose by meter     Status: Abnormal   Result Value Ref Range    GLUCOSE BY METER POCT 182 (H) 70 - 99 mg/dL   Glucose by meter     Status: Normal   Result Value Ref Range    GLUCOSE BY METER  POCT 75 70 - 99 mg/dL   Glucose by meter     Status: Normal   Result Value Ref Range    GLUCOSE BY METER POCT 78 70 - 99 mg/dL   Asymptomatic COVID-19 Virus (Coronavirus) by PCR Nasopharyngeal     Status: Normal    Specimen: Nasopharyngeal; Swab   Result Value Ref Range    SARS CoV2 PCR Negative Negative    Narrative    Testing was performed using the bennett  SARS-CoV-2 & Influenza A/B Assay on the bennett  Sophie  System.  This test should be ordered for the detection of SARS-COV-2 in individuals who meet SARS-CoV-2 clinical and/or epidemiological criteria. Test performance is unknown in asymptomatic patients.  This test is for in vitro diagnostic use under the FDA EUA for laboratories certified under CLIA to perform moderate and/or high complexity testing. This test has not been FDA cleared or approved.  A negative test does not rule out the presence of PCR inhibitors in the specimen or target RNA in concentration below the limit of detection for the assay. The possibility of a false negative should be considered if the patient's recent exposure or clinical presentation suggests COVID-19.  [facility] are certified under the Clinical Laboratory Improvement Amendments of 1988 (CLIA-88) as qualified to perform moderate and/or high complexity laboratory testing.   Glucose by meter     Status: Normal   Result Value Ref Range    GLUCOSE BY METER POCT 70 70 - 99 mg/dL   Glucose by meter     Status: Abnormal   Result Value Ref Range    GLUCOSE BY METER POCT 151 (H) 70 - 99 mg/dL   Glucose by meter     Status: Abnormal   Result Value Ref Range    GLUCOSE BY METER POCT 148 (H) 70 - 99 mg/dL   Glucose by meter     Status: Abnormal   Result Value Ref Range    GLUCOSE BY METER POCT 207 (H) 70 - 99 mg/dL   Glucose by meter     Status: Abnormal   Result Value Ref Range    GLUCOSE BY METER POCT 238 (H) 70 - 99 mg/dL   Glucose by meter     Status: Normal   Result Value Ref Range    GLUCOSE BY METER POCT 72 70 - 99  mg/dL   Glucose by meter     Status: Abnormal   Result Value Ref Range    GLUCOSE BY METER POCT 103 (H) 70 - 99 mg/dL   Glucose by meter     Status: Abnormal   Result Value Ref Range    GLUCOSE BY METER POCT 166 (H) 70 - 99 mg/dL   Glucose by meter     Status: Abnormal   Result Value Ref Range    GLUCOSE BY METER POCT 212 (H) 70 - 99 mg/dL   Glucose by meter     Status: Abnormal   Result Value Ref Range    GLUCOSE BY METER POCT 121 (H) 70 - 99 mg/dL   Glucose by meter     Status: Abnormal   Result Value Ref Range    GLUCOSE BY METER POCT 216 (H) 70 - 99 mg/dL   Glucose by meter     Status: Normal   Result Value Ref Range    GLUCOSE BY METER POCT 84 70 - 99 mg/dL   Glucose by meter     Status: Abnormal   Result Value Ref Range    GLUCOSE BY METER POCT 116 (H) 70 - 99 mg/dL   Glucose by meter     Status: Abnormal   Result Value Ref Range    GLUCOSE BY METER POCT 165 (H) 70 - 99 mg/dL   Glucose by meter     Status: Abnormal   Result Value Ref Range    GLUCOSE BY METER POCT 121 (H) 70 - 99 mg/dL   Glucose by meter     Status: Abnormal   Result Value Ref Range    GLUCOSE BY METER POCT 150 (H) 70 - 99 mg/dL   Glucose by meter     Status: Abnormal   Result Value Ref Range    GLUCOSE BY METER POCT 214 (H) 70 - 99 mg/dL   Glucose by meter     Status: Abnormal   Result Value Ref Range    GLUCOSE BY METER POCT 105 (H) 70 - 99 mg/dL   Glucose by meter     Status: Abnormal   Result Value Ref Range    GLUCOSE BY METER POCT 106 (H) 70 - 99 mg/dL   Glucose by meter     Status: Abnormal   Result Value Ref Range    GLUCOSE BY METER POCT 143 (H) 70 - 99 mg/dL   Glucose by meter     Status: Abnormal   Result Value Ref Range    GLUCOSE BY METER POCT 228 (H) 70 - 99 mg/dL   Glucose by meter     Status: Abnormal   Result Value Ref Range    GLUCOSE BY METER POCT 243 (H) 70 - 99 mg/dL   Glucose by meter     Status: Abnormal   Result Value Ref Range    GLUCOSE BY METER POCT 113 (H) 70 - 99 mg/dL   Glucose by meter     Status: Abnormal    Result Value Ref Range    GLUCOSE BY METER POCT 136 (H) 70 - 99 mg/dL   Glucose by meter     Status: Abnormal   Result Value Ref Range    GLUCOSE BY METER POCT 162 (H) 70 - 99 mg/dL   Glucose by meter     Status: Abnormal   Result Value Ref Range    GLUCOSE BY METER POCT 159 (H) 70 - 99 mg/dL   Glucose by meter     Status: Abnormal   Result Value Ref Range    GLUCOSE BY METER POCT 263 (H) 70 - 99 mg/dL   Glucose by meter     Status: Normal   Result Value Ref Range    GLUCOSE BY METER POCT 85 70 - 99 mg/dL   Glucose by meter     Status: Abnormal   Result Value Ref Range    GLUCOSE BY METER POCT 110 (H) 70 - 99 mg/dL   Glucose by meter     Status: Abnormal   Result Value Ref Range    GLUCOSE BY METER POCT 136 (H) 70 - 99 mg/dL   Glucose by meter     Status: Abnormal   Result Value Ref Range    GLUCOSE BY METER POCT 145 (H) 70 - 99 mg/dL   Glucose by meter     Status: Abnormal   Result Value Ref Range    GLUCOSE BY METER POCT 196 (H) 70 - 99 mg/dL   Glucose by meter     Status: Abnormal   Result Value Ref Range    GLUCOSE BY METER POCT 119 (H) 70 - 99 mg/dL   Glucose by meter     Status: Abnormal   Result Value Ref Range    GLUCOSE BY METER POCT 138 (H) 70 - 99 mg/dL   Glucose by meter     Status: Abnormal   Result Value Ref Range    GLUCOSE BY METER POCT 116 (H) 70 - 99 mg/dL   Glucose by meter     Status: Abnormal   Result Value Ref Range    GLUCOSE BY METER POCT 216 (H) 70 - 99 mg/dL   Glucose by meter     Status: Abnormal   Result Value Ref Range    GLUCOSE BY METER POCT 237 (H) 70 - 99 mg/dL   Glucose by meter     Status: Abnormal   Result Value Ref Range    GLUCOSE BY METER POCT 130 (H) 70 - 99 mg/dL   Glucose by meter     Status: Abnormal   Result Value Ref Range    GLUCOSE BY METER POCT 167 (H) 70 - 99 mg/dL   Glucose by meter     Status: Abnormal   Result Value Ref Range    GLUCOSE BY METER POCT 182 (H) 70 - 99 mg/dL   CBC with platelets differential     Status: None    Narrative    The following orders were  created for panel order CBC with platelets differential.  Procedure                               Abnormality         Status                     ---------                               -----------         ------                     CBC with platelets and d...[[id number]]                      Final result                 Please view results for these tests on the individual orders.

## 2022-05-12 NOTE — PROGRESS NOTES
"    ----------------------------------------------------------------------------------------------------------  [facility]  Psychiatric Progress Note  Hospital Day #15    Isac Li MRN# [id number]   Age: 67 year old YOB: 1955   Date of Admission: 4/27/2022     Subjective   The patient was discussed with the treatment team and chart notes were reviewed.      Identifier: Isac Li is a 67 year old male with a past psychiatric history of major depressive disorder admitted from the  ER on 04/27/2022 due to concern for SI in the context of loss of a recent close friend to suicide. Significant symptoms include worsening SI, depressed mood worthlessness, poor selfesteem, guiltiness, low energy  and sleep issues over past 2 weeks. His last psychiatric hospitalization was 20 years ago. Current psychosocial stressors include recent loss of a close friend to suicide. Patient is on hospital day #15. Assessment is that the current presentation is consistent with diagnosis of MDD, recurrent and severe.    Sleep:  7 hours (05/12/22 0607)  Prescribed Medications: Taken as prescribed  PRN Psychiatric Medications: acetaminophen, alum & mag hydroxide-simethicone, glucose **OR** dextrose **OR** glucagon, hydrOXYzine, insulin aspart, melatonin, OLANZapine **OR** OLANZapine, polyethylene glycol     Overnight Nursing Notes/Staff Report:  \"Patient denied suicidal and homicidal ideation and contracted for safety. He rated his depression at 1/10 and anxiety 5/10. He elaborated that his anxiety had started after getting rid of his firearm was discussed due to sentimental value. Patient reported that he is starting to feel more \"fidgety\" about being at the hospital and added that it could be a sign that he was getting better and was ready to discharge.\"    Patient interview:  Isac felt \"good\" about being in the hospital today. He noted he has been feeling more anxiety and that it \"might be a " "sign I'm getting a bit better\" and felt better about going home. Pt slept better last night and his appetite is \"recovering.\"     Firearm safety plan is to give his sister his gun until he feels safe to have it back. Team discussed ways to disarm the gun in the future (remove firing pin, wax). Additionally, he is planning on removing old medication from home and disposing at a pharmacy.    Pt met with social work and thinks a day program in-person 2x/week would be best for him. He feels comforted by the idea that he can always come back to the hospital if needed. Isac stated a discharge tomorrow () or Monday () sounds okay with him.      Objective   Vitals:  Temp: 97.2  F (36.2  C) (Temp  Min: 97.2  F (36.2  C)  Max: 97.2  F (36.2  C))  Resp: 16 (Resp  Min: 16  Max: 16)  SpO2: 96 % (SpO2  Min: 96 %  Max: 98 %)  Pulse: 81 (Pulse  Min: 81  Max: 81)  BP: 110/71  Systolic (24hrs), Av , Min:110 , Max:110   Diastolic (24hrs), Av, Min:71, Max:71    Mental Status Examination:  Oriented to:  Fully orientated to person, place, and situation   General: Awake and Alert  Appearance:  appears stated age, Grooming is adequate and Dressed in scrubs and wearing glasses  Behavior:  calm, cooperative and engaged  Eye Contact:  appropriate  Psychomotor:  no abnormal motor symptoms appreciated; no catatonia present  Speech:  appropriate volume/tone  Language: Fluent in English with appropriate syntax and vocabulary.  Mood:  \"good\"  Affect:  appropriate, congruent with mood and stable, improved  Thought Process:  coherent, linear and no thought blockade  Thought Content: No SI/HI/AH/VH; No apparent delusions  Associations:  intact  Insight:  fair due to acknowledges diagnosis of MDD  Judgment:  fair due to voluntary status  Attention Span: Appropriate  Concentration:  grossly intact     Allergies     Allergies   Allergen Reactions     Sulfa (Sulfonamide Antibiotics) [Sulfa Drugs] Unknown        Labs & Imaging     Results " for orders placed or performed during the hospital encounter of 04/27/22 (from the past 24 hour(s))   Glucose by meter   Result Value Ref Range    GLUCOSE BY METER POCT 116 (H) 70 - 99 mg/dL   Glucose by meter   Result Value Ref Range    GLUCOSE BY METER POCT 216 (H) 70 - 99 mg/dL   Glucose by meter   Result Value Ref Range    GLUCOSE BY METER POCT 237 (H) 70 - 99 mg/dL   Glucose by meter   Result Value Ref Range    GLUCOSE BY METER POCT 130 (H) 70 - 99 mg/dL   Glucose by meter   Result Value Ref Range    GLUCOSE BY METER POCT 167 (H) 70 - 99 mg/dL   Glucose by meter   Result Value Ref Range    GLUCOSE BY METER POCT 182 (H) 70 - 99 mg/dL            Assessment   Diagnostic Impression:  Isac Li is a 67 year old male with a past psychiatric history of major depressive disorder admitted from the  ER on 04/27/2022 due to concern for SI in the context of loss of a recent close friend to suicide.. Significant symptoms include SI, depressed and sleep issues. His last psychiatric hospitalization was 20 years ago and last visit with a psychiatrist was around 15 years ago. Sees his primary care physician for psychiatric medications and follows with an outpatient therapist. Current psychosocial stressors include recent loss of a close friend to suicide who was also Shinto.  Patient's support system includes his sister and some members of the Shinto community.  Substance use does not appear to be playing a contributing role in the patient's presentation.  There is genetic loading for none known. Medical history does appear to be significant for seizures and prior TBI as well as sleep apnea requiring CPAP which broke and he has been unable to replace. The MSE is notable for sad, tearful, and at times distraught affect, talkative engagement, and open conversation with treatment team. He denies self injurious behaviors.      His reported symptoms of depressed mood,  SI, worthlessness, poor selfesteem, guiltyness, low  energy  and sleep issues are consistent with his historic diagnosis of major depressive disorder. These issues are also likely compounded by grief related to friend's recent death by suicide, trauma as a child, and ongoing struggle with compulsive sexual behavior as well as ongoing internal conflict between his sexual identity and Zoroastrian alex. Optimization of medications to target these symptom clusters in addition to evaluation of adequate outpatient supports will be targets for treatment during this admission.      Given that he currently has SI and marked depression, patient warrants inpatient psychiatric hospitalization to maintain his safety. Disposition pending clinical stabilization, medication optimization and development of an appropriate discharge plan. Firearm safety and discharge plan discussed today.    Principal Diagnosis:     Major Depressive Disorder, severe, recurrent    Secondary psychiatric diagnoses of concern this admission:     Compulsive Sexual Behavior    Alcohol Use Disorder (hisorical)    Psychiatric course:  Isac Li was admitted to [facility] as a voluntary patient. His PTA aspirin, atorvastatin, lisinopril, metformin, phenytoin, and venlafaxine were continued.     4/28 Seroquel 50 mg PO as augmenting agent to selective serotonin reuptake inhibitor.     5/2 Pharmacy CL consult for Vibriid/Trintellix coverage and prior authorization was placed and was approved.     5/3 Cross-titration was started by adding Vybriid 10 mg PO daily with food.     5/4 Cross-titration continued with 10mg PO Vybriid daily and tapering venlafaxine to 225mg PO was ordered. Debrox 3 drops  discontinued after ear wax sufficiently removed. 300mg venlafaxine was given 5/4 and tapered 225mg PO dose scheduled for tonight 5/5 5/5 Cross-titration continued with 10mg PO Vybriid daily and tapering venlafaxine to 225mg PO was given    5/6 Cross-titration continued with 10mg PO Vybriid daily and tapering  venlafaxine to 150mg PO    5/9 Cross-titration continued with 20mg PO Vybriid daily and tapering venlafaxine to 75mg PO    5/11 Discussed gun safety plan and next steps    5/12 pt expressed being ready for discharge on 5/13 or 5/16     Isac Li continued to meet criteria for inpatient hospitalization medication optimization, inpatient stabilization, and appropriate discharge planning.      Medical course:   Isac Li was physically examined by the ED prior to being transferred to the unit and was found to be medically stable and appropriate for admission.      Plan   Today's Changes:     No med changes for today    Discussed gun safety plan    Discussed discharge next steps  _______________________________________________________________________  Psychiatric diagnoses and management:  Principal Diagnosis:     Major Depressive Disorder, severe, recurrent without psychotic features  -Continue cross-titratation Venlafaxine-Vilazodone (Vybriid):   -Venlafaxine to 75mg PO daily  -Continue Seroquel 50 mg at bedtime   -Vybriid 20 mg PO daily with food    Secondary Psychiatric Diagnoses:   1. Compulsive Sexual Behavior  ? Cont Venlafaxine   2. Alcohol Use Disorder (historical)    Additional Planning:    Continue to monitor for and stabilize: SI and depressed    Patient will be treated in therapeutic milieu with appropriate individual and group therapies as described.    Would benefit from community support groups    Will coordinate access and gun safety at home    Scheduled Medications (summary):    aspirin  81 mg Oral Daily     atorvastatin  20 mg Oral At Bedtime     insulin aspart   Subcutaneous Daily with breakfast     insulin aspart   Subcutaneous Daily with lunch     insulin aspart   Subcutaneous Daily with supper     insulin aspart  1-5 Units Subcutaneous At Bedtime     insulin aspart  1-10 Units Subcutaneous TID AC     insulin glargine  25 Units Subcutaneous At Bedtime     [START ON 5/13/2022] liraglutide   1.8 mg Subcutaneous Daily     lisinopril  5 mg Oral Daily     metFORMIN  2,000 mg Oral Daily with supper     phenytoin  500 mg Oral At Bedtime     QUEtiapine  50 mg Oral At Bedtime     venlafaxine  75 mg Oral At Bedtime     vilazodone  20 mg Oral Daily       PRN Medications (summary):  acetaminophen, alum & mag hydroxide-simethicone, glucose **OR** dextrose **OR** glucagon, hydrOXYzine, insulin aspart, melatonin, OLANZapine **OR** OLANZapine, polyethylene glycol    Discontinued Medications (& Rationale):        Consults:    IM following for T2DM    Endocrinology consulted for T2DM 5/12    Legal Status:    Voluntary     SIO:    No    Pt has not required locked seclusion or restraints in the past 24 hours to maintain safety, please refer to RN documentation for further details.    Disposition:    TBD, pending clinical stabilization, medication optimization, and formulation of a safe discharge plan.    Possible discharge on 5/13 or 5/16    Safety Assessment:   Behavioral Orders   Procedures     Code 3     Discontinue 1:1 attendant for suicide risk     Order Specific Question:   I have performed an in person assessment of the patient     Answer:   Based on this assessment the patient no longer requires a one on one attendant at this point in time.     Order Specific Question:   Rationale     Answer:   Patient States able to remain safe in hospital     Routine Programming     As clinically indicated     Sexual precautions     Status 15     Every 15 minutes.     Suicide precautions     Patients on Suicide Precautions should have a Combination Diet ordered that includes a Diet selection(s) AND a Behavioral Tray selection for Safe Tray - with utensils, or Safe Tray - NO utensils        ____________________________________________________________________  Pertinent Medical diagnoses and management:    Endocrinology currently following patient, apperciate help, for further details please see note on 05/12    Assessment:       1)   Insulin dependent Type 2 diabetes, uncontrolled (A1c 10.3%)  2)  Obesity; BMI 42     Plan:                  -continue victoza 1.2 mg subcutaneous daily this AM--->will increase to 1.8 mg tomorrow AM                 -Metformin XR 2000 mg daily with dinner (1700)                 -continue Lantus 25 units at HS                 -continue Novolog to 1:20g CHO  coverage AC (TBD what we will need for home: fixed dose vs GLP-1)                  -Novolog high intensity sliding scale TID AC and medium resistance for HS                 -BG monitoring TID AC, HS, and stop 0200 check, per pt. request                 -hypoglycemia protocol                 -carb counting protocol                 -diabetes education needs not anticipated                   Outpatient follow up: on discharge, will recommend outpatient follow up with [facility].     Plan discussed with patient and primary team through this note.         To contact Endocrine Diabetes service:   From 8AM-4PM: page inpatient diabetes provider who is following the patient that day (see filed or incomplete progress notes/consult notes).  If uncertain of provider assignment: page job code [phone]. (To page job code in-house dial 3 stars, [phone] then enter number).  For questions or updates AFTER HOURS from 4PM-8AM: page the diabetes job code for on call fellow: [phone]       Mckenna Taylor  Medical Student MS3    Attestation:  This patient has been seen and evaluated by me, Edel Lee MD.  I have discussed this patient with the house staff team including the resident and medical student and I agree with the findings and plan in this note.    I have reviewed today's vital signs, medications, labs and imaging. Edel Lee MD , PhD.

## 2022-05-12 NOTE — PLAN OF CARE
Problem: Sleep Disturbance  Goal: Adequate Sleep/Rest  Outcome: Ongoing, Progressing       Pt appeared to have slept for 7 hours. At 0200, blood sugar 130, no snack or fluid was requested during shift.No PRN medication was given. 15 minutes safety checks was in place. Staff will continue to offer support to pt.

## 2022-05-12 NOTE — PLAN OF CARE
"Patient denied suicidal and homicidal ideation and contracted for safety. He rated his depression at 1/10 and anxiety 5/10. He elaborated that his anxiety had started earlier during the day after he met treatment team and the idea of getting rid of his firearm was discussed. He said that the gun had some sentimental value to him since he inherited it from his dad. He further said that the ideal situation would be to let his sister or any other person that he trusts hold onto it for two years and if he improves and has \"no further incidents,\" then he can get the gun back, otherwise it would have to be \"sold.\"  Patient reported that he is starting to feel more \"fidgety\" about being at the hospital and added that it could be a sign that he was getting better and was ready to discharge. Patient seemed not to be enthused by the idea of going to a crisis bed as a transition but added that if it happens, \" it is what it is.\"     Problem: Suicide Risk  Goal: Absence of Self-Harm  Outcome: Ongoing, Progressing     Problem: Plan of Care - These are the overarching goals to be used throughout the patient stay.    Goal: Plan of Care Review/Shift Note  Description: The Plan of Care Review/Shift note should be completed every shift.  The Outcome Evaluation is a brief statement about your assessment that the patient is improving, declining, or no change.  This information will be displayed automatically on your shift note.  Outcome: Ongoing, Progressing  Flowsheets  Taken 5/11/2022 2130  Plan of Care Reviewed With: patient  Overall Patient Progress: improving  Taken 5/11/2022 1630  Plan of Care Reviewed With: patient   Goal Outcome Evaluation:    Plan of Care Reviewed With: patient     Overall Patient Progress: improving           "

## 2022-05-13 ENCOUNTER — DOCUMENTATION ONLY (OUTPATIENT)
Dept: BEHAVIORAL HEALTH | Facility: CLINIC | Age: 67
End: 2022-05-13
Payer: MEDICARE

## 2022-05-13 VITALS
OXYGEN SATURATION: 97 % | HEIGHT: 71 IN | RESPIRATION RATE: 16 BRPM | TEMPERATURE: 98.2 F | WEIGHT: 246.3 LBS | SYSTOLIC BLOOD PRESSURE: 110 MMHG | BODY MASS INDEX: 34.48 KG/M2 | HEART RATE: 81 BPM | DIASTOLIC BLOOD PRESSURE: 71 MMHG

## 2022-05-13 LAB
GLUCOSE BLDC GLUCOMTR-MCNC: 126 MG/DL (ref 70–99)
GLUCOSE BLDC GLUCOMTR-MCNC: 130 MG/DL (ref 70–99)
GLUCOSE BLDC GLUCOMTR-MCNC: 177 MG/DL (ref 70–99)

## 2022-05-13 PROCEDURE — 250N000013 HC RX MED GY IP 250 OP 250 PS 637: Performed by: STUDENT IN AN ORGANIZED HEALTH CARE EDUCATION/TRAINING PROGRAM

## 2022-05-13 PROCEDURE — 99232 SBSQ HOSP IP/OBS MODERATE 35: CPT | Performed by: NURSE PRACTITIONER

## 2022-05-13 PROCEDURE — 250N000013 HC RX MED GY IP 250 OP 250 PS 637: Performed by: PHYSICIAN ASSISTANT

## 2022-05-13 RX ORDER — VENLAFAXINE HYDROCHLORIDE 37.5 MG/1
37.5 CAPSULE, EXTENDED RELEASE ORAL AT BEDTIME
Qty: 10 CAPSULE | Refills: 0 | Status: SHIPPED | OUTPATIENT
Start: 2022-05-13 | End: 2022-06-21

## 2022-05-13 RX ORDER — INSULIN GLARGINE 100 [IU]/ML
INJECTION, SOLUTION SUBCUTANEOUS
Qty: 54 ML | Refills: 3 | Status: SHIPPED | OUTPATIENT
Start: 2022-05-13 | End: 2022-07-19

## 2022-05-13 RX ORDER — PHENYTOIN SODIUM 100 MG/1
500 CAPSULE, EXTENDED RELEASE ORAL AT BEDTIME
Qty: 150 CAPSULE | Refills: 0 | Status: SHIPPED | OUTPATIENT
Start: 2022-05-13 | End: 2022-06-21

## 2022-05-13 RX ORDER — INSULIN LISPRO 100 [IU]/ML
INJECTION, SOLUTION INTRAVENOUS; SUBCUTANEOUS
Qty: 15 ML | Refills: 4 | Status: SHIPPED | OUTPATIENT
Start: 2022-05-13 | End: 2024-02-14

## 2022-05-13 RX ORDER — SEMAGLUTIDE 1.34 MG/ML
0.5 INJECTION, SOLUTION SUBCUTANEOUS
Qty: 1.5 ML | Refills: 0 | Status: SHIPPED | OUTPATIENT
Start: 2022-05-13 | End: 2022-05-31

## 2022-05-13 RX ORDER — METFORMIN HYDROCHLORIDE EXTENDED-RELEASE TABLETS 1000 MG/1
2000 TABLET, FILM COATED, EXTENDED RELEASE ORAL
Qty: 60 TABLET | Refills: 0 | Status: SHIPPED | OUTPATIENT
Start: 2022-05-13 | End: 2022-11-23

## 2022-05-13 RX ORDER — QUETIAPINE FUMARATE 50 MG/1
50 TABLET, FILM COATED ORAL AT BEDTIME
Qty: 30 TABLET | Refills: 0 | Status: SHIPPED | OUTPATIENT
Start: 2022-05-13 | End: 2022-05-31

## 2022-05-13 RX ORDER — INSULIN LISPRO 100 [IU]/ML
INJECTION, SOLUTION INTRAVENOUS; SUBCUTANEOUS
Qty: 15 ML | Refills: 4 | Status: SHIPPED | OUTPATIENT
Start: 2022-05-13 | End: 2022-05-13

## 2022-05-13 RX ORDER — SEMAGLUTIDE 1.34 MG/ML
0.25 INJECTION, SOLUTION SUBCUTANEOUS
Qty: 1.5 ML | Refills: 0 | Status: SHIPPED | OUTPATIENT
Start: 2022-05-13 | End: 2022-05-13

## 2022-05-13 RX ORDER — VILAZODONE HYDROCHLORIDE 20 MG/1
20 TABLET ORAL DAILY
Qty: 30 TABLET | Refills: 0 | Status: SHIPPED | OUTPATIENT
Start: 2022-05-14 | End: 2022-05-31

## 2022-05-13 RX ORDER — HYDROXYZINE HYDROCHLORIDE 25 MG/1
25 TABLET, FILM COATED ORAL DAILY PRN
Qty: 30 TABLET | Refills: 0 | Status: SHIPPED | OUTPATIENT
Start: 2022-05-13 | End: 2023-11-10

## 2022-05-13 RX ADMIN — VILAZODONE HYDROCHLORIDE 20 MG: 10 TABLET ORAL at 09:04

## 2022-05-13 RX ADMIN — LISINOPRIL 5 MG: 5 TABLET ORAL at 09:04

## 2022-05-13 RX ADMIN — LIRAGLUTIDE 1.8 MG: 6 INJECTION SUBCUTANEOUS at 09:05

## 2022-05-13 RX ADMIN — ASPIRIN 81 MG CHEWABLE TABLET 81 MG: 81 TABLET CHEWABLE at 09:04

## 2022-05-13 NOTE — PROGRESS NOTES
Prior Authorization **INITIATED**    Medication: Ozempic 0.25-0.5mg/dose pens  Insurance Company: [employer] ([employer]) - Phone [phone] Fax [phone]  Pharmacy Filling the Rx: [place], [place] - [address]  Filling Pharmacy Phone: [phone]  Filling Pharmacy Fax: [phone]  Start Date: 5/13/2022  Reference #: CoverMyMeds Key: [id number], PA Case ID: [id number]  Comments:  n/a      Emmanuelle Sanchez CPhT  [facility] Discharge Pharmacy Liaison  Pronouns: She/Her/Hers    [facility]  [address]  [address], [place] [address]   [email or web address]  [email or web address]   Phone: [phone]  Pager: [phone]  Fax: [phone]

## 2022-05-13 NOTE — PROGRESS NOTES
IP Diabetes Management  Daily Note           Assessment and Plan:   HPI: Isac is a 67 year old male with PMHx TIIDM, depression, HTN, HLP who was admitted 4/27/22 to inpatient psychiatry for stabilization for SI in the context of recent loss of friend to suicide.       Assessment:      1)   Insulin dependent Type 2 diabetes, uncontrolled (A1c 10.3%)  2)  Obesity; BMI 42    Plan:    -increase victoza to 1.8mg from 1.2 mg subcutaneous daily this AM   -Metformin XR 2000 mg daily with dinner (1700)   -continue Lantus 25 units at HS   -hold novolog prandial insulin --assessing need.   -Novolog high intensity sliding scale TID AC and medium resistance for HS   -BG monitoring TID AC, HS, and stop 0200 check, per pt. request   -hypoglycemia protocol   -carb counting protocol   -diabetes education needs not anticipated    Recommendations for Discharge:   Instructions to patient were posted in AVS and discussed on day of discharge.   Medications and supplies are to be ordered by primary service on discharge.   *please use the [employer] non-branded discharge supply order set ([id number])*     -blood glucose monitoring three times daily before meals and at bedtime   -Lantus 25 units at bedtime   -Ozempic 0.5 mg once weekly, if tolerating, please increase to 1 mg weekly in one week.   -Metformin XR 2000 mg daily with dinner   -humalog sliding scale insulin with meals:  For Pre-Meal  - 164 give 1 unit.   For Pre-Meal  - 189 give 2 units.   For Pre-Meal  - 214 give 3 units.   For Pre-Meal  - 239 give 4 units.   For Pre-Meal  - 264 give 5 units.   For Pre-Meal  - 289 give 6 units.   For Pre-Meal  - 314 give 7 units.   For Pre-Meal  - 339 give 8 units.   For Pre-Meal  - 364 give 9 units.   For Pre-Meal BG greater than or equal to 365 give 10 units       -follow up with [facility] in 1-2 weeks after discharge (appointment request sent to clinic coordinator on  5/13/22)   -upon discharge, patient will need the following supplies: ozempic prescription        Interval History and Assessment: interval glucose trend reviewed:    Pt discharging today.  BG stable, increased VIctoza today and patient tolerating well.  Will discharge on Ozempic and can increase in one week.      Current nutritional intake and type: Orders Placed This Encounter      Regular Diet Adult      Planned Procedures/surgeries: none  Steroid planning: none  D5W-containing solutions/medications: none    PTA Diabetes Regimen:   Frequency of checks: infrequent, some less than, and some greater than, 200  Metformin XR 2000 mg with dinner  Basaglar 62 units at bedtime  Humalog 18-26 units TID with meals- he does not formally count carbs, and doesn't really systematically estimate either.  Usual BG control PTA:  uncontrolled, with A1c 10.3% on 3/30/22  Able to Detect Hypoglycemia: once, years ago. Occurred at work. Was symptomatic, doesn't remember how low it went. None since.   Usual Diabetes Care Provider: PCP, Dr. Russ  Primary Care Provider: Marquise Russ  Factors Impacting IP Glucose Control: omission of prandial insulin         Discharge Planning: today 5/13           Diabetes History:   Type of Diabetes: Type 2 Diabetes Mellitus  Lab Results   Component Value Date    A1C 9.8 04/28/2022    A1C 10.3 03/30/2022    A1C 11.5 12/22/2021    A1C 8.7 09/21/2021    A1C 10.5 06/10/2021              Review of Systems:     The Review of Systems is negative other than noted in the Interval History.           Medications:     Current Facility-Administered Medications   Medication     acetaminophen (TYLENOL) tablet 650 mg     alum & mag hydroxide-simethicone (MAALOX) suspension 30 mL     aspirin (ASA) chewable tablet 81 mg     atorvastatin (LIPITOR) tablet 20 mg     glucose gel 15-30 g    Or     dextrose 50 % injection 25-50 mL    Or     glucagon injection 1 mg     hydrOXYzine (ATARAX) tablet 25 mg     [Held by  "provider] insulin aspart (NovoLOG) injection (RAPID ACTING)     [Held by provider] insulin aspart (NovoLOG) injection (RAPID ACTING)     [Held by provider] insulin aspart (NovoLOG) injection (RAPID ACTING)     insulin aspart (NovoLOG) injection (RAPID ACTING)     [Held by provider] insulin aspart (NovoLOG) injection (RAPID ACTING)     insulin aspart (NovoLOG) injection (RAPID ACTING)     insulin glargine (LANTUS PEN) injection 25 Units     liraglutide (VICTOZA) injection 1.8 mg     lisinopril (ZESTRIL) tablet 5 mg     melatonin tablet 3 mg     metFORMIN (GLUCOPHAGE-XR) 24 hr tablet 2,000 mg     OLANZapine (zyPREXA) tablet 5 mg    Or     OLANZapine (zyPREXA) injection 5 mg     phenytoin (DILANTIN) CR capsule 500 mg     polyethylene glycol (MIRALAX) Packet 17 g     QUEtiapine (SEROquel) tablet 50 mg     venlafaxine (EFFEXOR XR) 24 hr capsule 75 mg     vilazodone (VIIBRYD) tablet 20 mg            Physical Exam:    /71   Pulse 81   Temp 97.2  F (36.2  C) (Oral)   Resp 16   Ht 1.791 m (5' 10.5\")   Wt 111.7 kg (246 lb 4.8 oz)   SpO2 96%   BMI 34.84 kg/m        GENERAL : In no apparent distress over the phone  RESP: normal respiratory effort. No cough  HEENT: hearing intact to conversational volume  NEURO: awake, alert, responds appropriately to questions.                 Data:     Recent Labs   Lab 05/13/22  0208 05/12/22  2137 05/12/22  1752 05/12/22  1205 05/12/22  0804 05/12/22  0214   * 202* 159* 182* 167* 130*     Lab Results   Component Value Date    WBC 10.0 04/27/2022    WBC 8.6 09/21/2021    WBC 8.9 06/17/2020    HGB 13.8 04/27/2022    HGB 13.2 (L) 09/21/2021    HGB 14.9 06/17/2020    HCT 40.5 04/27/2022    HCT 39.5 (L) 09/21/2021    HCT 43.3 06/17/2020    MCV 85 04/27/2022    MCV 86 09/21/2021    MCV 88 06/17/2020     04/27/2022     09/21/2021     06/17/2020     Lab Results   Component Value Date     04/27/2022     12/22/2021     06/10/2021    POTASSIUM " 4.0 04/27/2022    POTASSIUM 4.3 12/22/2021    POTASSIUM 4.6 06/10/2021    CHLORIDE 106 04/27/2022    CHLORIDE 108 (H) 12/22/2021    CHLORIDE 104 06/10/2021    CO2 21 04/27/2022    CO2 23 12/22/2021    CO2 19 (L) 06/10/2021     (H) 05/13/2022     (H) 05/12/2022     (H) 05/12/2022     Lab Results   Component Value Date    BUN 19 04/27/2022    BUN 22 12/22/2021    BUN 19 06/10/2021     Lab Results   Component Value Date    TSH 2.76 04/27/2022     Lab Results   Component Value Date    AST 9 04/27/2022    AST 17 12/22/2021    AST 19 06/10/2021    ALT 25 04/27/2022    ALT 30 12/22/2021    ALT 22 06/10/2021    ALKPHOS 149 04/27/2022    ALKPHOS 144 (H) 12/22/2021    ALKPHOS 157 (H) 06/10/2021       Follow up was done today via virtual/ telephone encounter.   5 minutes spent on phone with patient  25 minutes spent on documentation, coordination of care      To contact Endocrine Diabetes service:   From 8AM-4PM: page inpatient diabetes provider that is following the patient  For questions or updates from 4PM-8AM: page the diabetes job code for on call fellow: [phone]    NEVA Jacinto, CNP  Inpatient Diabetes Management Service  Pager [phone]

## 2022-05-13 NOTE — PLAN OF CARE
"  Problem: Suicide Risk  Goal: Absence of Self-Harm  Outcome: Ongoing, Progressing     Problem: Behavioral Health Plan of Care  Goal: Patient-Specific Goal (Individualization)  Outcome: Ongoing, Progressing   Goal Outcome Evaluation:          Pt stated that he feels \" ready to go\"/discharge tomorrow. I comforts him to know that he has resources and knows that he can \"come back\" should things not work out well for him.  Pt took a shower this shift and he did his laundry as well. Pt had a nice visit from a friend that went well, saying \" it is good to have a brother in [place]\". Stated that they prayed together in this visit. BG was 159 and 202 mg/dl. Corresponding insulin doses given together with carb count coverage. Pt requested/administered PRNs for anxiety and sleep this shift.  He is looking forward to discharge tomorrow.            "

## 2022-05-13 NOTE — PROGRESS NOTES
Pt discharged home around 1615 and was picked up by his sister. Writer went over pt's medications with him. Pt was calm, cooperative, and pleasant.

## 2022-05-13 NOTE — PROGRESS NOTES
05/12/22 2100   Group Therapy Session   Group Attendance attended group session   Time Session Began 2010   Time Session Ended 2100   Total Time patient participated (minutes) 50   Total # Attendees 3   Group Type psychotherapeutic   Group Topic Covered emotions/expression   Group Session Detail process   Patient Response/Contribution cooperative with task;discussed personal experience with topic;expressed readiness to alter behaviors;listened actively;unable to interrupt patient;verbalizations were off topic   Patient Response Detail   (good, good visit, ready for discharge. redirectino for boundaries, talks about pills and gun being removed now by sister and pornography habit. He seems unaware of conversational boundaries and at times needs some gentle coaching on this.)

## 2022-05-13 NOTE — PLAN OF CARE
Problem: Sleep Disturbance  Goal: Adequate Sleep/Rest  Outcome: Ongoing, Progressing   Goal Outcome Evaluation:  Patient denied any concerns for the overnight. Appears to have slept for 6.75 hours. 0200 BG was 130. No prn's administered overnight. Planned for possible discharge today.

## 2022-05-13 NOTE — PROGRESS NOTES
Prior Authorization **INITIATED**    Medication: Viibyrd 20mg tabs  Insurance Company: [employer] ([employer]) - Phone [phone] Fax [phone]  Pharmacy Filling the Rx: [facility] - [place], [place] - [address]  Filling Pharmacy Phone: [phone]  Filling Pharmacy Fax: [phone]  Start Date: 5/13/2022  Reference #: CoverMyMeds Key: [id number] - [place] Case ID: [id number]  Comments:  10mg approved only--new approval needed for 20mg tabs.      Emmanuelle Sanchez CPhT  [facility] Discharge Pharmacy Liaison  Pronouns: She/Her/Hers    [facility]  [address]  [address], [place] [address]   [email or web address]  [email or web address]   Phone: [phone]  Pager: [phone]  Fax: [phone]

## 2022-05-13 NOTE — PLAN OF CARE
Assessment/Intervention/Current Symtoms and Care Coordination:  Chart Review  Met with team to discuss patient care.  Called Thad to confirm mode and location of upcoming appointments. They are in person at [facility]. Update AVS.  Called [facility] and spoke with Bridgette who stated she received referral from OUMOU Olivarez on 5/11/22 but she needs a DA to move it forward.   Completed DA on [facility] DA form, OUMOU Garnett signed secondary due to [place] non-clinical licensure.    Discharge Plan or Goal:  Discharge home 5/13/22    Barriers to Discharge:  Transportation- sister expected to provide, if not, will use hospital account. Has no other resources.    Referral Status:  None    Legal Status:  Patient is voluntary

## 2022-05-14 DIAGNOSIS — Z71.89 OTHER SPECIFIED COUNSELING: ICD-10-CM

## 2022-05-15 ENCOUNTER — PATIENT OUTREACH (OUTPATIENT)
Dept: CARE COORDINATION | Facility: CLINIC | Age: 67
End: 2022-05-15
Payer: MEDICARE

## 2022-05-15 NOTE — PROGRESS NOTES
Clinic Care Coordination Contact  [facility]/Voicemail       Clinical Data: Care Coordinator Outreach  Outreach attempted x 1.  Left message on patient's voicemail with call back information and requested return call.  Plan: Care Coordinator will try to reach patient again in 1-2 business days.    .Gabrielle GILMORE Community Health Worker  Clinic Care Coordination  [facility]  Phone: [phone]

## 2022-05-17 NOTE — PROGRESS NOTES
Prior Authorization **APPROVED**    Authorization Effective Date: 5/13/2022  Authorization Expiration Date: 12/31/2099  Medication: Viibyrd 20mg tabs **APPROVED**  Approved Dose/Quantity: 1 tablet daily  Reference #: CoverMyMeds Key: [id number] - [place] Case ID: [id number], [employer] Case ID: [id number]   Insurance Company: [employer] ([employer]) - Phone [phone] Fax [phone]  Expected CoPay: $15.00     CoPay Card Available: No    Foundation Assistance Needed: n/a  Which Pharmacy is filling the prescription (Not needed for infusion/clinic administered): [facility] [place], [place] - [address]  Pharmacy Notified: Yes  Patient Notified: Yes  Comments:  10mg approved only--new approval needed for 20mg tabs.        Emmanuelle Sanchez CPhT  [facility] Discharge Pharmacy Liaison  Pronouns: She/Her/Hers    [facility]  [address]  [address] [address]   [email or web address]  [email or web address]   Phone: [phone]  Pager: [phone]  Fax: [phone]

## 2022-05-31 ENCOUNTER — OFFICE VISIT (OUTPATIENT)
Dept: ENDOCRINOLOGY | Facility: CLINIC | Age: 67
End: 2022-05-31

## 2022-05-31 VITALS
SYSTOLIC BLOOD PRESSURE: 143 MMHG | HEART RATE: 94 BPM | BODY MASS INDEX: 42.76 KG/M2 | HEIGHT: 70 IN | WEIGHT: 298.7 LBS | DIASTOLIC BLOOD PRESSURE: 85 MMHG

## 2022-05-31 DIAGNOSIS — Z79.4 TYPE 2 DIABETES MELLITUS WITH HYPERGLYCEMIA, WITH LONG-TERM CURRENT USE OF INSULIN (H): Primary | ICD-10-CM

## 2022-05-31 DIAGNOSIS — Z79.4 TYPE 2 DIABETES MELLITUS WITHOUT COMPLICATION, WITH LONG-TERM CURRENT USE OF INSULIN (H): ICD-10-CM

## 2022-05-31 DIAGNOSIS — E11.9 TYPE 2 DIABETES MELLITUS WITHOUT COMPLICATION, WITH LONG-TERM CURRENT USE OF INSULIN (H): ICD-10-CM

## 2022-05-31 DIAGNOSIS — G63 POLYNEUROPATHY ASSOCIATED WITH UNDERLYING DISEASE (H): ICD-10-CM

## 2022-05-31 DIAGNOSIS — F32.9 MAJOR DEPRESSION, CHRONIC: ICD-10-CM

## 2022-05-31 DIAGNOSIS — F10.21 ALCOHOL DEPENDENCE IN REMISSION (H): ICD-10-CM

## 2022-05-31 DIAGNOSIS — F33.2 SEVERE EPISODE OF RECURRENT MAJOR DEPRESSIVE DISORDER, WITHOUT PSYCHOTIC FEATURES (H): ICD-10-CM

## 2022-05-31 DIAGNOSIS — E11.65 TYPE 2 DIABETES MELLITUS WITH HYPERGLYCEMIA, WITH LONG-TERM CURRENT USE OF INSULIN (H): Primary | ICD-10-CM

## 2022-05-31 PROCEDURE — 99215 OFFICE O/P EST HI 40 MIN: CPT | Performed by: PHYSICIAN ASSISTANT

## 2022-05-31 RX ORDER — VILAZODONE HYDROCHLORIDE 20 MG/1
20 TABLET ORAL DAILY
Qty: 30 TABLET | Refills: 1 | Status: SHIPPED | OUTPATIENT
Start: 2022-05-31 | End: 2022-08-08

## 2022-05-31 RX ORDER — SEMAGLUTIDE 1.34 MG/ML
0.5 INJECTION, SOLUTION SUBCUTANEOUS
Qty: 1.5 ML | Refills: 3 | Status: SHIPPED | OUTPATIENT
Start: 2022-05-31 | End: 2022-08-04

## 2022-05-31 RX ORDER — QUETIAPINE FUMARATE 50 MG/1
50 TABLET, FILM COATED ORAL AT BEDTIME
Qty: 30 TABLET | Refills: 1 | Status: SHIPPED | OUTPATIENT
Start: 2022-05-31 | End: 2022-06-26

## 2022-05-31 ASSESSMENT — PAIN SCALES - GENERAL: PAINLEVEL: NO PAIN (0)

## 2022-05-31 NOTE — LETTER
5/31/2022       RE: Isac Li  [address]  [place] [address]     Dear Colleague,    Thank you for referring your patient, Isac Li, to the [facility] [place] at [facility]. Please see a copy of my visit note below.             Diabetes Consult Note  May 31, 2022      Isac Li is a 67 year old male with a past medical history of TIIDM, depression, HTN, HLP  who is here to establish care for his diabetes following hospitalization beginning in April.        HPI:  He was diagnosed with diabetes over 10 years ago and was taking both long and short acting insulin together with metformin however he recently had an A1c of 10.3%.  He tells me that he had not been taking his medications most days for some time prior to hospital admission due to severe depression.  While hospitalized he was started on Lantus and metformin initially and Victoza was later added.  He also met with Argelia Gotti and learned how to take the NovoLog sliding scale with his meals and at bedtime.  Upon discharge Victoza was not covered and he was switched to Ozempic 0.5 mg weekly with advised to increase this to 1 mg in another week.      Today:  Isac tells me that he is feeling much better in regards to his depression and would like to better manage his diabetes and life .  He took the Ozempic 0.5 mg and then took 1 mg on May 21  - thinks some dripped down chest with 0.5mg so he does not think all that went and but he tolerated that well.  However with 1 milligram all went in, but became ill later that day, had no appetite at all and at first thought had a reaction to decreased depression medications.  Then following Saturday checked at library about Ozempic and didn't take again.  He is willing to resume it if I do think it is indicated for him.    His sister has helped him  Clean up kitchen so he can cook a bit more.  His goal is to move  "forward to life and develop healthy habits before he goes back to work.  He has not been cooking healthy meals for very long time, but looks forward to beginning to do this.    He has not had any symptoms of low blood sugar.  He previously was having thirst fatigue hunger but this is improved.  Nausea has now resolved.    Current Diabetes Medications:  Metformin XR 2000 mg daily with dinner  Basaglar 25 units qhs  Ozempic 0.5 tolerated, 1 mg was not tolerated.    We reviewed glucometer/CGMS data together.  It revealed:  Fasting B - 268  Late afternoon: 257 - 385.      History of Diabetes monitoring and complications/ prevention:  CAD: No  Last eye exam results: : Not Found  Last dental exam: Did not yet inquire.  Microalbuminuria:No results found for: MICROALBUMIN  HTN: Yes on lisinopril 5 mg.  On statin: Yes atorvastatin 20 mg  On ASA: Yes 81 mg enteric-coated  Depression: Yes history of severe major depressive disorder recently hospitalized for suicidality.  ED: I have not yet inquired.    Isac's PMH, PSH and allergies were reviewed today and pertinent information is summarized above.    Isac's pertinent social and family history are also reviewed today and pertinent information is summarized above.  Also noted is: Isac lives alone.  He is a practicing [employer]ars[employer].  His main support is his sister.    Current Outpatient Medications   Medication     aspirin (ASA) 81 MG EC tablet     atorvastatin (LIPITOR) 20 MG tablet     hydrOXYzine (ATARAX) 25 MG tablet     insulin glargine (BASAGLAR KWIKPEN) 100 UNIT/ML pen     insulin lispro (HUMALOG KWIKPEN) 100 UNIT/ML (1 unit dial) KWIKPEN     lisinopril (ZESTRIL) 5 MG tablet     metFORMIN (FORTAMET) 1000 MG 24 hr tablet     pen needle, diabetic (BD ULTRA-FINE YAHAIRA PEN NEEDLE) 32 gauge x \" Ndle     phenytoin (DILANTIN) 100 MG capsule     QUEtiapine (SEROQUEL) 50 MG tablet     semaglutide (OZEMPIC, 0.25 OR 0.5 MG/DOSE,) 2 MG/1.5ML SOPN pen     venlafaxine (EFFEXOR XR) " "37.5 MG 24 hr capsule     vilazodone (VIIBRYD) 20 MG TABS tablet     No current facility-administered medications for this visit.         ROS:   Patient denies any fevers, chills or sweats as well as any changes in vision, problems with floaters or field cuts in vision, pain or problems with dentition, new or different headaches.  Patient denies symptoms of hypo and hyperglycemia except as above.   Patients denies marked fatigue, cough, shortness of breath, chest pain or pressure.  Isac reports that his mood is much increased.      Exam:    BP (!) 143/85 (BP Location: Left arm, Patient Position: Sitting, Cuff Size: Adult Regular)   Pulse 94   Ht 1.778 m (5' 10\")   Wt 135.5 kg (298 lb 11.2 oz)   BMI 42.86 kg/m      General: Soft-spoken obese male in NAD.   Psych:  Mood is \"good,\" affect is appropriate.  Thought form and content are fluid and coherent.    HEENT: Eyes and sclera are clear. Extraocular movements are grossly intact without proptosis.  Nares are patent, mucous membranes moist.  Neck: No masses or JVD are noted.    Resp: Easy and unlabored breathing.   Neuro: Alert and oriented, communicating clearly.   Ext: no swelling or edema.    Data:      Recent Labs   Lab Test 04/28/22  0744 04/27/22  1615 03/30/22  0952 12/22/21  1021   A1C 9.8*  --  10.3* 11.5*   TSH  --  2.76  --   --    *  --  122 130*   HDL 48  --  49 44   TRIG 135  --  158* 132   CR  --  0.82  --  0.78       Most recent GFRs:    GFR Estimate   Date Value Ref Range Status   04/27/2022 >90 >60 mL/min/1.73m2 Final     Comment:     Effective December 21, 2021 eGFRcr in adults is calculated using the 2021 CKD-EPI creatinine equation which includes age and gender (Tricia fairchild al., NEJM, DOI: 10.1056/UHCVrl8413699)  This result was previously suppressed from the chart.   12/22/2021 >90 >60 mL/min/1.73m2 Final     Comment:     Effective December 21, 2021 eGFRcr in adults is calculated using the 2021 CKD-EPI creatinine equation which includes " age and gender (Tricia fairchild al., NE, DOI: 10.1056/YTEVuj1844782)   06/10/2021 >60 >60 mL/min/1.73m2 Final   06/17/2020 >60 >60 mL/min/1.73m2 Final   05/23/2019 >60 >60 mL/min/1.73m2 Final     GFR Estimate If Black   Date Value Ref Range Status   06/10/2021 >60 >60 mL/min/1.73m2 Final   06/17/2020 >60 >60 mL/min/1.73m2 Final   05/23/2019 >60 >60 mL/min/1.73m2 Final           No results found for: CPEPT, GADAB, ISCAB    Most recent eye exam date: : Not Found       Assessment/Plan:    Isac Li is a 67 year old male with uncontrolled type 2 diabetes.    Extended education today answering many insightful questions.  Reviewed use of sliding scale.  Discussed increasing his basal insulin with a goal of getting a fasting insulin 100-1 40.    Advised:    Please continue Metformin 2000 mg daily.  Please increase Basaglar to 28 units each evening.  Goal is to get fasting blood sugar in the morning before eating to mostly 100 - 140.    As long as NO blood sugars <70, please increase Basaglar dose by 1 unit every other day until most blood sugars are 100 - 140.      Please resume Ozempic 0.5 mg weekly.  If vomiting, or otherwise intolerable, please stop it.     Please check blood sugar before each meal and give Humalog 1 unit:25 >140 before meals. (Same scale as with Tamela)    ISF 25  >140 ac, >200 qhs (typed out for Isac.)  If BG <70 or 2 in a row >350 (after giving Humalog) please call my office.     Please work to start getting some aerobic exercise days. Please see dietician and diabetes support nurse.      Refer to diabetes education and nutrition.    Return to clinic in 6 weeks, we will consider increasing Ozempic at that time.          2. DM Complications-     Lifestyle modification focusing on application of a Mediterranean diet or Dietary Approaches to Stop Hypertension (DASH) dietary pattern; reduction of saturated fat and trans fat; increase of dietary n-3 fatty acids, viscous fiber, and plant stanols/sterols  intake; and increased physical activity recommended to improve the lipid profile and reduce the risk of developing atherosclerotic cardiovascular disease.     Diet lifestyle discussed will address further needs at return to clinic.    55 minutes spent on the date of the encounter doing chart review, history and exam, documentation, education and counseling, as well as communication and coordination of care, and further activities as noted above.  This time excludes time spent reviewing CGM.      It is my privilege to be involved in the care of the above patient.     Gabriela Maxwell PA-C, MPAS  [facility]  Diabetes, Endocrinology, and Metabolism  [phone] Appointments/Nurse  [phone] pager  [phone] nurse line    This note was completed in part using Dragon voice recognition, and may contain word and grammatical errors.    Activity Duration    Exam room 45 minutes    Chart accessed < 1 minute    Chart accessed 10 minutes

## 2022-05-31 NOTE — PATIENT INSTRUCTIONS
Dear Isac,  Please continue Metformin 2000 mg daily.  Please increase Basaglar to 28 units each evening.  Goal is to get fasting blood sugar in the morning before eating to mostly 100 - 140.    As long as NO blood sugars <70, please increase Basaglar dose by 1 unit every other day until most blood sugars are 100 - 140.      Please resume Ozempic 0.5 mg weekly.  If vomiting, or otherwise intolerable, please stop it.     Please check blood sugar before each meal and give Humalog 1 unit:25 >140 before meals. (Same scale as with Tamela)    ISF 25  >140  For Pre-Meal  - 164 give 1 unit.   For Pre-Meal  - 189 give 2 units.   For Pre-Meal  - 214 give 3 units.   For Pre-Meal  - 239 give 4 units.   For Pre-Meal  - 264 give 5 units.   For Pre-Meal  - 289 give 6 units.   For Pre-Meal  - 314 give 7 units.   For Pre-Meal  - 339 give 8 units.   For Pre-Meal BG = or > 340 give 9 units.       Only if it has been 4 hours since dinner, check blood sugar at bedtime and give Humalog insulin if >200 as follows:    BEDTIME-  For  - 224 give 1 units.   For  - 249 give 2 units.   For  - 274 give 3 units.   For  - 299 give 4 units.   For  - 324 give 5 units.   For  - 349 give 6 units.   For BG = or > 350 give 7 units.       If BG <70 or 2 in a row >350 (after giving Humalog) please call my office.     Please work to start getting some aerobic exercise days. Please see dietician and diabetes support nurse.          My best wishes,    Gabriela Maxwell PA-C, MPAS  [facility]  Diabetes, Endocrinology, and Metabolism  [phone] Appointments/Nurse  [phone] Fax  [phone] URGENTafter hours/weekend Endocrinologist on call

## 2022-05-31 NOTE — PROGRESS NOTES
Diabetes Consult Note  May 31, 2022      Isac Li is a 67 year old male with a past medical history of TIIDM, depression, HTN, HLP  who is here to establish care for his diabetes following hospitalization beginning in April.        HPI:  He was diagnosed with diabetes over 10 years ago and was taking both long and short acting insulin together with metformin however he recently had an A1c of 10.3%.  He tells me that he had not been taking his medications most days for some time prior to hospital admission due to severe depression.  While hospitalized he was started on Lantus and metformin initially and Victoza was later added.  He also met with Argelia Gotti and learned how to take the NovoLog sliding scale with his meals and at bedtime.  Upon discharge Victoza was not covered and he was switched to Ozempic 0.5 mg weekly with advised to increase this to 1 mg in another week.      Today:  Isac tells me that he is feeling much better in regards to his depression and would like to better manage his diabetes and life .  He took the Ozempic 0.5 mg and then took 1 mg on May 21  - thinks some dripped down chest with 0.5mg so he does not think all that went and but he tolerated that well.  However with 1 milligram all went in, but became ill later that day, had no appetite at all and at first thought had a reaction to decreased depression medications.  Then following Saturday checked at library about Ozempic and didn't take again.  He is willing to resume it if I do think it is indicated for him.    His sister has helped him  Clean up kitchen so he can cook a bit more.  His goal is to move forward to life and develop healthy habits before he goes back to work.  He has not been cooking healthy meals for very long time, but looks forward to beginning to do this.    He has not had any symptoms of low blood sugar.  He previously was having thirst fatigue hunger but this is improved.  Nausea has now  "resolved.    Current Diabetes Medications:  Metformin XR 2000 mg daily with dinner  Basaglar 25 units qhs  Ozempic 0.5 tolerated, 1 mg was not tolerated.    We reviewed glucometer/CGMS data together.  It revealed:  Fasting B - 268  Late afternoon: 257 - 385.      History of Diabetes monitoring and complications/ prevention:  CAD: No  Last eye exam results: : Not Found  Last dental exam: Did not yet inquire.  Microalbuminuria:No results found for: MICROALBUMIN  HTN: Yes on lisinopril 5 mg.  On statin: Yes atorvastatin 20 mg  On ASA: Yes 81 mg enteric-coated  Depression: Yes history of severe major depressive disorder recently hospitalized for suicidality.  ED: I have not yet inquired.    Isac's PMH, PSH and allergies were reviewed today and pertinent information is summarized above.    Isac's pertinent social and family history are also reviewed today and pertinent information is summarized above.  Also noted is: Isac lives alone.  He is a practicing [employer].  His main support is his sister.    Current Outpatient Medications   Medication     aspirin (ASA) 81 MG EC tablet     atorvastatin (LIPITOR) 20 MG tablet     hydrOXYzine (ATARAX) 25 MG tablet     insulin glargine (BASAGLAR KWIKPEN) 100 UNIT/ML pen     insulin lispro (HUMALOG KWIKPEN) 100 UNIT/ML (1 unit dial) KWIKPEN     lisinopril (ZESTRIL) 5 MG tablet     metFORMIN (FORTAMET) 1000 MG 24 hr tablet     pen needle, diabetic (BD ULTRA-FINE YAHAIRA PEN NEEDLE) 32 gauge x \" Ndle     phenytoin (DILANTIN) 100 MG capsule     QUEtiapine (SEROQUEL) 50 MG tablet     semaglutide (OZEMPIC, 0.25 OR 0.5 MG/DOSE,) 2 MG/1.5ML SOPN pen     venlafaxine (EFFEXOR XR) 37.5 MG 24 hr capsule     vilazodone (VIIBRYD) 20 MG TABS tablet     No current facility-administered medications for this visit.         ROS:   Patient denies any fevers, chills or sweats as well as any changes in vision, problems with floaters or field cuts in vision, pain or problems with dentition, new or " "different headaches.  Patient denies symptoms of hypo and hyperglycemia except as above.   Patients denies marked fatigue, cough, shortness of breath, chest pain or pressure.  Isac reports that his mood is much increased.      Exam:    BP (!) 143/85 (BP Location: Left arm, Patient Position: Sitting, Cuff Size: Adult Regular)   Pulse 94   Ht 1.778 m (5' 10\")   Wt 135.5 kg (298 lb 11.2 oz)   BMI 42.86 kg/m      General: Soft-spoken obese male in NAD.   Psych:  Mood is \"good,\" affect is appropriate.  Thought form and content are fluid and coherent.    HEENT: Eyes and sclera are clear. Extraocular movements are grossly intact without proptosis.  Nares are patent, mucous membranes moist.  Neck: No masses or JVD are noted.    Resp: Easy and unlabored breathing.   Neuro: Alert and oriented, communicating clearly.   Ext: no swelling or edema.    Data:      Recent Labs   Lab Test 04/28/22  0744 04/27/22  1615 03/30/22  0952 12/22/21  1021   A1C 9.8*  --  10.3* 11.5*   TSH  --  2.76  --   --    *  --  122 130*   HDL 48  --  49 44   TRIG 135  --  158* 132   CR  --  0.82  --  0.78       Most recent GFRs:    GFR Estimate   Date Value Ref Range Status   04/27/2022 >90 >60 mL/min/1.73m2 Final     Comment:     Effective December 21, 2021 eGFRcr in adults is calculated using the 2021 CKD-EPI creatinine equation which includes age and gender (Tricia et al., NEJM, DOI: 10.1056/ZCHAsu9997545)  This result was previously suppressed from the chart.   12/22/2021 >90 >60 mL/min/1.73m2 Final     Comment:     Effective December 21, 2021 eGFRcr in adults is calculated using the 2021 CKD-EPI creatinine equation which includes age and gender (Tricia fairchild al., NEJ, DOI: 10.1056/DMHYaq2602306)   06/10/2021 >60 >60 mL/min/1.73m2 Final   06/17/2020 >60 >60 mL/min/1.73m2 Final   05/23/2019 >60 >60 mL/min/1.73m2 Final     GFR Estimate If Black   Date Value Ref Range Status   06/10/2021 >60 >60 mL/min/1.73m2 Final   06/17/2020 >60 >60 " mL/min/1.73m2 Final   05/23/2019 >60 >60 mL/min/1.73m2 Final           No results found for: CPEPT, GADAB, ISCAB    Most recent eye exam date: : Not Found       Assessment/Plan:    Isac Li is a 67 year old male with uncontrolled type 2 diabetes.    Extended education today answering many insightful questions.  Reviewed use of sliding scale.  Discussed increasing his basal insulin with a goal of getting a fasting insulin 100-1 40.    Advised:    Please continue Metformin 2000 mg daily.  Please increase Basaglar to 28 units each evening.  Goal is to get fasting blood sugar in the morning before eating to mostly 100 - 140.    As long as NO blood sugars <70, please increase Basaglar dose by 1 unit every other day until most blood sugars are 100 - 140.      Please resume Ozempic 0.5 mg weekly.  If vomiting, or otherwise intolerable, please stop it.     Please check blood sugar before each meal and give Humalog 1 unit:25 >140 before meals. (Same scale as with Tamela)    ISF 25  >140 ac, >200 qhs (typed out for Isac.)  If BG <70 or 2 in a row >350 (after giving Humalog) please call my office.     Please work to start getting some aerobic exercise days. Please see dietician and diabetes support nurse.      Refer to diabetes education and nutrition.    Return to clinic in 6 weeks, we will consider increasing Ozempic at that time.          2. DM Complications-     Lifestyle modification focusing on application of a Mediterranean diet or Dietary Approaches to Stop Hypertension (DASH) dietary pattern; reduction of saturated fat and trans fat; increase of dietary n-3 fatty acids, viscous fiber, and plant stanols/sterols intake; and increased physical activity recommended to improve the lipid profile and reduce the risk of developing atherosclerotic cardiovascular disease.     Diet lifestyle discussed will address further needs at return to clinic.    55 minutes spent on the date of the encounter doing chart review, history  and exam, documentation, education and counseling, as well as communication and coordination of care, and further activities as noted above.  This time excludes time spent reviewing CGM.      It is my privilege to be involved in the care of the above patient.     Gabriela Maxwell PA-C, MPAS  [facility]  Diabetes, Endocrinology, and Metabolism  [phone] Appointments/Nurse  [phone] pager  [phone] nurse line    This note was completed in part using Dragon voice recognition, and may contain word and grammatical errors.    Activity Duration    Exam room 45 minutes    Chart accessed < 1 minute    Chart accessed 10 minutes

## 2022-05-31 NOTE — NURSING NOTE
"Chief Complaint   Patient presents with     Diabetes     Vital signs:      BP: (!) 143/85 Pulse: 94           Height: 177.8 cm (5' 10\") Weight: 135.5 kg (298 lb 11.2 oz)  Estimated body mass index is 42.86 kg/m  as calculated from the following:    Height as of this encounter: 1.778 m (5' 10\").    Weight as of this encounter: 135.5 kg (298 lb 11.2 oz).        "

## 2022-06-21 ENCOUNTER — OFFICE VISIT (OUTPATIENT)
Dept: FAMILY MEDICINE | Facility: CLINIC | Age: 67
End: 2022-06-21
Payer: COMMERCIAL

## 2022-06-21 VITALS
RESPIRATION RATE: 16 BRPM | DIASTOLIC BLOOD PRESSURE: 71 MMHG | HEART RATE: 77 BPM | SYSTOLIC BLOOD PRESSURE: 133 MMHG | BODY MASS INDEX: 42.04 KG/M2 | WEIGHT: 293 LBS

## 2022-06-21 DIAGNOSIS — G40.909 SEIZURE DISORDER (H): ICD-10-CM

## 2022-06-21 DIAGNOSIS — E11.9 TYPE 2 DIABETES MELLITUS WITHOUT COMPLICATION, WITH LONG-TERM CURRENT USE OF INSULIN (H): ICD-10-CM

## 2022-06-21 DIAGNOSIS — H93.92 LESION OF LEFT EAR: ICD-10-CM

## 2022-06-21 DIAGNOSIS — R21 RASH: ICD-10-CM

## 2022-06-21 DIAGNOSIS — Z79.4 TYPE 2 DIABETES MELLITUS WITHOUT COMPLICATION, WITH LONG-TERM CURRENT USE OF INSULIN (H): ICD-10-CM

## 2022-06-21 DIAGNOSIS — F32.9 MAJOR DEPRESSION, CHRONIC: ICD-10-CM

## 2022-06-21 PROCEDURE — 99215 OFFICE O/P EST HI 40 MIN: CPT | Performed by: FAMILY MEDICINE

## 2022-06-21 RX ORDER — PHENYTOIN SODIUM 100 MG/1
500 CAPSULE, EXTENDED RELEASE ORAL AT BEDTIME
Qty: 150 CAPSULE | Refills: 0 | Status: SHIPPED | OUTPATIENT
Start: 2022-06-21 | End: 2022-06-21

## 2022-06-21 RX ORDER — PHENYTOIN SODIUM 100 MG/1
500 CAPSULE, EXTENDED RELEASE ORAL AT BEDTIME
Qty: 150 CAPSULE | Refills: 0 | Status: SHIPPED | OUTPATIENT
Start: 2022-06-21 | End: 2022-06-23

## 2022-06-21 ASSESSMENT — PATIENT HEALTH QUESTIONNAIRE - PHQ9
SUM OF ALL RESPONSES TO PHQ QUESTIONS 1-9: 1
10. IF YOU CHECKED OFF ANY PROBLEMS, HOW DIFFICULT HAVE THESE PROBLEMS MADE IT FOR YOU TO DO YOUR WORK, TAKE CARE OF THINGS AT HOME, OR GET ALONG WITH OTHER PEOPLE: NOT DIFFICULT AT ALL
SUM OF ALL RESPONSES TO PHQ QUESTIONS 1-9: 1

## 2022-06-21 NOTE — TELEPHONE ENCOUNTER
Fax from pharmacy for Phenytoin - [facility] insurance requires a 90 day supply. Please resend.

## 2022-06-21 NOTE — PATIENT INSTRUCTIONS
Eye appointment, [facility] in [place] [phone]. Check insurance prior to scheduling.     Referral to Dermatology for left ear lesion and chest rash    Referral to Neurology to review seizure meds    Paper work completed for [employer]    Follow up in 1-2 weeks if back pain persists.    Next A1C due on or after 7-28-22

## 2022-06-21 NOTE — PROGRESS NOTES
"  Assessment & Plan     Major depression, chronic  Doing well    Seizure disorder (H)  - phenytoin (DILANTIN) 100 MG capsule; Take 5 capsules (500 mg) by mouth At Bedtime        Neuro referral given    Type 2 diabetes mellitus without complication, with long-term current use of insulin (H  Poor 3 mo control but with recent improvements in daily numbers.  - OPTOMETRY REFERRAL; Future  - insulin pen needle (32G X 4 MM) 32G X 4 MM miscellaneous; [PEN NEEDLE, DIABETIC (BD ULTRA-FINE YAHAIRA PEN NEEDLE) 32 GAUGE X 5/32\" NDLE] USE DAILY WITH HUMALOG AND BASAGLAR PENS AS DIRECTED    Left ear lesion, and chest rash by hx that pt was referred to derm for    PLAN:   Eye appointment, [facility] in [place] [phone]. Check insurance prior to scheduling.     Referral to Dermatology for left ear lesion and chest rash    Referral to Neurology to review seizure meds    Paper work completed for [employer]    Follow up in 1-2 weeks if back pain persists.    Next A1C due on or after 7-28-22    approx 40 min spent with pt in obtaining hx, exam, completing form for [employer], completion of note  956}     BMI:   Estimated body mass index is 42.04 kg/m  as calculated from the following:    Height as of 5/31/22: 1.778 m (5' 10\").    Weight as of this encounter: 132.9 kg (293 lb).   Weight management plan: Discussed healthy diet and exercise guidelines    Return in about 3 months (around 9/21/2022) for Diabetic Check.    Marquise Russ MD  [facility]    Kait Chau is a 67 year old, presenting for the following health issues:  Diabetes and Depression    Plans to return to work 6-26-22 working 4hr/day and 20 hrs per week  Would like to see a Neurologist regarding seizure hx and Dermatologist    History of Present Illness       Back Pain:  He presents for follow up of back pain. Patient's back pain is a new problem.    Original cause of back pain: not " sure  First noticed back pain: in the last week  Patient feels back pain: dailyLocation of back pain:  Right upper back  Description of back pain: sharp  Back pain spreads: nowhere and right shoulder    Since patient first noticed back pain, pain is: gradually worsening  Does back pain interfere with his job:  No  On a scale of 1-10 (10 being the worst), patient describes pain as:  5  What makes back pain worse: coughing and certain positions  Acupuncture: not tried  Acetaminophen: not tried  Activity or exercise: not tried  Chiropractor:  Not tried  Cold: not tried  Heat: not tried  Massage: not tried  Muscle relaxants: not tried  NSAIDS: not tried  Opioids: not tried  Physical Therapy: not tried  Rest: not helpful  Steroid Injection: not tried  Stretching: not tried  Surgery: not tried  TENS unit: not tried  Topical pain relievers: not tried  Other healthcare providers patient is seeing for back pain: None    Mental Health Follow-up:  Patient presents to follow-up on Depression.Patient's depression since last visit has been:  Good  The patient is not having other symptoms associated with depression.      Any significant life events: job concerns and financial concerns  Patient is not feeling anxious or having panic attacks.  Patient has no concerns about alcohol or drug use.    Diabetes:   He presents for follow up of diabetes.  He is checking home blood glucose two times daily. He checks blood glucose before meals.  Blood glucose is sometimes over 200 and never under 70. When his blood glucose is low, the patient is asymptomatic for confusion, blurred vision, lethargy and reports not feeling dizzy, shaky, or weak.  He has no concerns regarding his diabetes at this time.  He is not experiencing numbness or burning in feet, excessive thirst, blurry vision, weight changes or redness, sores or blisters on feet. The patient has not had a diabetic eye exam in the last 12 months.         Hypertension: He presents for  follow up of hypertension.  He does not check blood pressure  regularly outside of the clinic. Outside blood pressures have been over 140/90. He does not follow a low salt diet.      Today's PHQ-9         PHQ-9 Total Score: 1    PHQ-9 Q9 Thoughts of better off dead/self-harm past 2 weeks :   Not at all    How difficult have these problems made it for you to do your work, take care of things at home, or get along with other people: Not difficult at all             Objective    /71 (BP Location: Left arm, Patient Position: Sitting, Cuff Size: Adult Large)   Pulse 77   Resp 16   Wt 132.9 kg (293 lb)   BMI 42.04 kg/m    Body mass index is 42.04 kg/m .  Physical Exam   GENERAL: healthy, alert and no distress  RESP: lungs clear to auscultation - no rales, rhonchi or wheezes  CV: regular rate and rhythm, normal S1 S2, no S3 or S4, no murmur, click or rub, no peripheral edema and peripheral pulses strong  MS:NO TX OF THE RIGHT UPPER BACK  PSYCH: mentation appears normal, affect normal/bright  SKIN:  Scaly area on left ear lobe.    FEET:  MF TESTING: DIFFUSELY POOR SENSATION              SKIN EXAM:  NL              VASCULAR:   R DP  PULSE: +                                      L DP  PULSE: +                                      R PT  PULSE: -                                      L PT  PULSE: -        Lab Results   Component Value Date    A1C 9.8 04/28/2022    A1C 10.3 03/30/2022    A1C 11.5 12/22/2021    A1C 8.7 09/21/2021    A1C 10.5 06/10/2021     CBC RESULTS: Recent Labs   Lab Test 04/27/22  1615   WBC 10.0   RBC 4.74   HGB 13.8   HCT 40.5   MCV 85   MCH 29.1   MCHC 34.1   RDW 12.8        Last Comprehensive Metabolic Panel:  Sodium   Date Value Ref Range Status   04/27/2022 138 133 - 144 mmol/L Final     Potassium   Date Value Ref Range Status   04/27/2022 4.0 3.4 - 5.3 mmol/L Final     Chloride   Date Value Ref Range Status   04/27/2022 106 94 - 109 mmol/L Final     Carbon Dioxide (CO2)   Date Value Ref  Range Status   04/27/2022 21 20 - 32 mmol/L Final     Comment:     This result was previously suppressed from the chart.     Anion Gap   Date Value Ref Range Status   04/27/2022 11 3 - 14 mmol/L Final     Comment:     This result was previously suppressed from the chart.     Glucose   Date Value Ref Range Status   04/27/2022 241 (H) 70 - 99 mg/dL Final     Comment:     This result was previously suppressed from the chart.     GLUCOSE BY METER POCT   Date Value Ref Range Status   05/13/2022 177 (H) 70 - 99 mg/dL Final     Urea Nitrogen   Date Value Ref Range Status   04/27/2022 19 7 - 30 mg/dL Final     Comment:     This result was previously suppressed from the chart.     Creatinine   Date Value Ref Range Status   04/27/2022 0.82 0.66 - 1.25 mg/dL Final     Comment:     This result was previously suppressed from the chart.     GFR Estimate   Date Value Ref Range Status   04/27/2022 >90 >60 mL/min/1.73m2 Final     Comment:     Effective December 21, 2021 eGFRcr in adults is calculated using the 2021 CKD-EPI creatinine equation which includes age and gender (Tricia et al., NEJ, DOI: 10.1056/NFGJpo7802606)  This result was previously suppressed from the chart.   06/10/2021 >60 >60 mL/min/1.73m2 Final     Calcium   Date Value Ref Range Status   04/27/2022 8.6 8.5 - 10.1 mg/dL Final     Comment:     This result was previously suppressed from the chart.     Bilirubin Total   Date Value Ref Range Status   04/27/2022 0.3 0.2 - 1.3 mg/dL Final     Comment:     This result was previously suppressed from the chart.     Alkaline Phosphatase   Date Value Ref Range Status   04/27/2022 149 40 - 150 U/L Final     Comment:     This result was previously suppressed from the chart.     ALT   Date Value Ref Range Status   04/27/2022 25 0 - 70 U/L Final     Comment:     This result was previously suppressed from the chart.     AST   Date Value Ref Range Status   04/27/2022 9 0 - 45 U/L Final     Comment:     This result was previously  suppressed from the chart.                           .  ..

## 2022-06-22 DIAGNOSIS — G40.909 SEIZURE DISORDER (H): ICD-10-CM

## 2022-06-23 RX ORDER — PHENYTOIN SODIUM 100 MG/1
500 CAPSULE, EXTENDED RELEASE ORAL AT BEDTIME
Qty: 150 CAPSULE | Refills: 5 | Status: SHIPPED | OUTPATIENT
Start: 2022-06-23 | End: 2022-11-20

## 2022-06-23 NOTE — TELEPHONE ENCOUNTER
"Routing refill request to provider for review/approval because:  Labs out of range:  Dilantin level not in therapeutic range      Last Written Prescription Date: 6/21/22 - pharmacy states insurance requires 90-day supply  Last Fill Quantity: 150, # refills: 0  Last office visit provider: Dr. Russ 6/21/22        Requested Prescriptions   Pending Prescriptions Disp Refills    phenytoin (DILANTIN) 100 MG capsule 150 capsule 0     Sig: Take 5 capsules (500 mg) by mouth At Bedtime        Anti-Seizure Meds Protocol  Failed - 6/22/2022  3:55 PM        Failed - Review Authorizing provider's last note.      Refer to last progress notes: confirm request is for original authorizing provider (cannot be through other providers).          Failed - Dilantin level within therapeutic range in past 26 months     Recent Labs   Lab Test 04/27/22  1615   DILANTIN 10.0       Dilantin level must be checked 2-4 weeks after dosage change.            Passed - Recent (12 mo) or future (30 days) visit within the authorizing provider's specialty     Patient has had an office visit with the authorizing provider or a provider within the authorizing providers department within the previous 12 mos or has a future within next 30 days. See \"Patient Info\" tab in inbasket, or \"Choose Columns\" in Meds & Orders section of the refill encounter.              Passed - Normal CBC on file in past 26 months     Recent Labs   Lab Test 04/27/22  1615   WBC 10.0   RBC 4.74   HGB 13.8   HCT 40.5                      Passed - Normal ALT or AST on file in past 26 months       Recent Labs   Lab Test 04/27/22  1615   ALT 25     Recent Labs   Lab Test 04/27/22  1615   AST 9             Passed - Normal platelet count on file in past 26 months       Recent Labs   Lab Test 04/27/22  1615                  Passed - Medication is active on med list            María Hitchcock RN 06/23/22 10:40 AM    "

## 2022-06-25 DIAGNOSIS — F33.2 SEVERE EPISODE OF RECURRENT MAJOR DEPRESSIVE DISORDER, WITHOUT PSYCHOTIC FEATURES (H): ICD-10-CM

## 2022-06-26 RX ORDER — QUETIAPINE FUMARATE 50 MG/1
TABLET, FILM COATED ORAL
Qty: 30 TABLET | Refills: 3 | Status: SHIPPED | OUTPATIENT
Start: 2022-06-26 | End: 2022-09-24

## 2022-06-26 NOTE — TELEPHONE ENCOUNTER
Routing refill request to provider for review/approval because:  Early fill requested    Last Written Prescription Date:  5/31/22  Last Fill Quantity: 30,  # refills: 1   Last office visit provider:  6/21/22     Requested Prescriptions   Pending Prescriptions Disp Refills     QUEtiapine (SEROQUEL) 50 MG tablet [Pharmacy Med Name: QUETIAPINE FUMARATE 50 MG TAB] 30 tablet 1     Sig: TAKE 1 TABLET BY MOUTH AT BEDTIME       Antipsychotic Medications Passed - 6/25/2022 10:31 AM        Passed - Blood pressure under 140/90 in past 12 months     BP Readings from Last 3 Encounters:   06/21/22 133/71   05/31/22 (!) 143/85   05/11/22 110/71                 Passed - Patient is 12 years of age or older        Passed - Lipid panel on file within the past 12 months     Recent Labs   Lab Test 04/28/22  0744   CHOL 191   TRIG 135   HDL 48   *   NHDL 143*               Passed - CBC on file in past 12 months     Recent Labs   Lab Test 04/27/22  1615   WBC 10.0   RBC 4.74   HGB 13.8   HCT 40.5                    Passed - Heart Rate on file within past 12 months     Pulse Readings from Last 3 Encounters:   06/21/22 77   05/31/22 94   05/11/22 81               Passed - A1c or Glucose on file in past 12 months     Recent Labs   Lab Test 05/13/22  1204 04/28/22  0811 04/28/22  0744   *   < >  --    A1C  --   --  9.8*    < > = values in this interval not displayed.       Please review patients last 3 weights. If a weight gain of >10 lbs exists, you may refill the prescription once after instructing the patient to schedule an appointment within the next 30 days.    Wt Readings from Last 3 Encounters:   06/21/22 132.9 kg (293 lb)   05/31/22 135.5 kg (298 lb 11.2 oz)   05/05/22 111.7 kg (246 lb 4.8 oz)             Passed - Medication is active on med list        Passed - Recent (6 mo) or future (30 days) visit within the authorizing provider's specialty     Patient had office visit in the last 6 months or has a visit in  "the next 30 days with authorizing provider or within the authorizing provider's specialty.  See \"Patient Info\" tab in inbasket, or \"Choose Columns\" in Meds & Orders section of the refill encounter.                 Carolin Baptiste RN 06/26/22 7:08 AM  "

## 2022-07-12 NOTE — PROGRESS NOTES
Outcome for 07/12/22 3:44 PM: [facility] Medical message sent  MELANIE Pagan             Diabetes Consult Note  July 19, 2022          Isac Li is a 67 year old male with a past medical history of TIIDM, major depression with suicidal ideation, HTN, HLP  who is here to establish care for his diabetes following hospitalization beginning in April.        HPI:  He was diagnosed with diabetes over 10 years ago and was taking both long and short acting insulin together with Metformin until he was hospitalized in April 2022 with depression and his diabetes uncontrolled due to not taking medications due to depression.    I met Isac in May of this year and his diabetes was better controlled and he reported feeling motivated to take better care of himself .   We discussed resuming the Ozempic that has been prescribed during his hospitalization.  I also advised him to increase Basaglar dose by 1 unit every other day until most blood sugars are 100 - 140.  He was also referred to diabetes education and support and nutrition.      Interim:    He saw his family practitioner in mid June and his depression was improved as were his recent blood sugar numbers.  He was referred for diabetic eye exam.    Today:  He is now back to work 6- 10 pm 5 days weekly. At times will take a nap in the afternoon and will have a bite to eat before leaving or on the way get "[employer].  Not proud of this meal but rarely checks his blood sugar or has Humalog.  He is giving his Basaglar in the evening and has been increasing this as we discussed, up to 39 units each night.  Has not been but could easily give Humalog at this time he just did not know that he could give both at once.      Second air conditioner is out, 1 in the bedroom has been out for a long time but now the other 1 in the living room has failed and he wonders if his blood sugar control will get worse without any air conditioning.  He has emailed his  but they only work  on .    Denies any history of urinary tract infections or balanitis.  Denies any feeling lightheaded when he rises quickly.    Current Diabetes Medications:  Metformin XR 2000 mg daily with dinner  Basaglar 37 units qhs  Ozempic 0.5 tolerated.  Humalog  - Will take in ams always if high, then takes ~3 days weekly before meals when high.      We reviewed glucometer/CGMS data together.  It revealed:  Fasting B - 206  Late afternoon: 190 - 303.      History of Diabetes monitoring and complications/ prevention:  CAD: No  Last eye exam results: : Not Found  Last dental exam: Did not yet inquire.  Microalbuminuria:No results found for: MICROALBUMIN  HTN: Yes on lisinopril 5 mg.  On statin: Yes atorvastatin 20 mg  On ASA: Yes 81 mg enteric-coated  Depression: Yes history of severe major depressive disorder recently hospitalized for suicidality.  ED: I have not yet inquired.    Isac's PMH, PSH and allergies were reviewed today and pertinent information is summarized above.    Isac's pertinent social and family history are also reviewed today and pertinent information is summarized above.  Also noted is: Isac lives alone.  He is a practicing Sikhism.  His main support is his sister.    Current Outpatient Medications   Medication     aspirin (ASA) 81 MG EC tablet     atorvastatin (LIPITOR) 20 MG tablet     hydrOXYzine (ATARAX) 25 MG tablet     insulin glargine (BASAGLAR KWIKPEN) 100 UNIT/ML pen     insulin lispro (HUMALOG KWIKPEN) 100 UNIT/ML (1 unit dial) KWIKPEN     insulin pen needle (32G X 4 MM) 32G X 4 MM miscellaneous     lisinopril (ZESTRIL) 5 MG tablet     metFORMIN (FORTAMET) 1000 MG 24 hr tablet     phenytoin (DILANTIN) 100 MG capsule     QUEtiapine (SEROQUEL) 50 MG tablet     semaglutide (OZEMPIC, 0.25 OR 0.5 MG/DOSE,) 2 MG/1.5ML SOPN pen     vilazodone (VIIBRYD) 20 MG TABS tablet     No current facility-administered medications for this visit.         ROS:   Patient denies any fevers, chills or  "sweats as well as any changes in vision, problems with floaters or field cuts in vision, pain or problems with dentition, new or different headaches.  Patient denies symptoms of hypo and hyperglycemia except as above.   Patients denies marked fatigue, cough, shortness of breath, chest pain or pressure.  Isac reports that his mood is much increased.      Exam:    /77 (BP Location: Left arm, Patient Position: Sitting, Cuff Size: Adult Large)   Pulse 85   Temp 98.1  F (36.7  C) (Oral)   Ht 1.778 m (5' 10\")   Wt 133.4 kg (294 lb 3.2 oz)   SpO2 100%   BMI 42.21 kg/m      General: Soft-spoken obese male in NAD.   Psych:  Mood is \"good,\" affect is appropriate.  Thought form and content are fluid and coherent.    HEENT: Eyes and sclera are clear. Extraocular movements are grossly intact without proptosis.  Nares are patent, mucous membranes moist.  Neck: No masses or JVD are noted.    Resp: Easy and unlabored breathing.   Neuro: Alert and oriented, communicating clearly.   Ext: no swelling or edema.    Data:      Recent Labs   Lab Test 04/28/22  0744 04/27/22  1615 03/30/22  0952 12/22/21  1021   A1C 9.8*  --  10.3* 11.5*   TSH  --  2.76  --   --    *  --  122 130*   HDL 48  --  49 44   TRIG 135  --  158* 132   CR  --  0.82  --  0.78       Most recent GFRs:    GFR Estimate   Date Value Ref Range Status   04/27/2022 >90 >60 mL/min/1.73m2 Final     Comment:     Effective December 21, 2021 eGFRcr in adults is calculated using the 2021 CKD-EPI creatinine equation which includes age and gender (Tricia et al., NEJM, DOI: 10.1056/JYNQwu5380172)  This result was previously suppressed from the chart.   12/22/2021 >90 >60 mL/min/1.73m2 Final     Comment:     Effective December 21, 2021 eGFRcr in adults is calculated using the 2021 CKD-EPI creatinine equation which includes age and gender (Tricia fairchild al., NEJ, DOI: 10.1056/WGWAxp2009342)   06/10/2021 >60 >60 mL/min/1.73m2 Final   06/17/2020 >60 >60 mL/min/1.73m2 Final "   05/23/2019 >60 >60 mL/min/1.73m2 Final     GFR Estimate If Black   Date Value Ref Range Status   06/10/2021 >60 >60 mL/min/1.73m2 Final   06/17/2020 >60 >60 mL/min/1.73m2 Final   05/23/2019 >60 >60 mL/min/1.73m2 Final           No results found for: CPEPT, GADAB, ISCAB    Most recent eye exam date: : Not Found       Assessment/Plan:    Isac Li is a 67 year old male with type 2 diabetes.      1.  Type 2 diabetes, blood sugar remains above goal but markedly improved.    We discussed options to bring postprandial blood sugars to goal including more frequent Humalog dosing and possibly adding Jardiance which I do favor for cardiovascular benefit.    He however would like to first try giving Humalog at bedtime and as often as possible with his second meal before heading into work.  Discussed working on increasing physical activity and on quality of diet.    Will get an A1c updated in a couple of weeks and I will see him back in a couple of months, he will establish care with MD at that time, we will again see him in late October after his next A1c.        2. DM Complications-     Lifestyle modification focusing on application of a Mediterranean diet or Dietary Approaches to Stop Hypertension (DASH) dietary pattern; reduction of saturated fat and trans fat; increase of dietary n-3 fatty acids, viscous fiber, and plant stanols/sterols intake; and increased physical activity recommended to improve the lipid profile and reduce the risk of developing atherosclerotic cardiovascular disease.     Just taking time to plan foods for his second meal.    36 minutes spent on the date of the encounter doing chart review, history and exam, documentation, education and counseling, as well as communication and coordination of care, and further activities as noted above.  This time excludes time spent reviewing CGM.      It is my privilege to be involved in the care of the above patient.     Gabriela Maxwell PA-C,  [facility]  [facility]  Diabetes, Endocrinology, and Metabolism  [phone] Appointments/Nurse  [phone] pager  [phone] nurse line    This note was completed in part using Dragon voice recognition, and may contain word and grammatical errors.

## 2022-07-19 ENCOUNTER — OFFICE VISIT (OUTPATIENT)
Dept: ENDOCRINOLOGY | Facility: CLINIC | Age: 67
End: 2022-07-19
Payer: COMMERCIAL

## 2022-07-19 VITALS
SYSTOLIC BLOOD PRESSURE: 119 MMHG | TEMPERATURE: 98.1 F | HEIGHT: 70 IN | OXYGEN SATURATION: 100 % | DIASTOLIC BLOOD PRESSURE: 77 MMHG | WEIGHT: 294.2 LBS | HEART RATE: 85 BPM | BODY MASS INDEX: 42.12 KG/M2

## 2022-07-19 DIAGNOSIS — E11.65 TYPE 2 DIABETES MELLITUS WITH HYPERGLYCEMIA, WITH LONG-TERM CURRENT USE OF INSULIN (H): ICD-10-CM

## 2022-07-19 DIAGNOSIS — Z79.4 TYPE 2 DIABETES MELLITUS WITHOUT COMPLICATION, WITH LONG-TERM CURRENT USE OF INSULIN (H): ICD-10-CM

## 2022-07-19 DIAGNOSIS — F10.21 ALCOHOL DEPENDENCE IN REMISSION (H): ICD-10-CM

## 2022-07-19 DIAGNOSIS — Z79.4 TYPE 2 DIABETES MELLITUS WITH HYPERGLYCEMIA, WITH LONG-TERM CURRENT USE OF INSULIN (H): ICD-10-CM

## 2022-07-19 DIAGNOSIS — G63 POLYNEUROPATHY ASSOCIATED WITH UNDERLYING DISEASE (H): ICD-10-CM

## 2022-07-19 DIAGNOSIS — E11.9 TYPE 2 DIABETES MELLITUS WITHOUT COMPLICATION, WITH LONG-TERM CURRENT USE OF INSULIN (H): ICD-10-CM

## 2022-07-19 PROCEDURE — 99214 OFFICE O/P EST MOD 30 MIN: CPT | Mod: 25 | Performed by: PHYSICIAN ASSISTANT

## 2022-07-19 PROCEDURE — 95251 CONT GLUC MNTR ANALYSIS I&R: CPT | Performed by: PHYSICIAN ASSISTANT

## 2022-07-19 RX ORDER — INSULIN DEGLUDEC 100 U/ML
37 INJECTION, SOLUTION SUBCUTANEOUS DAILY
Qty: 36 UNITS | Refills: 3 | Status: SHIPPED | OUTPATIENT
Start: 2022-07-19 | End: 2022-08-02

## 2022-07-19 ASSESSMENT — PAIN SCALES - GENERAL: PAINLEVEL: NO PAIN (0)

## 2022-07-19 NOTE — PROGRESS NOTES
Date Time Reading Time  Reading    7/19 7AM 141     7/18 7AM 134     7/17 8AM 137     7/16 8AM 220     7/15 3PM 289 8AM 151   7/14 9AM 151     7/13 9PM 190 9AM 173

## 2022-07-19 NOTE — PATIENT INSTRUCTIONS
Dear Isac,    You are doing great work and I am glad to see your diabetes improving!    I hope that you are able to get your air conditioner fixed. Let us know if a letter might help!      Please do give Humalog before your afternoon meal (when blood sugar is >140) as often as possible and before bedtime (when >190) as often as possible.  This will help prevent any prolonged periods of high blood sugar.    Please let me know of ANY blood sugar <55, or frequent <70.    My best wishes,    Gabriela Maxwell PA-C, MPAS  [facility]  Diabetes, Endocrinology, and Metabolism  [phone] Appointments/Nurse  [phone] Fax  [phone] URGENTafter hours/weekend Endocrinologist on call

## 2022-07-19 NOTE — NURSING NOTE
"Chief Complaint   Patient presents with     Diabetes     Vital signs:  Temp: 98.1  F (36.7  C) Temp src: Oral BP: 119/77 Pulse: 85     SpO2: 100 %     Height: 177.8 cm (5' 10\") Weight: 133.4 kg (294 lb 3.2 oz)  Estimated body mass index is 42.21 kg/m  as calculated from the following:    Height as of this encounter: 1.778 m (5' 10\").    Weight as of this encounter: 133.4 kg (294 lb 3.2 oz).          "

## 2022-07-19 NOTE — LETTER
7/19/2022       RE: Isac Li  [address]  [facility] [address]     Dear Colleague,    Thank you for referring your patient, Isac Li, to the [facility] [place] [facility]. Please see a copy of my visit note below.    Outcome for 07/12/22 3:44 PM: [employer] message sent  MELANIE Pagan             Diabetes Consult Note  July 19, 2022          Isac Li is a 67 year old male with a past medical history of TIIDM, major depression with suicidal ideation, HTN, HLP  who is here to establish care for his diabetes following hospitalization beginning in April.        HPI:  He was diagnosed with diabetes over 10 years ago and was taking both long and short acting insulin together with Metformin until he was hospitalized in April 2022 with depression and his diabetes uncontrolled due to not taking medications due to depression.    I met Isac in May of this year and his diabetes was better controlled and he reported feeling motivated to take better care of himself .   We discussed resuming the Ozempic that has been prescribed during his hospitalization.  I also advised him to increase Basaglar dose by 1 unit every other day until most blood sugars are 100 - 140.  He was also referred to diabetes education and support and nutrition.      Interim:    He saw his family practitioner in mid June and his depression was improved as were his recent blood sugar numbers.  He was referred for diabetic eye exam.    Today:  He is now back to work 6- 10 pm 5 days weekly. At times will take a nap in the afternoon and will have a bite to eat before leaving or on the way get [employer].  Not proud of this meal but rarely checks his blood sugar or has Humalog.  He is giving his Basaglar in the evening and has been increasing this as we discussed, up to 39 units each night.  Has not been but could easily give Humalog at this time he  just did not know that he could give both at once.      Second air conditioner is out, 1 in the bedroom has been out for a long time but now the other 1 in the living room has failed and he wonders if his blood sugar control will get worse without any air conditioning.  He has emailed his  but they only work on .    Denies any history of urinary tract infections or balanitis.  Denies any feeling lightheaded when he rises quickly.    Current Diabetes Medications:  Metformin XR 2000 mg daily with dinner  Basaglar 37 units qhs  Ozempic 0.5 tolerated.  Humalog  - Will take in ams always if high, then takes ~3 days weekly before meals when high.      We reviewed glucometer/CGMS data together.  It revealed:  Fasting B - 206  Late afternoon: 190 - 303.      History of Diabetes monitoring and complications/ prevention:  CAD: No  Last eye exam results: : Not Found  Last dental exam: Did not yet inquire.  Microalbuminuria:No results found for: MICROALBUMIN  HTN: Yes on lisinopril 5 mg.  On statin: Yes atorvastatin 20 mg  On ASA: Yes 81 mg enteric-coated  Depression: Yes history of severe major depressive disorder recently hospitalized for suicidality.  ED: I have not yet inquired.    Isac's PMH, PSH and allergies were reviewed today and pertinent information is summarized above.    Isac's pertinent social and family history are also reviewed today and pertinent information is summarized above.  Also noted is: Isac lives alone.  He is a practicing Yazidism.  His main support is his sister.    Current Outpatient Medications   Medication     aspirin (ASA) 81 MG EC tablet     atorvastatin (LIPITOR) 20 MG tablet     hydrOXYzine (ATARAX) 25 MG tablet     insulin glargine (BASAGLAR KWIKPEN) 100 UNIT/ML pen     insulin lispro (HUMALOG KWIKPEN) 100 UNIT/ML (1 unit dial) KWIKPEN     insulin pen needle (32G X 4 MM) 32G X 4 MM miscellaneous     lisinopril (ZESTRIL) 5 MG tablet     metFORMIN (FORTAMET)  "1000 MG 24 hr tablet     phenytoin (DILANTIN) 100 MG capsule     QUEtiapine (SEROQUEL) 50 MG tablet     semaglutide (OZEMPIC, 0.25 OR 0.5 MG/DOSE,) 2 MG/1.5ML SOPN pen     vilazodone (VIIBRYD) 20 MG TABS tablet     No current facility-administered medications for this visit.         ROS:   Patient denies any fevers, chills or sweats as well as any changes in vision, problems with floaters or field cuts in vision, pain or problems with dentition, new or different headaches.  Patient denies symptoms of hypo and hyperglycemia except as above.   Patients denies marked fatigue, cough, shortness of breath, chest pain or pressure.  Isac reports that his mood is much increased.      Exam:    /77 (BP Location: Left arm, Patient Position: Sitting, Cuff Size: Adult Large)   Pulse 85   Temp 98.1  F (36.7  C) (Oral)   Ht 1.778 m (5' 10\")   Wt 133.4 kg (294 lb 3.2 oz)   SpO2 100%   BMI 42.21 kg/m      General: Soft-spoken obese male in NAD.   Psych:  Mood is \"good,\" affect is appropriate.  Thought form and content are fluid and coherent.    HEENT: Eyes and sclera are clear. Extraocular movements are grossly intact without proptosis.  Nares are patent, mucous membranes moist.  Neck: No masses or JVD are noted.    Resp: Easy and unlabored breathing.   Neuro: Alert and oriented, communicating clearly.   Ext: no swelling or edema.    Data:      Recent Labs   Lab Test 04/28/22  0744 04/27/22  1615 03/30/22  0952 12/22/21  1021   A1C 9.8*  --  10.3* 11.5*   TSH  --  2.76  --   --    *  --  122 130*   HDL 48  --  49 44   TRIG 135  --  158* 132   CR  --  0.82  --  0.78       Most recent GFRs:    GFR Estimate   Date Value Ref Range Status   04/27/2022 >90 >60 mL/min/1.73m2 Final     Comment:     Effective December 21, 2021 eGFRcr in adults is calculated using the 2021 CKD-EPI creatinine equation which includes age and gender (Tricia fairchild al., NEJM, DOI: 10.1056/IUGKoa8692039)  This result was previously suppressed from the " chart.   12/22/2021 >90 >60 mL/min/1.73m2 Final     Comment:     Effective December 21, 2021 eGFRcr in adults is calculated using the 2021 CKD-EPI creatinine equation which includes age and gender (Tricia fairchild al., NEJM, DOI: 10.1056/VODPpp8444173)   06/10/2021 >60 >60 mL/min/1.73m2 Final   06/17/2020 >60 >60 mL/min/1.73m2 Final   05/23/2019 >60 >60 mL/min/1.73m2 Final     GFR Estimate If Black   Date Value Ref Range Status   06/10/2021 >60 >60 mL/min/1.73m2 Final   06/17/2020 >60 >60 mL/min/1.73m2 Final   05/23/2019 >60 >60 mL/min/1.73m2 Final           No results found for: CPEPT, GADAB, ISCAB    Most recent eye exam date: : Not Found       Assessment/Plan:    Isac Li is a 67 year old male with type 2 diabetes.      1.  Type 2 diabetes, blood sugar remains above goal but markedly improved.    We discussed options to bring postprandial blood sugars to goal including more frequent Humalog dosing and possibly adding Jardiance which I do favor for cardiovascular benefit.    He however would like to first try giving Humalog at bedtime and as often as possible with his second meal before heading into work.  Discussed working on increasing physical activity and on quality of diet.    Will get an A1c updated in a couple of weeks and I will see him back in a couple of months, he will establish care with MD at that time, we will again see him in late October after his next A1c.        2. DM Complications-     Lifestyle modification focusing on application of a Mediterranean diet or Dietary Approaches to Stop Hypertension (DASH) dietary pattern; reduction of saturated fat and trans fat; increase of dietary n-3 fatty acids, viscous fiber, and plant stanols/sterols intake; and increased physical activity recommended to improve the lipid profile and reduce the risk of developing atherosclerotic cardiovascular disease.     Just taking time to plan foods for his second meal.    36 minutes spent on the date of the encounter  doing chart review, history and exam, documentation, education and counseling, as well as communication and coordination of care, and further activities as noted above.  This time excludes time spent reviewing CGM.      It is my privilege to be involved in the care of the above patient.     Gabriela Maxwell PA-C, MPAS  [facility]  Diabetes, Endocrinology, and Metabolism  [phone] Appointments/Nurse  [phone] pager  [phone] nurse line    This note was completed in part using Dragon voice recognition, and may contain word and grammatical errors.          Date Time Reading Time  Reading    7/19 7AM 141     7/18 7AM 134     7/17 8AM 137     7/16 8AM 220     7/15 3PM 289 8AM 151   7/14 9AM 151     7/13 9PM 190 9AM 173

## 2022-07-28 ENCOUNTER — LAB (OUTPATIENT)
Dept: LAB | Facility: CLINIC | Age: 67
End: 2022-07-28
Payer: COMMERCIAL

## 2022-07-28 DIAGNOSIS — Z79.4 TYPE 2 DIABETES MELLITUS WITHOUT COMPLICATION, WITH LONG-TERM CURRENT USE OF INSULIN (H): ICD-10-CM

## 2022-07-28 DIAGNOSIS — E11.9 TYPE 2 DIABETES MELLITUS WITHOUT COMPLICATION, WITH LONG-TERM CURRENT USE OF INSULIN (H): ICD-10-CM

## 2022-07-28 LAB — HBA1C MFR BLD: 8.5 % (ref 0–5.6)

## 2022-07-28 PROCEDURE — 83036 HEMOGLOBIN GLYCOSYLATED A1C: CPT

## 2022-07-28 PROCEDURE — 36415 COLL VENOUS BLD VENIPUNCTURE: CPT

## 2022-07-30 ENCOUNTER — HEALTH MAINTENANCE LETTER (OUTPATIENT)
Age: 67
End: 2022-07-30

## 2022-08-02 ENCOUNTER — TELEPHONE (OUTPATIENT)
Dept: ENDOCRINOLOGY | Facility: CLINIC | Age: 67
End: 2022-08-02

## 2022-08-02 DIAGNOSIS — E11.9 TYPE 2 DIABETES MELLITUS WITHOUT COMPLICATION, WITH LONG-TERM CURRENT USE OF INSULIN (H): ICD-10-CM

## 2022-08-02 DIAGNOSIS — Z79.4 TYPE 2 DIABETES MELLITUS WITHOUT COMPLICATION, WITH LONG-TERM CURRENT USE OF INSULIN (H): ICD-10-CM

## 2022-08-02 RX ORDER — INSULIN DEGLUDEC 100 U/ML
37 INJECTION, SOLUTION SUBCUTANEOUS DAILY
Qty: 45 ML | Refills: 1 | Status: SHIPPED | OUTPATIENT
Start: 2022-08-02 | End: 2022-08-08

## 2022-08-02 NOTE — TELEPHONE ENCOUNTER
Outpatient Medication Detail     Disp Refills Start End KEVIN   insulin degludec (TRESIBA FLEXTOUCH) 100 UNIT/ML pen 45 mL 1 8/2/2022  No   Sig - Route: Inject 37 Units Subcutaneous daily As long as NO blood sugars <70, please increase dose by 1 unit every other day until most blood sugars are 100 - 140. - Subcutaneous   Sent to pharmacy as: Tresiba FlexTouch 100 UNIT/ML Subcutaneous Solution Pen-injector (insulin degludec)   Class: E-Prescribe   Notes to Pharmacy: If Tresiba not covered, may dispense Lantus, Basaglar, Semglee or Levemir at same dose and frequency.   Order: [id number]   E-Prescribing Status: Receipt confirmed by pharmacy (8/2/2022  1:11 PM CDT)     Pt notified.

## 2022-08-02 NOTE — TELEPHONE ENCOUNTER
Spoke w/ Pt: states he Ozempic is not covered. Out of basaglar. Asking for covered formulary. Semglee is [facility] [place].     [facility] Call Center    Phone Message    May a detailed message be left on voicemail: yes     Reason for Call: Other: patient calling in regarding blood sugars in the morning.  States they are between 89 and 140 in the AM.  Also Insurance no longer covers his Vasolog and he wants to see if it is ok to use his Humanalog that he has left.  Please call back to discuss.  Thank you.     Action Taken: Other: Endo    Travel Screening: Not Applicable

## 2022-08-04 ENCOUNTER — MYC MEDICAL ADVICE (OUTPATIENT)
Dept: FAMILY MEDICINE | Facility: CLINIC | Age: 67
End: 2022-08-04

## 2022-08-04 DIAGNOSIS — Z79.4 TYPE 2 DIABETES MELLITUS WITHOUT COMPLICATION, WITH LONG-TERM CURRENT USE OF INSULIN (H): ICD-10-CM

## 2022-08-04 DIAGNOSIS — E11.9 CONTROLLED TYPE 2 DIABETES MELLITUS WITHOUT COMPLICATION, WITH LONG-TERM CURRENT USE OF INSULIN (H): Primary | ICD-10-CM

## 2022-08-04 DIAGNOSIS — Z79.4 CONTROLLED TYPE 2 DIABETES MELLITUS WITHOUT COMPLICATION, WITH LONG-TERM CURRENT USE OF INSULIN (H): Primary | ICD-10-CM

## 2022-08-04 DIAGNOSIS — E11.9 TYPE 2 DIABETES MELLITUS WITHOUT COMPLICATION, WITH LONG-TERM CURRENT USE OF INSULIN (H): ICD-10-CM

## 2022-08-04 NOTE — TELEPHONE ENCOUNTER
Call to pharmacy after receiving paper refill request, pharmacist reports insurance requires 90 day supply.    semaglutide (OZEMPIC, 0.25 OR 0.5 MG/DOSE,) 2 MG/1.5ML SOPN pen      Last Written Prescription Date:  5/31/22  Last Fill Quantity: 1.5 ml,   # refills: 3  Last Office Visit : 7/18/22  Future Office visit:  11/1/22    Routing refill request to provider for review/approval because:  Dose of medication remains at 0.5 mg every 7 days or increased to 1 mg every 7 days?  Last clinic note:  Current Diabetes Medications:  Metformin XR 2000 mg daily with dinner  Basaglar 37 units qhs  Ozempic 0.5 tolerated.  Humalog  - Will take in ams always if high, then takes ~3 days weekly before meals when high.

## 2022-08-07 DIAGNOSIS — Z79.4 TYPE 2 DIABETES MELLITUS WITHOUT COMPLICATION, WITH LONG-TERM CURRENT USE OF INSULIN (H): ICD-10-CM

## 2022-08-07 DIAGNOSIS — F33.2 SEVERE EPISODE OF RECURRENT MAJOR DEPRESSIVE DISORDER, WITHOUT PSYCHOTIC FEATURES (H): ICD-10-CM

## 2022-08-07 DIAGNOSIS — E11.9 TYPE 2 DIABETES MELLITUS WITHOUT COMPLICATION, WITH LONG-TERM CURRENT USE OF INSULIN (H): ICD-10-CM

## 2022-08-08 RX ORDER — SEMAGLUTIDE 1.34 MG/ML
0.5 INJECTION, SOLUTION SUBCUTANEOUS
Qty: 9 ML | Refills: 3 | Status: SHIPPED | OUTPATIENT
Start: 2022-08-08 | End: 2022-08-08

## 2022-08-08 RX ORDER — VILAZODONE HYDROCHLORIDE 20 MG/1
TABLET ORAL
Qty: 30 TABLET | Refills: 1 | Status: SHIPPED | OUTPATIENT
Start: 2022-08-08 | End: 2022-08-09

## 2022-08-08 RX ORDER — SEMAGLUTIDE 1.34 MG/ML
0.5 INJECTION, SOLUTION SUBCUTANEOUS
Qty: 9 ML | Refills: 3 | Status: SHIPPED | OUTPATIENT
Start: 2022-08-08 | End: 2022-08-15

## 2022-08-08 RX ORDER — INSULIN GLARGINE-YFGN 100 [IU]/ML
37 INJECTION, SOLUTION SUBCUTANEOUS DAILY
Qty: 30 ML | Refills: 3 | Status: SHIPPED | OUTPATIENT
Start: 2022-08-08 | End: 2022-09-28

## 2022-08-09 ENCOUNTER — TELEPHONE (OUTPATIENT)
Dept: FAMILY MEDICINE | Facility: CLINIC | Age: 67
End: 2022-08-09

## 2022-08-09 DIAGNOSIS — F33.2 SEVERE EPISODE OF RECURRENT MAJOR DEPRESSIVE DISORDER, WITHOUT PSYCHOTIC FEATURES (H): ICD-10-CM

## 2022-08-09 RX ORDER — VILAZODONE HYDROCHLORIDE 20 MG/1
20 TABLET ORAL DAILY
Qty: 90 TABLET | Refills: 1 | Status: SHIPPED | OUTPATIENT
Start: 2022-08-09 | End: 2023-11-10 | Stop reason: DRUGHIGH

## 2022-08-15 ENCOUNTER — TELEPHONE (OUTPATIENT)
Dept: FAMILY MEDICINE | Facility: CLINIC | Age: 67
End: 2022-08-15

## 2022-08-15 RX ORDER — DULAGLUTIDE 0.75 MG/.5ML
0.75 INJECTION, SOLUTION SUBCUTANEOUS
Qty: 2 ML | Refills: 0 | Status: SHIPPED | OUTPATIENT
Start: 2022-08-15 | End: 2022-11-14

## 2022-08-15 NOTE — TELEPHONE ENCOUNTER
Prior Authorization Request  Who s requesting:  Pharmacy  Pharmacy Name and Location: [id number]  Medication Name: dulaglutide (TRULICITY) 0.75 MG/0.5ML pen  Insurance Plan: [email or web address]  Insurance Member ID Number:  [id number]  CoverMyMeds Key: N/A  Informed patient that prior authorizations can take up to 10 business days for response:   Yes  Okay to leave a detailed message: Yes

## 2022-08-16 NOTE — TELEPHONE ENCOUNTER
Central Prior Authorization Team   Phone: [phone]    PA Initiation    Medication: TRULICITY) 0.75 MG/0.5ML pen  Insurance Company: [employer] - Phone [phone] Fax [phone]  Pharmacy Filling the Rx: [id number] [address] [place] [address]  Filling Pharmacy Phone: [phone]  Filling Pharmacy Fax:    Start Date: 8/16/2022

## 2022-08-17 NOTE — TELEPHONE ENCOUNTER
Prior Authorization Approval    Authorization Effective Date: 8/16/2022  Authorization Expiration Date: 8/16/2023  Medication: TRULICITY) 0.75 MG/0.5ML pen  Approved Dose/Quantity:    Reference #:     Insurance Company: [employer] - Phone [phone] Fax [phone]  Expected CoPay:       CoPay Card Available:      Foundation Assistance Needed:    Which Pharmacy is filling the prescription (Not needed for infusion/clinic administered): [id number] - [place], [place] - [address] [place] [address]  Pharmacy Notified: Yes  Patient Notified: Yes

## 2022-08-20 RX ORDER — METFORMIN HCL 500 MG
TABLET, EXTENDED RELEASE 24 HR ORAL
Qty: 360 TABLET | Refills: 2 | OUTPATIENT
Start: 2022-08-20

## 2022-08-20 NOTE — TELEPHONE ENCOUNTER
metFORMIN (GLUCOPHAGE-XR) 500 MG 24 hr tablet (Discontinued) 360 tablet 3 2/8/2022 5/13/2022 No   Sig: TAKE 4 TABLETS BY MOUTH WITH THE EVENING MEAL   Sent to pharmacy as: metFORMIN HCl  MG Oral Tablet Extended Release 24 Hour (GLUCOPHAGE-XR)   Class: E-Prescribe   Reason for Discontinue: Stop at Discharge   Order: [id number]

## 2022-09-06 ENCOUNTER — TELEPHONE (OUTPATIENT)
Dept: FAMILY MEDICINE | Facility: CLINIC | Age: 67
End: 2022-09-06

## 2022-09-06 NOTE — TELEPHONE ENCOUNTER
Left message to call back, Dr Russ would like a telephone/virtual visit to complete paperwork.   Please assist in scheduling.

## 2022-09-07 ENCOUNTER — TELEPHONE (OUTPATIENT)
Dept: FAMILY MEDICINE | Facility: CLINIC | Age: 67
End: 2022-09-07

## 2022-09-07 NOTE — TELEPHONE ENCOUNTER
Patient called back and told me that he threw the test away per summer last year because he did not want to take the test.

## 2022-09-07 NOTE — TELEPHONE ENCOUNTER
Patient had Cologuard test delivered last September.  It has not been returned.  Please call patient and remind him the kit will be expiring and will not be processed if received after 9/20/2022.

## 2022-09-07 NOTE — TELEPHONE ENCOUNTER
Patient says he was denied the Long term disability and said it would be a waste of everyone's time to fill out. I let him know we will just shred the ppwk.

## 2022-09-09 DIAGNOSIS — E11.65 TYPE 2 DIABETES MELLITUS WITH HYPERGLYCEMIA, WITH LONG-TERM CURRENT USE OF INSULIN (H): Primary | ICD-10-CM

## 2022-09-09 DIAGNOSIS — Z79.4 TYPE 2 DIABETES MELLITUS WITH HYPERGLYCEMIA, WITH LONG-TERM CURRENT USE OF INSULIN (H): Primary | ICD-10-CM

## 2022-09-09 NOTE — TELEPHONE ENCOUNTER
"Routing refill request to provider for review/approval because:  A break in medication     Current rx on chart:  metFORMIN (FORTAMET) 1000 MG 24 hr tablet  2,000 mg, DAILY WITH SUPPER 0 ordered               Summary: Take 2 tablets (2,000 mg) by mouth daily (with dinner), Disp-60 tablet, R-0, E-Prescribe     Dose, Route, Frequency: 2,000 mg, Oral, DAILY WITH SUPPER    Start: 5/13/2022          Last Written Prescription Date: 5/23/22  Last Fill Quantity: 360, # refills: 2  Last office visit provider: Petrona 6/21/22      Requested Prescriptions   Pending Prescriptions Disp Refills    metFORMIN (GLUCOPHAGE XR) 500 MG 24 hr tablet [Pharmacy Med Name: METFORMIN HCL  MG TABLET] 360 tablet 2     Sig: TAKE 4 TABS BY MOUTH WITH EVENING MEAL        Biguanide Agents Passed - 9/9/2022 11:38 AM        Passed - Patient is age 10 or older        Passed - Patient has documented A1c within the specified period of time.     If HgbA1C is 8 or greater, it needs to be on file within the past 3 months.  If less than 8, must be on file within the past 6 months.     Recent Labs   Lab Test 07/28/22  0947   A1C 8.5*             Passed - Patient's CR is NOT>1.4 OR Patient's EGFR is NOT<45 within past 12 mos.       Recent Labs   Lab Test 04/27/22  1615 12/22/21  1021 06/10/21  1637   GFRESTIMATED >90   < > >60   GFRESTBLACK  --   --  >60    < > = values in this interval not displayed.       Recent Labs   Lab Test 04/27/22  1615   CR 0.82             Passed - Patient does NOT have a diagnosis of CHF.        Passed - Medication is active on med list        Passed - Recent (6 mo) or future (30 days) visit within the authorizing provider's specialty     Patient had office visit in the last 6 months or has a visit in the next 30 days with authorizing provider or within the authorizing provider's specialty.  See \"Patient Info\" tab in inbasket, or \"Choose Columns\" in Meds & Orders section of the refill encounter.                    Karan Bryant, " RN, BSN 09/09/22 2:08 PM

## 2022-09-10 RX ORDER — METFORMIN HCL 500 MG
TABLET, EXTENDED RELEASE 24 HR ORAL
Qty: 360 TABLET | Refills: 2 | Status: SHIPPED | OUTPATIENT
Start: 2022-09-10 | End: 2022-11-23

## 2022-09-23 DIAGNOSIS — F33.2 SEVERE EPISODE OF RECURRENT MAJOR DEPRESSIVE DISORDER, WITHOUT PSYCHOTIC FEATURES (H): ICD-10-CM

## 2022-09-24 NOTE — TELEPHONE ENCOUNTER
"Drug interaction warning    Last Written Prescription Date:  6/26/22  Last Fill Quantity: 30,  # refills: 3   Last office visit provider:  6/21/22     Requested Prescriptions   Pending Prescriptions Disp Refills     QUEtiapine (SEROQUEL) 50 MG tablet [Pharmacy Med Name: QUETIAPINE FUMARATE 50 MG TAB] 90 tablet 1     Sig: TAKE 1 TABLET BY MOUTH EVERYDAY AT BEDTIME       Antipsychotic Medications Passed - 9/23/2022  1:47 AM        Passed - Blood pressure under 140/90 in past 12 months     BP Readings from Last 3 Encounters:   07/19/22 119/77   06/21/22 133/71   05/31/22 (!) 143/85                 Passed - Patient is 12 years of age or older        Passed - Lipid panel on file within the past 12 months     Recent Labs   Lab Test 04/28/22  0744   CHOL 191   TRIG 135   HDL 48   *   NHDL 143*               Passed - CBC on file in past 12 months     Recent Labs   Lab Test 04/27/22  1615   WBC 10.0   RBC 4.74   HGB 13.8   HCT 40.5                    Passed - Heart Rate on file within past 12 months     Pulse Readings from Last 3 Encounters:   07/19/22 85   06/21/22 77   05/31/22 94               Passed - A1c or Glucose on file in past 12 months     Recent Labs   Lab Test 07/28/22  0947 05/13/22  1204   GLC  --  177*   A1C 8.5*  --        Please review patients last 3 weights. If a weight gain of >10 lbs exists, you may refill the prescription once after instructing the patient to schedule an appointment within the next 30 days.    Wt Readings from Last 3 Encounters:   07/19/22 133.4 kg (294 lb 3.2 oz)   06/21/22 132.9 kg (293 lb)   05/31/22 135.5 kg (298 lb 11.2 oz)             Passed - Medication is active on med list        Passed - Recent (6 mo) or future (30 days) visit within the authorizing provider's specialty     Patient had office visit in the last 6 months or has a visit in the next 30 days with authorizing provider or within the authorizing provider's specialty.  See \"Patient Info\" tab in inbasket, " "or \"Choose Columns\" in Meds & Orders section of the refill encounter.                 Kalie Myers RN 09/24/22 10:36 AM  "

## 2022-09-25 RX ORDER — QUETIAPINE FUMARATE 50 MG/1
TABLET, FILM COATED ORAL
Qty: 90 TABLET | Refills: 0 | Status: SHIPPED | OUTPATIENT
Start: 2022-09-25 | End: 2022-12-24

## 2022-09-28 ENCOUNTER — TELEPHONE (OUTPATIENT)
Dept: FAMILY MEDICINE | Facility: CLINIC | Age: 67
End: 2022-09-28

## 2022-09-28 DIAGNOSIS — E11.9 TYPE 2 DIABETES MELLITUS WITHOUT COMPLICATION, WITH LONG-TERM CURRENT USE OF INSULIN (H): ICD-10-CM

## 2022-09-28 DIAGNOSIS — Z79.4 TYPE 2 DIABETES MELLITUS WITHOUT COMPLICATION, WITH LONG-TERM CURRENT USE OF INSULIN (H): ICD-10-CM

## 2022-09-28 RX ORDER — INSULIN DEGLUDEC 100 U/ML
INJECTION, SOLUTION SUBCUTANEOUS
Qty: 15 ML | Refills: 3 | Status: SHIPPED | OUTPATIENT
Start: 2022-09-28 | End: 2022-10-27

## 2022-09-28 NOTE — TELEPHONE ENCOUNTER
Patient called for refill on   Tresiba Flextouch 100 Unit/ML pen.   Stated he is still taking this and only has a couple days left right now of this medication. Please send refill to the [facility] in [place].

## 2022-09-28 NOTE — TELEPHONE ENCOUNTER
Rx pended, please advise   Complex Repair And Dermal Autograft Text: The defect edges were debeveled with a #15 scalpel blade.  The primary defect was closed partially with a complex linear closure.  Given the location of the defect, shape of the defect and the proximity to free margins an dermal autograft was deemed most appropriate to repair the remaining defect.  The graft was trimmed to fit the size of the remaining defect.  The graft was then placed in the primary defect, oriented appropriately, and sutured into place.

## 2022-09-30 NOTE — TELEPHONE ENCOUNTER
Pt called stating that pharmacy has received the prescription but will not allow him to  medication until Oct 8th. He currently only has 3 days left of the medication, will run out on Oct 2nd. Pt requests a call back

## 2022-10-07 NOTE — TELEPHONE ENCOUNTER
RECORDS RECEIVED FROM: internal    DATE RECEIVED: 12.21.22    NOTES (FOR ALL VISITS) STATUS DETAILS   OFFICE NOTES from referring provider internal  [facility] preferred. Follow up on DM2  Gabriela Maxwell PA-C   OFFICE NOTES from other specialist     ED NOTES internal  4/27/22 Jesus    OPERATIVE REPORT  (thyroid, pituitary, adrenal, parathyroid)     MEDICATION LIST internal       LABS     DIABETES: HBGA1C, CREATININE, FASTING LIPIDS, MICROALBUMIN URINE, POTASSIUM, TSH, T4    THYROID: TSH, T4, CBC, THYRODLONULIN, TOTAL T3, FREE T4, CALCITONIN, CEA internal

## 2022-10-09 ENCOUNTER — HEALTH MAINTENANCE LETTER (OUTPATIENT)
Age: 67
End: 2022-10-09

## 2022-10-27 ENCOUNTER — TELEPHONE (OUTPATIENT)
Dept: FAMILY MEDICINE | Facility: CLINIC | Age: 67
End: 2022-10-27

## 2022-10-27 DIAGNOSIS — E11.9 TYPE 2 DIABETES MELLITUS WITHOUT COMPLICATION, WITH LONG-TERM CURRENT USE OF INSULIN (H): ICD-10-CM

## 2022-10-27 DIAGNOSIS — Z79.4 TYPE 2 DIABETES MELLITUS WITHOUT COMPLICATION, WITH LONG-TERM CURRENT USE OF INSULIN (H): ICD-10-CM

## 2022-10-27 RX ORDER — INSULIN DEGLUDEC 100 U/ML
INJECTION, SOLUTION SUBCUTANEOUS
Qty: 60 ML | Refills: 0 | Status: SHIPPED | OUTPATIENT
Start: 2022-10-27 | End: 2023-01-23

## 2022-10-27 NOTE — PROGRESS NOTES
Diabetes Consult Note  November 1, 2022    Isac Li is a 67 year old male with a past medical history of TIIDM, major depression with suicidal ideation, HTN, HLP  who is here for follow-up diabetes following hospitalization beginning in April.        HPI:  Isac was diagnosed with diabetes over 10 years ago and was taking both long and short acting insulin together with Metformin until he was hospitalized in April 2022 with depression and his diabetes uncontrolled due to not taking medications due to depression.    I met Isac in May of this year and his diabetes was better controlled and he reported feeling motivated to take better care of himself .   We discussed resuming the Ozempic that has been prescribed during his hospitalization.  I also advised him to increase Basaglar dose by 1 unit every other day until most blood sugars are 100 - 140.  He was also referred to diabetes education and support and nutrition.    I last saw him in July of this year and advised him to use a sliding scale before his meals when blood sugars greater than 140.    Interim: Appears Isac was denied long-term disability.      Today:  Still battling depression, just started Remeron at night. Notes taking Novolog qhs just half of time. Working 6pm - Midnight.  Eats breakfast 10 or 1030 am and checks BG gives Novolog sliding scale if Bg >140, but rarely the case.  He often naps in early afternoon, will have a light meal salad or sandwich around 4 pm and then dinner after work. The goes to sleep after some relaxation. Has not appreciated any symptoms of low blood sugar for several years.    Eating 50% meals from [employer] and 50% meals from [employer] as well as 2 bagged salad mixes per week. Drinking less soda and more ice tea.      Current Diabetes Medications:  Metformin XR 2000 mg daily with dinner 12:30 at night.   Tresiba 58 units qhs - for the last month - since October 4th.    Ozempic 0.5 tolerated.  Takes  Tuesdays  Humalog  - Will take in ams always if high, then takes ~now about once weekly in evenings when checks.      We reviewed glucometer/CGMS data together.  It revealed:    Date Time Reading   10/31 9 a 98   10/30 9 a 187   10/29 10 a 97   10/28 10 a 88   10/27 9:30 a 124   10/26 9:30 a 111   10/25 9:30 a 111   10/24 8 a 89         History of Diabetes monitoring and complications/ prevention:  CAD: No  Last eye exam results: : Not Found  Last dental exam: Did not yet inquire.  Microalbuminuria:No results found for: MICROALBUMIN  HTN: Yes on lisinopril 5 mg.  On statin: Yes atorvastatin 20 mg  On ASA: Yes 81 mg enteric-coated  Depression: Yes history of severe major depressive disorder recently hospitalized for suicidality.  ED: I have not yet inquired.    Isac's PMH, PSH and allergies were reviewed today and pertinent information is summarized above.    Isac's pertinent social and family history are also reviewed today and pertinent information is summarized above.  Also noted is: Isac lives alone.  He is a practicing Gnosticist.  His main support is his sister.    Current Outpatient Medications   Medication     aspirin (ASA) 81 MG EC tablet     atorvastatin (LIPITOR) 20 MG tablet     dulaglutide (TRULICITY) 0.75 MG/0.5ML pen     hydrOXYzine (ATARAX) 25 MG tablet     insulin degludec (TRESIBA FLEXTOUCH) 100 UNIT/ML pen     insulin lispro (HUMALOG KWIKPEN) 100 UNIT/ML (1 unit dial) KWIKPEN     insulin pen needle (32G X 4 MM) 32G X 4 MM miscellaneous     lisinopril (ZESTRIL) 5 MG tablet     metFORMIN (FORTAMET) 1000 MG 24 hr tablet     metFORMIN (GLUCOPHAGE XR) 500 MG 24 hr tablet     mirtazapine (REMERON) 15 MG tablet     phenytoin (DILANTIN) 100 MG capsule     QUEtiapine (SEROQUEL) 50 MG tablet     vilazodone (VIIBRYD) 20 MG TABS tablet     No current facility-administered medications for this visit.         ROS:   Patient denies any fevers, chills or sweats as well as any changes in vision, problems with  "floaters or field cuts in vision, pain or problems with dentition, new or different headaches.  Patient denies symptoms of hypo and hyperglycemia except as above.   Patients denies marked fatigue, cough, shortness of breath, chest pain or pressure.  Isac reports that his mood is much increased.      Exam:    /69 (BP Location: Right arm, Patient Position: Sitting, Cuff Size: Adult Regular)   Wt 132.3 kg (291 lb 11.2 oz)   BMI 41.85 kg/m      General: Soft-spoken obese male in NAD.   Psych:  Mood is \"good,\" affect is appropriate.  Thought form and content are fluid and coherent.    HEENT: Eyes and sclera are clear. Extraocular movements are grossly intact without proptosis.  Nares are patent, mucous membranes moist.  Neck: No masses or JVD are noted.    Resp: Easy and unlabored breathing.   Neuro: Alert and oriented, communicating clearly.   Ext: no swelling or edema.    Data:      Recent Labs   Lab Test 07/28/22  0947 04/28/22  0744 04/27/22  1615 03/30/22  0952 12/22/21  1021   A1C 8.5* 9.8*  --  10.3* 11.5*   TSH  --   --  2.76  --   --    LDL  --  116*  --  122 130*   HDL  --  48  --  49 44   TRIG  --  135  --  158* 132   CR  --   --  0.82  --  0.78       Most recent GFRs:    GFR Estimate   Date Value Ref Range Status   04/27/2022 >90 >60 mL/min/1.73m2 Final     Comment:     Effective December 21, 2021 eGFRcr in adults is calculated using the 2021 CKD-EPI creatinine equation which includes age and gender ( et al., NEJM, DOI: 10.1056/UBOQcg8353514)  This result was previously suppressed from the chart.   12/22/2021 >90 >60 mL/min/1.73m2 Final     Comment:     Effective December 21, 2021 eGFRcr in adults is calculated using the 2021 CKD-EPI creatinine equation which includes age and gender (Tricia fairchild al., NEJM, DOI: 10.1056/JRMWri7775653)   06/10/2021 >60 >60 mL/min/1.73m2 Final   06/17/2020 >60 >60 mL/min/1.73m2 Final   05/23/2019 >60 >60 mL/min/1.73m2 Final     GFR Estimate If Black   Date Value Ref " Range Status   06/10/2021 >60 >60 mL/min/1.73m2 Final   06/17/2020 >60 >60 mL/min/1.73m2 Final   05/23/2019 >60 >60 mL/min/1.73m2 Final           No results found for: CPEPT, GADAB, ISCAB    Most recent eye exam date: : Not Found       Assessment/Plan:    Isac Li is a 67 year old male with type 2 diabetes.      1.  Type 2 diabetes, blood sugar further improved and is now at goal.    Advised:  Congratulations!  Your blood sugar is much improved!!!!  Please keep up you great work.    Please reduce Tresiba to 55 units each evening.  If >140 3 days in a row,please add another unit.   Please check blood sugar and give sliding scale Humalog before dinner.    Please try to have larger meal before work and a lighter meal for dinner.    Please continue to try eat fruit and vegetables everyday.     He is eating about half of his food from [employer] currently and eating most of his food and carbohydrates after work before going to bed and we discussed trying to eat a larger meal before work and a lighter meal after work as well as ways to continue to improve the quality of his diet and working on physical activity.    50 minutes spent on the date of the encounter doing chart review, history and exam, documentation, education and counseling, as well as communication and coordination of care, and further activities as noted above.  This time excludes time spent reviewing CGM.      It is my privilege to be involved in the care of the above patient.     Gabriela Maxwell PA-C, MPAS  [facility]  Diabetes, Endocrinology, and Metabolism  [phone] Appointments/Nurse  [phone] pager  [phone] nurse line    This note was completed in part using Dragon voice recognition, and may contain word and grammatical errors.

## 2022-10-27 NOTE — TELEPHONE ENCOUNTER
Note from pharmacy: Please send additional fill on tresiba. Pt states they are doing 80 units per night and is running our sooner than what refills we have to fill for him.

## 2022-10-27 NOTE — TELEPHONE ENCOUNTER
Patient calling stating that he is having trouble getting his Tresiba. He is taking differently than prescribed, running out early, and pharmacy will not fill early. Dr. Russ please advise, I pended a new script reflecting the dosage he is taking. He only has 2 days of Tresiba left at this time.    Insulin Degludec 58 units    States that his BGL is 100-110 every morning before eating.    Baljinder Coyle RN     [facility]

## 2022-10-27 NOTE — TELEPHONE ENCOUNTER
Please clarify dosing on Tresiba and if Endocrine is prescribing.  Usually then they write for it.  Let me know if he still wants me to call it in.  Not ethat record has not been updated to his current dose of Tresiba.

## 2022-10-28 NOTE — TELEPHONE ENCOUNTER
Called and spoke with patient. Clarified that patient takes 58 units of Tresiba daily. Patient states that he does not and has not taken 80 units of Tresiba. Per chart review, prescription for Tresiba 58 units daily sent 10/27/22. Current dose is reflected in prescription.    María Montes RN

## 2022-10-31 ENCOUNTER — TELEPHONE (OUTPATIENT)
Dept: ENDOCRINOLOGY | Facility: CLINIC | Age: 67
End: 2022-10-31

## 2022-10-31 NOTE — TELEPHONE ENCOUNTER
Outcome for 10/31/22 11:32 AM :Glucose data obtained via Phone and Located in Table Below       Date Time Reading   10/31 9 a 98   10/30 9 a 187   10/29 10 a 97   10/28 10 a 88   10/27 9:30 a 124   10/26 9:30 a 111   10/25 9:30 a 111   10/24 8 a 89      Appointment reminder phone call made to patient.     Maureen Guerrero

## 2022-11-01 ENCOUNTER — OFFICE VISIT (OUTPATIENT)
Dept: ENDOCRINOLOGY | Facility: CLINIC | Age: 67
End: 2022-11-01
Payer: COMMERCIAL

## 2022-11-01 VITALS — BODY MASS INDEX: 41.85 KG/M2 | DIASTOLIC BLOOD PRESSURE: 69 MMHG | WEIGHT: 291.7 LBS | SYSTOLIC BLOOD PRESSURE: 130 MMHG

## 2022-11-01 DIAGNOSIS — Z79.4 TYPE 2 DIABETES MELLITUS WITHOUT COMPLICATION, WITH LONG-TERM CURRENT USE OF INSULIN (H): Primary | ICD-10-CM

## 2022-11-01 DIAGNOSIS — E11.9 TYPE 2 DIABETES MELLITUS WITHOUT COMPLICATION, WITH LONG-TERM CURRENT USE OF INSULIN (H): Primary | ICD-10-CM

## 2022-11-01 DIAGNOSIS — E11.9 CONTROLLED TYPE 2 DIABETES MELLITUS WITHOUT COMPLICATION, WITH LONG-TERM CURRENT USE OF INSULIN (H): ICD-10-CM

## 2022-11-01 DIAGNOSIS — Z79.4 TYPE 2 DIABETES MELLITUS WITH HYPERGLYCEMIA, WITH LONG-TERM CURRENT USE OF INSULIN (H): ICD-10-CM

## 2022-11-01 DIAGNOSIS — F10.21 ALCOHOL DEPENDENCE IN REMISSION (H): ICD-10-CM

## 2022-11-01 DIAGNOSIS — E11.65 TYPE 2 DIABETES MELLITUS WITH HYPERGLYCEMIA, WITH LONG-TERM CURRENT USE OF INSULIN (H): ICD-10-CM

## 2022-11-01 DIAGNOSIS — G63 POLYNEUROPATHY ASSOCIATED WITH UNDERLYING DISEASE (H): ICD-10-CM

## 2022-11-01 DIAGNOSIS — Z79.4 CONTROLLED TYPE 2 DIABETES MELLITUS WITHOUT COMPLICATION, WITH LONG-TERM CURRENT USE OF INSULIN (H): ICD-10-CM

## 2022-11-01 LAB — HBA1C MFR BLD: 7.5 % (ref 4.3–?)

## 2022-11-01 PROCEDURE — 95251 CONT GLUC MNTR ANALYSIS I&R: CPT | Performed by: PHYSICIAN ASSISTANT

## 2022-11-01 PROCEDURE — 83036 HEMOGLOBIN GLYCOSYLATED A1C: CPT | Performed by: PHYSICIAN ASSISTANT

## 2022-11-01 PROCEDURE — 99215 OFFICE O/P EST HI 40 MIN: CPT | Mod: 25 | Performed by: PHYSICIAN ASSISTANT

## 2022-11-01 RX ORDER — MIRTAZAPINE 15 MG/1
15 TABLET, FILM COATED ORAL AT BEDTIME
COMMUNITY
Start: 2022-10-28 | End: 2024-08-16 | Stop reason: DRUGHIGH

## 2022-11-01 NOTE — LETTER
11/1/2022       RE: Isac Li  [address]  [place] [address]     Dear Colleague,    Thank you for referring your patient, Isac Li, to the [facility] [place] [facility]. Please see a copy of my visit note below.                 Diabetes Consult Note  November 1, 2022    Isac Li is a 67 year old male with a past medical history of TIIDM, major depression with suicidal ideation, HTN, HLP  who is here for follow-up diabetes following hospitalization beginning in April.        HPI:  Isac was diagnosed with diabetes over 10 years ago and was taking both long and short acting insulin together with Metformin until he was hospitalized in April 2022 with depression and his diabetes uncontrolled due to not taking medications due to depression.    I met Isac in May of this year and his diabetes was better controlled and he reported feeling motivated to take better care of himself .   We discussed resuming the Ozempic that has been prescribed during his hospitalization.  I also advised him to increase Basaglar dose by 1 unit every other day until most blood sugars are 100 - 140.  He was also referred to diabetes education and support and nutrition.    I last saw him in July of this year and advised him to use a sliding scale before his meals when blood sugars greater than 140.    Interim: Appears Isac was denied long-term disability.      Today:  Still battling depression, just started Remeron at night. Notes taking Novolog qhs just half of time. Working 6pm - Midnight.  Eats breakfast 10 or 1030 am and checks BG gives Novolog sliding scale if Bg >140, but rarely the case.  He often naps in early afternoon, will have a light meal salad or sandwich around 4 pm and then dinner after work. The goes to sleep after some relaxation. Has not appreciated any symptoms of low blood sugar for several years.    Eating 50%  meals from [employer] and 50% meals from [employer] as well as 2 bagged salad mixes per week. Drinking less soda and more ice tea.      Current Diabetes Medications:  Metformin XR 2000 mg daily with dinner 12:30 at night.   Tresiba 58 units qhs - for the last month - since October 4th.    Ozempic 0.5 tolerated.  Takes Tuesdays  Humalog  - Will take in ams always if high, then takes ~now about once weekly in evenings when checks.      We reviewed glucometer/CGMS data together.  It revealed:    Date Time Reading   10/31 9 a 98   10/30 9 a 187   10/29 10 a 97   10/28 10 a 88   10/27 9:30 a 124   10/26 9:30 a 111   10/25 9:30 a 111   10/24 8 a 89         History of Diabetes monitoring and complications/ prevention:  CAD: No  Last eye exam results: : Not Found  Last dental exam: Did not yet inquire.  Microalbuminuria:No results found for: MICROALBUMIN  HTN: Yes on lisinopril 5 mg.  On statin: Yes atorvastatin 20 mg  On ASA: Yes 81 mg enteric-coated  Depression: Yes history of severe major depressive disorder recently hospitalized for suicidality.  ED: I have not yet inquired.    Isac's PMH, PSH and allergies were reviewed today and pertinent information is summarized above.    Isac's pertinent social and family history are also reviewed today and pertinent information is summarized above.  Also noted is: Isac lives alone.  He is a practicing [employer].  His main support is his sister.    Current Outpatient Medications   Medication     aspirin (ASA) 81 MG EC tablet     atorvastatin (LIPITOR) 20 MG tablet     dulaglutide (TRULICITY) 0.75 MG/0.5ML pen     hydrOXYzine (ATARAX) 25 MG tablet     insulin degludec (TRESIBA FLEXTOUCH) 100 UNIT/ML pen     insulin lispro (HUMALOG KWIKPEN) 100 UNIT/ML (1 unit dial) KWIKPEN     insulin pen needle (32G X 4 MM) 32G X 4 MM miscellaneous     lisinopril (ZESTRIL) 5 MG tablet     metFORMIN (FORTAMET) 1000 MG 24 hr tablet     metFORMIN (GLUCOPHAGE XR) 500 MG 24 hr tablet     mirtazapine  "(REMERON) 15 MG tablet     phenytoin (DILANTIN) 100 MG capsule     QUEtiapine (SEROQUEL) 50 MG tablet     vilazodone (VIIBRYD) 20 MG TABS tablet     No current facility-administered medications for this visit.         ROS:   Patient denies any fevers, chills or sweats as well as any changes in vision, problems with floaters or field cuts in vision, pain or problems with dentition, new or different headaches.  Patient denies symptoms of hypo and hyperglycemia except as above.   Patients denies marked fatigue, cough, shortness of breath, chest pain or pressure.  Isac reports that his mood is much increased.      Exam:    /69 (BP Location: Right arm, Patient Position: Sitting, Cuff Size: Adult Regular)   Wt 132.3 kg (291 lb 11.2 oz)   BMI 41.85 kg/m      General: Soft-spoken obese male in NAD.   Psych:  Mood is \"good,\" affect is appropriate.  Thought form and content are fluid and coherent.    HEENT: Eyes and sclera are clear. Extraocular movements are grossly intact without proptosis.  Nares are patent, mucous membranes moist.  Neck: No masses or JVD are noted.    Resp: Easy and unlabored breathing.   Neuro: Alert and oriented, communicating clearly.   Ext: no swelling or edema.    Data:      Recent Labs   Lab Test 07/28/22  0947 04/28/22  0744 04/27/22  1615 03/30/22  0952 12/22/21  1021   A1C 8.5* 9.8*  --  10.3* 11.5*   TSH  --   --  2.76  --   --    LDL  --  116*  --  122 130*   HDL  --  48  --  49 44   TRIG  --  135  --  158* 132   CR  --   --  0.82  --  0.78       Most recent GFRs:    GFR Estimate   Date Value Ref Range Status   04/27/2022 >90 >60 mL/min/1.73m2 Final     Comment:     Effective December 21, 2021 eGFRcr in adults is calculated using the 2021 CKD-EPI creatinine equation which includes age and gender (Tricia fairchild al., NEJM, DOI: 10.1056/DUDVip7562058)  This result was previously suppressed from the chart.   12/22/2021 >90 >60 mL/min/1.73m2 Final     Comment:     Effective December 21, 2021 " eGFRcr in adults is calculated using the 2021 CKD-EPI creatinine equation which includes age and gender (Tricia et al., NE, DOI: 10.1056/WNHGvi6564495)   06/10/2021 >60 >60 mL/min/1.73m2 Final   06/17/2020 >60 >60 mL/min/1.73m2 Final   05/23/2019 >60 >60 mL/min/1.73m2 Final     GFR Estimate If Black   Date Value Ref Range Status   06/10/2021 >60 >60 mL/min/1.73m2 Final   06/17/2020 >60 >60 mL/min/1.73m2 Final   05/23/2019 >60 >60 mL/min/1.73m2 Final           No results found for: CPEPT, GADAB, ISCAB    Most recent eye exam date: : Not Found       Assessment/Plan:    Isac Li is a 67 year old male with type 2 diabetes.      1.  Type 2 diabetes, blood sugar further improved and is now at goal.    Advised:  Congratulations!  Your blood sugar is much improved!!!!  Please keep up you great work.    Please reduce Tresiba to 55 units each evening.  If >140 3 days in a row,please add another unit.   Please check blood sugar and give sliding scale Humalog before dinner.    Please try to have larger meal before work and a lighter meal for dinner.    Please continue to try eat fruit and vegetables everyday.     He is eating about half of his food from [employer] currently and eating most of his food and carbohydrates after work before going to bed and we discussed trying to eat a larger meal before work and a lighter meal after work as well as ways to continue to improve the quality of his diet and working on physical activity.    50 minutes spent on the date of the encounter doing chart review, history and exam, documentation, education and counseling, as well as communication and coordination of care, and further activities as noted above.  This time excludes time spent reviewing CGM.      It is my privilege to be involved in the care of the above patient.     Gabriela Maxwell PA-C, MPAS  [facility]  Diabetes, Endocrinology, and Metabolism  [phone] Appointments/Nurse  [phone]  pager  [phone] nurse line    This note was completed in part using Dragon voice recognition, and may contain word and grammatical errors.

## 2022-11-01 NOTE — PATIENT INSTRUCTIONS
Dear Isac,   Congratulations you blood sugar is much improved!!!!  Please keep up you great work.    Please reduce Tresiba to 55 units each evening.  If >140 3 days in a row,please add another unit.   Please check blood sugar and give sliding scale Humalog before dinner.    Please try to have larger meal before work and a lighter meal for dinner.    Please continue to try eat fruit and vegetables everyday.     My best wishes,    Gabriela Maxwell PA-C, MPAS  [facility]  Diabetes, Endocrinology, and Metabolism  [phone] Appointments/Nurse  [phone] Fax  [phone] URGENTafter hours/weekend Endocrinologist on call

## 2022-11-14 RX ORDER — DULAGLUTIDE 0.75 MG/.5ML
0.75 INJECTION, SOLUTION SUBCUTANEOUS
Qty: 7 ML | Refills: 3 | Status: SHIPPED | OUTPATIENT
Start: 2022-11-14 | End: 2022-11-23

## 2022-11-19 DIAGNOSIS — G40.909 SEIZURE DISORDER (H): ICD-10-CM

## 2022-11-20 RX ORDER — PHENYTOIN SODIUM 100 MG/1
500 CAPSULE, EXTENDED RELEASE ORAL AT BEDTIME
Qty: 450 CAPSULE | Refills: 1 | Status: SHIPPED | OUTPATIENT
Start: 2022-11-20 | End: 2023-04-28

## 2022-11-20 NOTE — TELEPHONE ENCOUNTER
"Routing refill request to provider for review/approval because:  Needs review    Last Written Prescription Date:  6/23/22  Last Fill Quantity: 150,  # refills: 5   Last office visit provider:  6/21/22     Requested Prescriptions   Pending Prescriptions Disp Refills     phenytoin (DILANTIN) 100 MG ER capsule [Pharmacy Med Name: PHENYTOIN SOD  MG CAP] 450 capsule 1     Sig: TAKE 5 CAPSULES (500 MG) BY MOUTH AT BEDTIME       Anti-Seizure Meds Protocol  Failed - 11/19/2022  8:49 AM        Failed - Review Authorizing provider's last note.      Refer to last progress notes: confirm request is for original authorizing provider (cannot be through other providers).          Failed - Dilantin level within therapeutic range in past 26 months     Recent Labs   Lab Test 04/27/22  1615   DILANTIN 10.0       Dilantin level must be checked 2-4 weeks after dosage change.          Passed - Recent (12 mo) or future (30 days) visit within the authorizing provider's specialty     Patient has had an office visit with the authorizing provider or a provider within the authorizing providers department within the previous 12 mos or has a future within next 30 days. See \"Patient Info\" tab in inbasket, or \"Choose Columns\" in Meds & Orders section of the refill encounter.              Passed - Normal CBC on file in past 26 months     Recent Labs   Lab Test 04/27/22  1615   WBC 10.0   RBC 4.74   HGB 13.8   HCT 40.5                    Passed - Normal ALT or AST on file in past 26 months     Recent Labs   Lab Test 04/27/22  1615   ALT 25     Recent Labs   Lab Test 04/27/22  1615   AST 9             Passed - Normal platelet count on file in past 26 months     Recent Labs   Lab Test 04/27/22  1615                  Passed - Medication is active on med list             Kalie Myers RN 11/20/22 11:56 AM  "

## 2022-11-23 ENCOUNTER — OFFICE VISIT (OUTPATIENT)
Dept: FAMILY MEDICINE | Facility: CLINIC | Age: 67
End: 2022-11-23
Payer: COMMERCIAL

## 2022-11-23 VITALS
BODY MASS INDEX: 42.29 KG/M2 | HEART RATE: 68 BPM | SYSTOLIC BLOOD PRESSURE: 114 MMHG | WEIGHT: 295.4 LBS | RESPIRATION RATE: 16 BRPM | HEIGHT: 70 IN | OXYGEN SATURATION: 98 % | DIASTOLIC BLOOD PRESSURE: 60 MMHG

## 2022-11-23 DIAGNOSIS — E11.65 TYPE 2 DIABETES MELLITUS WITH HYPERGLYCEMIA, WITH LONG-TERM CURRENT USE OF INSULIN (H): ICD-10-CM

## 2022-11-23 DIAGNOSIS — Z12.5 SCREENING PSA (PROSTATE SPECIFIC ANTIGEN): ICD-10-CM

## 2022-11-23 DIAGNOSIS — E66.813 CLASS 3 SEVERE OBESITY DUE TO EXCESS CALORIES WITH SERIOUS COMORBIDITY AND BODY MASS INDEX (BMI) OF 40.0 TO 44.9 IN ADULT (H): ICD-10-CM

## 2022-11-23 DIAGNOSIS — Z23 NEED FOR INFLUENZA VACCINATION: ICD-10-CM

## 2022-11-23 DIAGNOSIS — Z12.11 SCREEN FOR COLON CANCER: ICD-10-CM

## 2022-11-23 DIAGNOSIS — E78.00 HYPERCHOLESTEREMIA: ICD-10-CM

## 2022-11-23 DIAGNOSIS — E66.01 CLASS 3 SEVERE OBESITY DUE TO EXCESS CALORIES WITH SERIOUS COMORBIDITY AND BODY MASS INDEX (BMI) OF 40.0 TO 44.9 IN ADULT (H): ICD-10-CM

## 2022-11-23 DIAGNOSIS — Z79.4 TYPE 2 DIABETES MELLITUS WITH HYPERGLYCEMIA, WITH LONG-TERM CURRENT USE OF INSULIN (H): ICD-10-CM

## 2022-11-23 DIAGNOSIS — Z00.00 ENCOUNTER FOR ANNUAL WELLNESS VISIT (AWV) IN MEDICARE PATIENT: ICD-10-CM

## 2022-11-23 DIAGNOSIS — Z23 NEED FOR COVID-19 VACCINE: ICD-10-CM

## 2022-11-23 LAB
ALBUMIN SERPL BCG-MCNC: 3.9 G/DL (ref 3.5–5.2)
ALP SERPL-CCNC: 143 U/L (ref 40–129)
ALT SERPL W P-5'-P-CCNC: 34 U/L (ref 10–50)
ANION GAP SERPL CALCULATED.3IONS-SCNC: 13 MMOL/L (ref 7–15)
AST SERPL W P-5'-P-CCNC: 26 U/L (ref 10–50)
BILIRUB SERPL-MCNC: 0.2 MG/DL
BUN SERPL-MCNC: 19 MG/DL (ref 8–23)
CALCIUM SERPL-MCNC: 8.9 MG/DL (ref 8.8–10.2)
CHLORIDE SERPL-SCNC: 107 MMOL/L (ref 98–107)
CHOLEST SERPL-MCNC: 158 MG/DL
CREAT SERPL-MCNC: 0.76 MG/DL (ref 0.67–1.17)
CREAT UR-MCNC: 304 MG/DL
DEPRECATED HCO3 PLAS-SCNC: 22 MMOL/L (ref 22–29)
ERYTHROCYTE [DISTWIDTH] IN BLOOD BY AUTOMATED COUNT: 13.2 % (ref 10–15)
GFR SERPL CREATININE-BSD FRML MDRD: >90 ML/MIN/1.73M2
GLUCOSE SERPL-MCNC: 154 MG/DL (ref 70–99)
HCT VFR BLD AUTO: 39.5 % (ref 40–53)
HDLC SERPL-MCNC: 44 MG/DL
HGB BLD-MCNC: 13.4 G/DL (ref 13.3–17.7)
LDLC SERPL CALC-MCNC: 95 MG/DL
MCH RBC QN AUTO: 28.8 PG (ref 26.5–33)
MCHC RBC AUTO-ENTMCNC: 33.9 G/DL (ref 31.5–36.5)
MCV RBC AUTO: 85 FL (ref 78–100)
MICROALBUMIN UR-MCNC: 43.4 MG/L
MICROALBUMIN/CREAT UR: 14.28 MG/G CR (ref 0–17)
NONHDLC SERPL-MCNC: 114 MG/DL
PLATELET # BLD AUTO: 225 10E3/UL (ref 150–450)
POTASSIUM SERPL-SCNC: 3.8 MMOL/L (ref 3.4–5.3)
PROT SERPL-MCNC: 6.5 G/DL (ref 6.4–8.3)
PSA SERPL-MCNC: 0.22 NG/ML (ref 0–4.5)
RBC # BLD AUTO: 4.66 10E6/UL (ref 4.4–5.9)
SODIUM SERPL-SCNC: 142 MMOL/L (ref 136–145)
TRIGL SERPL-MCNC: 97 MG/DL
WBC # BLD AUTO: 9 10E3/UL (ref 4–11)

## 2022-11-23 PROCEDURE — 36415 COLL VENOUS BLD VENIPUNCTURE: CPT | Performed by: FAMILY MEDICINE

## 2022-11-23 PROCEDURE — 80061 LIPID PANEL: CPT | Performed by: FAMILY MEDICINE

## 2022-11-23 PROCEDURE — 82043 UR ALBUMIN QUANTITATIVE: CPT | Performed by: FAMILY MEDICINE

## 2022-11-23 PROCEDURE — 80053 COMPREHEN METABOLIC PANEL: CPT | Performed by: FAMILY MEDICINE

## 2022-11-23 PROCEDURE — 85027 COMPLETE CBC AUTOMATED: CPT | Performed by: FAMILY MEDICINE

## 2022-11-23 PROCEDURE — 99397 PER PM REEVAL EST PAT 65+ YR: CPT | Mod: 25 | Performed by: FAMILY MEDICINE

## 2022-11-23 PROCEDURE — 0124A COVID-19 VACCINE BIVALENT BOOSTER 12+ (PFIZER): CPT | Performed by: FAMILY MEDICINE

## 2022-11-23 PROCEDURE — 91312 COVID-19 VACCINE BIVALENT BOOSTER 12+ (PFIZER): CPT | Performed by: FAMILY MEDICINE

## 2022-11-23 PROCEDURE — G0103 PSA SCREENING: HCPCS | Performed by: FAMILY MEDICINE

## 2022-11-23 PROCEDURE — 90471 IMMUNIZATION ADMIN: CPT | Performed by: FAMILY MEDICINE

## 2022-11-23 PROCEDURE — 99214 OFFICE O/P EST MOD 30 MIN: CPT | Mod: 25 | Performed by: FAMILY MEDICINE

## 2022-11-23 PROCEDURE — 90662 IIV NO PRSV INCREASED AG IM: CPT | Performed by: FAMILY MEDICINE

## 2022-11-23 RX ORDER — METFORMIN HCL 500 MG
TABLET, EXTENDED RELEASE 24 HR ORAL
Qty: 360 TABLET | Refills: 2 | Status: SHIPPED | OUTPATIENT
Start: 2022-11-23 | End: 2023-05-05

## 2022-11-23 RX ORDER — SEMAGLUTIDE 1.34 MG/ML
0.5 INJECTION, SOLUTION SUBCUTANEOUS
Qty: 4.5 ML | Refills: 3
Start: 2022-11-23 | End: 2023-05-11

## 2022-11-23 ASSESSMENT — ENCOUNTER SYMPTOMS
PARESTHESIAS: 0
EYE PAIN: 0
DIZZINESS: 0
HEMATURIA: 0
MYALGIAS: 0
HEARTBURN: 0
SHORTNESS OF BREATH: 0
HEMATOCHEZIA: 0
COUGH: 0
JOINT SWELLING: 0
WEAKNESS: 0
HEADACHES: 0
DYSURIA: 0
CONSTIPATION: 0
DIARRHEA: 0
CHILLS: 0
NAUSEA: 0
NERVOUS/ANXIOUS: 0
ABDOMINAL PAIN: 0
FEVER: 0
FREQUENCY: 0
PALPITATIONS: 0
SORE THROAT: 0
ARTHRALGIAS: 0

## 2022-11-23 ASSESSMENT — ACTIVITIES OF DAILY LIVING (ADL): CURRENT_FUNCTION: NO ASSISTANCE NEEDED

## 2022-11-23 NOTE — LETTER
November 29, 2022      Isac Li  [address]  [facility] [address]        Dear ,  We are writing to inform you of your test results.    Hi Isac:  Your PSA is normal and stable.  The PSA should be rechecked in one year.  The cholesterol panel looks good.  The blood sugar is consistent with your diabetes.  The alkaline p[hosphatase is not concerning.  The remaining labs are normal.     Resulted Orders   Comprehensive metabolic panel (BMP + Alb, Alk Phos, ALT, AST, Total. Bili, TP)   Result Value Ref Range    Sodium 142 136 - 145 mmol/L    Potassium 3.8 3.4 - 5.3 mmol/L    Chloride 107 98 - 107 mmol/L    Carbon Dioxide (CO2) 22 22 - 29 mmol/L    Anion Gap 13 7 - 15 mmol/L    Urea Nitrogen 19.0 8.0 - 23.0 mg/dL    Creatinine 0.76 0.67 - 1.17 mg/dL    Calcium 8.9 8.8 - 10.2 mg/dL    Glucose 154 (H) 70 - 99 mg/dL    Alkaline Phosphatase 143 (H) 40 - 129 U/L    AST 26 10 - 50 U/L    ALT 34 10 - 50 U/L    Protein Total 6.5 6.4 - 8.3 g/dL    Albumin 3.9 3.5 - 5.2 g/dL    Bilirubin Total 0.2 <=1.2 mg/dL    GFR Estimate >90 >60 mL/min/1.73m2      Comment:      Effective December 21, 2021 eGFRcr in adults is calculated using the 2021 CKD-EPI creatinine equation which includes age and gender (Tricia fairchild al., NEJM, DOI: 10.1056/VHHNrq9559310)   Lipid panel   Result Value Ref Range    Cholesterol 158 <200 mg/dL    Triglycerides 97 <150 mg/dL    Direct Measure HDL 44 >=40 mg/dL    LDL Cholesterol Calculated 95 <=100 mg/dL    Non HDL Cholesterol 114 <130 mg/dL    Narrative    Cholesterol  Desirable:  <200 mg/dL    Triglycerides  Normal:  Less than 150 mg/dL  Borderline High:  150-199 mg/dL  High:  200-499 mg/dL  Very High:  Greater than or equal to 500 mg/dL    Direct Measure HDL  Female:  Greater than or equal to 50 mg/dL   Male:  Greater than or equal to 40 mg/dL    LDL Cholesterol  Desirable:  <100mg/dL  Above Desirable:  100-129 mg/dL   Borderline High:  130-159 mg/dL   High:  160-189 mg/dL   Very  High:  >= 190 mg/dL    Non HDL Cholesterol  Desirable:  130 mg/dL  Above Desirable:  130-159 mg/dL  Borderline High:  160-189 mg/dL  High:  190-219 mg/dL  Very High:  Greater than or equal to 220 mg/dL   PSA, screen   Result Value Ref Range    Prostate Specific Antigen Screen 0.22 0.00 - 4.50 ng/mL    Narrative    This result is obtained using the Roche Elecsys total PSA method on the bennett e801 immunoassay analyzer. Results obtained with different assay methods or kits cannot be used interchangeably.   CBC with platelets   Result Value Ref Range    WBC Count 9.0 4.0 - 11.0 10e3/uL    RBC Count 4.66 4.40 - 5.90 10e6/uL    Hemoglobin 13.4 13.3 - 17.7 g/dL    Hematocrit 39.5 (L) 40.0 - 53.0 %    MCV 85 78 - 100 fL    MCH 28.8 26.5 - 33.0 pg    MCHC 33.9 31.5 - 36.5 g/dL    RDW 13.2 10.0 - 15.0 %    Platelet Count 225 150 - 450 10e3/uL   Albumin Random Urine Quantitative with Creat Ratio   Result Value Ref Range    Albumin Urine mg/L 43.4 mg/L      Comment:      The reference ranges have not been established in urine albumin. The results should be integrated into the clinical context for interpretation.    Albumin Urine mg/g Cr 14.28 0.00 - 17.00 mg/g Cr      Comment:      Microalbuminuria is defined as an albumin:creatinine ratio of 17 to 299 for males and 25 to 299 for females. A ratio of albumin:creatinine of 300 or higher is indicative of overt proteinuria.  Due to biologic variability, positive results should be confirmed by a second, first-morning random or 24-hour timed urine specimen. If there is discrepancy, a third specimen is recommended. When 2 out of 3 results are in the microalbuminuria range, this is evidence for incipient nephropathy and warrants increased efforts at glucose control, blood pressure control, and institution of therapy with an angiotensin-converting-enzyme (ACE) inhibitor (if the patient can tolerate it).      Creatinine Urine mg/dL 304.0 mg/dL      Comment:      The reference ranges have  not been established in urine creatinine. The results should be integrated into the clinical context for interpretation.       If you have any questions or concerns, please call the clinic at the number listed above.       Sincerely,      Marquise Russ MD

## 2022-11-23 NOTE — PROGRESS NOTES
"SUBJECTIVE:   Isac is a 67 year old who presents for Preventive Visit.    Patient has been advised of split billing requirements and indicates understanding: Yes  Are you in the first 12 months of your Medicare coverage?  No    Healthy Habits:     In general, how would you rate your overall health?  Good    Frequency of exercise:  None    Duration of exercise:  Less than 15 minutes    Do you usually eat at least 4 servings of fruit and vegetables a day, include whole grains    & fiber and avoid regularly eating high fat or \"junk\" foods?  No    Taking medications regularly:  Yes    Barriers to taking medications:  None    Medication side effects:  Not applicable    Ability to successfully perform activities of daily living:  No assistance needed    Home Safety:  No safety concerns identified    Hearing Impairment:  Need to ask people to speak up or repeat themselves and find that men's voices are easier to understand than woman's    In the past 6 months, have you been bothered by leaking of urine? Yes    In general, how would you rate your overall mental or emotional health?  Good      PHQ-2 Total Score: 1    Additional concerns today:  No      Have you ever done Advance Care Planning? (For example, a Health Directive, POLST, or a discussion with a medical provider or your loved ones about your wishes): No, advance care planning information given to patient to review.  Patient plans to discuss their wishes with loved ones or provider.         Fall risk  Fallen 2 or more times in the past year?: No  Any fall with injury in the past year?: No    Cognitive Screening   1) Repeat 3 items (Leader, Season, Table)    2) Clock draw: NORMAL  3) 3 item recall: Recalls 3 objects  Results: 3 items recalled: COGNITIVE IMPAIRMENT LESS LIKELY    Mini-CogTM Copyright MANDO Barlow. Licensed by the author for use in [facility]; reprinted with permission ([email or web address]). All rights reserved.      Do you have sleep apnea, " excessive snoring or daytime drowsiness?: yes    Reviewed and updated as needed this visit by clinical staff   Tobacco  Allergies  Meds              Reviewed and updated as needed this visit by Provider                 Social History     Tobacco Use     Smoking status: Former     Types: Cigarettes     Quit date: 2/15/1990     Years since quittin.7     Smokeless tobacco: Never   Substance Use Topics     Alcohol use: No     If you drink alcohol do you typically have >3 drinks per day or >7 drinks per week? Not applicable    Alcohol Use 2022   Prescreen: >3 drinks/day or >7 drinks/week? Not Applicable   Prescreen: >3 drinks/day or >7 drinks/week? -   No flowsheet data found.            Current providers sharing in care for this patient include:   Patient Care Team:  Marquise Russ MD as PCP - General (Family Medicine)  Jamaal Matson, PharmD as Pharmacist (Pharmacist)  Marquise Russ MD as Assigned PCP  Gabriela Maxwell PA-C as Physician Assistant (Endocrinology, Diabetes, and Metabolism)  Nancy Smith MD as MD (Endocrinology, Diabetes, and Metabolism)  Sharmila Carlin, RN as Diabetes Educator (Diabetes Education)  Gabriela Maxwell PA-C as Assigned Endocrinology Provider  Chel Finch PharmD as MTM Pharamcist (Pharmacist)    The following health maintenance items are reviewed in Epic and correct as of today:  Health Maintenance   Topic Date Due     DIABETIC FOOT EXAM  Never done     DEPRESSION ACTION PLAN  Never done     COLORECTAL CANCER SCREENING  Never done     MEDICARE ANNUAL WELLNESS VISIT  Never done     AORTIC ANEURYSM SCREENING (SYSTEM ASSIGNED)  Never done     EYE EXAM  2021     PHQ-9  2022     Pneumococcal Vaccine: 65+ Years (3 - PPSV23 if available, else PCV20) 2023     ANNUAL REVIEW OF HM ORDERS  2023     A1C  2023     BMP  2023     LIPID  2023     MICROALBUMIN  2023     FALL RISK ASSESSMENT  2023     ADVANCE  "CARE PLANNING  11/23/2027     DTAP/TDAP/TD IMMUNIZATION (3 - Td or Tdap) 06/17/2030     HEPATITIS C SCREENING  Completed     INFLUENZA VACCINE  Completed     ZOSTER IMMUNIZATION  Completed     COVID-19 Vaccine  Completed     IPV IMMUNIZATION  Aged Out     MENINGITIS IMMUNIZATION  Aged Out       Review of Systems   Constitutional: Negative for chills and fever.   HENT: Negative for congestion, ear pain, hearing loss and sore throat.    Eyes: Negative for pain and visual disturbance.   Respiratory: Negative for cough and shortness of breath.    Cardiovascular: Negative for chest pain, palpitations and peripheral edema.   Gastrointestinal: Negative for abdominal pain, constipation, diarrhea, heartburn, hematochezia and nausea.   Genitourinary: Negative for dysuria, frequency, genital sores, hematuria, impotence, penile discharge and urgency.   Musculoskeletal: Negative for arthralgias, joint swelling and myalgias.   Skin: Negative for rash.   Neurological: Negative for dizziness, weakness, headaches and paresthesias.   Psychiatric/Behavioral: Positive for mood changes. The patient is not nervous/anxious.          OBJECTIVE:   /60 (BP Location: Left arm, Patient Position: Sitting, Cuff Size: Adult Large)   Pulse 68   Resp 16   Ht 1.778 m (5' 10\")   Wt 134 kg (295 lb 6.4 oz)   SpO2 98%   BMI 42.39 kg/m   Estimated body mass index is 42.39 kg/m  as calculated from the following:    Height as of this encounter: 1.778 m (5' 10\").    Weight as of this encounter: 134 kg (295 lb 6.4 oz).  Physical Exam  GENERAL: healthy, alert and no distress  EYES: Eyes grossly normal to inspection, PERRL and conjunctivae and sclerae normal  HENT: ear canals and TM's normal, nose and mouth without ulcers or lesions  NECK: no adenopathy, no asymmetry, masses, or scars and thyroid normal to palpation  RESP: lungs clear to auscultation - no rales, rhonchi or wheezes  CV: regular rate and rhythm, normal S1 S2, no S3 or S4, no murmur, " click or rub, no peripheral edema and peripheral pulses strong  ABDOMEN: soft, nontender, no hepatosplenomegaly, no masses and bowel sounds normal   (male): exam declined.  Yearly PSA  MS: no gross musculoskeletal defects noted, no edema  SKIN: no suspicious lesions or rashes  NEURO: Normal strength and tone, mentation intact and speech normal  PSYCH: mentation appears normal, affect normal/bright    FEET:  MF TESTING:  POOR SENSATION              SKIN EXAM:  NL              VASCULAR:   R DP  PULSE: +                                      L DP  PULSE: +                                      R PT  PULSE: -                                      L PT  PULSE: -        Prostate Specific Antigen Screen   Date Value Ref Range Status   11/23/2022 0.22 0.00 - 4.50 ng/mL Final   12/22/2021 0.25 0.00 - 4.50 ug/L Final     Lab Results   Component Value Date    A1C 8.5 07/28/2022    A1C 9.8 04/28/2022    A1C 10.3 03/30/2022    A1C 11.5 12/22/2021    A1C 8.7 09/21/2021     A1C  7.5  11-1-22    Lab Results   Component Value Date    CHOL 158 11/23/2022     Lab Results   Component Value Date    HDL 44 11/23/2022     Lab Results   Component Value Date    LDL 95 11/23/2022     Lab Results   Component Value Date    TRIG 97 11/23/2022     No results found for: CHOLHDLRATIO        ASSESSMENT / PLAN:       ICD-10-CM    1. Encounter for annual wellness visit (AWV) in Medicare patient  Z00.00 CBC with platelets     CBC with platelets      2. Type 2 diabetes mellitus with hyperglycemia, with long-term current use of insulin (H), fair control E11.65 Adult Eye  Referral    Z79.4 metFORMIN (GLUCOPHAGE XR) 500 MG 24 hr tablet     Albumin Random Urine Quantitative with Creat Ratio     semaglutide (OZEMPIC, 0.25 OR 0.5 MG/DOSE,) 2 MG/1.5ML SOPN pen     Albumin Random Urine Quantitative with Creat Ratio      3. Screen for colon cancer, screening reviewed and declined Z12.11       4. Class 3 severe obesity due to excess calories with serious  comorbidity and body mass index (BMI) of 40.0 to 44.9 in adult (H)  E66.01     Z68.41       5. Hypercholesteremia, stable on statin E78.00 Comprehensive metabolic panel (BMP + Alb, Alk Phos, ALT, AST, Total. Bili, TP)     Lipid panel     Comprehensive metabolic panel (BMP + Alb, Alk Phos, ALT, AST, Total. Bili, TP)     Lipid panel      6. Need for influenza vaccination  Z23 INFLUENZA, QUAD, HIGH DOSE, PF, 65YR + (FLUZONE HD)      7. Need for COVID-19 vaccine  Z23 COVID-19,PF,PFIZER BOOSTER BIVALENT (12+YRS)      8. Screening PSA (prostate specific antigen)  Z12.5 PSA, screen     PSA, screen          PLAN:   Declines Colonoscopy or Cologuard screening    Fasting labs    Covid and  high dose flu vaccine today     Next A1C in 3 months (2-6-22 with Endocrine)    Patient has been advised of split billing requirements and indicates understanding: Yes      COUNSELING:  Reviewed preventive health counseling, as reflected in patient instructions       Regular exercise       Healthy diet/nutrition       Colon cancer screening       Prostate cancer screening        He reports that he quit smoking about 32 years ago. His smoking use included cigarettes. He has never used smokeless tobacco.      Appropriate preventive services were discussed with this patient, including applicable screening as appropriate for cardiovascular disease, diabetes, osteopenia/osteoporosis, and glaucoma.  As appropriate for age/gender, discussed screening for colorectal cancer, prostate cancer, breast cancer, and cervical cancer. Checklist reviewing preventive services available has been given to the patient.    Reviewed patients plan of care and provided an AVS. The Basic Care Plan (routine screening as documented in Health Maintenance) for Isac meets the Care Plan requirement. This Care Plan has been established and reviewed with the Patient.      Marquise Russ MD  [facility]    Identified Health Risks:

## 2022-11-23 NOTE — PATIENT INSTRUCTIONS
Declines Colonoscopy or Cologuard screening    Fasting labs    Covid and  high dose flu vaccine today    Next A1C in 3 months (2-6-22 with Endocrine)

## 2022-12-21 ENCOUNTER — PRE VISIT (OUTPATIENT)
Dept: ENDOCRINOLOGY | Facility: CLINIC | Age: 67
End: 2022-12-21

## 2022-12-23 DIAGNOSIS — Z79.4 TYPE 2 DIABETES MELLITUS WITHOUT COMPLICATION, WITH LONG-TERM CURRENT USE OF INSULIN (H): ICD-10-CM

## 2022-12-23 DIAGNOSIS — F33.2 SEVERE EPISODE OF RECURRENT MAJOR DEPRESSIVE DISORDER, WITHOUT PSYCHOTIC FEATURES (H): ICD-10-CM

## 2022-12-23 DIAGNOSIS — E11.9 TYPE 2 DIABETES MELLITUS WITHOUT COMPLICATION, WITH LONG-TERM CURRENT USE OF INSULIN (H): ICD-10-CM

## 2022-12-24 RX ORDER — QUETIAPINE FUMARATE 50 MG/1
TABLET, FILM COATED ORAL
Qty: 90 TABLET | Refills: 0 | Status: SHIPPED | OUTPATIENT
Start: 2022-12-24 | End: 2023-01-26

## 2022-12-24 RX ORDER — LISINOPRIL 5 MG/1
TABLET ORAL
Qty: 90 TABLET | Refills: 1 | Status: SHIPPED | OUTPATIENT
Start: 2022-12-24 | End: 2023-04-20

## 2022-12-25 NOTE — TELEPHONE ENCOUNTER
"Last Written Prescription:      Last office visit provider:  11/23/22    Routing refill request to provider for review/approval because:  Potential break in lisinopril Rx.      Requested Prescriptions   Pending Prescriptions Disp Refills     lisinopril (ZESTRIL) 5 MG tablet [Pharmacy Med Name: LISINOPRIL 5 MG TABLET] 90 tablet 1     Sig: TAKE 1 TABLET BY MOUTH EVERY DAY       ACE Inhibitors (Including Combos) Protocol Passed - 12/23/2022  9:12 AM        Passed - Blood pressure under 140/90 in past 12 months     BP Readings from Last 3 Encounters:   11/23/22 114/60   11/01/22 130/69   07/19/22 119/77                 Passed - Recent (12 mo) or future (30 days) visit within the authorizing provider's specialty     Patient has had an office visit with the authorizing provider or a provider within the authorizing providers department within the previous 12 mos or has a future within next 30 days. See \"Patient Info\" tab in inbasket, or \"Choose Columns\" in Meds & Orders section of the refill encounter.              Passed - Medication is active on med list        Passed - Patient is age 18 or older        Passed - Normal serum creatinine on file in past 12 months     Recent Labs   Lab Test 11/23/22  0925   CR 0.76       Ok to refill medication if creatinine is low          Passed - Normal serum potassium on file in past 12 months     Recent Labs   Lab Test 11/23/22  0925   POTASSIUM 3.8                QUEtiapine (SEROQUEL) 50 MG tablet [Pharmacy Med Name: QUETIAPINE FUMARATE 50 MG TAB] 90 tablet 0     Sig: TAKE 1 TABLET BY MOUTH EVERYDAY AT BEDTIME       Antipsychotic Medications Passed - 12/23/2022  9:12 AM        Passed - Blood pressure under 140/90 in past 12 months     BP Readings from Last 3 Encounters:   11/23/22 114/60   11/01/22 130/69   07/19/22 119/77                 Passed - Patient is 12 years of age or older        Passed - Lipid panel on file within the past 12 months     Recent Labs   Lab Test 11/23/22  0925 " "  CHOL 158   TRIG 97   HDL 44   LDL 95   NHDL 114               Passed - CBC on file in past 12 months     Recent Labs   Lab Test 11/23/22  0925   WBC 9.0   RBC 4.66   HGB 13.4   HCT 39.5*                    Passed - Heart Rate on file within past 12 months     Pulse Readings from Last 3 Encounters:   11/23/22 68   07/19/22 85   06/21/22 77               Passed - A1c or Glucose on file in past 12 months     Recent Labs   Lab Test 11/23/22  0925 07/28/22  0947   *  --    A1C  --  8.5*       Please review patients last 3 weights. If a weight gain of >10 lbs exists, you may refill the prescription once after instructing the patient to schedule an appointment within the next 30 days.    Wt Readings from Last 3 Encounters:   11/23/22 134 kg (295 lb 6.4 oz)   11/01/22 132.3 kg (291 lb 11.2 oz)   07/19/22 133.4 kg (294 lb 3.2 oz)             Passed - Medication is active on med list        Passed - Recent (6 mo) or future (30 days) visit within the authorizing provider's specialty     Patient had office visit in the last 6 months or has a visit in the next 30 days with authorizing provider or within the authorizing provider's specialty.  See \"Patient Info\" tab in inbasket, or \"Choose Columns\" in Meds & Orders section of the refill encounter.                 Stephany Seymour RN 12/24/22 6:12 PM  "

## 2023-01-22 DIAGNOSIS — Z79.4 TYPE 2 DIABETES MELLITUS WITHOUT COMPLICATION, WITH LONG-TERM CURRENT USE OF INSULIN (H): ICD-10-CM

## 2023-01-22 DIAGNOSIS — E11.9 TYPE 2 DIABETES MELLITUS WITHOUT COMPLICATION, WITH LONG-TERM CURRENT USE OF INSULIN (H): ICD-10-CM

## 2023-01-23 RX ORDER — INSULIN DEGLUDEC 100 U/ML
INJECTION, SOLUTION SUBCUTANEOUS
Qty: 60 ML | Refills: 1 | Status: SHIPPED | OUTPATIENT
Start: 2023-01-23 | End: 2023-02-06

## 2023-01-23 NOTE — TELEPHONE ENCOUNTER
"Last Written Prescription Date:  10/27/22  Last Fill Quantity: 60 ml,  # refills: 0   Last office visit provider:  11/23/22     Requested Prescriptions   Pending Prescriptions Disp Refills     insulin degludec (TRESIBA FLEXTOUCH) 100 UNIT/ML pen [Pharmacy Med Name: TRESIBA FLEXTOUCH 100 UNIT/ML]       Sig: INJECT 58 UNITS DAILY IN PLACE OF SEMGLEE. PLEASE CALL CLINIC IF SUGARS ARE SIGNIFICANTLY RISING       Long Acting Insulin Protocol Passed - 1/23/2023  1:58 PM        Passed - Serum creatinine on file in past 12 months     Recent Labs   Lab Test 11/23/22  0925   CR 0.76       Ok to refill medication if creatinine is low          Passed - HgbA1C in past 3 or 6 months     If HgbA1C is 8 or greater, it needs to be on file within the past 3 months.  If less than 8, must be on file within the past 6 months.     Recent Labs   Lab Test 11/01/22  1043 07/28/22  0947   A1C  --  8.5*   HEMOGLOBINA1 7.5  --              Passed - Medication is active on med list        Passed - Patient is age 18 or older        Passed - Recent (6 mo) or future (30 days) visit within the authorizing provider's specialty     Patient had office visit in the last 6 months or has a visit in the next 30 days with authorizing provider or within the authorizing provider's specialty.  See \"Patient Info\" tab in inbasket, or \"Choose Columns\" in Meds & Orders section of the refill encounter.                 Marquise Jose RN 01/23/23 1:59 PM  "

## 2023-01-25 DIAGNOSIS — F33.2 SEVERE EPISODE OF RECURRENT MAJOR DEPRESSIVE DISORDER, WITHOUT PSYCHOTIC FEATURES (H): ICD-10-CM

## 2023-01-26 RX ORDER — QUETIAPINE FUMARATE 50 MG/1
TABLET, FILM COATED ORAL
Qty: 90 TABLET | Refills: 0 | Status: SHIPPED | OUTPATIENT
Start: 2023-01-26 | End: 2023-05-05

## 2023-01-26 NOTE — TELEPHONE ENCOUNTER
"Last Written Prescription Date:  12/24/2022  Last Fill Quantity: 90,  # refills: 0   Last office visit provider:  11/23/2022     Requested Prescriptions   Pending Prescriptions Disp Refills     QUEtiapine (SEROQUEL) 50 MG tablet [Pharmacy Med Name: QUETIAPINE FUMARATE 50 MG TAB] 90 tablet 0     Sig: TAKE 1 TABLET BY MOUTH EVERYDAY AT BEDTIME       Antipsychotic Medications Passed - 1/25/2023  3:12 PM        Passed - Blood pressure under 140/90 in past 12 months     BP Readings from Last 3 Encounters:   11/23/22 114/60   11/01/22 130/69   07/19/22 119/77                 Passed - Patient is 12 years of age or older        Passed - Lipid panel on file within the past 12 months     Recent Labs   Lab Test 11/23/22  0925   CHOL 158   TRIG 97   HDL 44   LDL 95   NHDL 114               Passed - CBC on file in past 12 months     Recent Labs   Lab Test 11/23/22  0925   WBC 9.0   RBC 4.66   HGB 13.4   HCT 39.5*                    Passed - Heart Rate on file within past 12 months     Pulse Readings from Last 3 Encounters:   11/23/22 68   07/19/22 85   06/21/22 77               Passed - A1c or Glucose on file in past 12 months     Recent Labs   Lab Test 11/23/22  0925 07/28/22  0947   *  --    A1C  --  8.5*       Please review patients last 3 weights. If a weight gain of >10 lbs exists, you may refill the prescription once after instructing the patient to schedule an appointment within the next 30 days.    Wt Readings from Last 3 Encounters:   11/23/22 134 kg (295 lb 6.4 oz)   11/01/22 132.3 kg (291 lb 11.2 oz)   07/19/22 133.4 kg (294 lb 3.2 oz)             Passed - Medication is active on med list        Passed - Recent (6 mo) or future (30 days) visit within the authorizing provider's specialty     Patient had office visit in the last 6 months or has a visit in the next 30 days with authorizing provider or within the authorizing provider's specialty.  See \"Patient Info\" tab in inbasket, or \"Choose Columns\" in " Meds & Orders section of the refill encounter.                 Naila Pedarza RN 01/26/23 4:43 PM

## 2023-01-27 NOTE — TELEPHONE ENCOUNTER
Pt should be evaluated in clinic if a letter to return to work is required.  I cannot really write a return to work letter without seeing the pt.     Consent (Lip)/Introductory Paragraph: The rationale for Mohs was explained to the patient and consent was obtained. The risks, benefits and alternatives to therapy were discussed in detail. Specifically, the risks of lip deformity, changes in the oral aperture, infection, scarring, bleeding, prolonged wound healing, incomplete removal, allergy to anesthesia, nerve injury and recurrence were addressed. Prior to the procedure, the treatment site was clearly identified and confirmed by the patient. All components of Universal Protocol/PAUSE Rule completed.

## 2023-01-30 DIAGNOSIS — E78.5 HYPERLIPIDEMIA: ICD-10-CM

## 2023-01-30 RX ORDER — ATORVASTATIN CALCIUM 20 MG/1
TABLET, FILM COATED ORAL
Qty: 90 TABLET | Refills: 2 | Status: SHIPPED | OUTPATIENT
Start: 2023-01-30 | End: 2023-10-30

## 2023-01-31 NOTE — TELEPHONE ENCOUNTER
"Last Written Prescription Date:  1/6/22  Last Fill Quantity: 90,  # refills: 3   Last office visit provider:  11/23/22     Requested Prescriptions   Pending Prescriptions Disp Refills     atorvastatin (LIPITOR) 20 MG tablet [Pharmacy Med Name: ATORVASTATIN 20 MG TABLET] 90 tablet 3     Sig: TAKE 1 TABLET BY MOUTH AT BEDTIME       Statins Protocol Passed - 1/30/2023  9:17 AM        Passed - LDL on file in past 12 months     Recent Labs   Lab Test 11/23/22  0925   LDL 95             Passed - No abnormal creatine kinase in past 12 months     No lab results found.             Passed - Recent (12 mo) or future (30 days) visit within the authorizing provider's specialty     Patient has had an office visit with the authorizing provider or a provider within the authorizing providers department within the previous 12 mos or has a future within next 30 days. See \"Patient Info\" tab in inbasket, or \"Choose Columns\" in Meds & Orders section of the refill encounter.              Passed - Medication is active on med list        Passed - Patient is age 18 or older             Kalie Myers RN 01/30/23 7:49 PM  "

## 2023-02-03 NOTE — PROGRESS NOTES
Outcome for 02/03/23 4:32 PM :Sent patient [employer] message asking them to upload their BG data   Maureen Guerrero

## 2023-02-06 ENCOUNTER — OFFICE VISIT (OUTPATIENT)
Dept: ENDOCRINOLOGY | Facility: CLINIC | Age: 68
End: 2023-02-06
Payer: COMMERCIAL

## 2023-02-06 VITALS
BODY MASS INDEX: 42.76 KG/M2 | SYSTOLIC BLOOD PRESSURE: 136 MMHG | WEIGHT: 298 LBS | HEART RATE: 85 BPM | DIASTOLIC BLOOD PRESSURE: 77 MMHG

## 2023-02-06 DIAGNOSIS — E11.65 TYPE 2 DIABETES MELLITUS WITH HYPERGLYCEMIA, WITH LONG-TERM CURRENT USE OF INSULIN (H): ICD-10-CM

## 2023-02-06 DIAGNOSIS — E11.9 TYPE 2 DIABETES MELLITUS WITHOUT COMPLICATION, WITH LONG-TERM CURRENT USE OF INSULIN (H): Primary | ICD-10-CM

## 2023-02-06 DIAGNOSIS — E78.5 HYPERLIPIDEMIA DUE TO TYPE 2 DIABETES MELLITUS (H): ICD-10-CM

## 2023-02-06 DIAGNOSIS — E11.69 HYPERLIPIDEMIA DUE TO TYPE 2 DIABETES MELLITUS (H): ICD-10-CM

## 2023-02-06 DIAGNOSIS — Z79.4 TYPE 2 DIABETES MELLITUS WITHOUT COMPLICATION, WITH LONG-TERM CURRENT USE OF INSULIN (H): Primary | ICD-10-CM

## 2023-02-06 DIAGNOSIS — Z79.4 TYPE 2 DIABETES MELLITUS WITH HYPERGLYCEMIA, WITH LONG-TERM CURRENT USE OF INSULIN (H): ICD-10-CM

## 2023-02-06 DIAGNOSIS — E11.9 CONTROLLED TYPE 2 DIABETES MELLITUS WITHOUT COMPLICATION, WITH LONG-TERM CURRENT USE OF INSULIN (H): ICD-10-CM

## 2023-02-06 DIAGNOSIS — Z79.4 CONTROLLED TYPE 2 DIABETES MELLITUS WITHOUT COMPLICATION, WITH LONG-TERM CURRENT USE OF INSULIN (H): ICD-10-CM

## 2023-02-06 LAB — HBA1C MFR BLD: 8.1 % (ref 4.3–?)

## 2023-02-06 PROCEDURE — 99214 OFFICE O/P EST MOD 30 MIN: CPT | Performed by: INTERNAL MEDICINE

## 2023-02-06 PROCEDURE — 83036 HEMOGLOBIN GLYCOSYLATED A1C: CPT | Performed by: INTERNAL MEDICINE

## 2023-02-06 RX ORDER — INSULIN DEGLUDEC 100 U/ML
INJECTION, SOLUTION SUBCUTANEOUS
Qty: 30 ML | Refills: 11 | Status: SHIPPED | OUTPATIENT
Start: 2023-02-06 | End: 2024-02-14

## 2023-02-06 ASSESSMENT — PAIN SCALES - GENERAL: PAINLEVEL: NO PAIN (0)

## 2023-02-06 NOTE — NURSING NOTE
"Chief Complaint   Patient presents with     Diabetes     Vital signs:      BP: 136/77 Pulse: 85             Weight: 135.2 kg (298 lb)  Estimated body mass index is 42.76 kg/m  as calculated from the following:    Height as of 11/23/22: 1.778 m (5' 10\").    Weight as of this encounter: 135.2 kg (298 lb).          "

## 2023-02-06 NOTE — PROGRESS NOTES
Endocrine Consult note    Attending Assessment/Plan :        Type 2 diabetes mellitus without complication, with long-term current use of insulin (H)  Controlled type 2 diabetes mellitus without complication, with long-term current use of insulin (H)  Hyperlipidemia  Type 2 diabetes mellitus with hyperglycemia, with long-term current use of insulin (H)    Assessment:  -Above goal a1c today, likely related to diet during the holidays.  Otherwise is tolerating regimen well   -currently on max dose metformin, max tolerable dose of ozempic (1mg caused GI issues but 0.5mg works well), and is on basal bolus insulin  -would benefit from SGLT-2 inhibitor provided its covered by insurance and copay isn't unaffordable    Plan:  -continue current doses of tresiba, humalog, ozempic, metformin  -start jardiance 10mg (instructed pt to not purchase if copay is out of his price range)  -work on diet and lifestyle to help since a1c is close to goal  -recheck a1c, fasting lipid, b12 at followup - before visit if possible  -if still not at goal at followup will consider increase in jardiance dose vs actos    I have independently reviewed and interpreted labs, imaging as indicated.    RTC 3 months    Chief complaint:  Isac is a 68 year old male seen in consultation for type 2 diabetes followup.       HISTORY OF PRESENT ILLNESS    Isac is a 68M with pmh depression, t2dm here for followup on diabetes.   Diabetes diagnosis - ~2012    Complications    Current regimen:  Tresiba - 63 units   Humalog - 1 + 1:25 goal 140   Ozempic - 0.5 weekly  Metformin XR - 2gm/day    Unable to tolerate higher doses of ozempic due to GI issues.     Hasn't started self-titration plan for tresiba.  No objective or subjective lows.  Had some dietary indulgences over the holidays that likely caused slight increase in a1c and will likely resolve with renewed attention to lifestyle.     No hx of frequent UTI or  mycotic infections.     Endocrine relevant labs  and/or imaging are as follows:  Component      Latest Ref Rng & Units 11/1/2022 11/23/2022   Sodium      136 - 145 mmol/L  142   Potassium      3.4 - 5.3 mmol/L  3.8   Chloride      98 - 107 mmol/L  107   Carbon Dioxide (CO2)      22 - 29 mmol/L  22   Anion Gap      7 - 15 mmol/L  13   Urea Nitrogen      8.0 - 23.0 mg/dL  19.0   Creatinine      0.67 - 1.17 mg/dL  0.76   Calcium      8.8 - 10.2 mg/dL  8.9   Glucose      70 - 99 mg/dL  154 (H)   Alkaline Phosphatase      40 - 129 U/L  143 (H)   AST      10 - 50 U/L  26   ALT      10 - 50 U/L  34   Protein Total      6.4 - 8.3 g/dL  6.5   Albumin      3.5 - 5.2 g/dL  3.9   Bilirubin Total      <=1.2 mg/dL  0.2   GFR Estimate      >60 mL/min/1.73m2  >90   Cholesterol      <200 mg/dL  158   Triglycerides      <150 mg/dL  97   HDL Cholesterol      >=40 mg/dL  44   LDL Cholesterol Calculated      <=100 mg/dL  95   Non HDL Cholesterol      <130 mg/dL  114   Albumin Urine mg/L      mg/L  43.4   Albumin Urine mg/g Cr      0.00 - 17.00 mg/g Cr  14.28   Creatinine Urine      mg/dL  304.0   Hemoglobin A1C POCT      4.3 - <5.7 % 7.5      REVIEW OF SYSTEMS    10 system ROS otherwise as per the HPI or negative    Past Medical History  Past Medical History:   Diagnosis Date     Alcoholism (H)     early 1970s.     Depression     hospitalized 2003 suicidal ideation. Followed by Naseem Soto psychologist.     Diabetes mellitus (H)      Hx of type B viral hepatitis      Infectious viral hepatitis      Lower leg edema     dependent edema left leg more.     Seizures (H)     well controlled, last seizure 1980, followed by Dr. Pastrana now.     Sleep apnea     uses CPAP       Medications  Current Outpatient Medications   Medication Sig Dispense Refill     aspirin (ASA) 81 MG EC tablet Take 1 tablet (81 mg) by mouth daily       atorvastatin (LIPITOR) 20 MG tablet TAKE 1 TABLET BY MOUTH AT BEDTIME 90 tablet 2     hydrOXYzine (ATARAX) 25 MG tablet Take 1 tablet (25 mg) by mouth daily as needed for  "anxiety 30 tablet 0     insulin degludec (TRESIBA FLEXTOUCH) 100 UNIT/ML pen INJECT 58 UNITS DAILY IN PLACE OF SEMGLEE. PLEASE CALL CLINIC IF SUGARS ARE SIGNIFICANTLY RISING 60 mL 1     insulin lispro (HUMALOG KWIKPEN) 100 UNIT/ML (1 unit dial) KWIKPEN For Pre-Meal  - 164 give 1 unit. For Pre-Meal  - 189 give 2 units. For Pre-Meal  - 214 give 3 units. For Pre-Meal  - 239 give 4 units. For Pre-Meal  - 264 give 5 units. For Pre-Meal  - 289 give 6 units. For Pre-Meal  - 314 give 7 units. For Pre-Meal  - 339 give 8 units. For Pre-Meal  - 364 give 9 units.  For Pre-Meal BG greater than or equal to 365 give 10 units 15 mL 4     insulin pen needle (32G X 4 MM) 32G X 4 MM miscellaneous [PEN NEEDLE, DIABETIC (BD ULTRA-FINE YAHAIRA PEN NEEDLE) 32 GAUGE X 5/32\" NDLE] USE DAILY WITH HUMALOG AND BASAGLAR PENS AS DIRECTED 200 each 1     lisinopril (ZESTRIL) 5 MG tablet TAKE 1 TABLET BY MOUTH EVERY DAY 90 tablet 1     metFORMIN (GLUCOPHAGE XR) 500 MG 24 hr tablet TAKE 4 TABS BY MOUTH WITH EVENING MEAL/  Strength: 500 mg 360 tablet 2     mirtazapine (REMERON) 15 MG tablet Take 15 mg by mouth At Bedtime       phenytoin (DILANTIN) 100 MG ER capsule TAKE 5 CAPSULES (500 MG) BY MOUTH AT BEDTIME 450 capsule 1     QUEtiapine (SEROQUEL) 50 MG tablet TAKE 1 TABLET BY MOUTH EVERYDAY AT BEDTIME 90 tablet 0     semaglutide (OZEMPIC, 0.25 OR 0.5 MG/DOSE,) 2 MG/1.5ML SOPN pen Inject 0.5 mg Subcutaneous every 7 days 4.5 mL 3     vilazodone (VIIBRYD) 20 MG TABS tablet Take 1 tablet (20 mg) by mouth daily 90 tablet 1       Allergies  Allergies   Allergen Reactions     Sulfa (Sulfonamide Antibiotics) [Sulfa Drugs] Unknown         Family History  family history is not on file.    Social History  Social History     Tobacco Use     Smoking status: Former     Types: Cigarettes     Quit date: 2/15/1990     Years since quittin.9     Smokeless tobacco: Never   Substance Use Topics     Alcohol use: No "     Drug use: No       Physical Exam  /77 (BP Location: Right arm, Patient Position: Sitting, Cuff Size: Adult Regular)   Pulse 85   Wt 135.2 kg (298 lb)   BMI 42.76 kg/m    Body mass index is 42.76 kg/m .    Physical Exam    GENERAL :  In no apparent distress  SKIN: Normal color, normal temperature     EYES: EOMI, No scleral icterus  NECK: No visible masses  RESP: No respiratory distress, normal effort  CARDIO: regular rate  ABDOMEN: flat, nondistended       NEURO: awake, alert, responds appropriately to questions   EXTREMITIES: No clubbing, cyanosis or edema.    I spent a total of 32 minutes on the day of this established patient visit on chart review, lab review, coordinating care, reviewing previous providers notes, exam and counseling of patient

## 2023-02-06 NOTE — LETTER
2/6/2023       RE: Isac Li  [address]  [facility] [address]     Dear Colleague,    Thank you for referring your patient, Isac Li, to the [facility] [place] at [facility]. Please see a copy of my visit note below.    Outcome for 02/03/23 4:32 PM :Sent patient [employer] message asking them to upload their BG data   Maureen Guerrero        Endocrine Consult note    Attending Assessment/Plan :        Type 2 diabetes mellitus without complication, with long-term current use of insulin (H)  Controlled type 2 diabetes mellitus without complication, with long-term current use of insulin (H)  Hyperlipidemia  Type 2 diabetes mellitus with hyperglycemia, with long-term current use of insulin (H)    Assessment:  -Above goal a1c today, likely related to diet during the holidays.  Otherwise is tolerating regimen well   -currently on max dose metformin, max tolerable dose of ozempic (1mg caused GI issues but 0.5mg works well), and is on basal bolus insulin  -would benefit from SGLT-2 inhibitor provided its covered by insurance and copay isn't unaffordable    Plan:  -continue current doses of tresiba, humalog, ozempic, metformin  -start jardiance 10mg (instructed pt to not purchase if copay is out of his price range)  -work on diet and lifestyle to help since a1c is close to goal  -recheck a1c, fasting lipid, b12 at followup - before visit if possible  -if still not at goal at followup will consider increase in jardiance dose vs actos    I have independently reviewed and interpreted labs, imaging as indicated.    RTC 3 months    Chief complaint:  Isac is a 68 year old male seen in consultation for type 2 diabetes followup.       HISTORY OF PRESENT ILLNESS    Isac is a 68M with pmh depression, t2dm here for followup on diabetes.   Diabetes diagnosis - ~2012    Complications    Current regimen:  Tresiba - 63 units   Humalog - 1 + 1:25  goal 140   Ozempic - 0.5 weekly  Metformin XR - 2gm/day    Unable to tolerate higher doses of ozempic due to GI issues.     Hasn't started self-titration plan for tresiba.  No objective or subjective lows.  Had some dietary indulgences over the holidays that likely caused slight increase in a1c and will likely resolve with renewed attention to lifestyle.     No hx of frequent UTI or  mycotic infections.     Endocrine relevant labs and/or imaging are as follows:  Component      Latest Ref Rng & Units 11/1/2022 11/23/2022   Sodium      136 - 145 mmol/L  142   Potassium      3.4 - 5.3 mmol/L  3.8   Chloride      98 - 107 mmol/L  107   Carbon Dioxide (CO2)      22 - 29 mmol/L  22   Anion Gap      7 - 15 mmol/L  13   Urea Nitrogen      8.0 - 23.0 mg/dL  19.0   Creatinine      0.67 - 1.17 mg/dL  0.76   Calcium      8.8 - 10.2 mg/dL  8.9   Glucose      70 - 99 mg/dL  154 (H)   Alkaline Phosphatase      40 - 129 U/L  143 (H)   AST      10 - 50 U/L  26   ALT      10 - 50 U/L  34   Protein Total      6.4 - 8.3 g/dL  6.5   Albumin      3.5 - 5.2 g/dL  3.9   Bilirubin Total      <=1.2 mg/dL  0.2   GFR Estimate      >60 mL/min/1.73m2  >90   Cholesterol      <200 mg/dL  158   Triglycerides      <150 mg/dL  97   HDL Cholesterol      >=40 mg/dL  44   LDL Cholesterol Calculated      <=100 mg/dL  95   Non HDL Cholesterol      <130 mg/dL  114   Albumin Urine mg/L      mg/L  43.4   Albumin Urine mg/g Cr      0.00 - 17.00 mg/g Cr  14.28   Creatinine Urine      mg/dL  304.0   Hemoglobin A1C POCT      4.3 - <5.7 % 7.5      REVIEW OF SYSTEMS    10 system ROS otherwise as per the HPI or negative    Past Medical History  Past Medical History:   Diagnosis Date     Alcoholism (H)     early 1970s.     Depression     hospitalized 2003 suicidal ideation. Followed by Naseem Soto, psychologist.     Diabetes mellitus (H)      Hx of type B viral hepatitis      Infectious viral hepatitis      Lower leg edema     dependent edema left leg more.      "Seizures (H)     well controlled, last seizure 1980, followed by Dr. Pastrana now.     Sleep apnea     uses CPAP       Medications  Current Outpatient Medications   Medication Sig Dispense Refill     aspirin (ASA) 81 MG EC tablet Take 1 tablet (81 mg) by mouth daily       atorvastatin (LIPITOR) 20 MG tablet TAKE 1 TABLET BY MOUTH AT BEDTIME 90 tablet 2     hydrOXYzine (ATARAX) 25 MG tablet Take 1 tablet (25 mg) by mouth daily as needed for anxiety 30 tablet 0     insulin degludec (TRESIBA FLEXTOUCH) 100 UNIT/ML pen INJECT 58 UNITS DAILY IN PLACE OF SEMGLE[place]. PLEASE CALL CLINIC IF SUGARS ARE SIGNIFICANTLY RISING 60 mL 1     insulin lispro (HUMALOG KWIKPEN) 100 UNIT/ML (1 unit dial) KWIKPEN For Pre-Meal  - 164 give 1 unit. For Pre-Meal  - 189 give 2 units. For Pre-Meal  - 214 give 3 units. For Pre-Meal  - 239 give 4 units. For Pre-Meal  - 264 give 5 units. For Pre-Meal  - 289 give 6 units. For Pre-Meal  - 314 give 7 units. For Pre-Meal  - 339 give 8 units. For Pre-Meal  - 364 give 9 units.  For Pre-Meal BG greater than or equal to 365 give 10 units 15 mL 4     insulin pen needle (32G X 4 MM) 32G X 4 MM miscellaneous [PEN NEEDLE, DIABETIC (BD ULTRA-FINE YAHAIRA PEN NEEDLE) 32 GAUGE X 5/32\" NDLE] USE DAILY WITH HUMALOG AND BASAGLAR PENS AS DIRECTED 200 each 1     lisinopril (ZESTRIL) 5 MG tablet TAKE 1 TABLET BY MOUTH EVERY DAY 90 tablet 1     metFORMIN (GLUCOPHAGE XR) 500 MG 24 hr tablet TAKE 4 TABS BY MOUTH WITH EVENING MEAL/  Strength: 500 mg 360 tablet 2     mirtazapine (REMERON) 15 MG tablet Take 15 mg by mouth At Bedtime       phenytoin (DILANTIN) 100 MG ER capsule TAKE 5 CAPSULES (500 MG) BY MOUTH AT BEDTIME 450 capsule 1     QUEtiapine (SEROQUEL) 50 MG tablet TAKE 1 TABLET BY MOUTH EVERYDAY AT BEDTIME 90 tablet 0     semaglutide (OZEMPIC, 0.25 OR 0.5 MG/DOSE,) 2 MG/1.5ML SOPN pen Inject 0.5 mg Subcutaneous every 7 days 4.5 mL 3     vilazodone (VIIBRYD) 20 MG TABS " tablet Take 1 tablet (20 mg) by mouth daily 90 tablet 1       Allergies  Allergies   Allergen Reactions     Sulfa (Sulfonamide Antibiotics) [Sulfa Drugs] Unknown         Family History  family history is not on file.    Social History  Social History     Tobacco Use     Smoking status: Former     Types: Cigarettes     Quit date: 2/15/1990     Years since quittin.9     Smokeless tobacco: Never   Substance Use Topics     Alcohol use: No     Drug use: No       Physical Exam  /77 (BP Location: Right arm, Patient Position: Sitting, Cuff Size: Adult Regular)   Pulse 85   Wt 135.2 kg (298 lb)   BMI 42.76 kg/m    Body mass index is 42.76 kg/m .    Physical Exam    GENERAL :  In no apparent distress  SKIN: Normal color, normal temperature     EYES: EOMI, No scleral icterus  NECK: No visible masses  RESP: No respiratory distress, normal effort  CARDIO: regular rate  ABDOMEN: flat, nondistended       NEURO: awake, alert, responds appropriately to questions   EXTREMITIES: No clubbing, cyanosis or edema.    I spent a total of 32 minutes on the day of this established patient visit on chart review, lab review, coordinating care, reviewing previous providers notes, exam and counseling of patient      Alvaro Rosenbaum MD

## 2023-02-06 NOTE — PATIENT INSTRUCTIONS
Start Jardiance daily pending insurance approval.  Work on diet and exercise.  Return in 3 months with fasting labs ~1 week before if able.     Please reach out to the following centers to schedule your appointment:           Lab    General [phone]   [facility] [phone]   [place] [phone]   [facility]  [phone]   [facility] [phone]   [place] [phone]   [facility]) [phone]   [facility] [phone]   [place] [phone]   [place] [phone]   [place] [phone]

## 2023-02-08 NOTE — TELEPHONE ENCOUNTER
From: Magaly Dias  To: Leo Saucedo  Sent: 2/7/2023 2:57 PM CST  Subject: Stelara    Hi. [facility] has not been able to fill my prescription for the Stelara (was supposed to take it on 2/2) They say they are trying to contact you and my insurance for approval. Have they been in touch with you? Thanks!!  Nighat Dias    Vaccine pended, please advise.

## 2023-03-08 ENCOUNTER — NURSE TRIAGE (OUTPATIENT)
Dept: NURSING | Facility: CLINIC | Age: 68
End: 2023-03-08
Payer: MEDICARE

## 2023-03-08 NOTE — TELEPHONE ENCOUNTER
Nurse Triage SBAR    Is this a 2nd Level Triage? NO    Situation: Cough for 2 weeks    Background: Patient calling, states that he has had a cough for 2 weeks. He denies any recent covid exposure, denies fever. States that he will just cough up clear mucus. He denies difficulty breathing, denies chest pain and pressure.     Assessment: Cough    Protocol Recommended Disposition:   See in Office Today or Tomorrow    Recommendation:     Patient given care advice per protocol with call back precautions. Patient warm transferred to scheduling to make an appointment. If unable patient will go to urgent care. Patient verbalized understanding and agreement with the plan of care.      LORE DANIEL RN      Does the patient meet one of the following criteria for ADS visit consideration? 16+ years old, with an [facility] PCP     TIP  Providers, please consider if this condition is appropriate for management at one of our Acute and Diagnostic Services sites.     If patient is a good candidate, please use dotphrase <dot>triageresponse and select Refer to ADS to document.    Reason for Disposition    Patient wants to be seen    Additional Information    Negative: SEVERE difficulty breathing (e.g., struggling for each breath, speaks in single words)    Negative: Bluish (or gray) lips or face    Negative: Rapid onset of cough and has hives    Negative: Coughing started suddenly after medicine, an allergic food or bee sting    Negative: Difficulty breathing after exposure to flames, smoke, or fumes    Negative: Sounds like a life-threatening emergency to the triager    Negative: Previous asthma attacks and this feels like asthma attack    Negative: Dry cough (non-productive; no sputum or minimal clear sputum) and within 14 days of COVID-19 Exposure    Negative: MODERATE difficulty breathing (e.g., speaks in phrases, SOB even at rest, pulse 100-120) and still present when not coughing    Negative: Chest pain present when not  coughing    Negative: Passed out (i.e., fainted, collapsed and was not responding)    Negative: Patient sounds very sick or weak to the triager    Negative: MILD difficulty breathing (e.g., minimal/no SOB at rest, SOB with walking, pulse <100) and still present when not coughing    Negative: Coughed up > 1 tablespoon (15 ml) blood (Exception: Blood-tinged sputum.)    Negative: Fever > 103 F (39.4 C)    Negative: Fever > 101 F (38.3 C) and over 60 years of age    Negative: Fever > 100.0 F (37.8 C) and has diabetes mellitus or a weak immune system (e.g., HIV positive, cancer chemotherapy, organ transplant, splenectomy, chronic steroids)    Negative: Fever > 100.0 F (37.8 C) and bedridden (e.g., nursing home patient, stroke, chronic illness, recovering from surgery)    Negative: Increasing ankle swelling    Negative: Wheezing is present    Negative: SEVERE coughing spells (e.g., whooping sound after coughing, vomiting after coughing)    Negative: Coughing up sean-colored (reddish-brown) or blood-tinged sputum    Negative: Fever present > 3 days (72 hours)    Negative: Fever returns after gone for over 24 hours and symptoms worse or not improved    Negative: Using nasal washes and pain medicine > 24 hours and sinus pain persists    Negative: Known COPD or other severe lung disease (i.e., bronchiectasis, cystic fibrosis, lung surgery) and worsening symptoms (i.e., increased sputum purulence or amount, increased breathing difficulty)    Negative: Continuous (nonstop) coughing interferes with work or school and no improvement using cough treatment per Care Advice    Protocols used: COUGH-A-OH

## 2023-03-10 ENCOUNTER — NURSE TRIAGE (OUTPATIENT)
Dept: NURSING | Facility: CLINIC | Age: 68
End: 2023-03-10

## 2023-03-10 ENCOUNTER — VIRTUAL VISIT (OUTPATIENT)
Dept: URGENT CARE | Facility: CLINIC | Age: 68
End: 2023-03-10
Payer: COMMERCIAL

## 2023-03-10 DIAGNOSIS — R05.1 ACUTE COUGH: Primary | ICD-10-CM

## 2023-03-10 PROCEDURE — 99441 PR PHYSICIAN TELEPHONE EVALUATION 5-10 MIN: CPT | Mod: CS | Performed by: EMERGENCY MEDICINE

## 2023-03-10 RX ORDER — BENZONATATE 200 MG/1
200 CAPSULE ORAL 3 TIMES DAILY PRN
Qty: 30 CAPSULE | Refills: 0 | Status: SHIPPED | OUTPATIENT
Start: 2023-03-10 | End: 2023-11-10

## 2023-03-10 RX ORDER — AZITHROMYCIN 250 MG/1
TABLET, FILM COATED ORAL
Qty: 6 TABLET | Refills: 0 | Status: SHIPPED | OUTPATIENT
Start: 2023-03-10 | End: 2023-03-15

## 2023-03-10 NOTE — TELEPHONE ENCOUNTER
Nurse Triage SBAR    Is this a 2nd Level Triage? NO    Situation: cough; COVID    Background:   Pt reports that cough has been present since 2/28.   Pt thought of testing for COVID on 3/8, positive home test  Pt has not reported to work since 2/28 and will need FMLA form signed    Assessment:   Mild occasional cough, pt reports that it is significantly better today than a week ago  Nasal congestion and runny nose not improved   Has trouble hearing due to ear congestion. No ear pain  No sinus pain  No fever  No headache  No SOB  No chest pain    Protocol Recommended Disposition:   Be seen within the day     Recommendation:   Pt sent to  and assisted with telephone visit  To  provider - kindly advise pt on FMLA form      Bethany Nieves RN/Carrie Nurse Advisor          Reason for Disposition    [1] COVID-19 diagnosed by positive lab test (e.g., PCR, rapid self-test kit) AND [2] mild symptoms (e.g., cough, fever, others) AND [3] no complications or SOB    Additional Information    Negative: SEVERE difficulty breathing (e.g., struggling for each breath, speaks in single words)    Negative: Difficult to awaken or acting confused (e.g., disoriented, slurred speech)    Negative: Bluish (or gray) lips or face now    Negative: Shock suspected (e.g., cold/pale/clammy skin, too weak to stand, low BP, rapid pulse)    Negative: Sounds like a life-threatening emergency to the triager    Negative: [1] Diagnosed or suspected COVID-19 AND [2] symptoms lasting 3 or more weeks    Negative: [1] COVID-19 exposure AND [2] no symptoms    Negative: COVID-19 vaccine reaction suspected (e.g., fever, headache, muscle aches) occurring 1 to 3 days after getting vaccine    Negative: COVID-19 vaccine, questions about    Negative: [1] Lives with someone known to have influenza (flu test positive) AND [2] flu-like symptoms (e.g., cough, runny nose, sore throat, SOB; with or without fever)    Negative: [1] Adult with possible COVID-19  symptoms AND [2] triager concerned about severity of symptoms or other causes    Negative: COVID-19 and breastfeeding, questions about    Negative: SEVERE or constant chest pain or pressure  (Exception: Mild central chest pain, present only when coughing.)    Negative: MODERATE difficulty breathing (e.g., speaks in phrases, SOB even at rest, pulse 100-120)    Negative: Headache and stiff neck (can't touch chin to chest)    Negative: Oxygen level (e.g., pulse oximetry) 90 percent or lower    Negative: Chest pain or pressure  (Exception: MILD central chest pain, present only when coughing)    Negative: Patient sounds very sick or weak to the triager    Negative: MILD difficulty breathing (e.g., minimal/no SOB at rest, SOB with walking, pulse <100)    Negative: Fever > 103 F (39.4 C)    Negative: [1] Fever > 101 F (38.3 C) AND [2] over 60 years of age    Negative: [1] Fever > 100.0 F (37.8 C) AND [2] bedridden (e.g., nursing home patient, CVA, chronic illness, recovering from surgery)    Negative: [1] HIGH RISK for severe COVID complications (e.g., weak immune system, age > 64 years, obesity with BMI of 30 or higher, pregnant, chronic lung disease or other chronic medical condition) AND [2] COVID symptoms (e.g., cough, fever)  (Exceptions: Already seen by PCP and no new or worsening symptoms.)    Negative: [1] HIGH RISK patient AND [2] influenza is widespread in the community AND [3] ONE OR MORE respiratory symptoms: cough, sore throat, runny or stuffy nose    Negative: [1] HIGH RISK patient AND [2] influenza exposure within the last 7 days AND [3] ONE OR MORE respiratory symptoms: cough, sore throat, runny or stuffy nose    Negative: Oxygen level (e.g., pulse oximetry) 91 to 94 percent    Negative: [1] COVID-19 infection suspected by caller or triager AND [2] mild symptoms (cough, fever, or others) AND [3] negative COVID-19 rapid test    Negative: Fever present > 3 days (72 hours)    Negative: [1] Fever returns after  gone for over 24 hours AND [2] symptoms worse or not improved    Negative: [1] Continuous (nonstop) coughing interferes with work or school AND [2] no improvement using cough treatment per Care Advice    Negative: Cough present > 3 weeks    Negative: [1] COVID-19 diagnosed by positive lab test (e.g., PCR, rapid self-test kit) AND [2] NO symptoms (e.g., cough, fever, others)    Negative: Sounds like a life-threatening emergency to the triager    Negative: Difficulty breathing, and not from stuffy nose (e.g., not relieved by cleaning out the nose)    Negative: SEVERE headache and has fever    Negative: Patient sounds very sick or weak to the triager    Negative: SEVERE sinus pain    Negative: Severe headache    Negative: Redness or swelling on the cheek, forehead, or around the eye    Negative: Fever > 103 F (39.4 C)    Negative: Fever > 101 F (38.3 C) and over 60 years of age    Negative: Fever > 100.0 F (37.8 C) and has diabetes mellitus or a weak immune system (e.g., HIV positive, cancer chemotherapy, organ transplant, splenectomy, chronic steroids)    Negative: Fever > 100.0 F (37.8 C) and bedridden (e.g., nursing home patient, stroke, chronic illness, recovering from surgery)    Negative: Fever present > 3 days (72 hours)    Negative: Fever returns after gone for over 24 hours and symptoms worse or not improved    Negative: Sinus pain (not just congestion) and fever    Negative: Earache    Sinus congestion (pressure, fullness) present > 10 days    Protocols used: CORONAVIRUS (COVID-19) DIAGNOSED OR [id number], SINUS PAIN AND CONGESTION-A-OH

## 2023-03-10 NOTE — TELEPHONE ENCOUNTER
FNA called pt and alex Pereira ([facility] department) will send form to Dr. Russ's office. Sending message to care team to watch out for form.     Bethany Nieves RN/Carrie Nurse Advisor

## 2023-03-10 NOTE — TELEPHONE ENCOUNTER
Called and spoke with patient, assisted in scheduling virtual appointment for 3/16 with PCP.     María Montes RN

## 2023-03-10 NOTE — PROGRESS NOTES
Phone appointment:  Time: 10 minutes    CHIEF COMPLAINT: Cough, ear pain      HPI: Patient is a 68-year-old male whose had upper respiratory infection symptoms for approximately 10 days.  He tested positive for COVID on 3/8/2023.  Symptoms have not improved.  He complains of a severe cough although that has decreased in terms of frequency.  He has had persistent sinus congestion and feels like both of his ears are plugged.      ROS: See HPI otherwise normal.    Allergies   Allergen Reactions     Sulfa (Sulfonamide Antibiotics) [Sulfa Drugs] Unknown      Current Outpatient Medications   Medication Sig Dispense Refill     azithromycin (ZITHROMAX) 250 MG tablet Take 2 tablets (500 mg) by mouth daily for 1 day, THEN 1 tablet (250 mg) daily for 4 days. 6 tablet 0     benzonatate (TESSALON) 200 MG capsule Take 1 capsule (200 mg) by mouth 3 times daily as needed for cough 30 capsule 0     aspirin (ASA) 81 MG EC tablet Take 1 tablet (81 mg) by mouth daily       atorvastatin (LIPITOR) 20 MG tablet TAKE 1 TABLET BY MOUTH AT BEDTIME 90 tablet 2     empagliflozin (JARDIANCE) 10 MG TABS tablet Take 1 tablet (10 mg) by mouth daily 30 tablet 11     hydrOXYzine (ATARAX) 25 MG tablet Take 1 tablet (25 mg) by mouth daily as needed for anxiety 30 tablet 0     insulin degludec (TRESIBA FLEXTOUCH) 100 UNIT/ML pen Inject 63 units daily and adjust according to instructions.  Max TDD 70 units 30 mL 11     insulin lispro (HUMALOG KWIKPEN) 100 UNIT/ML (1 unit dial) KWIKPEN For Pre-Meal  - 164 give 1 unit. For Pre-Meal  - 189 give 2 units. For Pre-Meal  - 214 give 3 units. For Pre-Meal  - 239 give 4 units. For Pre-Meal  - 264 give 5 units. For Pre-Meal  - 289 give 6 units. For Pre-Meal  - 314 give 7 units. For Pre-Meal  - 339 give 8 units. For Pre-Meal  - 364 give 9 units.  For Pre-Meal BG greater than or equal to 365 give 10 units 15 mL 4     insulin pen needle (32G X 4 MM) 32G X 4 MM  "miscellaneous [PEN NEEDLE, DIABETIC (BD ULTRA-FINE YAHAIRA PEN NEEDLE) 32 GAUGE X 5/32\" NDLE] USE DAILY WITH HUMALOG AND TRESIBA PENS AS DIRECTED 200 each 11     lisinopril (ZESTRIL) 5 MG tablet TAKE 1 TABLET BY MOUTH EVERY DAY 90 tablet 1     metFORMIN (GLUCOPHAGE XR) 500 MG 24 hr tablet TAKE 4 TABS BY MOUTH WITH EVENING MEAL/  Strength: 500 mg 360 tablet 2     mirtazapine (REMERON) 15 MG tablet Take 15 mg by mouth At Bedtime       phenytoin (DILANTIN) 100 MG ER capsule TAKE 5 CAPSULES (500 MG) BY MOUTH AT BEDTIME 450 capsule 1     QUEtiapine (SEROQUEL) 50 MG tablet TAKE 1 TABLET BY MOUTH EVERYDAY AT BEDTIME 90 tablet 0     semaglutide (OZEMPIC, 0.25 OR 0.5 MG/DOSE,) 2 MG/1.5ML SOPN pen Inject 0.5 mg Subcutaneous every 7 days 4.5 mL 3     vilazodone (VIIBRYD) 20 MG TABS tablet Take 1 tablet (20 mg) by mouth daily 90 tablet 1         PE: No acute distress on phone appointment.  Nondyspneic sounding speaking in full sentences.  He is alert and oriented.        TREATMENT: See HPI otherwise normal.      ASSESSMENT: Protracted upper respiratory infection symptoms at 10 days, concern for bacterial sequela I.      DIAGNOSIS: Acute bronchitis.  COVID infection.      PLAN: Z-Marquise, Tessalon as instructed.  Recommend retesting every other day over the next 2 to 3 days to see if COVID infection resolves.  Be seen again in 5 to 7 days if his condition does not improve.    "

## 2023-03-16 ENCOUNTER — VIRTUAL VISIT (OUTPATIENT)
Dept: FAMILY MEDICINE | Facility: CLINIC | Age: 68
End: 2023-03-16
Payer: COMMERCIAL

## 2023-03-16 DIAGNOSIS — Z79.4 TYPE 2 DIABETES MELLITUS WITH HYPERGLYCEMIA, WITH LONG-TERM CURRENT USE OF INSULIN (H): ICD-10-CM

## 2023-03-16 DIAGNOSIS — U07.1 INFECTION DUE TO 2019 NOVEL CORONAVIRUS: Primary | ICD-10-CM

## 2023-03-16 DIAGNOSIS — E11.65 TYPE 2 DIABETES MELLITUS WITH HYPERGLYCEMIA, WITH LONG-TERM CURRENT USE OF INSULIN (H): ICD-10-CM

## 2023-03-16 PROCEDURE — 99442 PR PHYSICIAN TELEPHONE EVALUATION 11-20 MIN: CPT | Mod: 95 | Performed by: FAMILY MEDICINE

## 2023-03-16 ASSESSMENT — PATIENT HEALTH QUESTIONNAIRE - PHQ9: SUM OF ALL RESPONSES TO PHQ QUESTIONS 1-9: 4

## 2023-03-16 NOTE — PROGRESS NOTES
Isac is a 68 year old who is being evaluated via a billable telephone visit.      What phone number would you like to be contacted at? [phone]  How would you like to obtain your AVS? Sarbjit    Distant Location (provider location):  On-site    WAS OUT OF WORK FOR A COUGH AND COLD.  Positive Covid last Wed.  Two days ago tested neg for Covid.  Had runs and went back to work yesterday evening.  Did not take Paxlovid.    Subjective   Isac is a 68 year old presenting for the following health issues:  Forms (Forms for FMLA)      HPI       Objective           Vitals:  No vitals were obtained today due to virtual visit.    Physical Exam   healthy, alert and no distress  PSYCH: Alert and oriented times 3; coherent speech, normal   rate and volume, able to articulate logical thoughts, able   to abstract reason, no tangential thoughts, no hallucinations   or delusions  His affect is normal  RESP: No cough, no audible wheezing, able to talk in full sentences  Remainder of exam unable to be completed due to telephone visits          Encounter Diagnoses   Name Primary?     Infection due to 2019 novel coronavirus Yes     Type 2 diabetes mellitus with hyperglycemia, with long-term current use of insulin (H)         PLAN:   FMLA form completed and we will fax     Phone call duration: 15  minutes

## 2023-04-13 DIAGNOSIS — E11.65 TYPE 2 DIABETES MELLITUS WITH HYPERGLYCEMIA, WITH LONG-TERM CURRENT USE OF INSULIN (H): Primary | ICD-10-CM

## 2023-04-13 DIAGNOSIS — Z79.4 TYPE 2 DIABETES MELLITUS WITH HYPERGLYCEMIA, WITH LONG-TERM CURRENT USE OF INSULIN (H): Primary | ICD-10-CM

## 2023-04-13 NOTE — TELEPHONE ENCOUNTER
Faxed Request from [facility] pharmacy for refill of  test strips.      One touch ultra blue test strip  Use to test 4 times daily.

## 2023-04-14 NOTE — TELEPHONE ENCOUNTER
"Routing refill request to provider for review/approval because:  Drug not active on patient's medication list       Last office visit provider:  3/16/2023     Requested Prescriptions   Pending Prescriptions Disp Refills     blood glucose (NO BRAND SPECIFIED) test strip 400 strip 3     Sig: Use to test blood sugar 4 times daily or as directed.       Diabetic Supplies Protocol Failed - 4/13/2023  4:48 PM        Failed - Medication is active on med list        Passed - Patient is 18 years of age or older        Passed - Recent (6 mo) or future (30 days) visit within the authorizing provider's specialty     Patient had office visit in the last 6 months or has a visit in the next 30 days with authorizing provider.  See \"Patient Info\" tab in inbasket, or \"Choose Columns\" in Meds & Orders section of the refill encounter.                 Shanita Thornton RN 04/14/23 3:19 PM  "

## 2023-04-19 DIAGNOSIS — Z79.4 TYPE 2 DIABETES MELLITUS WITHOUT COMPLICATION, WITH LONG-TERM CURRENT USE OF INSULIN (H): ICD-10-CM

## 2023-04-19 DIAGNOSIS — E11.9 TYPE 2 DIABETES MELLITUS WITHOUT COMPLICATION, WITH LONG-TERM CURRENT USE OF INSULIN (H): ICD-10-CM

## 2023-04-20 RX ORDER — LISINOPRIL 5 MG/1
5 TABLET ORAL DAILY
Qty: 90 TABLET | Refills: 2 | Status: SHIPPED | OUTPATIENT
Start: 2023-04-20 | End: 2023-11-10

## 2023-04-20 NOTE — TELEPHONE ENCOUNTER
"Routing refill request to provider for review/approval because:  Early refill requested.    Last Written Prescription Date:  12/24/22  Last Fill Quantity: 90,  # refills: 1   Last office visit provider:  3/16/23     Requested Prescriptions   Pending Prescriptions Disp Refills     lisinopril (ZESTRIL) 5 MG tablet [Pharmacy Med Name: LISINOPRIL 5 MG TABLET] 90 tablet 1     Sig: TAKE 1 TABLET BY MOUTH EVERY DAY       ACE Inhibitors (Including Combos) Protocol Passed - 4/20/2023  1:04 PM        Passed - Blood pressure under 140/90 in past 12 months     BP Readings from Last 3 Encounters:   02/06/23 136/77   11/23/22 114/60   11/01/22 130/69                 Passed - Recent (12 mo) or future (30 days) visit within the authorizing provider's specialty     Patient has had an office visit with the authorizing provider or a provider within the authorizing providers department within the previous 12 mos or has a future within next 30 days. See \"Patient Info\" tab in inbasket, or \"Choose Columns\" in Meds & Orders section of the refill encounter.              Passed - Medication is active on med list        Passed - Patient is age 18 or older        Passed - Normal serum creatinine on file in past 12 months     Recent Labs   Lab Test 11/23/22  0925   CR 0.76       Ok to refill medication if creatinine is low          Passed - Normal serum potassium on file in past 12 months     Recent Labs   Lab Test 11/23/22  0925   POTASSIUM 3.8                  Marquise Jose RN 04/20/23 1:04 PM  "

## 2023-04-28 DIAGNOSIS — G40.909 SEIZURE DISORDER (H): ICD-10-CM

## 2023-04-28 RX ORDER — PHENYTOIN SODIUM 100 MG/1
500 CAPSULE, EXTENDED RELEASE ORAL AT BEDTIME
Qty: 450 CAPSULE | Refills: 1 | Status: SHIPPED | OUTPATIENT
Start: 2023-04-28 | End: 2023-10-09

## 2023-04-28 NOTE — TELEPHONE ENCOUNTER
"Routing refill request to provider for review/approval because:  Labs out of range  Labs not current    Last Written Prescription Date:  11/20/22  Last Fill Quantity: 450,  # refills: 1   Last office visit provider:  3/16/23     Requested Prescriptions   Pending Prescriptions Disp Refills     phenytoin (DILANTIN) 100 MG ER capsule [Pharmacy Med Name: PHENYTOIN SOD  MG CAP] 450 capsule 1     Sig: TAKE 5 CAPSULES (500 MG) BY MOUTH AT BEDTIME       Anti-Seizure Meds Protocol  Failed - 4/28/2023  8:26 AM        Failed - Review Authorizing provider's last note.      Refer to last progress notes: confirm request is for original authorizing provider (cannot be through other providers).          Failed - Normal CBC on file in past 26 months     Recent Labs   Lab Test 11/23/22  0925   WBC 9.0   RBC 4.66   HGB 13.4   HCT 39.5*                    Failed - Dilantin level within therapeutic range in past 26 months     Recent Labs   Lab Test 04/27/22  1615   DILANTIN 10.0       Dilantin level must be checked 2-4 weeks after dosage change.          Passed - Recent (12 mo) or future (30 days) visit within the authorizing provider's specialty     Patient has had an office visit with the authorizing provider or a provider within the authorizing providers department within the previous 12 mos or has a future within next 30 days. See \"Patient Info\" tab in inbasket, or \"Choose Columns\" in Meds & Orders section of the refill encounter.              Passed - Normal ALT or AST on file in past 26 months     Recent Labs   Lab Test 11/23/22  0925   ALT 34     Recent Labs   Lab Test 11/23/22  0925   AST 26             Passed - Normal platelet count on file in past 26 months     Recent Labs   Lab Test 11/23/22  0925                  Passed - Medication is active on med list             KAYLAH ELIZONDO RN 04/28/23 3:44 PM  "

## 2023-05-05 ENCOUNTER — OFFICE VISIT (OUTPATIENT)
Dept: FAMILY MEDICINE | Facility: CLINIC | Age: 68
End: 2023-05-05
Payer: COMMERCIAL

## 2023-05-05 VITALS
RESPIRATION RATE: 16 BRPM | WEIGHT: 287 LBS | BODY MASS INDEX: 41.18 KG/M2 | SYSTOLIC BLOOD PRESSURE: 131 MMHG | DIASTOLIC BLOOD PRESSURE: 67 MMHG | OXYGEN SATURATION: 95 % | HEART RATE: 74 BPM | TEMPERATURE: 97.8 F

## 2023-05-05 DIAGNOSIS — Z79.4 TYPE 2 DIABETES MELLITUS WITH HYPERGLYCEMIA, WITH LONG-TERM CURRENT USE OF INSULIN (H): ICD-10-CM

## 2023-05-05 DIAGNOSIS — E11.65 TYPE 2 DIABETES MELLITUS WITH HYPERGLYCEMIA, WITH LONG-TERM CURRENT USE OF INSULIN (H): ICD-10-CM

## 2023-05-05 DIAGNOSIS — F33.2 SEVERE EPISODE OF RECURRENT MAJOR DEPRESSIVE DISORDER, WITHOUT PSYCHOTIC FEATURES (H): ICD-10-CM

## 2023-05-05 DIAGNOSIS — G62.9 NEUROPATHY: ICD-10-CM

## 2023-05-05 LAB
HBA1C MFR BLD: 6.7 % (ref 0–5.6)
HOLD SPECIMEN: NORMAL

## 2023-05-05 PROCEDURE — 36415 COLL VENOUS BLD VENIPUNCTURE: CPT | Performed by: FAMILY MEDICINE

## 2023-05-05 PROCEDURE — 83036 HEMOGLOBIN GLYCOSYLATED A1C: CPT | Performed by: FAMILY MEDICINE

## 2023-05-05 PROCEDURE — 99214 OFFICE O/P EST MOD 30 MIN: CPT | Performed by: FAMILY MEDICINE

## 2023-05-05 RX ORDER — METFORMIN HCL 500 MG
TABLET, EXTENDED RELEASE 24 HR ORAL
Qty: 360 TABLET | Refills: 2 | Status: SHIPPED | OUTPATIENT
Start: 2023-05-05 | End: 2024-01-22

## 2023-05-05 RX ORDER — QUETIAPINE FUMARATE 50 MG/1
50 TABLET, FILM COATED ORAL AT BEDTIME
Qty: 90 TABLET | Refills: 3 | Status: SHIPPED | OUTPATIENT
Start: 2023-05-05 | End: 2024-08-16

## 2023-05-05 ASSESSMENT — PATIENT HEALTH QUESTIONNAIRE - PHQ9
SUM OF ALL RESPONSES TO PHQ QUESTIONS 1-9: 5
10. IF YOU CHECKED OFF ANY PROBLEMS, HOW DIFFICULT HAVE THESE PROBLEMS MADE IT FOR YOU TO DO YOUR WORK, TAKE CARE OF THINGS AT HOME, OR GET ALONG WITH OTHER PEOPLE: NOT DIFFICULT AT ALL
SUM OF ALL RESPONSES TO PHQ QUESTIONS 1-9: 5

## 2023-05-05 NOTE — PATIENT INSTRUCTIONS
------------------------------------------------------------------------------------------------------   MD PROGRESS NOTE     Patient: Azar Lo        : 1963  Diagnosis/ICD-10 Code:  Strain of lumbar region, subsequent encounter [S39.012D]  Referring practitioner: Washington Ramirez MD  Date of Initial Visit: 2019                  Today's Date: 2019  _________________________________________________________________     Thank you for the referral of Mr. Lo to [facility].  Mr. Lo has attended 7 PT sessions and their treatment has consisted of: modalities prn, manual therapy, therapeutic exercise, lumbar mechanical traction, patient education, and HEP.      Subjective   Azar Lo reports: decreased pain and no RLE symptoms after PT and Lumbar traction when standing / walking, but  wakes up with increased (R) LBP and RLE symptoms into lateral lower leg.  LBP and RLE symptoms much more intermittent during the day as long as he is standing / walking, but sitting > 5-10 min increases his (R) LBP and (R) LE symptoms into his lateral lower leg.  He notes avoiding sitting at all costs during the day except for driving to/from work -   ___________________________________________________________________  Objective              OBSERVATION: Level pelvis and improved gait, transitional movements and more relaxed                   posturing.               PALPATION: Tender L4-5, L5-S1 and (R) -               AROM: Limited and painful lumbar flexion with repeated flexion resulting in increased LBP into                         buttock - -               STRENGTH: Limited ability to heel walk RLE -               SPECIAL TESTS: (+) SLR (R); (+) Seated SLR (R) ; (+) SLUMP Test (R):                     (+) Repeated Lumbar  Flexion Test w/ increased LB/Rt Glute pain;               ACTIVITY TOLERANCE: Overall improved tolerance to ADL's and job requirements that involve    Meds refilled    Find an Eye Doctor and let me know if a referral is needed.    Follow up in 3 months.    standing and walking, but very limited ability to sit, bend, lift items of wt, and push/pull, etc -                See Exercise, Manual, and Modality Logs for complete treatment.      Functional / Therapeutic Activities:  X 25 min  · TAPING / BRACING: NA  · SEE EXERCISE FLOW SHEET -   · FUNCTIONAL ASSESSMENT -    · Jt protection, ADL modification; Posture and    ___________________________________________________________________   Assessment/Plan  Lumbar strain; RLE Radiculopathy; Possible Disc involvement L5?  Prone positioning / extension and lumbar traction centralizes and greatly alleviates his pain, but pain increased when he sits longer than a few minutes, is bent over for period of time and every morning when he wakes up.-   Mr. Lo would benefit from continued Physical Therapy, and further assessment (I,e, MRI, etc) due to persistent / consistent LBP and radicular symptoms     P: Recommend continued Physical Therapy, and further assessment (I,e, MRI, etc) due to persistent / consistent LBP and radicular symptoms.       Please advise after your exam.     Thank you again for this referral of Mr. Lo to [facility].     PT Signature: ______________________________   Geovani Balderas, PT  ____________________________________________________________________  Manual Therapy:                 mins  04597;  Therapeutic Exercise:    15     mins  61193;     Neuromuscular Lala:        mins  79573;    Therapeutic Activity:       25    mins  17041;     Lumbar Traction:           20      mins  27451;     Ultrasound:                     10     mins  84358;    Electrical Stimulation:         mins  09935 ( );  Dry Needling                       mins self-pay     Timed Treatment:   50   mins                  Total Treatment:     80   mins  ______________________________________________________________________  [id number] [facility] [place] [address]  Phone: [phone]                  Fax: [phone]

## 2023-05-05 NOTE — PROGRESS NOTES
}    Subjective   Isac is a 68 year old, presenting for the following health issues:  Recheck Medication (DM, ANXIETY/ DEPRESSION)      On daily BLOOD SUGAR check 118-165      5/5/2023    10:23 AM   Additional Questions   Roomed by VÍCTOR MICHELE   Accompanied by NA     Forms 5/5/2023   Any forms needing to be completed Yes     History of Present Illness       Mental Health Follow-up:  Patient presents to follow-up on Depression.Patient's depression since last visit has been:  Good  The patient is not having other symptoms associated with depression.      Any significant life events: job concerns  Patient is not feeling anxious or having panic attacks.  Patient has no concerns about alcohol or drug use.    Diabetes:   He presents for follow up of diabetes.  He is checking home blood glucose two times daily. He checks blood glucose before and after meals.  Blood glucose is never over 200 and never under 70. He is aware of hypoglycemia symptoms including shakiness and weakness. He has no concerns regarding his diabetes at this time.  He is not experiencing numbness or burning in feet, excessive thirst, blurry vision, weight changes or redness, sores or blisters on feet. The patient has not had a diabetic eye exam in the last 12 months.         Reason for visit:  Recent diatrrhea and vomitingHe consumes 2 sweetened beverage(s) daily.He exercises with enough effort to increase his heart rate 9 or less minutes per day.  He exercises with enough effort to increase his heart rate 3 or less days per week.     Today's PHQ-9         PHQ-9 Total Score: 5    PHQ-9 Q9 Thoughts of better off dead/self-harm past 2 weeks :   Not at all    How difficult have these problems made it for you to do your work, take care of things at home, or get along with other people: Not difficult at all     2-28-23 to 3-15-23 was out of work with Covid        Objective    /67 (BP Location: Left arm, Patient Position: Sitting, Cuff Size: Adult  Large)   Pulse 74   Temp 97.8  F (36.6  C) (Oral)   Resp 16   Wt 130.2 kg (287 lb)   SpO2 95%   BMI 41.18 kg/m    Body mass index is 41.18 kg/m .     Wt Readings from Last 4 Encounters:   05/05/23 130.2 kg (287 lb)   02/06/23 135.2 kg (298 lb)   11/23/22 134 kg (295 lb 6.4 oz)   11/01/22 132.3 kg (291 lb 11.2 oz)     Physical Exam   GENERAL: healthy, alert and no distress  RESP: lungs clear to auscultation - no rales, rhonchi or wheezes  CV: regular rate and rhythm, normal S1 S2, no S3 or S4, no murmur, click or rub, no peripheral edema and peripheral pulses strong (HEART SOUNDS DISTANT)  MS: no gross musculoskeletal defects noted, no edema  FEET:  MF TESTING: DIFFUSELY DIMINISHED SENSATION              SKIN EXAM:  NL              VASCULAR:   R DP  PULSE: +                                      L DP  PULSE: +                                      R PT  PULSE: -                                      L PT  PULSE: -    Lab Results   Component Value Date    A1C 6.7 05/05/2023    A1C 8.5 07/28/2022    A1C 9.8 04/28/2022    A1C 10.3 03/30/2022    A1C 11.5 12/22/2021       Encounter Diagnoses   Name Primary?     Type 2 diabetes mellitus with hyperglycemia, with long-term current use of insulin (H), excellent control and improvement.      Severe episode of recurrent major depressive disorder, without psychotic features (H), stable on Seroquel, rx refilled      Neuropathy, both plantar surfaces           PLAN:   Meds refilled    Find an Eye Doctor and let me know if a referral is needed.    Follow up in 3 months.      Return to work note given for 5-3-23

## 2023-05-11 DIAGNOSIS — E11.9 CONTROLLED TYPE 2 DIABETES MELLITUS WITHOUT COMPLICATION, WITH LONG-TERM CURRENT USE OF INSULIN (H): Primary | ICD-10-CM

## 2023-05-11 DIAGNOSIS — Z79.4 CONTROLLED TYPE 2 DIABETES MELLITUS WITHOUT COMPLICATION, WITH LONG-TERM CURRENT USE OF INSULIN (H): Primary | ICD-10-CM

## 2023-08-16 ENCOUNTER — TELEPHONE (OUTPATIENT)
Dept: NEUROLOGY | Facility: CLINIC | Age: 68
End: 2023-08-16

## 2023-08-16 NOTE — TELEPHONE ENCOUNTER
LVM for patient in regards to a referral that we received in June of 2022, Left clinic number for him to call back if he is still needing an appointment with us

## 2023-10-09 DIAGNOSIS — G40.909 SEIZURE DISORDER (H): ICD-10-CM

## 2023-10-09 RX ORDER — PHENYTOIN SODIUM 100 MG/1
500 CAPSULE, EXTENDED RELEASE ORAL AT BEDTIME
Qty: 150 CAPSULE | Refills: 1 | Status: SHIPPED | OUTPATIENT
Start: 2023-10-09 | End: 2023-12-11

## 2023-10-30 DIAGNOSIS — E78.5 HYPERLIPIDEMIA: ICD-10-CM

## 2023-10-30 RX ORDER — ATORVASTATIN CALCIUM 20 MG/1
20 TABLET, FILM COATED ORAL AT BEDTIME
Qty: 30 TABLET | Refills: 0 | Status: SHIPPED | OUTPATIENT
Start: 2023-10-30 | End: 2023-11-10

## 2023-11-06 DIAGNOSIS — G40.909 SEIZURE DISORDER (H): ICD-10-CM

## 2023-11-06 RX ORDER — PHENYTOIN SODIUM 100 MG/1
500 CAPSULE, EXTENDED RELEASE ORAL AT BEDTIME
Qty: 450 CAPSULE | Refills: 1 | OUTPATIENT
Start: 2023-11-06

## 2023-11-10 ENCOUNTER — OFFICE VISIT (OUTPATIENT)
Dept: FAMILY MEDICINE | Facility: CLINIC | Age: 68
End: 2023-11-10
Payer: COMMERCIAL

## 2023-11-10 VITALS
TEMPERATURE: 98.5 F | DIASTOLIC BLOOD PRESSURE: 67 MMHG | WEIGHT: 304.2 LBS | OXYGEN SATURATION: 95 % | BODY MASS INDEX: 43.65 KG/M2 | HEART RATE: 67 BPM | SYSTOLIC BLOOD PRESSURE: 138 MMHG | RESPIRATION RATE: 18 BRPM

## 2023-11-10 DIAGNOSIS — E11.9 CONTROLLED TYPE 2 DIABETES MELLITUS WITHOUT COMPLICATION, WITH LONG-TERM CURRENT USE OF INSULIN (H): ICD-10-CM

## 2023-11-10 DIAGNOSIS — Z79.4 CONTROLLED TYPE 2 DIABETES MELLITUS WITHOUT COMPLICATION, WITH LONG-TERM CURRENT USE OF INSULIN (H): ICD-10-CM

## 2023-11-10 DIAGNOSIS — Z79.4 TYPE 2 DIABETES MELLITUS WITHOUT COMPLICATION, WITH LONG-TERM CURRENT USE OF INSULIN (H): ICD-10-CM

## 2023-11-10 DIAGNOSIS — G40.909 SEIZURE DISORDER (H): ICD-10-CM

## 2023-11-10 DIAGNOSIS — Z23 NEED FOR VACCINATION FOR STREP PNEUMONIAE: ICD-10-CM

## 2023-11-10 DIAGNOSIS — Z23 NEED FOR COVID-19 VACCINE: ICD-10-CM

## 2023-11-10 DIAGNOSIS — G62.9 PERIPHERAL POLYNEUROPATHY: ICD-10-CM

## 2023-11-10 DIAGNOSIS — Z00.00 HEALTHCARE MAINTENANCE: ICD-10-CM

## 2023-11-10 DIAGNOSIS — Z23 NEED FOR INFLUENZA VACCINATION: ICD-10-CM

## 2023-11-10 DIAGNOSIS — Z12.5 SCREENING PSA (PROSTATE SPECIFIC ANTIGEN): ICD-10-CM

## 2023-11-10 DIAGNOSIS — E78.5 HYPERLIPIDEMIA, UNSPECIFIED HYPERLIPIDEMIA TYPE: ICD-10-CM

## 2023-11-10 DIAGNOSIS — E11.9 TYPE 2 DIABETES MELLITUS WITHOUT COMPLICATION, WITH LONG-TERM CURRENT USE OF INSULIN (H): ICD-10-CM

## 2023-11-10 DIAGNOSIS — D72.829 LEUKOCYTOSIS, UNSPECIFIED TYPE: Primary | ICD-10-CM

## 2023-11-10 LAB
ALBUMIN SERPL BCG-MCNC: 4 G/DL (ref 3.5–5.2)
ALP SERPL-CCNC: 140 U/L (ref 40–129)
ALT SERPL W P-5'-P-CCNC: 30 U/L (ref 0–70)
ANION GAP SERPL CALCULATED.3IONS-SCNC: 13 MMOL/L (ref 7–15)
AST SERPL W P-5'-P-CCNC: 17 U/L (ref 0–45)
BILIRUB SERPL-MCNC: 0.2 MG/DL
BUN SERPL-MCNC: 14.9 MG/DL (ref 8–23)
CALCIUM SERPL-MCNC: 9 MG/DL (ref 8.8–10.2)
CHLORIDE SERPL-SCNC: 107 MMOL/L (ref 98–107)
CHOLEST SERPL-MCNC: 157 MG/DL
CREAT SERPL-MCNC: 0.91 MG/DL (ref 0.67–1.17)
CREAT UR-MCNC: 116 MG/DL
DEPRECATED HCO3 PLAS-SCNC: 24 MMOL/L (ref 22–29)
EGFRCR SERPLBLD CKD-EPI 2021: >90 ML/MIN/1.73M2
ERYTHROCYTE [DISTWIDTH] IN BLOOD BY AUTOMATED COUNT: 13.5 % (ref 10–15)
GLUCOSE SERPL-MCNC: 129 MG/DL (ref 70–99)
HBA1C MFR BLD: 7.4 % (ref 0–5.6)
HCT VFR BLD AUTO: 43 % (ref 40–53)
HDLC SERPL-MCNC: 48 MG/DL
HGB BLD-MCNC: 14.1 G/DL (ref 13.3–17.7)
HOLD SPECIMEN: NORMAL
LDLC SERPL CALC-MCNC: 92 MG/DL
MCH RBC QN AUTO: 28.7 PG (ref 26.5–33)
MCHC RBC AUTO-ENTMCNC: 32.8 G/DL (ref 31.5–36.5)
MCV RBC AUTO: 87 FL (ref 78–100)
MICROALBUMIN UR-MCNC: 15.4 MG/L
MICROALBUMIN/CREAT UR: 13.28 MG/G CR (ref 0–17)
NONHDLC SERPL-MCNC: 109 MG/DL
PHENYTOIN SERPL-MCNC: 7.4 UG/ML
PLATELET # BLD AUTO: 260 10E3/UL (ref 150–450)
POTASSIUM SERPL-SCNC: 4.7 MMOL/L (ref 3.4–5.3)
PROT SERPL-MCNC: 6.6 G/DL (ref 6.4–8.3)
RBC # BLD AUTO: 4.92 10E6/UL (ref 4.4–5.9)
SODIUM SERPL-SCNC: 144 MMOL/L (ref 135–145)
TRIGL SERPL-MCNC: 87 MG/DL
WBC # BLD AUTO: 11.9 10E3/UL (ref 4–11)

## 2023-11-10 PROCEDURE — 82570 ASSAY OF URINE CREATININE: CPT | Performed by: FAMILY MEDICINE

## 2023-11-10 PROCEDURE — 90472 IMMUNIZATION ADMIN EACH ADD: CPT | Performed by: FAMILY MEDICINE

## 2023-11-10 PROCEDURE — 80061 LIPID PANEL: CPT | Performed by: FAMILY MEDICINE

## 2023-11-10 PROCEDURE — 83036 HEMOGLOBIN GLYCOSYLATED A1C: CPT | Performed by: FAMILY MEDICINE

## 2023-11-10 PROCEDURE — 99214 OFFICE O/P EST MOD 30 MIN: CPT | Mod: 25 | Performed by: FAMILY MEDICINE

## 2023-11-10 PROCEDURE — 82043 UR ALBUMIN QUANTITATIVE: CPT | Performed by: FAMILY MEDICINE

## 2023-11-10 PROCEDURE — 36415 COLL VENOUS BLD VENIPUNCTURE: CPT | Performed by: FAMILY MEDICINE

## 2023-11-10 PROCEDURE — 90471 IMMUNIZATION ADMIN: CPT | Performed by: FAMILY MEDICINE

## 2023-11-10 PROCEDURE — 85027 COMPLETE CBC AUTOMATED: CPT | Performed by: FAMILY MEDICINE

## 2023-11-10 PROCEDURE — 90677 PCV20 VACCINE IM: CPT | Performed by: FAMILY MEDICINE

## 2023-11-10 PROCEDURE — 91320 SARSCV2 VAC 30MCG TRS-SUC IM: CPT | Performed by: FAMILY MEDICINE

## 2023-11-10 PROCEDURE — G0103 PSA SCREENING: HCPCS | Performed by: FAMILY MEDICINE

## 2023-11-10 PROCEDURE — 80053 COMPREHEN METABOLIC PANEL: CPT | Performed by: FAMILY MEDICINE

## 2023-11-10 PROCEDURE — 90662 IIV NO PRSV INCREASED AG IM: CPT | Performed by: FAMILY MEDICINE

## 2023-11-10 PROCEDURE — 80185 ASSAY OF PHENYTOIN TOTAL: CPT | Performed by: FAMILY MEDICINE

## 2023-11-10 PROCEDURE — 90480 ADMN SARSCOV2 VAC 1/ONLY CMP: CPT | Performed by: FAMILY MEDICINE

## 2023-11-10 RX ORDER — LISINOPRIL 5 MG/1
5 TABLET ORAL DAILY
Qty: 90 TABLET | Refills: 3 | Status: SHIPPED | OUTPATIENT
Start: 2023-11-10 | End: 2024-08-16

## 2023-11-10 RX ORDER — VILAZODONE HYDROCHLORIDE 40 MG/1
40 TABLET ORAL
COMMUNITY
Start: 2023-10-24

## 2023-11-10 RX ORDER — ATORVASTATIN CALCIUM 20 MG/1
20 TABLET, FILM COATED ORAL AT BEDTIME
Qty: 90 TABLET | Refills: 3 | Status: SHIPPED | OUTPATIENT
Start: 2023-11-10 | End: 2024-08-16

## 2023-11-10 RX ORDER — ASPIRIN 81 MG/1
81 TABLET ORAL DAILY
Start: 2023-11-10 | End: 2024-08-16

## 2023-11-10 RX ORDER — RESPIRATORY SYNCYTIAL VIRUS VACCINE 120MCG/0.5
0.5 KIT INTRAMUSCULAR ONCE
Qty: 1 EACH | Refills: 0 | Status: CANCELLED | OUTPATIENT
Start: 2023-11-10 | End: 2023-11-10

## 2023-11-10 ASSESSMENT — PATIENT HEALTH QUESTIONNAIRE - PHQ9
SUM OF ALL RESPONSES TO PHQ QUESTIONS 1-9: 8
10. IF YOU CHECKED OFF ANY PROBLEMS, HOW DIFFICULT HAVE THESE PROBLEMS MADE IT FOR YOU TO DO YOUR WORK, TAKE CARE OF THINGS AT HOME, OR GET ALONG WITH OTHER PEOPLE: NOT DIFFICULT AT ALL
SUM OF ALL RESPONSES TO PHQ QUESTIONS 1-9: 8

## 2023-11-10 NOTE — LETTER
November 12, 2023      Isac Li  [address]  [facility] [address]        Dear Estefaniar,  We are writing to inform you of your test results.    Hi Isac:  Your A1C was close to goal.  Continue to work on a diabetic diet and  exercise with a goal of gradual weight reduction.  The PSA is normal and stable.  Recheck the PSA in one year.  The cholesterol panel is very good.  The white blood cell count was slightly elevated and I would recheck that in a month.  The alk phos elevation was small and not concerning.  The remaining labs are normal.  I will place an order to recheck the CBC in a month.        Resulted Orders   HEMOGLOBIN A1C   Result Value Ref Range    Hemoglobin A1C 7.4 (H) 0.0 - 5.6 %      Comment:      Normal <5.7%   Prediabetes 5.7-6.4%    Diabetes 6.5% or higher     Note: Adopted from ADA consensus guidelines.   PSA, screen   Result Value Ref Range    Prostate Specific Antigen Screen 0.28 0.00 - 4.50 ng/mL    Narrative    This result is obtained using the Roche Elecsys total PSA method on the bennett e801 immunoassay analyzer. Results obtained with different assay methods or kits cannot be used interchangeably.   Lipid panel reflex to direct LDL Fasting   Result Value Ref Range    Cholesterol 157 <200 mg/dL    Triglycerides 87 <150 mg/dL    Direct Measure HDL 48 >=40 mg/dL    LDL Cholesterol Calculated 92 <=100 mg/dL    Non HDL Cholesterol 109 <130 mg/dL    Narrative    Cholesterol  Desirable:  <200 mg/dL    Triglycerides  Normal:  Less than 150 mg/dL  Borderline High:  150-199 mg/dL  High:  200-499 mg/dL  Very High:  Greater than or equal to 500 mg/dL    Direct Measure HDL  Female:  Greater than or equal to 50 mg/dL   Male:  Greater than or equal to 40 mg/dL    LDL Cholesterol  Desirable:  <100mg/dL  Above Desirable:  100-129 mg/dL   Borderline High:  130-159 mg/dL   High:  160-189 mg/dL   Very High:  >= 190 mg/dL    Non HDL Cholesterol  Desirable:  130 mg/dL  Above Desirable:  130-159  mg/dL  Borderline High:  160-189 mg/dL  High:  190-219 mg/dL  Very High:  Greater than or equal to 220 mg/dL   Comprehensive metabolic panel (BMP + Alb, Alk Phos, ALT, AST, Total. Bili, TP)   Result Value Ref Range    Sodium 144 135 - 145 mmol/L      Comment:      Reference intervals for this test were updated on 09/26/2023 to more accurately reflect our healthy population. There may be differences in the flagging of prior results with similar values performed with this method. Interpretation of those prior results can be made in the context of the updated reference intervals.     Potassium 4.7 3.4 - 5.3 mmol/L    Carbon Dioxide (CO2) 24 22 - 29 mmol/L    Anion Gap 13 7 - 15 mmol/L    Urea Nitrogen 14.9 8.0 - 23.0 mg/dL    Creatinine 0.91 0.67 - 1.17 mg/dL    GFR Estimate >90 >60 mL/min/1.73m2    Calcium 9.0 8.8 - 10.2 mg/dL    Chloride 107 98 - 107 mmol/L    Glucose 129 (H) 70 - 99 mg/dL    Alkaline Phosphatase 140 (H) 40 - 129 U/L    AST 17 0 - 45 U/L      Comment:      Reference intervals for this test were updated on 6/12/2023 to more accurately reflect our healthy population. There may be differences in the flagging of prior results with similar values performed with this method. Interpretation of those prior results can be made in the context of the updated reference intervals.    ALT 30 0 - 70 U/L      Comment:      Reference intervals for this test were updated on 6/12/2023 to more accurately reflect our healthy population. There may be differences in the flagging of prior results with similar values performed with this method. Interpretation of those prior results can be made in the context of the updated reference intervals.      Protein Total 6.6 6.4 - 8.3 g/dL    Albumin 4.0 3.5 - 5.2 g/dL    Bilirubin Total 0.2 <=1.2 mg/dL   CBC with platelets   Result Value Ref Range    WBC Count 11.9 (H) 4.0 - 11.0 10e3/uL    RBC Count 4.92 4.40 - 5.90 10e6/uL    Hemoglobin 14.1 13.3 - 17.7 g/dL    Hematocrit 43.0  40.0 - 53.0 %    MCV 87 78 - 100 fL    MCH 28.7 26.5 - 33.0 pg    MCHC 32.8 31.5 - 36.5 g/dL    RDW 13.5 10.0 - 15.0 %    Platelet Count 260 150 - 450 10e3/uL   Phenytoin level   Result Value Ref Range    Phenytoin 7.4   ug/mL      Comment:      Therapeutic Range: 10.0-20.0 ug/mL  Critical: Greater than 30.0 ug/mL   Albumin Random Urine Quantitative with Creat Ratio   Result Value Ref Range    Creatinine Urine mg/dL 116.0 mg/dL      Comment:      The reference ranges have not been established in urine creatinine. The results should be integrated into the clinical context for interpretation.    Albumin Urine mg/L 15.4 mg/L      Comment:      The reference ranges have not been established in urine albumin. The results should be integrated into the clinical context for interpretation.    Albumin Urine mg/g Cr 13.28 0.00 - 17.00 mg/g Cr      Comment:      Microalbuminuria is defined as an albumin:creatinine ratio of 17 to 299 for males and 25 to 299 for females. A ratio of albumin:creatinine of 300 or higher is indicative of overt proteinuria.  Due to biologic variability, positive results should be confirmed by a second, first-morning random or 24-hour timed urine specimen. If there is discrepancy, a third specimen is recommended. When 2 out of 3 results are in the microalbuminuria range, this is evidence for incipient nephropathy and warrants increased efforts at glucose control, blood pressure control, and institution of therapy with an angiotensin-converting-enzyme (ACE) inhibitor (if the patient can tolerate it).         If you have any questions or concerns, please call the clinic at the number listed above.       Sincerely,      Marquise Russ MD

## 2023-11-10 NOTE — PROGRESS NOTES
Kait Chau is a 68 year old, presenting for the following health issues:  Diabetes (DM check, no concerns at this time )    Last eye exam about 2 years ago.      11/10/2023     9:59 AM   Additional Questions   Roomed by Evelyn Tristan LPN   Accompanied by -       History of Present Illness       Mental Health Follow-up:  Patient presents to follow-up on Depression.Patient's depression since last visit has been:  Good  The patient is not having other symptoms associated with depression.      Any significant life events: No  Patient is not feeling anxious or having panic attacks.  Patient has no concerns about alcohol or drug use.    Diabetes:   He presents for follow up of diabetes.  He is checking home blood glucose one time daily.   He checks blood glucose before meals.  Blood glucose is sometimes over 200 and never under 70. He is aware of hypoglycemia symptoms including shakiness and weakness.    He has no concerns regarding his diabetes at this time.   He is not experiencing numbness or burning in feet, excessive thirst, blurry vision, weight changes or redness, sores or blisters on feet. The patient has not had a diabetic eye exam in the last 12 months.          Reason for visit:  Diabetes and epilepsy    He eats 0-1 servings of fruits and vegetables daily.He consumes 2 sweetened beverage(s) daily.He exercises with enough effort to increase his heart rate 9 or less minutes per day.  He exercises with enough effort to increase his heart rate 3 or less days per week.   He is taking medications regularly.           Objective    /67 (BP Location: Left arm, Patient Position: Sitting, Cuff Size: Adult Large)   Pulse 67   Temp 98.5  F (36.9  C) (Oral)   Resp 18   Wt 138 kg (304 lb 3.2 oz)   SpO2 95%   BMI 43.65 kg/m    Body mass index is 43.65 kg/m .    Wt Readings from Last 4 Encounters:   11/10/23 138 kg (304 lb 3.2 oz)   05/05/23 130.2 kg (287 lb)   02/06/23 135.2 kg (298 lb)   11/23/22 134  kg (295 lb 6.4 oz)      Physical Exam   GENERAL: healthy, alert and no distress  RESP: lungs clear to auscultation - no rales, rhonchi or wheezes  CV: regular rate and rhythm, normal S1 S2, no S3 or S4, no murmur, click or rub, no peripheral edema and peripheral pulses strong  MS: no gross musculoskeletal defects noted, no edema    FEET:  MF TESTING: no sensation to the monofilament              SKIN EXAM:  NL              VASCULAR:   R DP  PULSE: +                                      L DP  PULSE: +                                      R PT  PULSE: -                                      L PT  PULSE: -      Lab Results   Component Value Date    A1C 7.4 11/10/2023    A1C 6.7 05/05/2023    A1C 8.5 07/28/2022    A1C 9.8 04/28/2022    A1C 10.3 03/30/2022               Encounter Diagnoses   Name Primary?    Hyperlipidemia, unspecified hyperlipidemia type, check lipids, REFILL ATORVASTATIN     Type 2 diabetes mellitus without complication, with long-term current use of insulin (H), fair control     Screening PSA (prostate specific antigen), check PSA     Need for vaccination for Strep pneumoniae, GIVEN     Need for COVID-19 vaccine, GIVEN     Need for influenza vaccination, GIVEN     Healthcare maintenance     Controlled type 2 diabetes mellitus without complication, with long-term current use of insulin , FAIR CONTROL     Seizure disorder (H), CHECK DEPAKOTE LEVEL     Peripheral polyneuropathy,               PLAN:   Schedule an eye exam    Colonoscopy declined    Fasting labs    Flu  vaccine    Prevnar 20 vaccine    Covid  vaccine    Refill lisinopril and atorvastatin for a year.           Answers submitted by the patient for this visit:  Patient Health Questionnaire (Submitted on 11/10/2023)  If you checked off any problems, how difficult have these problems made it for you to do your work, take care of things at home, or get along with other people?: Not difficult at all  PHQ9 TOTAL SCORE: 8  Diabetes Visit (Submitted  on 11/10/2023)  Chief Complaint: Chronic problems general questions HPI Form  Frequency of checking blood sugars:: one time daily  What time of day are you checking your blood sugars : before meals  Have you had any blood sugars above 200?: Yes  Have you had any blood sugars below 70?: No  Hypoglycemia symptoms:: shakiness, weakness  Diabetic concerns:: none  Paraesthesia present:: none of these symptoms  Have you had a diabetic eye exam within the last year?: No  Depression / Anxiety Questionnaire (Submitted on 11/10/2023)  Chief Complaint: Chronic problems general questions HPI Form  Depression/Anxiety: Depression  Depression only (Submitted on 11/10/2023)  Chief Complaint: Chronic problems general questions HPI Form  Status since last visit:: good  Other associated symptoms of depression:: No  Significant life event: : No  Anxious:: No  Current substance use:: No  General Questionnaire (Submitted on 11/10/2023)  Chief Complaint: Chronic problems general questions HPI Form  What is the reason for your visit today? : diabetes and epilepsy  How many servings of fruits and vegetables do you eat daily?: 0-1  On average, how many sweetened beverages do you drink each day (Examples: soda, juice, sweet tea, etc.  Do NOT count diet or artificially sweetened beverages)?: 2  How many minutes a day do you exercise enough to make your heart beat faster?: 9 or less  How many days a week do you exercise enough to make your heart beat faster?: 3 or less  How many days per week do you miss taking your medication?: 0

## 2023-11-10 NOTE — PATIENT INSTRUCTIONS
Schedule an eye exam    Colonoscopy declined    Fasting labs    Flu  vaccine    Prevnar 20 vaccine    Covid  vaccine    Refill lisinopril and atorvastatin for a year.

## 2023-11-11 LAB — PSA SERPL DL<=0.01 NG/ML-MCNC: 0.28 NG/ML (ref 0–4.5)

## 2023-11-13 NOTE — RESULT ENCOUNTER NOTE
Called patient and left voice mail to call back to go over results and schedule Lab appointment in a month.

## 2023-12-07 DIAGNOSIS — E11.9 CONTROLLED TYPE 2 DIABETES MELLITUS WITHOUT COMPLICATION, WITH LONG-TERM CURRENT USE OF INSULIN (H): ICD-10-CM

## 2023-12-07 DIAGNOSIS — Z79.4 CONTROLLED TYPE 2 DIABETES MELLITUS WITHOUT COMPLICATION, WITH LONG-TERM CURRENT USE OF INSULIN (H): ICD-10-CM

## 2023-12-11 ENCOUNTER — LAB (OUTPATIENT)
Dept: LAB | Facility: CLINIC | Age: 68
End: 2023-12-11
Payer: COMMERCIAL

## 2023-12-11 DIAGNOSIS — F33.2 SEVERE RECURRENT MAJOR DEPRESSION WITHOUT PSYCHOTIC FEATURES (H): Primary | ICD-10-CM

## 2023-12-11 DIAGNOSIS — F33.2 SEVERE RECURRENT MAJOR DEPRESSION WITHOUT PSYCHOTIC FEATURES (H): ICD-10-CM

## 2023-12-11 DIAGNOSIS — D72.829 LEUKOCYTOSIS, UNSPECIFIED TYPE: ICD-10-CM

## 2023-12-11 LAB
ERYTHROCYTE [DISTWIDTH] IN BLOOD BY AUTOMATED COUNT: 13.5 % (ref 10–15)
HCT VFR BLD AUTO: 44.5 % (ref 40–53)
HGB BLD-MCNC: 14.7 G/DL (ref 13.3–17.7)
Lab: NORMAL
Lab: NORMAL
MCH RBC QN AUTO: 28.8 PG (ref 26.5–33)
MCHC RBC AUTO-ENTMCNC: 33 G/DL (ref 31.5–36.5)
MCV RBC AUTO: 87 FL (ref 78–100)
PERFORMING LABORATORY: NORMAL
PERFORMING LABORATORY: NORMAL
PLATELET # BLD AUTO: 235 10E3/UL (ref 150–450)
RBC # BLD AUTO: 5.1 10E6/UL (ref 4.4–5.9)
SPECIMEN STATUS: NORMAL
SPECIMEN STATUS: NORMAL
TEST NAME: NORMAL
TEST NAME: NORMAL
WBC # BLD AUTO: 8.1 10E3/UL (ref 4–11)

## 2023-12-11 PROCEDURE — 80307 DRUG TEST PRSMV CHEM ANLYZR: CPT

## 2023-12-11 PROCEDURE — 80185 ASSAY OF PHENYTOIN TOTAL: CPT

## 2023-12-11 PROCEDURE — 84443 ASSAY THYROID STIM HORMONE: CPT

## 2023-12-11 PROCEDURE — 36415 COLL VENOUS BLD VENIPUNCTURE: CPT

## 2023-12-11 PROCEDURE — 99000 SPECIMEN HANDLING OFFICE-LAB: CPT

## 2023-12-11 PROCEDURE — 84439 ASSAY OF FREE THYROXINE: CPT

## 2023-12-11 PROCEDURE — 85027 COMPLETE CBC AUTOMATED: CPT

## 2023-12-12 LAB
PHENYTOIN SERPL-MCNC: 9.8 UG/ML
T4 FREE SERPL-MCNC: 1.14 NG/DL (ref 0.9–1.7)
TSH SERPL DL<=0.005 MIU/L-ACNC: 4.34 UIU/ML (ref 0.3–4.2)

## 2023-12-18 LAB — MAYO MISC RESULT: NORMAL

## 2023-12-28 DIAGNOSIS — Z79.4 TYPE 2 DIABETES MELLITUS WITH HYPERGLYCEMIA, WITH LONG-TERM CURRENT USE OF INSULIN (H): ICD-10-CM

## 2023-12-28 DIAGNOSIS — E11.9 TYPE 2 DIABETES MELLITUS WITHOUT COMPLICATION, WITH LONG-TERM CURRENT USE OF INSULIN (H): ICD-10-CM

## 2023-12-28 DIAGNOSIS — E11.65 TYPE 2 DIABETES MELLITUS WITH HYPERGLYCEMIA, WITH LONG-TERM CURRENT USE OF INSULIN (H): ICD-10-CM

## 2023-12-28 DIAGNOSIS — Z79.4 TYPE 2 DIABETES MELLITUS WITHOUT COMPLICATION, WITH LONG-TERM CURRENT USE OF INSULIN (H): ICD-10-CM

## 2023-12-28 RX ORDER — METFORMIN HCL 500 MG
TABLET, EXTENDED RELEASE 24 HR ORAL
Qty: 120 TABLET | Refills: 8 | OUTPATIENT
Start: 2023-12-28

## 2024-01-01 NOTE — LETTER
December 22, 2021      Isac Li  [address]  [facility] [address]        Dear ,  We are writing to inform you of your test results.    Emanuel Chau:  We discussed your elevated A1C today.  It sure seems that things have been headed in the right direction over the past month.  Make sure to not miss either form of insulin that you use.  Also work on limiting those big four carbs:  Rice, bread, pasta and potatoes.    Your LDL cholesterol is up.  Are you taking your statin daily?  I wonder as the LDL was much better in the past.  We could recheck the lipids panel again with your next A1C.  Make sure though to take your statin daily.  Also if you are taking it daily religiously then we may want to consider doubling the dose.  Let me know which is the case.    Your PSA is normal and stable;  It should be rechecked in one year.    Your CMP (comprehensive metabolic panel)  was also normal with a mildly elevated but stable alk phos.  This is not a concern.    Good seeing you today!  Keep up the good work.   Start taking those carrot and celery sticks to work!    Resulted Orders   Hemoglobin A1c   Result Value Ref Range    Hemoglobin A1C 11.5 (H) 0.0 - 5.6 %      Comment:      Normal <5.7%   Prediabetes 5.7-6.4%    Diabetes 6.5% or higher     Note: Adopted from ADA consensus guidelines.   Lipid panel reflex to direct LDL Fasting   Result Value Ref Range    Cholesterol 200 (H) <=199 mg/dL    Triglycerides 132 <=149 mg/dL    Direct Measure HDL 44 >=40 mg/dL      Comment:      HDL Cholesterol Reference Range:     0-2 years:   No reference ranges established for patients under 2 years old  at [facility] for lipid analytes.    2-8 years:  Greater than 45 mg/dL     18 years and older:   Female: Greater than or equal to 50 mg/dL   Male:   Greater than or equal to 40 mg/dL    LDL Cholesterol Calculated 130 (H) <=129 mg/dL    Patient Fasting > 8hrs? Yes    PSA, screen   Result Value Ref Range     Prostate Specific Antigen Screen 0.25 0.00 - 4.50 ug/L    Narrative    Assay Method is Abbott Prostate-Specific Antigen (PSA)  Standard-WHO 1st International (90:10)       If you have any questions or concerns, please call the clinic at the number listed above.       Sincerely,      Marquise Russ MD           3

## 2024-01-03 LAB — MAYO MISC RESULT: NORMAL

## 2024-01-06 ENCOUNTER — HEALTH MAINTENANCE LETTER (OUTPATIENT)
Age: 69
End: 2024-01-06

## 2024-01-20 DIAGNOSIS — E11.65 TYPE 2 DIABETES MELLITUS WITH HYPERGLYCEMIA, WITH LONG-TERM CURRENT USE OF INSULIN (H): ICD-10-CM

## 2024-01-20 DIAGNOSIS — Z79.4 TYPE 2 DIABETES MELLITUS WITH HYPERGLYCEMIA, WITH LONG-TERM CURRENT USE OF INSULIN (H): ICD-10-CM

## 2024-01-22 RX ORDER — METFORMIN HCL 500 MG
TABLET, EXTENDED RELEASE 24 HR ORAL
Qty: 120 TABLET | Refills: 3 | Status: SHIPPED | OUTPATIENT
Start: 2024-01-22 | End: 2024-05-29

## 2024-02-01 ENCOUNTER — TRANSFERRED RECORDS (OUTPATIENT)
Dept: HEALTH INFORMATION MANAGEMENT | Facility: CLINIC | Age: 69
End: 2024-02-01
Payer: COMMERCIAL

## 2024-02-09 DIAGNOSIS — E11.65 TYPE 2 DIABETES MELLITUS WITH HYPERGLYCEMIA, WITH LONG-TERM CURRENT USE OF INSULIN (H): ICD-10-CM

## 2024-02-09 DIAGNOSIS — E11.9 TYPE 2 DIABETES MELLITUS WITHOUT COMPLICATION, WITH LONG-TERM CURRENT USE OF INSULIN (H): ICD-10-CM

## 2024-02-09 DIAGNOSIS — Z79.4 TYPE 2 DIABETES MELLITUS WITHOUT COMPLICATION, WITH LONG-TERM CURRENT USE OF INSULIN (H): ICD-10-CM

## 2024-02-09 DIAGNOSIS — Z79.4 TYPE 2 DIABETES MELLITUS WITH HYPERGLYCEMIA, WITH LONG-TERM CURRENT USE OF INSULIN (H): ICD-10-CM

## 2024-02-14 ENCOUNTER — TELEPHONE (OUTPATIENT)
Dept: FAMILY MEDICINE | Facility: CLINIC | Age: 69
End: 2024-02-14

## 2024-02-14 ENCOUNTER — OFFICE VISIT (OUTPATIENT)
Dept: FAMILY MEDICINE | Facility: CLINIC | Age: 69
End: 2024-02-14
Payer: COMMERCIAL

## 2024-02-14 VITALS
TEMPERATURE: 97.4 F | RESPIRATION RATE: 20 BRPM | SYSTOLIC BLOOD PRESSURE: 121 MMHG | OXYGEN SATURATION: 95 % | BODY MASS INDEX: 42.95 KG/M2 | DIASTOLIC BLOOD PRESSURE: 82 MMHG | HEIGHT: 70 IN | HEART RATE: 108 BPM | WEIGHT: 300 LBS

## 2024-02-14 DIAGNOSIS — E11.9 TYPE 2 DIABETES MELLITUS WITHOUT COMPLICATION, WITH LONG-TERM CURRENT USE OF INSULIN (H): ICD-10-CM

## 2024-02-14 DIAGNOSIS — F33.9 EPISODE OF RECURRENT MAJOR DEPRESSIVE DISORDER, UNSPECIFIED DEPRESSION EPISODE SEVERITY (H): ICD-10-CM

## 2024-02-14 DIAGNOSIS — Z79.4 TYPE 2 DIABETES MELLITUS WITH HYPERGLYCEMIA, WITH LONG-TERM CURRENT USE OF INSULIN (H): ICD-10-CM

## 2024-02-14 DIAGNOSIS — Z79.899 MEDICATION MANAGEMENT: Primary | ICD-10-CM

## 2024-02-14 DIAGNOSIS — Z79.4 TYPE 2 DIABETES MELLITUS WITHOUT COMPLICATION, WITH LONG-TERM CURRENT USE OF INSULIN (H): ICD-10-CM

## 2024-02-14 DIAGNOSIS — E11.65 TYPE 2 DIABETES MELLITUS WITH HYPERGLYCEMIA, WITH LONG-TERM CURRENT USE OF INSULIN (H): ICD-10-CM

## 2024-02-14 DIAGNOSIS — Z79.4 CONTROLLED TYPE 2 DIABETES MELLITUS WITHOUT COMPLICATION, WITH LONG-TERM CURRENT USE OF INSULIN (H): ICD-10-CM

## 2024-02-14 DIAGNOSIS — E11.9 CONTROLLED TYPE 2 DIABETES MELLITUS WITHOUT COMPLICATION, WITH LONG-TERM CURRENT USE OF INSULIN (H): ICD-10-CM

## 2024-02-14 PROBLEM — R27.0 ATAXIA: Status: ACTIVE | Noted: 2024-02-14

## 2024-02-14 LAB — HBA1C MFR BLD: 7.2 % (ref 0–5.6)

## 2024-02-14 PROCEDURE — 93010 ELECTROCARDIOGRAM REPORT: CPT | Performed by: STUDENT IN AN ORGANIZED HEALTH CARE EDUCATION/TRAINING PROGRAM

## 2024-02-14 PROCEDURE — 93005 ELECTROCARDIOGRAM TRACING: CPT | Performed by: FAMILY MEDICINE

## 2024-02-14 PROCEDURE — 36415 COLL VENOUS BLD VENIPUNCTURE: CPT | Performed by: FAMILY MEDICINE

## 2024-02-14 PROCEDURE — 96127 BRIEF EMOTIONAL/BEHAV ASSMT: CPT | Performed by: FAMILY MEDICINE

## 2024-02-14 PROCEDURE — 99214 OFFICE O/P EST MOD 30 MIN: CPT | Mod: 25 | Performed by: FAMILY MEDICINE

## 2024-02-14 PROCEDURE — 83036 HEMOGLOBIN GLYCOSYLATED A1C: CPT | Performed by: FAMILY MEDICINE

## 2024-02-14 RX ORDER — INSULIN DEGLUDEC 100 U/ML
INJECTION, SOLUTION SUBCUTANEOUS
Qty: 75 ML | Refills: 3 | Status: SHIPPED | OUTPATIENT
Start: 2024-02-14 | End: 2024-08-16

## 2024-02-14 RX ORDER — INSULIN LISPRO 100 [IU]/ML
INJECTION, SOLUTION INTRAVENOUS; SUBCUTANEOUS
Qty: 15 ML | Refills: 4 | Status: SHIPPED | OUTPATIENT
Start: 2024-02-14 | End: 2024-08-16

## 2024-02-14 RX ORDER — NORTRIPTYLINE HYDROCHLORIDE 75 MG/1
CAPSULE ORAL
COMMUNITY
Start: 2024-01-25

## 2024-02-14 ASSESSMENT — ANXIETY QUESTIONNAIRES
3. WORRYING TOO MUCH ABOUT DIFFERENT THINGS: MORE THAN HALF THE DAYS
GAD7 TOTAL SCORE: 6
8. IF YOU CHECKED OFF ANY PROBLEMS, HOW DIFFICULT HAVE THESE MADE IT FOR YOU TO DO YOUR WORK, TAKE CARE OF THINGS AT HOME, OR GET ALONG WITH OTHER PEOPLE?: SOMEWHAT DIFFICULT
GAD7 TOTAL SCORE: 6
2. NOT BEING ABLE TO STOP OR CONTROL WORRYING: SEVERAL DAYS
5. BEING SO RESTLESS THAT IT IS HARD TO SIT STILL: NOT AT ALL
6. BECOMING EASILY ANNOYED OR IRRITABLE: NOT AT ALL
GAD7 TOTAL SCORE: 6
1. FEELING NERVOUS, ANXIOUS, OR ON EDGE: MORE THAN HALF THE DAYS
7. FEELING AFRAID AS IF SOMETHING AWFUL MIGHT HAPPEN: NOT AT ALL
4. TROUBLE RELAXING: SEVERAL DAYS
IF YOU CHECKED OFF ANY PROBLEMS ON THIS QUESTIONNAIRE, HOW DIFFICULT HAVE THESE PROBLEMS MADE IT FOR YOU TO DO YOUR WORK, TAKE CARE OF THINGS AT HOME, OR GET ALONG WITH OTHER PEOPLE: SOMEWHAT DIFFICULT
7. FEELING AFRAID AS IF SOMETHING AWFUL MIGHT HAPPEN: NOT AT ALL

## 2024-02-14 ASSESSMENT — PATIENT HEALTH QUESTIONNAIRE - PHQ9
SUM OF ALL RESPONSES TO PHQ QUESTIONS 1-9: 22
10. IF YOU CHECKED OFF ANY PROBLEMS, HOW DIFFICULT HAVE THESE PROBLEMS MADE IT FOR YOU TO DO YOUR WORK, TAKE CARE OF THINGS AT HOME, OR GET ALONG WITH OTHER PEOPLE: VERY DIFFICULT
SUM OF ALL RESPONSES TO PHQ QUESTIONS 1-9: 22

## 2024-02-14 NOTE — PROGRESS NOTES
"  Assessment & Plan     1. Medication management  - EKG 12-lead, tracing only    Working with neurology, considering changing from phenytoin to lacosamide.  Neurology requesting EKG and also DEXA scan.  EKG updated.  Awaiting cardiology review.  Unable to order DEXA scan without a covered diagnosis.    2. Episode of recurrent major depressive disorder, unspecified depression episode severity (H24)  Actively working with psychiatry for medication adjustment.    3. Controlled type 2 diabetes mellitus without complication, with long-term current use of insulin (H)  - insulin degludec (TRESIBA FLEXTOUCH) 100 UNIT/ML pen; Inject 64 units daily and adjust according to instructions.  Max TDD 70 units  Dispense: 75 mL; Refill: 3  - insulin lispro (HUMALOG KWIKPEN) 100 UNIT/ML (1 unit dial) KWIKPEN; For Pre-Meal  - 164 give 1 unit. For Pre-Meal  - 189 give 2 units. For Pre-Meal  - 214 give 3 units. For Pre-Meal  - 239 give 4 units. For Pre-Meal  - 264 give 5 units. For Pre-Meal  - 289 give 6 units. For Pre-Meal  - 314 give 7 units. For Pre-Meal  - 339 give 8 units. For Pre-Meal  - 364 give 9 units.  For Pre-Meal BG greater than or equal to 365 give 10 units  Dispense: 15 mL; Refill: 4  - insulin pen needle (32G X 4 MM) 32G X 4 MM miscellaneous; [PEN NEEDLE, DIABETIC (BD ULTRA-FINE YAHAIRA PEN NEEDLE) 32 GAUGE X 5/32\" NDLE] USE DAILY WITH HUMALOG AND TRESIBA PENS AS DIRECTED  Dispense: 200 each; Refill: 11  - semaglutide (OZEMPIC) 2 MG/3ML pen; Inject 0.5 mg Subcutaneous every 7 days  Dispense: 9 mL; Refill: 4      A1c close to goal, we will increase Jardiance from 10 mg daily to 25 mg daily.    Kait Chau is a 69 year old, presenting for the following health issues:  Anxiety, Depression, and Establish Care      2/14/2024     2:18 PM   Additional Questions   Roomed by Kanchan LOTT CMA   Accompanied by Self       SEIZURE DISORDER:   Working with [facility], " "seeing Dr. Davis.  No seizure x 40 years.   Concerned the phenytoin is contributing to exacerbation of depression and anxiety. Want to try a different medication, possibly lacosamide.   EEG on Monday.   Requesting EKG and bone scan.       DIABETES:   Not seeing endocrinology regularly.   Diagnosed in 2010 or so.   Not checking sugars regularly.   Currently on Tresiba, Humalog, metformin, Ozempic, Jardiance, atorvastatin, ASA 81mg.   No low blood sugars.   Lab Results   Component Value Date    A1C 7.4 11/10/2023    A1C 6.7 05/05/2023    A1C 8.5 07/28/2022    A1C 9.8 04/28/2022    A1C 10.3 03/30/2022       MENTAL HEALTH:   [facility]   Tapering off Viibryd. Increasing Nortriptyline. Also on mirtazapine. Continues Seroquel at bedtime.       HCC:   Neuropathy: resolved.     Alcohol dependence, remission: continues in remission    Obesity: no concerns today, working on lifestyle modifications.        Health Maintenance:   - eye exam:  Will schedule diabetic eye exam.    - RSV: check with insurance  - colon: declines colonoscopy, declines.        ROS:   Rare pounding in chest when standing up.   Denies any exertional chest pain or shortness of breath.        Objective    /82 (BP Location: Left arm, Patient Position: Sitting, Cuff Size: Adult Regular)   Pulse 108   Temp 97.4  F (36.3  C) (Oral)   Resp 20   Ht 1.778 m (5' 10\")   Wt 136.1 kg (300 lb)   SpO2 95%   BMI 43.05 kg/m    Body mass index is 43.05 kg/m .  Physical Exam   GENERAL: alert and no distress  NECK: no adenopathy, no asymmetry, masses, or scars  RESP: lungs clear to auscultation - no rales, rhonchi or wheezes  CV: regular rate and rhythm, normal S1 S2, no S3 or S4, no murmur, click or rub, no peripheral edema  ABDOMEN: soft, nontender, no hepatosplenomegaly, no masses and bowel sounds normal  MS: no gross musculoskeletal defects noted, no edema    Diabetic foot exam: normal DP and PT pulses, no " trophic changes or ulcerative lesions, and normal monofilament exam          Signed Electronically by: Azeb Brink DO

## 2024-02-14 NOTE — TELEPHONE ENCOUNTER
Per Dr. Brink, called [facility] to verify the reason why they are recommending a bone density scan? Dr. Brink is unable to order the scan without a covered diagnosis.   They will send a message to the provider. Once he receives the message and responds, they call the clinic back.

## 2024-02-14 NOTE — TELEPHONE ENCOUNTER
----- Message from Azeb Brink DO sent at 2/14/2024  3:41 PM CST -----  Please call patient with lab results.    A1c improved, down to 7.2.  I will send an updated dose of Jardiance, increase from 10 mg daily to 25 mg daily.  Please help schedule physical plus med check in 6 months.  Remind Isac to schedule diabetic eye exam.  Azeb Brink DO

## 2024-02-14 NOTE — RESULT ENCOUNTER NOTE
Please call patient with lab results.    A1c improved, down to 7.2.  I will send an updated dose of Jardiance, increase from 10 mg daily to 25 mg daily.  Please help schedule physical plus med check in 6 months.  Remind Isac to schedule diabetic eye exam.  Azeb Brink, DO

## 2024-02-16 LAB
ATRIAL RATE - MUSE: 98 BPM
DIASTOLIC BLOOD PRESSURE - MUSE: NORMAL MMHG
INTERPRETATION ECG - MUSE: NORMAL
P AXIS - MUSE: 39 DEGREES
PR INTERVAL - MUSE: 178 MS
QRS DURATION - MUSE: 100 MS
QT - MUSE: 340 MS
QTC - MUSE: 434 MS
R AXIS - MUSE: 98 DEGREES
SYSTOLIC BLOOD PRESSURE - MUSE: NORMAL MMHG
T AXIS - MUSE: 70 DEGREES
VENTRICULAR RATE- MUSE: 98 BPM

## 2024-02-20 NOTE — RESULT ENCOUNTER NOTE
Please fax copy of EKG to neurology.  Patient updated by [employer] message.      EKG unchanged from prior.  We will forward a copy to your neurologist.  Azeb Brink, DO

## 2024-02-21 ENCOUNTER — TELEPHONE (OUTPATIENT)
Dept: OPHTHALMOLOGY | Facility: CLINIC | Age: 69
End: 2024-02-21
Payer: COMMERCIAL

## 2024-02-21 NOTE — TELEPHONE ENCOUNTER
Called and spoke to pt. He will call the [facility] to ask why they are requesting a DEXA. He will then call back with the diagnosis.

## 2024-02-21 NOTE — TELEPHONE ENCOUNTER
----- Message from Azeb Brink DO sent at 2/20/2024  1:36 PM CST -----  Results message routed attached to EKG to fax copy of EKG to his neurologist.  Please call neurology and let them know that we cannot order a DEXA scan without a comparable diagnosis.  If neurology feels DEXA scan warranted, it will cost patient approximately $500.  Azeb Brink DO     ----- Message -----  From: Azeb Brink DO  Sent: 2/14/2024   3:37 PM CST  To: Azeb Brink DO    Fax EKG to neurology.  Unable to order DEXA, no cover diagnoses for DEXA scan.  Neurology recommendations?

## 2024-02-24 ENCOUNTER — OFFICE VISIT (OUTPATIENT)
Dept: OPHTHALMOLOGY | Facility: CLINIC | Age: 69
End: 2024-02-24
Payer: COMMERCIAL

## 2024-02-24 DIAGNOSIS — H35.3131 EARLY DRY STAGE NONEXUDATIVE AGE-RELATED MACULAR DEGENERATION OF BOTH EYES: ICD-10-CM

## 2024-02-24 DIAGNOSIS — H25.13 AGE-RELATED NUCLEAR CATARACT OF BOTH EYES: ICD-10-CM

## 2024-02-24 DIAGNOSIS — Z79.4 CONTROLLED TYPE 2 DIABETES MELLITUS WITHOUT COMPLICATION, WITH LONG-TERM CURRENT USE OF INSULIN (H): Primary | ICD-10-CM

## 2024-02-24 DIAGNOSIS — E11.9 CONTROLLED TYPE 2 DIABETES MELLITUS WITHOUT COMPLICATION, WITH LONG-TERM CURRENT USE OF INSULIN (H): Primary | ICD-10-CM

## 2024-02-24 PROCEDURE — 99213 OFFICE O/P EST LOW 20 MIN: CPT | Performed by: STUDENT IN AN ORGANIZED HEALTH CARE EDUCATION/TRAINING PROGRAM

## 2024-02-24 PROCEDURE — 99207 PR NO BILLABLE SERVICE THIS VISIT: CPT

## 2024-02-24 ASSESSMENT — CONF VISUAL FIELD
OS_INFERIOR_TEMPORAL_RESTRICTION: 0
OD_NORMAL: 1
OD_SUPERIOR_NASAL_RESTRICTION: 0
OS_INFERIOR_NASAL_RESTRICTION: 0
OD_SUPERIOR_TEMPORAL_RESTRICTION: 0
OD_INFERIOR_NASAL_RESTRICTION: 0
OS_SUPERIOR_TEMPORAL_RESTRICTION: 0
OS_SUPERIOR_NASAL_RESTRICTION: 0
OS_NORMAL: 1
METHOD: COUNTING FINGERS
OD_INFERIOR_TEMPORAL_RESTRICTION: 0

## 2024-02-24 ASSESSMENT — REFRACTION_MANIFEST
OS_AXIS: 095
OD_SPHERE: -1.75
OD_AXIS: 055
OD_ADD: +2.50
OS_CYLINDER: +1.50
OS_ADD: +2.50
OD_CYLINDER: +2.25
OS_SPHERE: -3.00

## 2024-02-24 ASSESSMENT — SLIT LAMP EXAM - LIDS
COMMENTS: 1+ MEIBOMIAN GLAND DYSFUNCTION
COMMENTS: 1+ MEIBOMIAN GLAND DYSFUNCTION

## 2024-02-24 ASSESSMENT — REFRACTION_WEARINGRX
OD_SPHERE: -1.75
OS_CYLINDER: +1.50
OS_SPHERE: -3.00
OD_ADD: +2.25
OD_CYLINDER: +2.25
OS_AXIS: 102
SPECS_TYPE: PAL
OS_ADD: +2.25
OD_AXIS: 060

## 2024-02-24 ASSESSMENT — VISUAL ACUITY
OD_CC: 20/40
CORRECTION_TYPE: GLASSES
OS_CC: 20/30
METHOD: SNELLEN - LINEAR

## 2024-02-24 ASSESSMENT — CUP TO DISC RATIO
OS_RATIO: 0.35
OD_RATIO: 0.35

## 2024-02-24 ASSESSMENT — TONOMETRY
OD_IOP_MMHG: 17
IOP_METHOD: ICARE
OS_IOP_MMHG: 19

## 2024-02-24 ASSESSMENT — EXTERNAL EXAM - LEFT EYE: OS_EXAM: NORMAL

## 2024-02-24 ASSESSMENT — EXTERNAL EXAM - RIGHT EYE: OD_EXAM: NORMAL

## 2024-02-24 NOTE — PROGRESS NOTES
CC -   Diabetic ey exam    INTERVAL HISTORY - Initial visit    PMH -   Isac Li is a  69 year old year-old patient with history of type II diabetes diagnosed 13 years prior currently metformin, insulin, ozempic, jardiance, depression currently on nortriptyline and mirtazapine. Patient reports that his vision has been a little blurry but does not report any significant changes in vision.       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   - not visually significant   - Will do cataract eval in 6 months    # Type II diabetes without diabetic retinopathy   - DFE without signs of diabetic retinopathy     Lab Results   Component Value Date    A1C 7.2 02/14/2024    A1C 7.4 11/10/2023      - Discussed with patient the importance of tight glucose control    # Myopia and astigmatism each eye     return to clinic: 6 months, cataract evaluation    ATTESTATION     Attending Attestation:     Complete documentation of historical and exam elements from today's encounter can be found in the full encounter summary report (not reduplicated in this progress note).  I personally obtained the chief complaint(s) and history of present illness.  I confirmed and edited as necessary the review of systems, past medical/surgical history, family history, social history, and examination findings as documented by others; and I examined the patient myself.  I personally reviewed the relevant tests, images, and reports as documented above.  I formulated and edited as necessary the assessment and plan and discussed the findings and management plan with the patient and family    Enrique Mayer MD  PGY-5 Vitreo-retina surgery Fellow  Department of Ophthalmology   [facility]

## 2024-02-24 NOTE — PATIENT INSTRUCTIONS
Please follow the instructions below:    - Artificial tears: Apply 1 drop on each 2-3 a day  - Warm compresses: apply warm compresses in each eye for 10-15 minutes   - Lid scrubs: clean your eyelids gently while showering or during morning face wash

## 2024-02-24 NOTE — NURSING NOTE
Chief Complaints and History of Present Illnesses   Patient presents with    Eye Exam For Diabetes     Chief Complaint(s) and History of Present Illness(es)       Eye Exam For Diabetes              Laterality: both eyes    Course: gradually worsening    Associated symptoms: Negative for eye pain, flashes and floaters    Treatments tried: no treatments              Comments    Isac Li is a(n) 69 year old male who presents for a diabetic exam. Last eye exam was 2 year(s) ago. Since exam, vision has worsened. No lubricating drops. No flashes and floaters. No eye pain.     DM2 x13 years - insulin dependent  Lab Results       Component                Value               Date                       A1C                      7.2                 02/14/2024                 A1C                      7.4                 11/10/2023                 A1C                      6.7                 05/05/2023                 A1C                      8.5                 07/28/2022                 A1C                      9.8                 04/28/2022              Sameer Boykin COT 9:01 AM February 24, 2024

## 2024-03-05 ENCOUNTER — PATIENT OUTREACH (OUTPATIENT)
Dept: FAMILY MEDICINE | Facility: CLINIC | Age: 69
End: 2024-03-05
Payer: COMMERCIAL

## 2024-03-05 NOTE — TELEPHONE ENCOUNTER
Patient Quality Outreach    Patient is due for the following:   Physical Annual Wellness Visit    Next Steps:   Patient has upcoming appointment, these items will be addressed at that time.    Type of outreach:    Chart review performed, no outreach needed.      Questions for provider review:    None           Sharita Kramer MA

## 2024-03-06 RX ORDER — SEMAGLUTIDE 0.68 MG/ML
0.5 INJECTION, SOLUTION SUBCUTANEOUS
Refills: 5 | OUTPATIENT
Start: 2024-03-06

## 2024-03-18 ENCOUNTER — MEDICAL CORRESPONDENCE (OUTPATIENT)
Dept: HEALTH INFORMATION MANAGEMENT | Facility: CLINIC | Age: 69
End: 2024-03-18
Payer: COMMERCIAL

## 2024-03-27 RX ORDER — EMPAGLIFLOZIN 10 MG/1
10 TABLET, FILM COATED ORAL DAILY
Qty: 30 TABLET | Refills: 11 | OUTPATIENT
Start: 2024-03-27

## 2024-04-03 DIAGNOSIS — E11.65 TYPE 2 DIABETES MELLITUS WITH HYPERGLYCEMIA, WITH LONG-TERM CURRENT USE OF INSULIN (H): ICD-10-CM

## 2024-04-03 DIAGNOSIS — Z79.4 TYPE 2 DIABETES MELLITUS WITH HYPERGLYCEMIA, WITH LONG-TERM CURRENT USE OF INSULIN (H): ICD-10-CM

## 2024-04-03 DIAGNOSIS — E11.9 TYPE 2 DIABETES MELLITUS WITHOUT COMPLICATION, WITH LONG-TERM CURRENT USE OF INSULIN (H): ICD-10-CM

## 2024-04-03 DIAGNOSIS — Z79.4 TYPE 2 DIABETES MELLITUS WITHOUT COMPLICATION, WITH LONG-TERM CURRENT USE OF INSULIN (H): ICD-10-CM

## 2024-04-05 ENCOUNTER — ANCILLARY PROCEDURE (OUTPATIENT)
Dept: BONE DENSITY | Facility: CLINIC | Age: 69
End: 2024-04-05
Attending: PSYCHIATRY & NEUROLOGY
Payer: COMMERCIAL

## 2024-04-05 DIAGNOSIS — Z79.818 LONG TERM (CURRENT) USE OF OTHER AGENTS AFFECTING ESTROGEN RECEPTORS AND ESTROGEN LEVELS: ICD-10-CM

## 2024-04-05 PROCEDURE — 77080 DXA BONE DENSITY AXIAL: CPT | Performed by: RADIOLOGY

## 2024-04-10 ENCOUNTER — OFFICE VISIT (OUTPATIENT)
Dept: FAMILY MEDICINE | Facility: CLINIC | Age: 69
End: 2024-04-10
Payer: COMMERCIAL

## 2024-04-10 VITALS
HEART RATE: 92 BPM | TEMPERATURE: 98.5 F | WEIGHT: 295.6 LBS | DIASTOLIC BLOOD PRESSURE: 82 MMHG | BODY MASS INDEX: 42.41 KG/M2 | SYSTOLIC BLOOD PRESSURE: 126 MMHG | OXYGEN SATURATION: 95 %

## 2024-04-10 DIAGNOSIS — R05.1 ACUTE COUGH: Primary | ICD-10-CM

## 2024-04-10 LAB
FLUAV RNA SPEC QL NAA+PROBE: NEGATIVE
FLUBV RNA RESP QL NAA+PROBE: NEGATIVE
RSV RNA SPEC NAA+PROBE: NEGATIVE
SARS-COV-2 RNA RESP QL NAA+PROBE: NEGATIVE

## 2024-04-10 PROCEDURE — 99213 OFFICE O/P EST LOW 20 MIN: CPT

## 2024-04-10 PROCEDURE — 87637 SARSCOV2&INF A&B&RSV AMP PRB: CPT

## 2024-04-10 RX ORDER — RESPIRATORY SYNCYTIAL VIRUS VACCINE 120MCG/0.5
0.5 KIT INTRAMUSCULAR ONCE
Qty: 1 EACH | Refills: 0 | Status: CANCELLED | OUTPATIENT
Start: 2024-04-10 | End: 2024-04-10

## 2024-04-10 ASSESSMENT — PATIENT HEALTH QUESTIONNAIRE - PHQ9
SUM OF ALL RESPONSES TO PHQ QUESTIONS 1-9: 5
10. IF YOU CHECKED OFF ANY PROBLEMS, HOW DIFFICULT HAVE THESE PROBLEMS MADE IT FOR YOU TO DO YOUR WORK, TAKE CARE OF THINGS AT HOME, OR GET ALONG WITH OTHER PEOPLE: SOMEWHAT DIFFICULT
SUM OF ALL RESPONSES TO PHQ QUESTIONS 1-9: 5

## 2024-04-10 NOTE — LETTER
April 10, 2024      Isac CRUZ [place]  [address]  [facility] [address]        To Whom It May Concern:    Isac Li  was seen on 4/10/24.  Please excuse him from work Sunday 7th until Sunday the 14th unless he is otherwise feeling better due to illness.        Sincerely,        NEVA Nguyen CNP

## 2024-04-10 NOTE — PROGRESS NOTES
"  Assessment & Plan     Acute cough  Cough starting on Friday. Appears to be viral. COVID, Influenza and RSV ordered, patient needs a call with results. Supportive measures encouraged.   - Symptomatic Influenza A/B, RSV, & SARS-CoV2 PCR (COVID-19) Nasopharyngeal    BMI  Estimated body mass index is 42.41 kg/m  as calculated from the following:    Height as of 2/14/24: 1.778 m (5' 10\").    Weight as of this encounter: 134.1 kg (295 lb 9.6 oz).             Kait Chau is a 69 year old, presenting for the following health issues:  Sinus Problem (cough)      4/10/2024     3:24 PM   Additional Questions   Roomed by deny farr   Failed to redirect to the Timeline version of the [employer] SmartLink.  Sinus Problem     History of Present Illness       Reason for visit:  Not feeling well, sinus/cough  Symptom onset:  3-7 days ago  Symptoms include:  Sinus issues, cough, runny nose, chest pain when coughing  Symptom intensity:  Severe  Symptom progression:  Worsening  Had these symptoms before:  No  What makes it worse:  Cold water-makes him cough more  What makes it better:  Falling asleep    He eats 0-1 servings of fruits and vegetables daily.He consumes 3 sweetened beverage(s) daily.He exercises with enough effort to increase his heart rate 9 or less minutes per day.  He exercises with enough effort to increase his heart rate 3 or less days per week. He is missing 7 dose(s) of medications per week.  He is not taking prescribed medications regularly due to remembering to take and other.     Started Friday, Saturday was coughing, felt like he was feeling better on Tuesday. Reports a dry cough. Did not test for covid. No pain in face, upper chest hurts when he coughs. No ear pain. Blood sugars are normal. Has not been around anyone that has been sick. Congestion and rhinorrhea. Reports feeling possibly more tired then before.         Objective    /82 (BP Location: Right arm, Patient Position: Sitting, Cuff Size: Adult " Large)   Pulse 92   Temp 98.5  F (36.9  C) (Oral)   Wt 134.1 kg (295 lb 9.6 oz)   SpO2 95%   BMI 42.41 kg/m    Body mass index is 42.41 kg/m .  Physical Exam   GENERAL: alert and no distress  EYES: Eyes grossly normal to inspection, PERRL and conjunctivae and sclerae normal  HENT: ear canals and TM's normal, nose and mouth without ulcers or lesions  NECK: no adenopathy, no asymmetry, masses, or scars  RESP: lungs clear to auscultation - no rales, rhonchi or wheezes  CV: regular rate and rhythm, normal S1 S2, no S3 or S4, no murmur, click or rub, no peripheral edema  SKIN: slight perspiration             Signed Electronically by: NEVA Nguyen CNP

## 2024-04-12 NOTE — RESULT ENCOUNTER NOTE
Emanuel Chau,     Your influenza, RSV and COVID tests came back negative so your symptoms are most likely due to a virus.      Feel free to contact me via [employer] or call the clinic at [phone].     Sincerely,  Shira Liu, ALVINO, FNP-C

## 2024-05-09 RX ORDER — INSULIN DEGLUDEC 100 U/ML
INJECTION, SOLUTION SUBCUTANEOUS
Refills: 23 | OUTPATIENT
Start: 2024-05-09

## 2024-05-21 ENCOUNTER — PATIENT OUTREACH (OUTPATIENT)
Dept: CARE COORDINATION | Facility: CLINIC | Age: 69
End: 2024-05-21
Payer: COMMERCIAL

## 2024-05-29 DIAGNOSIS — Z79.4 TYPE 2 DIABETES MELLITUS WITH HYPERGLYCEMIA, WITH LONG-TERM CURRENT USE OF INSULIN (H): ICD-10-CM

## 2024-05-29 DIAGNOSIS — E11.65 TYPE 2 DIABETES MELLITUS WITH HYPERGLYCEMIA, WITH LONG-TERM CURRENT USE OF INSULIN (H): ICD-10-CM

## 2024-05-29 RX ORDER — METFORMIN HCL 500 MG
TABLET, EXTENDED RELEASE 24 HR ORAL
Qty: 360 TABLET | Refills: 0 | Status: SHIPPED | OUTPATIENT
Start: 2024-05-29 | End: 2024-08-16

## 2024-05-29 NOTE — TELEPHONE ENCOUNTER
Medication Question or Refill        What medication are you calling about (include dose and sig)?: Metformin  mg    Preferred Pharmacy:   [id number] - [facility] [address] [place] [id number]  Phone: [phone] Fax: [phone]      Controlled Substance Agreement on file:   CSA -- Patient Level:    CSA: None found at the patient level.       Who prescribed the medication?: Brink    Do you need a refill? Yes    When did you use the medication last? Last night    Patient offered an appointment? Yes: Declined     Do you have any questions or concerns?  No - patient only has 4 tabs remaining      Could we send this information to you in [place] or would you prefer to receive a phone call?:   Patient would prefer a phone call   Okay to leave a detailed message?: Yes at Cell number on file:    Telephone Information:   Mobile [phone]

## 2024-06-04 ENCOUNTER — LAB (OUTPATIENT)
Dept: LAB | Facility: CLINIC | Age: 69
End: 2024-06-04
Payer: COMMERCIAL

## 2024-06-04 DIAGNOSIS — F33.2 MAJOR DEPRESSIVE DISORDER, RECURRENT SEVERE WITHOUT PSYCHOTIC FEATURES (H): Primary | ICD-10-CM

## 2024-06-04 DIAGNOSIS — Z51.81 ENCOUNTER FOR THERAPEUTIC DRUG LEVEL MONITORING: ICD-10-CM

## 2024-06-04 PROCEDURE — G0480 DRUG TEST DEF 1-7 CLASSES: HCPCS

## 2024-06-04 PROCEDURE — 36415 COLL VENOUS BLD VENIPUNCTURE: CPT

## 2024-06-13 LAB — NORTRIP SERPL-MCNC: NORMAL UG/ML

## 2024-07-02 RX ORDER — EMPAGLIFLOZIN 10 MG/1
10 TABLET, FILM COATED ORAL DAILY
Qty: 30 TABLET | Refills: 11 | OUTPATIENT
Start: 2024-07-02

## 2024-08-16 ENCOUNTER — OFFICE VISIT (OUTPATIENT)
Dept: FAMILY MEDICINE | Facility: CLINIC | Age: 69
End: 2024-08-16
Payer: COMMERCIAL

## 2024-08-16 VITALS
OXYGEN SATURATION: 96 % | RESPIRATION RATE: 16 BRPM | SYSTOLIC BLOOD PRESSURE: 111 MMHG | HEIGHT: 70 IN | HEART RATE: 103 BPM | WEIGHT: 296.2 LBS | DIASTOLIC BLOOD PRESSURE: 68 MMHG | BODY MASS INDEX: 42.4 KG/M2

## 2024-08-16 DIAGNOSIS — E66.01 CLASS 3 SEVERE OBESITY DUE TO EXCESS CALORIES WITH SERIOUS COMORBIDITY AND BODY MASS INDEX (BMI) OF 40.0 TO 44.9 IN ADULT (H): ICD-10-CM

## 2024-08-16 DIAGNOSIS — N30.00 ACUTE CYSTITIS WITHOUT HEMATURIA: ICD-10-CM

## 2024-08-16 DIAGNOSIS — F33.2 SEVERE EPISODE OF RECURRENT MAJOR DEPRESSIVE DISORDER, WITHOUT PSYCHOTIC FEATURES (H): ICD-10-CM

## 2024-08-16 DIAGNOSIS — R06.02 SHORTNESS OF BREATH: ICD-10-CM

## 2024-08-16 DIAGNOSIS — Z79.4 TYPE 2 DIABETES MELLITUS WITH DIABETIC POLYNEUROPATHY, WITH LONG-TERM CURRENT USE OF INSULIN (H): ICD-10-CM

## 2024-08-16 DIAGNOSIS — H61.23 BILATERAL IMPACTED CERUMEN: ICD-10-CM

## 2024-08-16 DIAGNOSIS — L60.3 DYSTROPHIC NAIL: ICD-10-CM

## 2024-08-16 DIAGNOSIS — G63 POLYNEUROPATHY ASSOCIATED WITH UNDERLYING DISEASE (H): ICD-10-CM

## 2024-08-16 DIAGNOSIS — R82.90 ABNORMAL URINE: ICD-10-CM

## 2024-08-16 DIAGNOSIS — G40.909 SEIZURE DISORDER (H): ICD-10-CM

## 2024-08-16 DIAGNOSIS — H91.90 HEARING LOSS, UNSPECIFIED HEARING LOSS TYPE, UNSPECIFIED LATERALITY: ICD-10-CM

## 2024-08-16 DIAGNOSIS — F10.21 ALCOHOL DEPENDENCE IN REMISSION (H): ICD-10-CM

## 2024-08-16 DIAGNOSIS — Z00.00 ENCOUNTER FOR MEDICARE ANNUAL WELLNESS EXAM: Primary | ICD-10-CM

## 2024-08-16 DIAGNOSIS — E11.42 TYPE 2 DIABETES MELLITUS WITH DIABETIC POLYNEUROPATHY, WITH LONG-TERM CURRENT USE OF INSULIN (H): ICD-10-CM

## 2024-08-16 DIAGNOSIS — Z91.81 PERSONAL HISTORY OF FALL: ICD-10-CM

## 2024-08-16 DIAGNOSIS — Z12.5 SCREENING FOR PROSTATE CANCER: ICD-10-CM

## 2024-08-16 DIAGNOSIS — Z53.20 COLON CANCER SCREENING DECLINED: ICD-10-CM

## 2024-08-16 DIAGNOSIS — R27.0 ATAXIA: ICD-10-CM

## 2024-08-16 DIAGNOSIS — E78.00 HYPERCHOLESTEREMIA: ICD-10-CM

## 2024-08-16 DIAGNOSIS — E66.813 CLASS 3 SEVERE OBESITY DUE TO EXCESS CALORIES WITH SERIOUS COMORBIDITY AND BODY MASS INDEX (BMI) OF 40.0 TO 44.9 IN ADULT (H): ICD-10-CM

## 2024-08-16 LAB
ALBUMIN UR-MCNC: ABNORMAL MG/DL
APPEARANCE UR: ABNORMAL
BACTERIA #/AREA URNS HPF: ABNORMAL /HPF
BILIRUB UR QL STRIP: NEGATIVE
COLOR UR AUTO: YELLOW
ERYTHROCYTE [DISTWIDTH] IN BLOOD BY AUTOMATED COUNT: 12.9 % (ref 10–15)
GLUCOSE UR STRIP-MCNC: >=1000 MG/DL
HBA1C MFR BLD: 8.5 % (ref 0–5.6)
HCT VFR BLD AUTO: 45.9 % (ref 40–53)
HGB BLD-MCNC: 15.1 G/DL (ref 13.3–17.7)
HGB UR QL STRIP: ABNORMAL
KETONES UR STRIP-MCNC: NEGATIVE MG/DL
LEUKOCYTE ESTERASE UR QL STRIP: ABNORMAL
MCH RBC QN AUTO: 28.4 PG (ref 26.5–33)
MCHC RBC AUTO-ENTMCNC: 32.9 G/DL (ref 31.5–36.5)
MCV RBC AUTO: 86 FL (ref 78–100)
NITRATE UR QL: NEGATIVE
PH UR STRIP: 5 [PH] (ref 5–8)
PLATELET # BLD AUTO: 291 10E3/UL (ref 150–450)
RBC # BLD AUTO: 5.32 10E6/UL (ref 4.4–5.9)
RBC #/AREA URNS AUTO: ABNORMAL /HPF
SP GR UR STRIP: 1.01 (ref 1–1.03)
SQUAMOUS #/AREA URNS AUTO: ABNORMAL /LPF
UROBILINOGEN UR STRIP-ACNC: 0.2 E.U./DL
WBC # BLD AUTO: 14.4 10E3/UL (ref 4–11)
WBC #/AREA URNS AUTO: ABNORMAL /HPF
WBC CLUMPS #/AREA URNS HPF: PRESENT /HPF

## 2024-08-16 PROCEDURE — 84443 ASSAY THYROID STIM HORMONE: CPT | Performed by: FAMILY MEDICINE

## 2024-08-16 PROCEDURE — 36415 COLL VENOUS BLD VENIPUNCTURE: CPT | Performed by: FAMILY MEDICINE

## 2024-08-16 PROCEDURE — G0103 PSA SCREENING: HCPCS | Performed by: FAMILY MEDICINE

## 2024-08-16 PROCEDURE — 99214 OFFICE O/P EST MOD 30 MIN: CPT | Mod: 25 | Performed by: FAMILY MEDICINE

## 2024-08-16 PROCEDURE — 82043 UR ALBUMIN QUANTITATIVE: CPT | Performed by: FAMILY MEDICINE

## 2024-08-16 PROCEDURE — 85027 COMPLETE CBC AUTOMATED: CPT | Performed by: FAMILY MEDICINE

## 2024-08-16 PROCEDURE — 87086 URINE CULTURE/COLONY COUNT: CPT | Mod: 59 | Performed by: FAMILY MEDICINE

## 2024-08-16 PROCEDURE — 93246 EXT ECG>7D<15D RECORDING: CPT | Performed by: FAMILY MEDICINE

## 2024-08-16 PROCEDURE — 82570 ASSAY OF URINE CREATININE: CPT | Performed by: FAMILY MEDICINE

## 2024-08-16 PROCEDURE — G0438 PPPS, INITIAL VISIT: HCPCS | Performed by: FAMILY MEDICINE

## 2024-08-16 PROCEDURE — 81001 URINALYSIS AUTO W/SCOPE: CPT | Performed by: FAMILY MEDICINE

## 2024-08-16 PROCEDURE — 80053 COMPREHEN METABOLIC PANEL: CPT | Performed by: FAMILY MEDICINE

## 2024-08-16 PROCEDURE — 83735 ASSAY OF MAGNESIUM: CPT | Performed by: FAMILY MEDICINE

## 2024-08-16 PROCEDURE — 80061 LIPID PANEL: CPT | Performed by: FAMILY MEDICINE

## 2024-08-16 PROCEDURE — 83036 HEMOGLOBIN GLYCOSYLATED A1C: CPT | Performed by: FAMILY MEDICINE

## 2024-08-16 PROCEDURE — 87186 SC STD MICRODIL/AGAR DIL: CPT | Performed by: FAMILY MEDICINE

## 2024-08-16 RX ORDER — LISINOPRIL 5 MG/1
5 TABLET ORAL DAILY
Qty: 90 TABLET | Refills: 3 | Status: SHIPPED | OUTPATIENT
Start: 2024-08-16

## 2024-08-16 RX ORDER — ATORVASTATIN CALCIUM 20 MG/1
20 TABLET, FILM COATED ORAL AT BEDTIME
Qty: 90 TABLET | Refills: 3 | Status: SHIPPED | OUTPATIENT
Start: 2024-08-16

## 2024-08-16 RX ORDER — INSULIN LISPRO 100 [IU]/ML
INJECTION, SOLUTION INTRAVENOUS; SUBCUTANEOUS
Qty: 15 ML | Refills: 4 | Status: SHIPPED | OUTPATIENT
Start: 2024-08-16

## 2024-08-16 RX ORDER — METFORMIN HCL 500 MG
TABLET, EXTENDED RELEASE 24 HR ORAL
Qty: 360 TABLET | Refills: 3 | Status: SHIPPED | OUTPATIENT
Start: 2024-08-16

## 2024-08-16 RX ORDER — MIRTAZAPINE 45 MG/1
45 TABLET, FILM COATED ORAL AT BEDTIME
COMMUNITY
Start: 2024-07-13

## 2024-08-16 RX ORDER — LACOSAMIDE 200 MG/1
1 TABLET ORAL
COMMUNITY
Start: 2024-08-13

## 2024-08-16 RX ORDER — INSULIN DEGLUDEC 100 U/ML
INJECTION, SOLUTION SUBCUTANEOUS
Qty: 75 ML | Refills: 3 | Status: SHIPPED | OUTPATIENT
Start: 2024-08-16

## 2024-08-16 RX ORDER — QUETIAPINE FUMARATE 50 MG/1
50 TABLET, FILM COATED ORAL AT BEDTIME
Qty: 90 TABLET | Refills: 3 | Status: SHIPPED | OUTPATIENT
Start: 2024-08-16

## 2024-08-16 SDOH — HEALTH STABILITY: PHYSICAL HEALTH: ON AVERAGE, HOW MANY MINUTES DO YOU ENGAGE IN EXERCISE AT THIS LEVEL?: 0 MIN

## 2024-08-16 SDOH — HEALTH STABILITY: PHYSICAL HEALTH: ON AVERAGE, HOW MANY DAYS PER WEEK DO YOU ENGAGE IN MODERATE TO STRENUOUS EXERCISE (LIKE A BRISK WALK)?: 0 DAYS

## 2024-08-16 ASSESSMENT — PATIENT HEALTH QUESTIONNAIRE - PHQ9
10. IF YOU CHECKED OFF ANY PROBLEMS, HOW DIFFICULT HAVE THESE PROBLEMS MADE IT FOR YOU TO DO YOUR WORK, TAKE CARE OF THINGS AT HOME, OR GET ALONG WITH OTHER PEOPLE: SOMEWHAT DIFFICULT
SUM OF ALL RESPONSES TO PHQ QUESTIONS 1-9: 13
SUM OF ALL RESPONSES TO PHQ QUESTIONS 1-9: 13

## 2024-08-16 ASSESSMENT — SOCIAL DETERMINANTS OF HEALTH (SDOH): HOW OFTEN DO YOU GET TOGETHER WITH FRIENDS OR RELATIVES?: NEVER

## 2024-08-16 NOTE — PROGRESS NOTES
"Preventive Care Visit  [facility]  Azeb Brink DO, Family Medicine  Aug 16, 2024      Assessment & Plan     1. Encounter for Medicare annual wellness exam  - Adult Dermatology  Referral; Future    Reviewed health history and health maintenance recommendations. Recommend formal dermatology skin check given number of pigmented lesions.      2. Type 2 diabetes mellitus with diabetic polyneuropathy, with long-term current use of insulin (H)  - Comprehensive metabolic panel (BMP + Alb, Alk Phos, ALT, AST, Total. Bili, TP)  - atorvastatin (LIPITOR) 20 MG tablet; Take 1 tablet (20 mg) by mouth at bedtime  Dispense: 90 tablet; Refill: 3  - empagliflozin (JARDIANCE) 25 MG TABS tablet; Take 1 tablet (25 mg) by mouth daily  Dispense: 90 tablet; Refill: 3  - insulin degludec (TRESIBA FLEXTOUCH) 100 UNIT/ML pen; Inject 64 units daily and adjust according to instructions.  Max TDD 70 units  Dispense: 75 mL; Refill: 3  - insulin lispro (HUMALOG KWIKPEN) 100 UNIT/ML (1 unit dial) KWIKPEN; For Pre-Meal  - 164 give 1 unit. For Pre-Meal  - 189 give 2 units. For Pre-Meal  - 214 give 3 units. For Pre-Meal  - 239 give 4 units. For Pre-Meal  - 264 give 5 units. For Pre-Meal  - 289 give 6 units. For Pre-Meal  - 314 give 7 units. For Pre-Meal  - 339 give 8 units. For Pre-Meal  - 364 give 9 units.  For Pre-Meal BG greater than or equal to 365 give 10 units  Dispense: 15 mL; Refill: 4  - insulin pen needle (32G X 4 MM) 32G X 4 MM miscellaneous; [PEN NEEDLE, DIABETIC (BD ULTRA-FINE YAHAIRA PEN NEEDLE) 32 GAUGE X 5/32\" NDLE] USE DAILY WITH HUMALOG AND TRESIBA PENS AS DIRECTED  Dispense: 200 each; Refill: 11  - lisinopril (ZESTRIL) 5 MG tablet; Take 1 tablet (5 mg) by mouth daily  Dispense: 90 tablet; Refill: 3  - metFORMIN (GLUCOPHAGE XR) 500 MG 24 hr tablet; TAKE 4 TABS BY MOUTH WITH EVENING MEAL/  Strength: 500 mg  Dispense: 360 tablet; Refill: 3  - " semaglutide (OZEMPIC) 2 MG/3ML pen; Inject 0.5 mg subcutaneously every 7 days  Dispense: 9 mL; Refill: 4  - HEMOGLOBIN A1C  - Lipid panel reflex to direct LDL Non-fasting  - Albumin Random Urine Quantitative with Creat Ratio  - Adult Diabetes Education  Referral; Future    Monitoring labs updated today. A1c above goal. On max dose metformin, max dose Jardiance, max dose ozempic, basal Tresiba, and mealtime Humalog. He is not checking his sugars frequently enough to adjust regimen today. Recommend follow up with diabetic educator. Referral placed.     3. Hypercholesteremia  - Comprehensive metabolic panel (BMP + Alb, Alk Phos, ALT, AST, Total. Bili, TP)  - atorvastatin (LIPITOR) 20 MG tablet; Take 1 tablet (20 mg) by mouth at bedtime  Dispense: 90 tablet; Refill: 3  - Lipid panel reflex to direct LDL Non-fasting    Continue atorvastatin. Medication monitoring labs.     4. Polyneuropathy associated with underlying disease (H24)  5. Dystrophic nail  - Orthopedic  Referral; Future    Thickened, dystrophic nails on right foot. Having a hard time performing nail care. At risk for foot wounds given uncontrolled diabetes and peripheral neuropath.     6. Severe episode of recurrent major depressive disorder, without psychotic features (H)  7. Alcohol dependence in remission (H)  - QUEtiapine (SEROQUEL) 50 MG tablet; Take 1 tablet (50 mg) by mouth at bedtime  Dispense: 90 tablet; Refill: 3  - Comprehensive metabolic panel (BMP + Alb, Alk Phos, ALT, AST, Total. Bili, TP)    Patient continues to manage mood with psychiatry. Dr. Russ was prescribing Seroquel to help with sleep/mood. Will continue this regimen today. Ensure psychiatry aware of this medication given other psychotropic medications.    8. Seizure disorder (H)  Managing with Vimpat through neurology.     9. Class 3 severe obesity due to excess calories with serious comorbidity and body mass index (BMI) of 40.0 to 44.9 in adult (H)  -  Comprehensive metabolic panel (BMP + Alb, Alk Phos, ALT, AST, Total. Bili, TP)    Continue healthy lifestyle modifications. On Ozempic.     10. Ataxia  11. Shortness of breath  12. Personal history of fall  - Physical Therapy  Referral; Future  - CBC with platelets  - TSH with free T4 reflex  - Magnesium  - ZIO PATCH 8-14 DAYS (additional cost to patient)  - ZIO PATCH 8-14 DAYS APPLICATION    Isac notes concerns for intermittent spells where is freezes, feels unsteady, unable to move. Discussed with his neurologist and has had updated EEG which was reassuring. No post ictal state. He does not fully lose consciousness during episodes. Episodes last approximately 30 seconds. No prodromal symptoms such as lightheadedness, heart racing, or chest pain though he does endorse shortness of breath and some positional orthostasis.     Recommend checking blood sugar, blood pressure, and pulse during episodes.   Evaluate for arrhythmia/heart block as source of symptoms with zio patch. Rule out anemia, thyroid disease, and abnormal magnesium level which may predispose to arrhythmia. He remains sober.     Would consider echocardiogram for next evaluation if work up unrevealing.    13. Colon cancer screening declined  Offered colonoscopy vs cologuard, declines.     14. Hearing loss, unspecified hearing loss type, unspecified laterality  - Adult Audiology  Referral; Future    Hearing loss present, unsure laterality. Recommend formal hearing evaluation as he may benefit from hearing aids.     15. Screening for prostate cancer  - PSA, screen    16. Bilateral impacted cerumen  - MT REMOVAL IMPACTED CERUMEN IRRIGATION/LVG UNILAT    Nursing staff performed bilateral lavage. Successful removal of left wax, partial removal of right wax. I attempted to manually remove remaining wax and successfully removed additional wax, though unable to completely remove due to deep impaction, concern for damage to eardrum, and patient  "intolerance of curette.     17. Abnormal urine  - UA Macroscopic with reflex to Microscopic and Culture - Lab Collect  - UA Microscopic with Reflex to Culture  - Urine Culture      Urine found to have strong odor and discoloration, evaluate for infection.       Patient has been advised of split billing requirements and indicates understanding: Yes        BMI  Estimated body mass index is 42.5 kg/m  as calculated from the following:    Height as of this encounter: 1.778 m (5' 10\").    Weight as of this encounter: 134.4 kg (296 lb 3.2 oz).   Weight management plan: Discussed healthy diet and exercise guidelines    Counseling  Appropriate preventive services were addressed with this patient via screening, questionnaire, or discussion as appropriate for fall prevention, nutrition, physical activity, Tobacco-use cessation, social engagement, weight loss and cognition.  Checklist reviewing preventive services available has been given to the patient.  Reviewed patient's diet, addressing concerns and/or questions.   Patient is at risk for social isolation and has been provided with information about the benefit of social connection.   The patient was instructed to see the dentist every 6 months.   Discussed possible causes of fatigue. Updated plan of care.  Patient reported difficulty with activities of daily living were addressed today.The patient was provided with written information regarding signs of hearing loss.   Information on urinary incontinence and treatment options given to patient.   The patient's PHQ-9 score is consistent with moderate depression. He was provided with information regarding depression.         Kait Chau is a 69 year old, presenting for the following:  Physical (Annual Wellness ), Diabetes (Diabetic check ), and Fall (Stepped off a curb recently, hit his car before hitting the ground. Did hit his head on the ground but denies LOC. Had to call for help to get him back up. Also reports of " "having episodes of \"his body freezing\" and gets the feeling like he is going to fall. He will put his hand on the wall or grab on to something near by to help keep his balance. )        8/16/2024     9:53 AM   Additional Questions   Roomed by Naila VELEZ CMA         Health Care Directive  Patient does not have a Health Care Directive or Living Will: Discussed advance care planning with patient; information given to patient to review.    Fall      Encounter for Medicare annual wellness exam  - colon: patient states \"not going to happen\" - declines screening   - RSV: will check with insurance, consider at the pharmacy    - covid: consider with new booster        Type 2 diabetes mellitus with diabetic polyneuropathy, with long-term current use of insulin (H)  Polyneuropathy associated with underlying disease (H24)  Dystrophic nail  Sugars have been well controlled in the morning.   No symptoms of hypoglycemia.       Hemoglobin A1C   Date Value Ref Range Status   02/14/2024 7.2 (H) 0.0 - 5.6 % Final     Comment:     Normal <5.7%   Prediabetes 5.7-6.4%    Diabetes 6.5% or higher     Note: Adopted from ADA consensus guidelines.     Currently taking tresiba, humalog, jardiance, ozempic, and metformin      Severe episode of recurrent major depressive disorder, without psychotic features (H)      2/14/2024     2:09 PM 4/10/2024     3:20 PM 8/16/2024     8:50 AM   PHQ   PHQ-9 Total Score 22 5 13   Q9: Thoughts of better off dead/self-harm past 2 weeks Nearly every day Not at all Not at all   F/U: Thoughts of suicide or self-harm No     F/U: Safety concerns No       Following with psychiatry       Alcohol dependence in remission (H)  Sober     Seizure disorder (H)  Vimpat with neurology    Class 3 severe obesity due to excess calories with serious comorbidity and body mass index (BMI) of 40.0 to 44.9 in adult (H)  Body mass index is 42.5 kg/m .  Wt Readings from Last 4 Encounters:   08/16/24 134.4 kg (296 lb 3.2 oz)   04/10/24 " 134.1 kg (295 lb 9.6 oz)   02/14/24 136.1 kg (300 lb)   11/10/23 138 kg (304 lb 3.2 oz)       Personal history of fall  Ataxia  Feeling discombobulated, episodes where feels stuck, needs to hang on to the wall or feels like he may fall. Episodes are escalating in frequency. Did have a fall back in May in a parking log, had to call someone to help him get up. Did discuss with neurologist at appointment in March, recommended EEG test in April, was reassuring.     Denies any prodromal symptoms. Not like previous seizures. Episodes are brief.   Maybe lasts 20-30 seconds.   Sensation of unsteadiness. Can't do anything when events happen, feel like he might fall any second.  Describes as episodes of feeling frozen.      Denies sensations of skipped beats, palpitations. Sometimes when getting up.   Not checking BP at home.   Hasn't checked his blood sugar during an episode either.             8/16/2024   General Health   How would you rate your overall physical health? (!) POOR   Feel stress (tense, anxious, or unable to sleep) To some extent      (!) STRESS CONCERN      8/16/2024   Nutrition   Diet: Diabetic    I don't know       Multiple values from one day are sorted in reverse-chronological order         8/16/2024   Exercise   Days per week of moderate/strenous exercise 0 days   Average minutes spent exercising at this level 0 min      (!) EXERCISE CONCERN      8/16/2024   Social Factors   Frequency of gathering with friends or relatives Never   Worry food won't last until get money to buy more No   Food not last or not have enough money for food? No   Do you have housing? (Housing is defined as stable permanent housing and does not include staying ouside in a car, in a tent, in an abandoned building, in an overnight shelter, or couch-surfing.) Yes   Are you worried about losing your housing? No   Lack of transportation? No   Unable to get utilities (heat,electricity)? No      (!) SOCIAL CONNECTIONS CONCERN       2024   Fall Risk   Fallen 2 or more times in the past year? No   Trouble with walking or balance? Yes   Reason Gait Speed Test Not Completed Unable to complete test, patient reported symptoms             2024   Activities of Daily Living- Home Safety   Needs help with the following daily activites Shopping    Preparing meals    Laundry   Safety concerns in the home None of the above       Multiple values from one day are sorted in reverse-chronological order         2024   Dental   Dentist two times every year? (!) NO            2024   Hearing Screening   Hearing concerns? (!) IT'S HARD TO FOLLOW A CONVERSATION IN A NOISY RESTAURANT OR CROWDED ROOM.            2024   Driving Risk Screening   Patient/family members have concerns about driving No            2024   General Alertness/Fatigue Screening   Have you been more tired than usual lately? (!) YES            2024   Urinary Incontinence Screening   Bothered by leaking urine in past 6 months Yes            2024   TB Screening   Were you born outside of the [place]? No          Today's PHQ-9 Score:       2024     8:50 AM   PHQ-9 SCORE   PHQ-9 Total Score MyChart 13 (Moderate depression)   PHQ-9 Total Score 13         2024   Substance Use   Alcohol more than 3/day or more than 7/wk Not Applicable   Do you have a current opioid prescription? No   How severe/bad is pain from 1 to 10? 0/10 (No Pain)   Do you use any other substances recreationally? No        Social History     Tobacco Use    Smoking status: Former     Current packs/day: 0.00     Types: Cigarettes     Quit date: 2/15/1990     Years since quittin.5    Smokeless tobacco: Never   Vaping Use    Vaping status: Never Used   Substance Use Topics    Alcohol use: No    Drug use: No       Last PSA:   Prostate Specific Antigen Screen   Date Value Ref Range Status   11/10/2023 0.28 0.00 - 4.50 ng/mL Final   2021 0.25 0.00 - 4.50 ug/L Final     ASCVD Risk    The 10-year ASCVD risk score (Ruben HERNANDEZ, et al., 2019) is: 20.3%    Values used to calculate the score:      Age: 69 years      Sex: Male      Is Non- : No      Diabetic: Yes      Tobacco smoker: No      Systolic Blood Pressure: 111 mmHg      Is BP treated: No      HDL Cholesterol: 48 mg/dL      Total Cholesterol: 157 mg/dL            Reviewed and updated as needed this visit by Provider   Tobacco  Allergies  Meds  Problems  Med Hx  Surg Hx  Fam Hx              Current providers sharing in care for this patient include:  Patient Care Team:  Azeb Brink DO as PCP - General (Family Medicine)  Jamaal Matson, PharmD as Pharmacist (Pharmacist)  Gabriela Maxwell PA-C as Physician Assistant (Endocrinology, Diabetes, and Metabolism)  Nancy Smith MD as MD (Endocrinology, Diabetes, and Metabolism)  Sharmila Carlin, RN as Diabetes Educator (Diabetes Education)  Chel Giraldo, PharmD as [facility] Pharmacist (Pharmacist)  Alvaro Rosenbaum MD as Assigned Endocrinology Provider  Enrique Hanson MD as Physician (Ophthalmology)  Enrique Hanson MD as Assigned Surgical Provider  Azeb Brink DO as Assigned PCP  Brian Izquierdo MD as MD (Ophthalmology)    The following health maintenance items are reviewed in Epic and correct as of today:  Health Maintenance   Topic Date Due    DEPRESSION ACTION PLAN  Never done    COLORECTAL CANCER SCREENING  Never done    RSV VACCINE (Pregnancy & 60+) (1 - 1-dose 60+ series) Never done    MEDICARE ANNUAL WELLNESS VISIT  11/23/2023    COVID-19 Vaccine (5 - 2023-24 season) 03/10/2024    A1C  08/14/2024    DEPRESSION 6 MO INDEX REPEAT PHQ-9  08/30/2024    INFLUENZA VACCINE (1) 09/01/2024    BMP  11/10/2024    LIPID  11/10/2024    MICROALBUMIN  11/10/2024    ANNUAL REVIEW OF HM ORDERS  11/10/2024    DIABETIC FOOT EXAM  02/14/2025    PHQ-9  02/16/2025    EYE EXAM  02/24/2025    FALL RISK ASSESSMENT  08/16/2025    ADVANCE CARE  "PLANNING  11/25/2027    DTAP/TDAP/TD IMMUNIZATION (3 - Td or Tdap) 06/17/2030    HEPATITIS C SCREENING  Completed    Pneumococcal Vaccine: 65+ Years  Completed    ZOSTER IMMUNIZATION  Completed    AORTIC ANEURYSM SCREENING (SYSTEM ASSIGNED)  Completed    IPV IMMUNIZATION  Aged Out    HPV IMMUNIZATION  Aged Out    MENINGITIS IMMUNIZATION  Aged Out    RSV MONOCLONAL ANTIBODY  Aged Out         Review of Systems  Constitutional, HEENT, cardiovascular, pulmonary, GI, , musculoskeletal, neuro, skin, endocrine and psych systems are negative, except as otherwise noted.       Objective    Exam  /68 (BP Location: Left arm, Patient Position: Sitting, Cuff Size: Adult Large)   Pulse 103   Resp 16   Ht 1.778 m (5' 10\")   Wt 134.4 kg (296 lb 3.2 oz)   SpO2 96%   BMI 42.50 kg/m     Estimated body mass index is 42.5 kg/m  as calculated from the following:    Height as of this encounter: 1.778 m (5' 10\").    Weight as of this encounter: 134.4 kg (296 lb 3.2 oz).    Physical Exam    GENERAL: alert, obese, pale, and no distress  EYES: Eyes grossly normal to inspection, PERRL and conjunctivae and sclerae normal  HENT: ear canals and TM's normal, nose and mouth without ulcers or lesions  NECK: no adenopathy, no asymmetry, masses, or scars  RESP: lungs clear to auscultation - no rales, rhonchi or wheezes  CV: regular rate and rhythm, no murmurs   ABDOMEN: soft, nontender, no hepatosplenomegaly, no masses and bowel sounds normal  MS: no gross musculoskeletal defects noted, no edema  SKIN: red macules bilateral medial lower legs.   NEURO: Normal strength and tone, mentation intact and speech normal  PSYCH: mentation appears normal, affect normal/bright    Diabetic foot exam: dystrophic toenails, right nails long, thick, curved laterally. Dorsalis pedis and posterior tibial pulses intact. Monofilament testing abnormal with 0/10 points identified bilaterally.          8/16/2024   Mini Cog   Clock Draw Score 2 Normal   3 Item " Recall 3 objects recalled   Mini Cog Total Score 5             Isac Li arrived here on 8/16/2024 11:02 AM for 8-14 Days  Zio monitor placement per ordering provider Dr Brink for the diagnosis shortness of breath and spells.  Patient s skin was prepped per protocol. Dr. Dr Brink is the supervising MD.  Zio monitor was placed.  Instructions were reviewed with and given to the patient.  Patient verbalized understanding of wear, troubleshooting and monitor return instructions.       Signed Electronically by: Azeb rBink, DO    Answers submitted by the patient for this visit:  Patient Health Questionnaire (Submitted on 8/16/2024)  If you checked off any problems, how difficult have these problems made it for you to do your work, take care of things at home, or get along with other people?: Somewhat difficult  PHQ9 TOTAL SCORE: 13

## 2024-08-16 NOTE — PATIENT INSTRUCTIONS
Patient Education   Preventive Care Advice   This is general advice given by our system to help you stay healthy. However, your care team may have specific advice just for you. Please talk to your care team about your preventive care needs.  Nutrition  Eat 5 or more servings of fruits and vegetables each day.  Try wheat bread, brown rice and whole grain pasta (instead of white bread, rice, and pasta).  Get enough calcium and vitamin D. Check the label on foods and aim for 100% of the RDA (recommended daily allowance).  Lifestyle  Exercise at least 150 minutes each week  (30 minutes a day, 5 days a week).  Do muscle strengthening activities 2 days a week. These help control your weight and prevent disease.  No smoking.  Wear sunscreen to prevent skin cancer.  Have a dental exam and cleaning every 6 months.  Yearly exams  See your health care team every year to talk about:  Any changes in your health.  Any medicines your care team has prescribed.  Preventive care, family planning, and ways to prevent chronic diseases.  Shots (vaccines)   HPV shots (up to age 26), if you've never had them before.  Hepatitis B shots (up to age 59), if you've never had them before.  COVID-19 shot: Get this shot when it's due.  Flu shot: Get a flu shot every year.  Tetanus shot: Get a tetanus shot every 10 years.  Pneumococcal, hepatitis A, and RSV shots: Ask your care team if you need these based on your risk.  Shingles shot (for age 50 and up)  General health tests  Diabetes screening:  Starting at age 35, Get screened for diabetes at least every 3 years.  If you are younger than age 35, ask your care team if you should be screened for diabetes.  Cholesterol test: At age 39, start having a cholesterol test every 5 years, or more often if advised.  Bone density scan (DEXA): At age 50, ask your care team if you should have this scan for osteoporosis (brittle bones).  Hepatitis C: Get tested at least once in your life.  STIs (sexually  transmitted infections)  Before age 24: Ask your care team if you should be screened for STIs.  After age 24: Get screened for STIs if you're at risk. You are at risk for STIs (including HIV) if:  You are sexually active with more than one person.  You don't use condoms every time.  You or a partner was diagnosed with a sexually transmitted infection.  If you are at risk for HIV, ask about PrEP medicine to prevent HIV.  Get tested for HIV at least once in your life, whether you are at risk for HIV or not.  Cancer screening tests  Cervical cancer screening: If you have a cervix, begin getting regular cervical cancer screening tests starting at age 21.  Breast cancer scan (mammogram): If you've ever had breasts, begin having regular mammograms starting at age 40. This is a scan to check for breast cancer.  Colon cancer screening: It is important to start screening for colon cancer at age 45.  Have a colonoscopy test every 10 years (or more often if you're at risk) Or, ask your provider about stool tests like a FIT test every year or Cologuard test every 3 years.  To learn more about your testing options, visit:   .  For help making a decision, visit:   https://bit.ly/tc61074.  Prostate cancer screening test: If you have a prostate, ask your care team if a prostate cancer screening test (PSA) at age 55 is right for you.  Lung cancer screening: If you are a current or former smoker ages 50 to 80, ask your care team if ongoing lung cancer screenings are right for you.  For informational purposes only. Not to replace the advice of your health care provider. Copyright   2023 [facility]. All rights reserved. Clinically reviewed by the [facility]. PHARMAJET [id number] - REV 01/24.  Learning About Activities of Daily Living  What are activities of daily living?     Activities of daily living (ADLs) are the basic self-care tasks you do every day. These include eating, bathing, dressing,  and moving around.  As you age, and if you have health problems, you may find that it's harder to do some of these tasks. If so, your doctor can suggest ideas that may help.  To measure what kind of help you may need, your doctor will ask how well you are able to do ADLs. Let your doctor know if there are any tasks that you are having trouble doing. This is an important first step to getting help. And when you have the help you need, you can stay as independent as possible.  How will a doctor assess your ADLs?  Asking about ADLs is part of a routine health checkup your doctor will likely do as you age. Your health check might be done in a doctor's office, in your home, or at a hospital. The goal is to find out if you are having any problems that could make it hard to care for yourself or that make it unsafe for you to be on your own.  To measure your ADLs, your doctor will ask how hard it is for you to do routine tasks. Your doctor may also want to know if you have changed the way you do a task because of a health problem. Your doctor may watch how you:  Walk back and forth.  Keep your balance while you stand or walk.  Move from sitting to standing or from a bed to a chair.  Button or unbutton a shirt or sweater.  Remove and put on your shoes.  It's common to feel a little worried or anxious if you find you can't do all the things you used to be able to do. Talking with your doctor about ADLs is a way to make sure you're as safe as possible and able to care for yourself as well as you can. You may want to bring a caregiver, friend, or family member to your checkup. They can help you talk to your doctor.  Follow-up care is a key part of your treatment and safety. Be sure to make and go to all appointments, and call your doctor if you are having problems. It's also a good idea to know your test results and keep a list of the medicines you take.  Current as of: October 24, 2023  Content Version: 14.1    9129-2510  Healthwise, [employer].   Care instructions adapted under license by your healthcare professional. If you have questions about a medical condition or this instruction, always ask your healthcare professional. [employer] disclaims any warranty or liability for your use of this information.    Preventing Falls: Care Instructions  Injuries and health problems such as trouble walking or poor eyesight can increase your risk of falling. So can some medicines. But there are things you can do to help prevent falls. You can exercise to get stronger. You can also arrange your home to make it safer.    Talk to your doctor about the medicines you take. Ask if any of them increase the risk of falls and whether they can be changed or stopped.   Try to exercise regularly. It can help improve your strength and balance. This can help lower your risk of falling.     Practice fall safety and prevention.    Wear low-heeled shoes that fit well and give your feet good support. Talk to your doctor if you have foot problems that make this hard.  Carry a cellphone or wear a medical alert device that you can use to call for help.  Use stepladders instead of chairs to reach high objects. Don't climb if you're at risk for falls. Ask for help, if needed.  Wear the correct eyeglasses, if you need them.    Make your home safer.    Remove rugs, cords, clutter, and furniture from walkways.  Keep your house well lit. Use night-lights in hallways and bathrooms.  Install and use sturdy handrails on stairways.  Wear nonskid footwear, even inside. Don't walk barefoot or in socks without shoes.    Be safe outside.    Use handrails, curb cuts, and ramps whenever possible.  Keep your hands free by using a shoulder bag or backpack.  Try to walk in well-lit areas. Watch out for uneven ground, changes in pavement, and debris.  Be careful in the winter. Walk on the grass or gravel when sidewalks are slippery. Use de-icer on steps and walkways.  "Add non-slip devices to shoes.    Put grab bars and nonskid mats in your shower or tub and near the toilet. Try to use a shower chair or bath bench when bathing.   Get into a tub or shower by putting in your weaker leg first. Get out with your strong side first. Have a phone or medical alert device in the bathroom with you.   Where can you learn more?  Go to https://www.[employer].Robin/patiented  Enter G117 in the search box to learn more about \"Preventing Falls: Care Instructions.\"  Current as of: July 17, 2023               Content Version: 14.0    8038-6756 [employer].   Care instructions adapted under license by your healthcare professional. If you have questions about a medical condition or this instruction, always ask your healthcare professional. [employer] disclaims any warranty or liability for your use of this information.      Hearing Loss: Care Instructions  Overview     Hearing loss is a sudden or slow decrease in how well you hear. It can range from slight to profound. Permanent hearing loss can occur with aging. It also can happen when you are exposed long-term to loud noise. Examples include listening to loud music, riding motorcycles, or being around other loud machines.  Hearing loss can affect your work and home life. It can make you feel lonely or depressed. You may feel that you have lost your independence. But hearing aids and other devices can help you hear better and feel connected to others.  Follow-up care is a key part of your treatment and safety. Be sure to make and go to all appointments, and call your doctor if you are having problems. It's also a good idea to know your test results and keep a list of the medicines you take.  How can you care for yourself at home?  Avoid loud noises whenever possible. This helps keep your hearing from getting worse.  Always wear hearing protection around loud noises.  Wear a hearing aid as directed.  A professional can help " "you pick a hearing aid that will work best for you.  You can also get hearing aids over the counter for mild to moderate hearing loss.  Have hearing tests as your doctor suggests. They can show whether your hearing has changed. Your hearing aid may need to be adjusted.  Use other devices as needed. These may include:  Telephone amplifiers and hearing aids that can connect to a television, stereo, radio, or microphone.  Devices that use lights or vibrations. These alert you to the doorbell, a ringing telephone, or a baby monitor.  Television closed-captioning. This shows the words at the bottom of the screen. Most new TVs can do this.  TTY (text telephone). This lets you type messages back and forth on the telephone instead of talking or listening. These devices are also called TDD. When messages are typed on the keyboard, they are sent over the phone line to a receiving TTY. The message is shown on a monitor.  Use text messaging, social media, and email if it is hard for you to communicate by telephone.  Try to learn a listening technique called speechreading. It is not lipreading. You pay attention to people's gestures, expressions, posture, and tone of voice. These clues can help you understand what a person is saying. Face the person you are talking to, and have them face you. Make sure the lighting is good. You need to see the other person's face clearly.  Think about counseling if you need help to adjust to your hearing loss.  When should you call for help?  Watch closely for changes in your health, and be sure to contact your doctor if:    You think your hearing is getting worse.     You have new symptoms, such as dizziness or nausea.   Where can you learn more?  Go to https://www.[employer].net/patiented  Enter R798 in the search box to learn more about \"Hearing Loss: Care Instructions.\"  Current as of: September 27, 2023               Content Version: 14.0    6778-7551 Healthwise, Incorporated.   Care " instructions adapted under license by your healthcare professional. If you have questions about a medical condition or this instruction, always ask your healthcare professional. Healthwise, [facility] disclaims any warranty or liability for your use of this information.      Learning About Sleeping Well  What does sleeping well mean?     Sleeping well means getting enough sleep to feel good and stay healthy. How much sleep is enough varies among people.  The number of hours you sleep and how you feel when you wake up are both important. If you do not feel refreshed, you probably need more sleep. Another sign of not getting enough sleep is feeling tired during the day.  Experts recommend that adults get at least 7 or more hours of sleep per day. Children and older adults need more sleep.  Why is getting enough sleep important?  Getting enough quality sleep is a basic part of good health. When your sleep suffers, your physical health, mood, and your thoughts can suffer too. You may find yourself feeling more grumpy or stressed. Not getting enough sleep also can lead to serious problems, including injury, accidents, anxiety, and depression.  What might cause poor sleeping?  Many things can cause sleep problems, including:  Changes to your sleep schedule.  Stress. Stress can be caused by fear about a single event, such as giving a speech. Or you may have ongoing stress, such as worry about work or school.  Depression, anxiety, and other mental or emotional conditions.  Changes in your sleep habits or surroundings. This includes changes that happen where you sleep, such as noise, light, or sleeping in a different bed. It also includes changes in your sleep pattern, such as having jet lag or working a late shift.  Health problems, such as pain, breathing problems, and restless legs syndrome.  Lack of regular exercise.  Using alcohol, nicotine, or caffeine before bed.  How can you help yourself?  Here are some tips that  "may help you sleep more soundly and wake up feeling more refreshed.  Your sleeping area   Use your bedroom only for sleeping and sex. A bit of light reading may help you fall asleep. But if it doesn't, do your reading elsewhere in the house. Try not to use your TV, computer, smartphone, or tablet while you are in bed.  Be sure your bed is big enough to stretch out comfortably, especially if you have a sleep partner.  Keep your bedroom quiet, dark, and cool. Use curtains, blinds, or a sleep mask to block out light. To block out noise, use earplugs, soothing music, or a \"white noise\" machine.  Your evening and bedtime routine   Create a relaxing bedtime routine. You might want to take a warm shower or bath, or listen to soothing music.  Go to bed at the same time every night. And get up at the same time every morning, even if you feel tired.  What to avoid   Limit caffeine (coffee, tea, caffeinated sodas) during the day, and don't have any for at least 6 hours before bedtime.  Avoid drinking alcohol before bedtime. Alcohol can cause you to wake up more often during the night.  Try not to smoke or use tobacco, especially in the evening. Nicotine can keep you awake.  Limit naps during the day, especially close to bedtime.  Avoid lying in bed awake for too long. If you can't fall asleep or if you wake up in the middle of the night and can't get back to sleep within about 20 minutes, get out of bed and go to another room until you feel sleepy.  Avoid taking medicine right before bed that may keep you awake or make you feel hyper or energized. Your doctor can tell you if your medicine may do this and if you can take it earlier in the day.  If you can't sleep   Imagine yourself in a peaceful, pleasant scene. Focus on the details and feelings of being in a place that is relaxing.  Get up and do a quiet or boring activity until you feel sleepy.  Avoid drinking any liquids before going to bed to help prevent waking up often to " "use the bathroom.  Where can you learn more?  Go to https://www.[employer].net/patiented  Enter J942 in the search box to learn more about \"Learning About Sleeping Well.\"  Current as of: July 10, 2023  Content Version: 14.1 2006-2024 [employer].   Care instructions adapted under license by your healthcare professional. If you have questions about a medical condition or this instruction, always ask your healthcare professional. [employer] disclaims any warranty or liability for your use of this information.    Bladder Training: Care Instructions  Your Care Instructions     Bladder training is used to treat urge incontinence and stress incontinence. Urge incontinence means that the need to urinate comes on so fast that you can't get to a toilet in time. Stress incontinence means that you leak urine because of pressure on your bladder. For example, it may happen when you laugh, cough, or lift something heavy.  Bladder training can increase how long you can wait before you have to urinate. It can also help your bladder hold more urine. And it can give you better control over the urge to urinate.  It is important to remember that bladder training takes a few weeks to a few months to make a difference. You may not see results right away, but don't give up.  Follow-up care is a key part of your treatment and safety. Be sure to make and go to all appointments, and call your doctor if you are having problems. It's also a good idea to know your test results and keep a list of the medicines you take.  How can you care for yourself at home?  Work with your doctor to come up with a bladder training program that is right for you. You may use one or more of the following methods.  Delayed urination  In the beginning, try to keep from urinating for 5 minutes after you first feel the need to go.  While you wait, take deep, slow breaths to relax. Kegel exercises can also help you delay the need to go to " "the bathroom.  After some practice, when you can easily wait 5 minutes to urinate, try to wait 10 minutes before you urinate.  Slowly increase the waiting period until you are able to control when you have to urinate.  Scheduled urination  Empty your bladder when you first wake up in the morning.  Schedule times throughout the day when you will urinate.  Start by going to the bathroom every hour, even if you don't need to go.  Slowly increase the time between trips to the bathroom.  When you have found a schedule that works well for you, keep doing it.  If you wake up during the night and have to urinate, do it. Apply your schedule to waking hours only.  Kegel exercises  These tighten and strengthen pelvic muscles, which can help you control the flow of urine. (If doing these exercises causes pain, stop doing them and talk with your doctor.) To do Kegel exercises:  Squeeze your muscles as if you were trying not to pass gas. Or squeeze your muscles as if you were stopping the flow of urine. Your belly, legs, and buttocks shouldn't move.  Hold the squeeze for 3 seconds, then relax for 5 to 10 seconds.  Start with 3 seconds, then add 1 second each week until you are able to squeeze for 10 seconds.  Repeat the exercise 10 times a session. Do 3 to 8 sessions a day.  When should you call for help?  Watch closely for changes in your health, and be sure to contact your doctor if:    Your incontinence is getting worse.     You do not get better as expected.   Where can you learn more?  Go to https://www.[employer] [facility].net/patiented  Enter V684 in the search box to learn more about \"Bladder Training: Care Instructions.\"  Current as of: November 15, 2023               Content Version: 14.0    2508-8591 Healthwise, Incorporated.   Care instructions adapted under license by your healthcare professional. If you have questions about a medical condition or this instruction, always ask your healthcare professional. [employer], " [facility] disclaims any warranty or liability for your use of this information.      Learning About Depression Screening  What is depression screening?  Depression screening is a way to see if you have depression symptoms. It may be done by a doctor or counselor. It's often part of a routine checkup. That's because your mental health is just as important as your physical health.  Depression is a mental health condition that affects how you feel, think, and act. You may:  Have less energy.  Lose interest in your daily activities.  Feel sad and grouchy for a long time.  Depression is very common. It affects people of all ages.  Many things can lead to depression. Some people become depressed after they have a stroke or find out they have a major illness like cancer or heart disease. The death of a loved one or a breakup may lead to depression. It can run in families. Most experts believe that a combination of inherited genes and stressful life events can cause it.  What happens during screening?  You may be asked to fill out a form about your depression symptoms. You and the doctor will discuss your answers. The doctor may ask you more questions to learn more about how you think, act, and feel.  What happens after screening?  If you have symptoms of depression, your doctor will talk to you about your options.  Doctors usually treat depression with medicines or counseling. Often, combining the two works best. Many people don't get help because they think that they'll get over the depression on their own. But people with depression may not get better unless they get treatment.  The cause of depression is not well understood. There may be many factors involved. But if you have depression, it's not your fault.  A serious symptom of depression is thinking about death or suicide. If you or someone you care about talks about this or about feeling hopeless, get help right away.  It's important to know that depression can  "be treated. Medicine, counseling, and self-care may help.  Where can you learn more?  Go to https://www.[employer].net/patiented  Enter T185 in the search box to learn more about \"Learning About Depression Screening.\"  Current as of: June 24, 2023  Content Version: 14.1 2006-2024 [employer]   Care instructions adapted under license by your healthcare professional. If you have questions about a medical condition or this instruction, always ask your healthcare professional. Healthwise, [employer] disclaims any warranty or liability for your use of this information.       "

## 2024-08-17 LAB
ALBUMIN SERPL BCG-MCNC: 4.1 G/DL (ref 3.5–5.2)
ALP SERPL-CCNC: 114 U/L (ref 40–150)
ALT SERPL W P-5'-P-CCNC: 26 U/L (ref 0–70)
ANION GAP SERPL CALCULATED.3IONS-SCNC: 16 MMOL/L (ref 7–15)
AST SERPL W P-5'-P-CCNC: 15 U/L (ref 0–45)
BILIRUB SERPL-MCNC: 0.4 MG/DL
BUN SERPL-MCNC: 26.7 MG/DL (ref 8–23)
CALCIUM SERPL-MCNC: 9.2 MG/DL (ref 8.8–10.4)
CHLORIDE SERPL-SCNC: 104 MMOL/L (ref 98–107)
CHOLEST SERPL-MCNC: 135 MG/DL
CREAT SERPL-MCNC: 1.14 MG/DL (ref 0.67–1.17)
CREAT UR-MCNC: 107 MG/DL
EGFRCR SERPLBLD CKD-EPI 2021: 70 ML/MIN/1.73M2
FASTING STATUS PATIENT QL REPORTED: ABNORMAL
FASTING STATUS PATIENT QL REPORTED: ABNORMAL
GLUCOSE SERPL-MCNC: 175 MG/DL (ref 70–99)
HCO3 SERPL-SCNC: 17 MMOL/L (ref 22–29)
HDLC SERPL-MCNC: 35 MG/DL
LDLC SERPL CALC-MCNC: 80 MG/DL
MAGNESIUM SERPL-MCNC: 2.2 MG/DL (ref 1.7–2.3)
MICROALBUMIN UR-MCNC: 71.2 MG/L
MICROALBUMIN/CREAT UR: 66.54 MG/G CR (ref 0–17)
NONHDLC SERPL-MCNC: 100 MG/DL
POTASSIUM SERPL-SCNC: 4.7 MMOL/L (ref 3.4–5.3)
PROT SERPL-MCNC: 7.2 G/DL (ref 6.4–8.3)
PSA SERPL DL<=0.01 NG/ML-MCNC: 0.79 NG/ML (ref 0–4.5)
SODIUM SERPL-SCNC: 137 MMOL/L (ref 135–145)
TRIGL SERPL-MCNC: 102 MG/DL
TSH SERPL DL<=0.005 MIU/L-ACNC: 3.38 UIU/ML (ref 0.3–4.2)

## 2024-08-17 NOTE — RESULT ENCOUNTER NOTE
Patient updated by [employer] message with lab results.       Vince Chau,  Some of your labs have returned.   1. Your urine does look infected. I recommend we treat with macrobid. I will send antibiotic prescription to the pharmacy. Urine culture in process.   2. There is blood in your urine which can be seen with infection. I recommend we recheck a urine sample in 6-8 weeks to ensure the blood has cleared. I will place a future lab order. Please schedule a lab only appointment.   3. Your blood sugar is up from before. I have asked the team to help coordinate an appointment with the diabetic educator to help optimize your insulin management.   4. Your white blood cell count is up a little. This may be related to the urine infection.   Please reach out with follow up questions or concerns.  Azeb Brink, DO

## 2024-08-17 NOTE — RESULT ENCOUNTER NOTE
The 10-year ASCVD risk score (Ruben HERNANDEZ, et al., 2019) is: 21.7%  Patient updated by [employer] message with lab results.       Vince Chau,  Additional labs have returned.   1. Cholesterol labs well controlled on statin. Continue current treatment regimen.  2. Metabolic panel reassuring.  3. Your protein level is elevated in your urine showing that your elevated blood sugars are impacting your kidney function. Continue lisinopril which has has a kidney protective effect. Continue working on improved diabetes control.  4. PSA in the normal range.  5. Magnesium and thyroid normal.  Azeb Brink, DO

## 2024-08-18 LAB
BACTERIA UR CULT: ABNORMAL
BACTERIA UR CULT: ABNORMAL

## 2024-08-19 ENCOUNTER — TELEPHONE (OUTPATIENT)
Dept: FAMILY MEDICINE | Facility: CLINIC | Age: 69
End: 2024-08-19
Payer: COMMERCIAL

## 2024-08-19 RX ORDER — NITROFURANTOIN 25; 75 MG/1; MG/1
100 CAPSULE ORAL 2 TIMES DAILY
Qty: 14 CAPSULE | Refills: 0 | Status: SHIPPED | OUTPATIENT
Start: 2024-08-19 | End: 2024-08-26

## 2024-08-19 NOTE — TELEPHONE ENCOUNTER
Called patient and relayed information/instructions from provider. Patient has no further questions at this time. Will follow up as needed/instructed     Juana Epstein RN

## 2024-08-19 NOTE — TELEPHONE ENCOUNTER
----- Message from Zahraa Stein sent at 8/19/2024 11:15 AM CDT -----  Patient's UA shows infection with E. Coli. Will be sending in rx for treatment of this to his pharmacy.    Zahraa Stein PA-C

## 2024-08-19 NOTE — TELEPHONE ENCOUNTER
Patient calling to see if there was a separate button to push on the zio patch. Explained to patient it was in the center of what is on his chest. Patient understood how to push that he thought there might be a different button and wanted to clarify      Juana Epstein RN

## 2024-09-07 PROCEDURE — 93248 EXT ECG>7D<15D REV&INTERPJ: CPT | Performed by: INTERNAL MEDICINE

## 2024-09-07 NOTE — RESULT ENCOUNTER NOTE
Patient updated by [employer] message with zio patch results.     Vince Chau,  Overall your heart monitor study was reassuring. Your symptoms correlated with a normal heart rhythm.   Azeb Brink, DO

## 2024-09-10 ENCOUNTER — THERAPY VISIT (OUTPATIENT)
Dept: PHYSICAL THERAPY | Facility: CLINIC | Age: 69
End: 2024-09-10
Attending: FAMILY MEDICINE
Payer: COMMERCIAL

## 2024-09-10 DIAGNOSIS — Z91.81 PERSONAL HISTORY OF FALL: ICD-10-CM

## 2024-09-10 DIAGNOSIS — R27.0 ATAXIA: ICD-10-CM

## 2024-09-10 PROCEDURE — 97162 PT EVAL MOD COMPLEX 30 MIN: CPT | Mod: GP | Performed by: PHYSICAL THERAPIST

## 2024-09-10 PROCEDURE — 97110 THERAPEUTIC EXERCISES: CPT | Mod: GP | Performed by: PHYSICAL THERAPIST

## 2024-09-10 NOTE — PROGRESS NOTES
PHYSICAL THERAPY EVALUATION  Type of Visit: Evaluation       Fall Risk Screen:  Fall screen completed by: PT  Have you fallen 2 or more times in the past year?: Yes  Have you fallen and had an injury in the past year?: Yes  Timed Up and Go score (seconds): DGI  Is patient a fall risk?: Yes; Department fall risk interventions implemented    Subjective       Presenting condition or subjective complaint:   He did have a fall in May and sometimes gets some episodes where it feels like he will fall and has to stop and hold something.  In May, he had stepped off the curb and leg gave out.   Date of onset: 08/16/24    Relevant medical history:     Dates & types of surgery:      Prior diagnostic imaging/testing results:       Prior therapy history for the same diagnosis, illness or injury:        Prior Level of Function  Transfers: Independent  Ambulation: Independent  ADL: Independent  IADL: Driving, Laundry, Meal preparation, Medication management    Living Environment  Social support:   lives alone  Type of home:   apartment  Stairs to enter the home:       --  Ramp:     Stairs inside the home:       --  Help at home:   none  Equipment owned:   none    Employment:      Hobbies/Interests:      Patient goals for therapy:   be able to understand how much PT can help    Pain assessment: Pain denied     Objective   Vitals Signs  Heart Rate: 99  Blood Pressure: 127/68  Cognitive Status Examination  Orientation: Oriented to person, place and time   Level of Consciousness: Alert  Follows Commands and Answers Questions: 100% of the time  Personal Safety and Judgement: Intact  Memory: Intact    OBSERVATION: Arrives independently with no assistive device.  INTEGUMENTARY: Scabbed/irritated areas on lower legs/ankles  POSTURE: forward head, rounded shoulders  PALPATION: N/T  RANGE OF MOTION: WFL  STRENGTH: 4/5 strength in hip flexors and ankle DF bilaterally    BED MOBILITY: N/T    TRANSFERS: Independent    WHEELCHAIR MOBILITY:  N/A    GAIT:   Level of Sibley: Independent  Assistive Device(s): None  Gait Deviations: Ambulates with decreased ralph, short step lengths, and shuffling gait  Gait Distance: 200 feet, distance limited by fatigue and SOB  Stairs: Patient reports being unable to use stairs    BALANCE: Impaired balance noted with head movements during gait and stepping around obstacles    SPECIAL TESTS  10 Meter Walk Test (Comfortable)   12.76 (gait speed .47 m/sec)   5 Times Sit-to-Stand (5TSTS)  27.62     Dynamic Gait Index (DGI) Total Score (out of 24): 9       SENSATION: Neuropathy in LEs    REFLEXES: N/T  COORDINATION: WFL with NIA of LE's  MUSCLE TONE:     Assessment & Plan   CLINICAL IMPRESSIONS  Medical Diagnosis: Ataxia, personal history of fall    Treatment Diagnosis: Force Production Deficit   Impression/Assessment: Patient is a 69 year old male with complaints of unsteadiness and weakness.  The following significant findings have been identified: Decreased strength, Impaired balance, Impaired sensation, Impaired gait, Impaired muscle performance, Decreased activity tolerance, and Impaired posture. These impairments interfere with their ability to perform self care tasks, recreational activities, household chores, and community mobility as compared to previous level of function. Suspect that balance concerns are multifactorial, including LE weakness, neuropathy, and sedentary lifestyle. He may also benefit from positional testing to rule out bppv secondary to imbalance noted today with vertical head movements while walking.    Clinical Decision Making (Complexity):  Clinical Presentation: Evolving/Changing  Clinical Presentation Rationale: based on medical and personal factors listed in PT evaluation  Clinical Decision Making (Complexity): Moderate complexity    PLAN OF CARE  Treatment Interventions:  Interventions: Gait Training, Neuromuscular Re-education, Therapeutic Activity, Therapeutic Exercise,  Self-Care/Home Management, Canalith Repositioning    Long Term Goals     PT Goal 1  Goal Identifier: HEP  Goal Description: Patient will be independent with Home Exercise Program for continued improvements in health and wellness upon d/c from therapy  Target Date: 12/08/24  PT Goal 2  Goal Identifier: Gait Speed  Goal Description: Patient will improve gait speed to .57 m/sec  Rationale: to maximize safety and independence within the community  Target Date: 12/08/24  PT Goal 3  Goal Identifier: 5TSTS  Goal Description: Patient will improve score on 5 time sit to stand test to <20 sec  Rationale: to maximize safety and independence with performance of ADLs and functional tasks  Target Date: 12/08/24  PT Goal 4  Goal Identifier: DGI  Goal Description: Patient will improve DGI score to 17 or better  Rationale: to maximize safety and independence with performance of ADLs and functional tasks  Target Date: 12/08/24      Frequency of Treatment: 1x/week  Duration of Treatment: up to 90 days    Recommended Referrals to Other Professionals:   Education Assessment:   Learner/Method: Patient  Education Comments: Educated on POC, HEP    Risks and benefits of evaluation/treatment have been explained.   Patient/Family/caregiver agrees with Plan of Care.     Evaluation Time:     PT Eval, Moderate Complexity Minutes (24434): 35       Signing Clinician: Maggi Pepper, PT        [facility]                                                                                   OUTPATIENT PHYSICAL THERAPY      PLAN OF TREATMENT FOR OUTPATIENT REHABILITATION   Patient's Last Name, First Name, Isac Humphrey YOB: 1955   Provider's Name   [facility]   Medical Record No.  [id number]     Onset Date: 08/16/24  Start of Care Date: 09/10/24     Medical Diagnosis:  Ataxia, personal history of fall      PT Treatment Diagnosis:  Force Production Deficit Plan of  Treatment  Frequency/Duration: 1x/week/ up to 90 days    Certification date from 09/10/24 to 12/08/24         See note for plan of treatment details and functional goals     Maggi Pepper, PT                         I CERTIFY THE NEED FOR THESE SERVICES FURNISHED UNDER        THIS PLAN OF TREATMENT AND WHILE UNDER MY CARE     (Physician attestation of this document indicates review and certification of the therapy plan).              Referring Provider:  Azeb Brink    Initial Assessment  See Epic Evaluation- Start of Care Date: 09/10/24

## 2024-09-11 ENCOUNTER — OFFICE VISIT (OUTPATIENT)
Dept: OPHTHALMOLOGY | Facility: CLINIC | Age: 69
End: 2024-09-11
Attending: OPHTHALMOLOGY
Payer: COMMERCIAL

## 2024-09-11 DIAGNOSIS — H25.13 AGE-RELATED NUCLEAR CATARACT OF BOTH EYES: ICD-10-CM

## 2024-09-11 DIAGNOSIS — H35.3131 EARLY DRY STAGE NONEXUDATIVE AGE-RELATED MACULAR DEGENERATION OF BOTH EYES: Primary | ICD-10-CM

## 2024-09-11 PROCEDURE — G0463 HOSPITAL OUTPT CLINIC VISIT: HCPCS | Performed by: OPHTHALMOLOGY

## 2024-09-11 PROCEDURE — 92014 COMPRE OPH EXAM EST PT 1/>: CPT | Mod: GC | Performed by: OPHTHALMOLOGY

## 2024-09-11 PROCEDURE — 92025 CPTRIZED CORNEAL TOPOGRAPHY: CPT | Performed by: OPHTHALMOLOGY

## 2024-09-11 PROCEDURE — 76519 ECHO EXAM OF EYE: CPT | Performed by: OPHTHALMOLOGY

## 2024-09-11 PROCEDURE — 92134 CPTRZ OPH DX IMG PST SGM RTA: CPT | Performed by: OPHTHALMOLOGY

## 2024-09-11 ASSESSMENT — VISUAL ACUITY
OD_CC: 20/40+
CORRECTION_TYPE: GLASSES
METHOD: SNELLEN - LINEAR
OS_BAT_HIGH: >20/400
OS_CC: 20/30+
OD_BAT_HIGH: 20/40-2

## 2024-09-11 ASSESSMENT — CONF VISUAL FIELD
OS_NORMAL: 1
OD_SUPERIOR_NASAL_RESTRICTION: 0
OS_SUPERIOR_NASAL_RESTRICTION: 0
OS_INFERIOR_NASAL_RESTRICTION: 0
OS_INFERIOR_TEMPORAL_RESTRICTION: 0
OS_SUPERIOR_TEMPORAL_RESTRICTION: 0
OD_SUPERIOR_TEMPORAL_RESTRICTION: 0
OD_INFERIOR_NASAL_RESTRICTION: 0
OD_NORMAL: 1
METHOD: COUNTING FINGERS
OD_INFERIOR_TEMPORAL_RESTRICTION: 0

## 2024-09-11 ASSESSMENT — SLIT LAMP EXAM - LIDS
COMMENTS: 1+ MEIBOMIAN GLAND DYSFUNCTION
COMMENTS: 1+ MEIBOMIAN GLAND DYSFUNCTION

## 2024-09-11 ASSESSMENT — REFRACTION_MANIFEST
OS_CYLINDER: +1.50
OD_CYLINDER: +2.25
OS_AXIS: 114
OD_SPHERE: -2.25
OD_AXIS: 080
OS_SPHERE: -3.00

## 2024-09-11 ASSESSMENT — REFRACTION_WEARINGRX
OD_SPHERE: -1.75
SPECS_TYPE: PAL
OS_AXIS: 102
OD_AXIS: 060
OD_CYLINDER: +2.25
OS_CYLINDER: +1.50
OS_SPHERE: -3.00
OD_ADD: +2.25
OS_ADD: +2.25

## 2024-09-11 ASSESSMENT — TONOMETRY
IOP_METHOD: TONOPEN
OD_IOP_MMHG: 15
OS_IOP_MMHG: 15

## 2024-09-11 ASSESSMENT — CUP TO DISC RATIO
OD_RATIO: 0.35
OS_RATIO: 0.35

## 2024-09-11 ASSESSMENT — EXTERNAL EXAM - RIGHT EYE: OD_EXAM: NORMAL

## 2024-09-11 ASSESSMENT — EXTERNAL EXAM - LEFT EYE: OS_EXAM: NORMAL

## 2024-09-11 NOTE — PROGRESS NOTES
HPI       Cataract    In both eyes.  Associated symptoms include Negative for blurred vision, glare, haloes, starburst, double vision and a need for brighter lights.  Duration of months.  Frequency is constant.  Since onset it is stable.             Comments    Referred by Dr. Mallory Mayer for cataract evaluation of both eyes    Pt states he is here as he was told he has cataracts.  He states his eyes have been this way for so long, he's unsure whether his vision is bad or not.  He doesn't feel he is struggling with his vision at this time.     He is not noticing issues with glare or driving at night (he occasionally drives at night).  He feels he has to remove his glasses to read occasionally.  He hasn't noticed changes to his vision since his last visit here.      He states he does not use any eye drops and has never had any eye surgeries or injuries.    Denies hx flomax use    Pt is diabetic, type 2    Lab Results       Component                Value               Date                       A1C                      8.5                 08/16/2024                 A1C                      7.2                 02/14/2024                 A1C                      7.4                 11/10/2023                 A1C                      6.7                 05/05/2023                 A1C                      8.5                 07/28/2022                CARLOS Maradiaga 1:41 PM 09/11/2024              Last edited by Charity Handley on 9/11/2024  1:43 PM.          Review of systems for the eyes was negative other than the pertinent positives/negatives listed in the HPI.      Assessment & Plan      Isac Li is a 69 year old male with the following diagnoses:   1. Early dry stage nonexudative age-related macular degeneration of both eyes    2. Age-related nuclear cataract of both eyes         Referral from Dr. Tejada for cataract evaluation  Patient at this time not impacted by cataracts  Not visually  significant   Risks, benefits and alternatives to cataract extraction/IOL implantation discussed  Functioning well and would prefer glasses at this time  Return precautions reviewed     Patient disposition:   Return in about 6 months (around 3/11/2025) for Follow up for DFE with Dr. Natalya Izquierdo as needed            Attending Physician Attestation:  Complete documentation of historical and exam elements from today's encounter can be found in the full encounter summary report (not reduplicated in this progress note).  I personally obtained the chief complaint(s) and history of present illness.  I confirmed and edited as necessary the review of systems, past medical/surgical history, family history, social history, and examination findings as documented by others; and I examined the patient myself.  I personally reviewed the relevant tests, images, and reports as documented above.  I formulated and edited as necessary the assessment and plan and discussed the findings and management plan with the patient and family. Attending Physician Image/Tesing Attestation: I personally reviewed the ophthalmic test(s) associated with this encounter, agree with the interpretation(s) as documented by the resident/fellow, and have edited the corresponding report(s) as necessary.   . - Brian Izquierdo MD     graft, culture swab

## 2024-09-17 ENCOUNTER — TELEPHONE (OUTPATIENT)
Dept: FAMILY MEDICINE | Facility: CLINIC | Age: 69
End: 2024-09-17
Payer: COMMERCIAL

## 2024-09-17 DIAGNOSIS — Z79.4 TYPE 2 DIABETES MELLITUS WITH DIABETIC POLYNEUROPATHY, WITH LONG-TERM CURRENT USE OF INSULIN (H): Primary | ICD-10-CM

## 2024-09-17 DIAGNOSIS — E11.42 TYPE 2 DIABETES MELLITUS WITH DIABETIC POLYNEUROPATHY, WITH LONG-TERM CURRENT USE OF INSULIN (H): Primary | ICD-10-CM

## 2024-09-17 NOTE — TELEPHONE ENCOUNTER
Type 2 diabetes mellitus with diabetic polyneuropathy, with long-term current use of insulin (H)  - Adult Diabetes Education  Referral; Future     Fasting glucose close to goal, suspect we will need to adjust his mealtime insulin.  Request assistance with diabetic education.    Azeb Brink, DO

## 2024-09-17 NOTE — TELEPHONE ENCOUNTER
Called patient to review provider questions below. LMTCB. Route answers to provider.    Isidro Whitfield, MARLYN    I suspect her best option is going to be optimizing the long and short acting insulin dosing.  This patient been checking his sugars regularly?  Any home numbers to report?  I do recommend he meet with our diabetic educator if he is willing.  They can help optimize insulin dosing.  Let me know if he is interested in this option.  Azeb Brink, DO

## 2024-09-17 NOTE — TELEPHONE ENCOUNTER
Patient states that he currently checks his blood sugar once a day in the morning and levels range from 115-140. Patient is willing to meet with diabetic educator. Patient given phone number to contact diabetic educator scheduling.

## 2024-09-17 NOTE — TELEPHONE ENCOUNTER
Patient calling about Ozempic medication. Patient had switched to Medicare advantage plan on 8/1, and now they are telling him he hit a cap in coverage, and they explained they will cover 25% of the medication and he would have to pay 700 for the medication. Patient reports insurance would not give him an alternative medication and he is now seeking advice from PCP.     Please advise     Isidro Whitfield RN

## 2024-09-17 NOTE — TELEPHONE ENCOUNTER
I suspect her best option is going to be optimizing the long and short acting insulin dosing.  This patient been checking his sugars regularly?  Any home numbers to report?  I do recommend he meet with our diabetic educator if he is willing.  They can help optimize insulin dosing.  Let me know if he is interested in this option.  Azeb Brink, DO

## 2024-09-19 ENCOUNTER — OFFICE VISIT (OUTPATIENT)
Dept: PODIATRY | Facility: CLINIC | Age: 69
End: 2024-09-19
Attending: FAMILY MEDICINE
Payer: COMMERCIAL

## 2024-09-19 DIAGNOSIS — E11.51 TYPE II DIABETES MELLITUS WITH PERIPHERAL ARTERY DISEASE (H): Primary | ICD-10-CM

## 2024-09-19 DIAGNOSIS — B35.1 ONYCHOMYCOSIS: ICD-10-CM

## 2024-09-19 PROCEDURE — 11721 DEBRIDE NAIL 6 OR MORE: CPT | Mod: Q8 | Performed by: PODIATRIST

## 2024-09-19 PROCEDURE — 99203 OFFICE O/P NEW LOW 30 MIN: CPT | Mod: 25 | Performed by: PODIATRIST

## 2024-09-19 NOTE — PROGRESS NOTES
"Subjective:    Pt is seen today in consult from Dr. Brink for a diabetic foot exam.  Patient is having a hard time trimming his nails and points to all of them.  This has been getting more difficult over the years.  Sometimes they are painful.  The pain is aggravated by activity and relieved by rest.  Patient has numbness and tingling in feet.  Denies any past history of foot ulcers.  Primary care is above last seen 8/16/24.      ROS:  see above         Allergies   Allergen Reactions    Sulfa (Sulfonamide Antibiotics) [Sulfa Antibiotics] Unknown       Current Outpatient Medications   Medication Sig Dispense Refill    atorvastatin (LIPITOR) 20 MG tablet Take 1 tablet (20 mg) by mouth at bedtime 90 tablet 3    blood glucose (NO BRAND SPECIFIED) test strip Use to test blood sugar 4 times daily or as directed. 400 strip 3    empagliflozin (JARDIANCE) 25 MG TABS tablet Take 1 tablet (25 mg) by mouth daily 90 tablet 3    insulin degludec (TRESIBA FLEXTOUCH) 100 UNIT/ML pen Inject 64 units daily and adjust according to instructions.  Max TDD 70 units 75 mL 3    insulin lispro (HUMALOG KWIKPEN) 100 UNIT/ML (1 unit dial) KWIKPEN For Pre-Meal  - 164 give 1 unit. For Pre-Meal  - 189 give 2 units. For Pre-Meal  - 214 give 3 units. For Pre-Meal  - 239 give 4 units. For Pre-Meal  - 264 give 5 units. For Pre-Meal  - 289 give 6 units. For Pre-Meal  - 314 give 7 units. For Pre-Meal  - 339 give 8 units. For Pre-Meal  - 364 give 9 units.  For Pre-Meal BG greater than or equal to 365 give 10 units 15 mL 4    insulin pen needle (32G X 4 MM) 32G X 4 MM miscellaneous [PEN NEEDLE, DIABETIC (BD ULTRA-FINE YAHAIRA PEN NEEDLE) 32 GAUGE X 5/32\" NDLE] USE DAILY WITH HUMALOG AND TRESIBA PENS AS DIRECTED 200 each 11    lacosamide (VIMPAT) 200 MG TABS tablet Take 1 tablet by mouth 2 times daily      lisinopril (ZESTRIL) 5 MG tablet Take 1 tablet (5 mg) by mouth daily 90 tablet 3    metFORMIN " (GLUCOPHAGE XR) 500 MG 24 hr tablet TAKE 4 TABS BY MOUTH WITH EVENING MEAL/  Strength: 500 mg 360 tablet 3    mirtazapine (REMERON) 45 MG tablet Take 45 mg by mouth at bedtime      nortriptyline (PAMELOR) 75 MG capsule TAKE 1 CAPSULE BY MOUTH AT BEDTIME. START ON 2024 AFTER FINISHING THE 50 MG CAPS      QUEtiapine (SEROQUEL) 50 MG tablet Take 1 tablet (50 mg) by mouth at bedtime 90 tablet 3    semaglutide (OZEMPIC) 2 MG/3ML pen Inject 0.5 mg subcutaneously every 7 days 9 mL 4    vilazodone (VIIBRYD) 40 MG TABS tablet Take 40 mg by mouth daily with food         Patient Active Problem List   Diagnosis    Obesity    Generalized Convulsive Seizure    Hypercholesteremia    Type 2 diabetes mellitus with diabetic polyneuropathy, with long-term current use of insulin (H)    ROSSI on CPAP    Alcohol dependence in remission (H)    Morbid obesity (H)    Polyneuropathy associated with underlying disease (H24)    Ankle fracture    Seizure disorder (H)    Suicidal ideation    Ataxia    Severe episode of recurrent major depressive disorder, without psychotic features (H)       Past Medical History:   Diagnosis Date    Alcoholism (H)     early .    Depression     hospitalized  suicidal ideation. Followed by Naseem Stoo, psychologist.    Diabetes mellitus (H)     Hx of type B viral hepatitis     Infectious viral hepatitis     Lower leg edema     dependent edema left leg more.    Seizures (H)     well controlled, last seizure , followed by Dr. Pastrana now.    Sleep apnea     uses CPAP       Past Surgical History:   Procedure Laterality Date    ANKLE FRACTURE SURGERY Left 2012    COLONOSCOPY      KNEE LIGAMENT RECONSTRUCTION Left        Family History   Problem Relation Age of Onset    Glaucoma No family hx of     Macular Degeneration No family hx of        Social History     Tobacco Use    Smoking status: Former     Current packs/day: 0.00     Types: Cigarettes     Quit date: 2/15/1990     Years since quittin.6     Smokeless tobacco: Never   Substance Use Topics    Alcohol use: No         Exam:    Vitals: There were no vitals taken for this visit.  BMI: There is no height or weight on file to calculate BMI.  Height: Data Unavailable    Constitutional/ general:  Pt is in no apparent distress, appears well-nourished.  Cooperative with history and physical exam.     Psych:  The patient answered questions appropriately.  Normal affect.  Seems to have reasonable expectations, in terms of treatment.     Lungs:  Non labored breathing, non labored speech. No cough.  No audible wheezing. Even, quiet breathing.       Vascular:  positive  DP pulse.  negative PT pulse.  CFT < 3 sec.  positive ankle edema.  positive varicosities.  negative pedal hair growth.    Neuro:  Alert and oriented x 3. Coordinated gait.  Light touch sensation is diminished.   Monofilament absent to mid tibial region bilaterally.    Derm:  Skin thin, shiny, atrophic, with no hair growth noted.   No erythema, ecchymosis, or cyanosis.  No foot ulcers.    All nails are thickened, elongated, discolored with subungual debris.   No masses or breakdown in the skin bilaterally.  No erythema or ecchymosis noted bilateral.     Musculoskeletal:    Lower extremity muscle strength is normal.  Patient is ambulatory without an assistive device or brace.  No gross deformities.  Normal arch.                   Component  Ref Range & Units 1 mo ago  (8/16/24) 7 mo ago  (2/14/24) 10 mo ago  (11/10/23) 1 yr ago  (5/5/23) 2 yr ago  (7/28/22) 2 yr ago  (4/28/22) 2 yr ago  (3/30/22)    Hemoglobin A1C  0.0 - 5.6 % 8.5 High  7.2 High  CM 7.4 High  CM 6.7 High  CM 8.5 High  CM 9.8 High  CM 10.3 High  CM              A/P  Diabetes mellitus with PAD  Peripheral neuropathy with L OPS  Onychomycosis      Mycotic nails manually debrided with a .  Instructed patient he is lost his protective sensation.  Discussed the importance of watching feet daily and protecting the shoes.   Discussed signs of infection if notices any will contact myself or primary care immediately.  Will try to keep blood sugars well-controlled.  Discussed I do not do routine footcare.  Will refer to foot care nurse.  RTC as needed.  Thank you for allowing me participate in the care of this patient.        Mohan Dixon, PAU, FACFAS

## 2024-09-19 NOTE — PATIENT INSTRUCTIONS
We wish you continued good healing. If you have any questions or concerns, please do not hesitate to contact us at  [phone]    [employer] (secure e-mail communication and access to your chart) to send a message or to make an appointment.    Please remember to call and schedule a follow up appointment if one was recommended at your earliest convenience.     PODIATRY CLINIC HOURS  TELEPHONE NUMBER    Dr. Mohan Dixon D.P.M FACFAS        Clinics:  [facility] [place]  Tuesday 1PM-6PM  [place]  Wednesday 745AM-330PM  [place]  Monday 2nd,4th  830AM-4PM  Thursday/Friday 745AM-230PM     TREASURE APPOINTMENTS  [phone]    [facility] [place] APPOINTMENTS  [phone]          If you need a medication refill, please contact us you may need lab work and/or a follow up visit prior to your refill (i.e. Antifungal medications).  If MRI needed please call Imaging at [phone]   HOW DO I GET MY KNEE SCOOTER? Knee scooters can be picked up at ANY Medical Supply stores with your knee scooter Prescription.  OR  Bring your signed prescription to an [facility] Medical [facility] showroom.   Set up an appointment for your custom Orthotics. Call any Orthotics locations call [phone]         [facility](out of pocket)........................[phone]    They travel to the following Community/Senior Centers.   Need to call [facility] to set up appointments.     Senior Centers:    [facility].......[phone]    [place]................[phone]    [place]-.....[phone]    [place]........................[phone]    [facility].................[phone]    Affordable Foot Care (in Home)    Lu Montesinos [phone]    The Foot Care Nurse    Sharita Del Rio RN, BSN, [phone]    [facility]....[phone]    [facility]-----------[phone]      ** YOU  "MUST CALL FOR INFORMATION ON DAYS, TIMES AND COST OF SERVICE**      For up to date list of Foot Care Rn's. Visit the website    American Foot Care Nurses Association website    Afcna.org    Click on the \"Find a foot care provider\" Link      Ginny MelendezRN,[facility] [phone] (Text or call) Has limited home visit.    Karlene Ochoa RN,[facility] [phone]    Carol [phone]    Judy Mary,MARLYN,BSN,[facility] [phone]    Sangita Matson,MARLYN [phone]    Kaley Kolb [phone]    Yanet Mcarthur [phone]    Anabel [phone]    **THIS IS A PRIVATE PAY SERVICE- NOT BILLABLE TO MEDICARE OR INSURANCE** Routine Foot Care Referral List  [facility]  [phone]  [facility]  [phone]  [facility]  Podiatrist   Isac Triplett DPM  Location:  [address], [address], [place] [address]  Phone: [phone]    [facility]  Podiatrist  Dheeraj Foreman DPM  Locations:  [place]  [address] [place] [address], [address], [place] [address]- [phone]  [place]  [address], [place] [address]- [phone]  [place]  [address] [place] [address], [address], [place] [address]- [phone]    Foot & Ankle Physician [facility]  Podiatrist  Rishi Elias DPM  Locations:  [place] [id number], [address], [place] [address]- [phone]  [place]  [address], [place] [address]- [phone]      [facility]  Podiatrists  PAU Dalton DPM  Location  [place] [address], [address], [place] [address]- [phone]    [facility]  Podiatrist  Prakash Christianson DPM  Location  [address] [address]-  [phone]    Isac Schaefer DPM  Podiatrist   Isac Schaefer DPM  Location  [address] [address]-  [phone]  [place]  [address] [place], [place] [address], [place] [address]-  [phone]    [facility]  Podiatrists  Juan Mejía, DPJALEN Linton, DPJALEN Vega, DPM  Locations  [place]  [address], [address], [place] [address]- [phone]  [place]  [address], [address], [place] [address]-  [phone]    [facility]  Podiatrists  Daron Valdovinos, DPJALEN Miranda, DPJALEN Cosby, DPJALEN Sarabia, DPJALEN Leo, DPJALEN Howard, DPJALEN Zheng, DPJALEN Linton, DPM  Samy Calix, DPM  Locations  [place]  [address], [address], [place], [place] [address]- [phone]  [place]   [address],  [address], [place] [address]-  [phone]  [place]  [address], [address], [place], [place] [address]- [phone]  [place]  [address], [address], [place] [address]- [phone]  [place]  [address], [address], [place], [place] [address]- [phone]    [facility]  Podiatrists  Charles Emery, DPJALEN Ruiz, DPJALEN Jones, DPJALEN Parnell, DPJALEN Randolph, DPJALEN Leo, DPM  Locations  [place]  [address], [address], [place], [place] [address]- [phone]  [place]  [address], [address], [place], [place] [address]- [phone]  [place]  [address] , [place], [place] [address]- [phone]    [facility]  Podiatrist   Stuart Walker, DPM  Location   [place]  [address], [address], [place], [place] [address]- [phone]    [facility]   Podiatrists  Denisa Marie, DPM  Chaim Espinal DPM  Location  [place]  [address], [address], [place] [address]-  [phone]    [facility]  Podiatrist   Harlan Zheng DPM  Location  [place]  [address], [address], [place] [address]-  [phone]      [facility]  Podiatrist   ROSIE OseiM  Location  [address] [facility]  [place] [address]-  [phone]

## 2024-09-19 NOTE — LETTER
"9/19/2024      Isac RCUZ [place]  [address]  [facility] [address]      Dear Colleague,    Thank you for referring your patient, Isac Li, to the [facility]. Please see a copy of my visit note below.    Subjective:    Pt is seen today in consult from Dr. Brink for a diabetic foot exam.  Patient is having a hard time trimming his nails and points to all of them.  This has been getting more difficult over the years.  Sometimes they are painful.  The pain is aggravated by activity and relieved by rest.  Patient has numbness and tingling in feet.  Denies any past history of foot ulcers.  Primary care is above last seen 8/16/24.      ROS:  see above         Allergies   Allergen Reactions     Sulfa (Sulfonamide Antibiotics) [Sulfa Antibiotics] Unknown       Current Outpatient Medications   Medication Sig Dispense Refill     atorvastatin (LIPITOR) 20 MG tablet Take 1 tablet (20 mg) by mouth at bedtime 90 tablet 3     blood glucose (NO BRAND SPECIFIED) test strip Use to test blood sugar 4 times daily or as directed. 400 strip 3     empagliflozin (JARDIANCE) 25 MG TABS tablet Take 1 tablet (25 mg) by mouth daily 90 tablet 3     insulin degludec (TRESIBA FLEXTOUCH) 100 UNIT/ML pen Inject 64 units daily and adjust according to instructions.  Max TDD 70 units 75 mL 3     insulin lispro (HUMALOG KWIKPEN) 100 UNIT/ML (1 unit dial) KWIKPEN For Pre-Meal  - 164 give 1 unit. For Pre-Meal  - 189 give 2 units. For Pre-Meal  - 214 give 3 units. For Pre-Meal  - 239 give 4 units. For Pre-Meal  - 264 give 5 units. For Pre-Meal  - 289 give 6 units. For Pre-Meal  - 314 give 7 units. For Pre-Meal  - 339 give 8 units. For Pre-Meal  - 364 give 9 units.  For Pre-Meal BG greater than or equal to 365 give 10 units 15 mL 4     insulin pen needle (32G X 4 MM) 32G X 4 MM miscellaneous [PEN NEEDLE, DIABETIC (BD ULTRA-FINE YAHAIRA PEN NEEDLE) 32 GAUGE X 5/32\" " NDLE] USE DAILY WITH HUMALOG AND TRESIBA PENS AS DIRECTED 200 each 11     lacosamide (VIMPAT) 200 MG TABS tablet Take 1 tablet by mouth 2 times daily       lisinopril (ZESTRIL) 5 MG tablet Take 1 tablet (5 mg) by mouth daily 90 tablet 3     metFORMIN (GLUCOPHAGE XR) 500 MG 24 hr tablet TAKE 4 TABS BY MOUTH WITH EVENING MEAL/  Strength: 500 mg 360 tablet 3     mirtazapine (REMERON) 45 MG tablet Take 45 mg by mouth at bedtime       nortriptyline (PAMELOR) 75 MG capsule TAKE 1 CAPSULE BY MOUTH AT BEDTIME. START ON 02/09/2024 AFTER FINISHING THE 50 MG CAPS       QUEtiapine (SEROQUEL) 50 MG tablet Take 1 tablet (50 mg) by mouth at bedtime 90 tablet 3     semaglutide (OZEMPIC) 2 MG/3ML pen Inject 0.5 mg subcutaneously every 7 days 9 mL 4     vilazodone (VIIBRYD) 40 MG TABS tablet Take 40 mg by mouth daily with food         Patient Active Problem List   Diagnosis     Obesity     Generalized Convulsive Seizure     Hypercholesteremia     Type 2 diabetes mellitus with diabetic polyneuropathy, with long-term current use of insulin (H)     ROSSI on CPAP     Alcohol dependence in remission (H)     Morbid obesity (H)     Polyneuropathy associated with underlying disease (H24)     Ankle fracture     Seizure disorder (H)     Suicidal ideation     Ataxia     Severe episode of recurrent major depressive disorder, without psychotic features (H)       Past Medical History:   Diagnosis Date     Alcoholism (H)     early 1970s.     Depression     hospitalized 2003 suicidal ideation. Followed by Naseem Soto, psychologist.     Diabetes mellitus (H)      Hx of type B viral hepatitis      Infectious viral hepatitis      Lower leg edema     dependent edema left leg more.     Seizures (H)     well controlled, last seizure 1980, followed by Dr. Pastrana now.     Sleep apnea     uses CPAP       Past Surgical History:   Procedure Laterality Date     ANKLE FRACTURE SURGERY Left 2012     COLONOSCOPY       KNEE LIGAMENT RECONSTRUCTION Left 1981        Family History   Problem Relation Age of Onset     Glaucoma No family hx of      Macular Degeneration No family hx of        Social History     Tobacco Use     Smoking status: Former     Current packs/day: 0.00     Types: Cigarettes     Quit date: 2/15/1990     Years since quittin.6     Smokeless tobacco: Never   Substance Use Topics     Alcohol use: No         Exam:    Vitals: There were no vitals taken for this visit.  BMI: There is no height or weight on file to calculate BMI.  Height: Data Unavailable    Constitutional/ general:  Pt is in no apparent distress, appears well-nourished.  Cooperative with history and physical exam.     Psych:  The patient answered questions appropriately.  Normal affect.  Seems to have reasonable expectations, in terms of treatment.     Lungs:  Non labored breathing, non labored speech. No cough.  No audible wheezing. Even, quiet breathing.       Vascular:  positive  DP pulse.  negative PT pulse.  CFT < 3 sec.  positive ankle edema.  positive varicosities.  negative pedal hair growth.    Neuro:  Alert and oriented x 3. Coordinated gait.  Light touch sensation is diminished.   Monofilament absent to mid tibial region bilaterally.    Derm:  Skin thin, shiny, atrophic, with no hair growth noted.   No erythema, ecchymosis, or cyanosis.  No foot ulcers.    All nails are thickened, elongated, discolored with subungual debris.   No masses or breakdown in the skin bilaterally.  No erythema or ecchymosis noted bilateral.     Musculoskeletal:    Lower extremity muscle strength is normal.  Patient is ambulatory without an assistive device or brace.  No gross deformities.  Normal arch.                   Component  Ref Range & Units 1 mo ago  (24) 7 mo ago  (24) 10 mo ago  (11/10/23) 1 yr ago  (23) 2 yr ago  (22) 2 yr ago  (22) 2 yr ago  (3/30/22)    Hemoglobin A1C  0.0 - 5.6 % 8.5 High  7.2 High  CM 7.4 High  CM 6.7 High  CM 8.5 High  CM 9.8 High  CM 10.3 High   CM              A/P  Diabetes mellitus with PAD  Peripheral neuropathy with L OPS  Onychomycosis      Mycotic nails manually debrided with a .  Instructed patient he is lost his protective sensation.  Discussed the importance of watching feet daily and protecting the shoes.  Discussed signs of infection if notices any will contact myself or primary care immediately.  Will try to keep blood sugars well-controlled.  Discussed I do not do routine footcare.  Will refer to foot care nurse.  RTC as needed.  Thank you for allowing me participate in the care of this patient.        Mohan Dixon DPM, FACFAS               Again, thank you for allowing me to participate in the care of your patient.        Sincerely,        Mohan Dixon DPM

## 2024-10-18 ENCOUNTER — OFFICE VISIT (OUTPATIENT)
Dept: AUDIOLOGY | Facility: CLINIC | Age: 69
End: 2024-10-18
Payer: COMMERCIAL

## 2024-10-18 DIAGNOSIS — H90.3 SENSORINEURAL HEARING LOSS, BILATERAL: Primary | ICD-10-CM

## 2024-10-18 DIAGNOSIS — H91.90 HEARING LOSS, UNSPECIFIED HEARING LOSS TYPE, UNSPECIFIED LATERALITY: ICD-10-CM

## 2024-10-18 PROCEDURE — 92567 TYMPANOMETRY: CPT | Performed by: AUDIOLOGIST

## 2024-10-18 PROCEDURE — 92557 COMPREHENSIVE HEARING TEST: CPT | Performed by: AUDIOLOGIST

## 2024-10-18 NOTE — PROGRESS NOTES
AUDIOLOGY REPORT    SUBJECTIVE:  Isac Li is a 69 year old male who was seen in the Audiology Clinic at the [facility] for audiologic evaluation, referred by Azeb Brink D.O.  The patient reports a gradual decrease in hearing status bilaterally. The patient denies  bilateral tinnitus, bilateral otalgia, bilateral drainage, and bilateral aural fullness.  No previous ear surgeries reported. The patient notes difficulty with communication in a variety of listening situations, mostly complex environments, he notes that he needs more repetition within the last year than he did previously.     OBJECTIVE:    Fall Risk Screen:  Patient confirmed that his PCP is aware of any imbalance and dizziness concerns.    Otoscopic exam indicates clear left ear canal and cerumen in the right ear. Cerumen was removed with a lighted curette from the right ear until the cerumen was only partially occluding and part of the eardrum could be visualized.    Pure Tone Thresholds assessed using conventional audiometry with good  reliability from 250-8000 Hz bilaterally using insert earphones and circumaural headphones     RIGHT:  normal sloping to severe sensorineural hearing loss    LEFT:    normal sloping to severe sensorineural hearing loss    Tympanogram:    RIGHT: normal eardrum mobility    LEFT:   normal eardrum mobility    Speech Reception Threshold:    RIGHT: 25 dB HL    LEFT:   20 dB HL    Word Recognition Score:     RIGHT: 100% at 65 dB HL using NU-6 recorded word list.    LEFT:   92% at 65 dB HL using NU-6 recorded word list.      ASSESSMENT:     ICD-10-CM    1. Hearing loss, unspecified hearing loss type, unspecified laterality  H91.90 Adult Audiology  Referral           Today's results revealed a bilateral sensorineural hearing loss. Today s results were discussed with the patient in detail.     PLAN:  Patient was counseled regarding hearing loss and impact on  communication.  Patient is a good candidate for amplification at this time. A trial with amplification was reviewed and he expressed that he would check with his insurance prior to scheduling any hearing aid appointments. He was provided a copy of his hearing evaluation today.  It is recommended that the patient repeat the hearing evaluation in one year, sooner if concerns arise. It was also recommended that he follow-up with his PCP or an ENT for cerumen removal.  Please call this clinic with questions regarding these results or recommendations.        Rosa Hansen  Audiologist  [place] License  #[id number]

## 2024-11-14 DIAGNOSIS — Z79.4 TYPE 2 DIABETES MELLITUS WITH DIABETIC POLYNEUROPATHY, WITH LONG-TERM CURRENT USE OF INSULIN (H): Primary | ICD-10-CM

## 2024-11-14 DIAGNOSIS — E11.42 TYPE 2 DIABETES MELLITUS WITH DIABETIC POLYNEUROPATHY, WITH LONG-TERM CURRENT USE OF INSULIN (H): Primary | ICD-10-CM

## 2024-11-15 ENCOUNTER — TELEPHONE (OUTPATIENT)
Dept: FAMILY MEDICINE | Facility: CLINIC | Age: 69
End: 2024-11-15

## 2024-11-15 NOTE — TELEPHONE ENCOUNTER
First Attempt: I sent a my chart message to the patient to please contact our office to schedule a lab only appt to recheck his A1C early next week.

## 2024-11-15 NOTE — TELEPHONE ENCOUNTER
Type 2 diabetes mellitus with diabetic polyneuropathy, with long-term current use of insulin (H) (Primary)  - Hemoglobin A1c; Future      A1c due to repeat on/after 11/16/24.  Please schedule patient for lab only appointment early next week.     Azeb Brink, DO

## 2024-11-25 NOTE — TELEPHONE ENCOUNTER
Third Attempt: I mailed a letter to the patient to please contact our office to schedule a lab only appt to recheck his A1C. Closing until the patient responds.

## 2025-05-08 ENCOUNTER — TELEPHONE (OUTPATIENT)
Dept: FAMILY MEDICINE | Facility: CLINIC | Age: 70
End: 2025-05-08
Payer: COMMERCIAL

## 2025-05-08 NOTE — TELEPHONE ENCOUNTER
Nancy, a representitive with [employer] calling to check on code associate with 8/16/24 zio patch as they are trying to close out claim with patient's insurance. Relayed code linked to order. Nancy had no further questions or concerns.    María Montes RN

## 2025-05-21 NOTE — PLAN OF CARE
" Assessment/Intervention/Current Symtoms and Care Coordination  The patient's care was discussed with the treatment team and chart notes were reviewed.   Patient met with team. He continues to report depressive sx's- intermittently tearful. Patient continues to report intrusive thoughts of \"putting gun in my mouth\"  Patient has been compliant with meds, attending most groups.  Patient admitted that he has not been appropriately managing diabetes at home.  IM consulted. Diabetes educator will be ordered.    Discharge Plan or Goal  Patient will return home when stable/safe  Patient will be referred for outpatient Psychiatry  Will offer referral to  [facility] day treatment    Barriers to Discharge   Severity of depression  Ongoing SI  Medication mgmt per MD's    Referral Status  None today    Legal Status     Voluntary  " Officer at bedside for TDO